# Patient Record
Sex: MALE | Race: BLACK OR AFRICAN AMERICAN | Employment: UNEMPLOYED | URBAN - METROPOLITAN AREA
[De-identification: names, ages, dates, MRNs, and addresses within clinical notes are randomized per-mention and may not be internally consistent; named-entity substitution may affect disease eponyms.]

---

## 2017-01-16 ENCOUNTER — HOSPITAL ENCOUNTER (EMERGENCY)
Age: 52
Discharge: HOME | End: 2017-01-16
Attending: EMERGENCY MEDICINE
Payer: SELF-PAY

## 2017-01-16 VITALS
OXYGEN SATURATION: 100 % | TEMPERATURE: 98.5 F | DIASTOLIC BLOOD PRESSURE: 86 MMHG | BODY MASS INDEX: 37.81 KG/M2 | SYSTOLIC BLOOD PRESSURE: 140 MMHG | HEART RATE: 97 BPM | WEIGHT: 240.9 LBS | RESPIRATION RATE: 17 BRPM | HEIGHT: 67 IN

## 2017-01-16 DIAGNOSIS — I83.891 BLEEDING FROM VARICOSE VEIN, RIGHT: Primary | ICD-10-CM

## 2017-01-16 PROCEDURE — 99283 EMERGENCY DEPT VISIT LOW MDM: CPT

## 2017-01-16 PROCEDURE — 74011250637 HC RX REV CODE- 250/637: Performed by: PHYSICIAN ASSISTANT

## 2017-01-16 RX ORDER — HYDROCODONE BITARTRATE AND ACETAMINOPHEN 5; 325 MG/1; MG/1
2 TABLET ORAL
Status: DISCONTINUED | OUTPATIENT
Start: 2017-01-16 | End: 2017-01-16

## 2017-01-16 RX ORDER — HYDROCODONE BITARTRATE AND ACETAMINOPHEN 5; 325 MG/1; MG/1
1 TABLET ORAL
Qty: 20 TAB | Refills: 0 | Status: SHIPPED | OUTPATIENT
Start: 2017-01-16 | End: 2017-01-16

## 2017-01-16 RX ADMIN — BACITRACIN, NEOMYCIN, POLYMYXIN B 1 PACKET: 400; 3.5; 5 OINTMENT TOPICAL at 19:34

## 2017-01-16 NOTE — ED NOTES
Emergency Department Nursing Plan of Care       The Nursing Plan of Care is developed from the Nursing assessment and Emergency Department Attending provider initial evaluation. The plan of care may be reviewed in the ED Provider note.     The Plan of Care was developed with the following considerations:   Patient / Family readiness to learn indicated by:verbalized understanding  Persons(s) to be included in education: patient  Barriers to Learning/Limitations:No    Signed     Peyton Zarate RN    1/16/2017   6:58 PM

## 2017-01-16 NOTE — ED TRIAGE NOTES
Pt presents via EMS from home with complaints of spontaneous \"squirting\" bleeding from RLE varicose vein.

## 2017-01-17 NOTE — ED NOTES
Patient given copy of dc instructions. Patient verbalized understanding of instructions and script (s). Patient given a current medication reconciliation form and verbalized understanding of their medications. Patient verbalized understanding of the importance of discussing medications with  his or her physician or clinic when they follow up. Patient alert and oriented and in no acute distress. Pt verbalizes pain scale of 0 out of 10. Patient discharged home ambulatory without assistance.

## 2017-01-17 NOTE — DISCHARGE INSTRUCTIONS
Varicose Veins: Care Instructions  Your Care Instructions  Varicose veins are twisted, enlarged veins near the surface of the skin. They develop most often in the legs and ankles. Some people may be more likely than others to get varicose veins because of aging or hormone changes or because a parent has them. Being overweight or pregnant can make varicose veins worse. Jobs that require standing for long periods of time also can make them worse. Follow-up care is a key part of your treatment and safety. Be sure to make and go to all appointments, and call your doctor if you are having problems. It's also a good idea to know your test results and keep a list of the medicines you take. How can you care for yourself at home? · Wear compression stockings during the day to help relieve symptoms. They improve blood flow and are the main treatment for varicose veins. Talk to your doctor about which ones to get and where to get them. · Prop up your legs at or above the level of your heart when possible. This helps keep the blood from pooling in your lower legs and improves blood flow to the rest of your body. · Avoid sitting and standing for long periods. This puts added stress on your veins. · Get regular exercise, and control your weight. Walk, bicycle, or swim to improve blood flow in your legs. · If you bump your leg so hard that you know it is likely to bruise, prop up your leg and put ice or a cold pack on the area for 10 to 20 minutes at a time. Try to do this every 1 to 2 hours for the next 3 days (when you are awake) or until the swelling goes down. Put a thin cloth between the ice and your skin. · If you cut or scratch the skin over a vein, it may bleed a lot. Prop up your leg and apply firm pressure with a clean bandage over the site of the bleeding. Continue to apply pressure for a full 15 minutes. Do not check sooner to see if the bleeding has stopped.  If the bleeding has not stopped after 15 minutes, apply pressure again for another 15 minutes. You can repeat this up to 3 times for a total of 45 minutes. If you have a blood clot in a varicose vein, you may have tenderness and swelling over the vein. The vein may feel firm. Be sure to call your doctor right away if you have these symptoms. If your doctor has told you how to care for the clot, follow his or her instructions. Care may include the following:  · Prop up your leg and apply heat with a warm, damp cloth or a heating pad set on low (put a towel or cloth between your leg and the heating pad to prevent burns). · Ask your doctor if you can take an over-the-counter pain medicine, such as acetaminophen (Tylenol), ibuprofen (Advil, Motrin), or naproxen (Aleve). Be safe with medicines. Read and follow all instructions on the label. When should you call for help? Call 911 anytime you think you may need emergency care. For example, call if:  · You have sudden chest pain and shortness of breath, or you cough up blood. Call your doctor now or seek immediate medical care if:  · You have signs of a blood clot, such as:  ¨ Pain in your calf, back of the knee, thigh, or groin. ¨ Redness and swelling in your leg or groin. · A varicose vein begins to bleed and you cannot stop it. · You have a tender lump in your leg. · You get an open sore. Watch closely for changes in your health, and be sure to contact your doctor if:  · Your varicose vein symptoms do not improve with home treatment. Where can you learn more? Go to http://peggy-austyn.info/. Enter P698 in the search box to learn more about \"Varicose Veins: Care Instructions. \"  Current as of: June 4, 2016  Content Version: 11.1  © 4379-2792 [employer]. Care instructions adapted under license by [employer] (which disclaims liability or warranty for this information).  If you have questions about a medical condition or this instruction, always ask your healthcare professional. [address] any warranty or liability for your use of this information.

## 2017-01-17 NOTE — ED NOTES
Dressing applied to right lower leg. Ace wrap reapplied over dressing for moderate pressure. Pt give additional supplies for dressing change.

## 2017-01-17 NOTE — ED PROVIDER NOTES
Patient is a 46 y.o. male presenting with ecchymosis. The history is provided by the patient. Bleeding/Bruising   This is a new problem. The current episode started less than 1 hour ago (Pt says had a varicose vein rupture R anterior leg bleeding profusely until EMS arrived and applied dressing). Pertinent negatives include no chest pain, no abdominal pain, no headaches and no shortness of breath. The symptoms are aggravated by standing. Past Medical History:   Diagnosis Date    Asthma        History reviewed. No pertinent past surgical history. History reviewed. No pertinent family history. Social History     Social History    Marital status: N/A     Spouse name: N/A    Number of children: N/A    Years of education: N/A     Occupational History    Not on file. Social History Main Topics    Smoking status: Light Tobacco Smoker    Smokeless tobacco: Not on file    Alcohol use Yes      Comment: social    Drug use: No    Sexual activity: Not on file     Other Topics Concern    Not on file     Social History Narrative    No narrative on file         ALLERGIES: Review of patient's allergies indicates no known allergies. Review of Systems   Constitutional: Negative for chills and fever. HENT: Negative for congestion. Eyes: Negative for redness. Respiratory: Negative for shortness of breath. Cardiovascular: Negative for chest pain. Gastrointestinal: Negative for abdominal pain. Genitourinary: Negative for dysuria, enuresis, flank pain, frequency and genital sores. Neurological: Negative for headaches. Vitals:    01/16/17 1847   BP: 140/86   Pulse: 97   Resp: 17   Temp: 98.5 °F (36.9 °C)   SpO2: 100%   Weight: 109.3 kg (240 lb 14.4 oz)   Height: 5' 7.2\" (1.707 m)            Physical Exam   Constitutional: He is oriented to person, place, and time. He appears well-developed and well-nourished. HENT:   Head: Normocephalic and atraumatic.    Eyes: EOM are normal. Pupils are equal, round, and reactive to light. Neck: Normal range of motion. Neck supple. Cardiovascular: Normal rate, regular rhythm and normal heart sounds. Pulmonary/Chest: Effort normal and breath sounds normal.   Abdominal: Soft. Bowel sounds are normal.   Musculoskeletal: Normal range of motion. He exhibits no edema, tenderness or deformity. After unwrapping dressing a 3 cm reba area of confluent varicose veins were revealed on ant. R mid lower leg. Bleeding had stopped. Neurological: He is alert and oriented to person, place, and time. He has normal reflexes. Skin: Skin is warm and dry. Psychiatric: He has a normal mood and affect.         MDM  Number of Diagnoses or Management Options  Diagnosis management comments: Dr Savita Markham saw pt and applied pressure dressing    ED Course       Procedures

## 2021-02-25 PROCEDURE — 99284 EMERGENCY DEPT VISIT MOD MDM: CPT

## 2021-02-26 ENCOUNTER — APPOINTMENT (OUTPATIENT)
Dept: ULTRASOUND IMAGING | Age: 56
DRG: 420 | End: 2021-02-26
Attending: HOSPITALIST
Payer: MEDICAID

## 2021-02-26 ENCOUNTER — APPOINTMENT (OUTPATIENT)
Dept: NON INVASIVE DIAGNOSTICS | Age: 56
DRG: 420 | End: 2021-02-26
Attending: HOSPITALIST
Payer: MEDICAID

## 2021-02-26 ENCOUNTER — APPOINTMENT (OUTPATIENT)
Dept: GENERAL RADIOLOGY | Age: 56
DRG: 420 | End: 2021-02-26
Attending: HOSPITALIST
Payer: MEDICAID

## 2021-02-26 ENCOUNTER — HOSPITAL ENCOUNTER (INPATIENT)
Age: 56
LOS: 5 days | Discharge: HOME HEALTH | DRG: 420 | End: 2021-03-03
Attending: EMERGENCY MEDICINE | Admitting: HOSPITALIST
Payer: MEDICAID

## 2021-02-26 DIAGNOSIS — L03.115 CELLULITIS OF RIGHT LOWER EXTREMITY: Primary | ICD-10-CM

## 2021-02-26 DIAGNOSIS — R60.0 BILATERAL LEG EDEMA: ICD-10-CM

## 2021-02-26 PROBLEM — E88.09 HYPOALBUMINEMIA: Status: ACTIVE | Noted: 2021-02-26

## 2021-02-26 PROBLEM — R73.9 HYPERGLYCEMIA: Status: ACTIVE | Noted: 2021-02-26

## 2021-02-26 LAB
ALBUMIN SERPL-MCNC: 2.6 G/DL (ref 3.5–5)
ALBUMIN/GLOB SERPL: 0.5 {RATIO} (ref 1.1–2.2)
ALP SERPL-CCNC: 69 U/L (ref 45–117)
ALT SERPL-CCNC: 21 U/L (ref 12–78)
ANION GAP SERPL CALC-SCNC: 7 MMOL/L (ref 5–15)
AST SERPL-CCNC: 36 U/L (ref 15–37)
BASOPHILS # BLD: 0 K/UL (ref 0–0.1)
BASOPHILS NFR BLD: 0 % (ref 0–1)
BILIRUB SERPL-MCNC: 0.2 MG/DL (ref 0.2–1)
BNP SERPL-MCNC: 17 PG/ML
BUN SERPL-MCNC: 16 MG/DL (ref 6–20)
BUN/CREAT SERPL: 18 (ref 12–20)
CALCIUM SERPL-MCNC: 8.1 MG/DL (ref 8.5–10.1)
CHLORIDE SERPL-SCNC: 106 MMOL/L (ref 97–108)
CO2 SERPL-SCNC: 25 MMOL/L (ref 21–32)
CREAT SERPL-MCNC: 0.88 MG/DL (ref 0.7–1.3)
DIFFERENTIAL METHOD BLD: ABNORMAL
ECHO AO ASC DIAM: 3.1 CM
ECHO AV AREA PEAK VELOCITY: 1.74 CM2
ECHO AV AREA PEAK VELOCITY: 1.89 CM2
ECHO AV AREA VTI: 1.84 CM2
ECHO AV AREA/BSA VTI: 0.8 CM2/M2
ECHO AV MEAN GRADIENT: 13.32 MMHG
ECHO AV PEAK GRADIENT: 21.78 MMHG
ECHO AV PEAK VELOCITY: 233.31 CM/S
ECHO AV VTI: 41.9 CM
ECHO EST RA PRESSURE: 10 MMHG
ECHO LA AREA 4C: 19.21 CM2
ECHO LA TO AORTIC ROOT RATIO: 1.09
ECHO LA VOL 4C: 53.48 ML (ref 18–58)
ECHO LA VOLUME INDEX A4C: 24.55 ML/M2 (ref 16–28)
ECHO LV INTERNAL DIMENSION DIASTOLIC MMODE: 5.54 CM
ECHO LV INTERNAL DIMENSION SYSTOLIC MMODE: 3.52 CM
ECHO LV IVSD MMODE: 1.04 CM
ECHO LV IVSS MMODE: 1.73 CM
ECHO LV POSTERIOR WALL DIASTOLIC MMODE: 0.92 CM
ECHO LV POSTERIOR WALL SYSTOLIC MMODE: 1.44 CM
ECHO LVOT CARDIAC OUTPUT: 8.27 LITER/MINUTE
ECHO LVOT DIAM: 2.09 CM
ECHO LVOT PEAK GRADIENT: 5.64 MMHG
ECHO LVOT PEAK GRADIENT: 6.65 MMHG
ECHO LVOT PEAK VELOCITY: 118.58 CM/S
ECHO LVOT PEAK VELOCITY: 128.95 CM/S
ECHO LVOT SV: 77 ML
ECHO LVOT VTI: 22.46 CM
ECHO PV PEAK INSTANTANEOUS GRADIENT SYSTOLIC: 5.04 MMHG
ECHO PV REGURGITANT MAX VELOCITY: 112.13 CM/S
ECHO RIGHT VENTRICULAR SYSTOLIC PRESSURE (RVSP): 19.53 MMHG
ECHO RV INTERNAL DIMENSION: 3.99 CM
ECHO TV REGURGITANT MAX VELOCITY: 154.34 CM/S
ECHO TV REGURGITANT MAX VELOCITY: 180.54 CM/S
ECHO TV REGURGITANT PEAK GRADIENT: 9.53 MMHG
EOSINOPHIL # BLD: 0.3 K/UL (ref 0–0.4)
EOSINOPHIL NFR BLD: 5 % (ref 0–7)
ERYTHROCYTE [DISTWIDTH] IN BLOOD BY AUTOMATED COUNT: 12.5 % (ref 11.5–14.5)
EST. AVERAGE GLUCOSE BLD GHB EST-MCNC: 240 MG/DL
GLOBULIN SER CALC-MCNC: 4.9 G/DL (ref 2–4)
GLUCOSE BLD STRIP.AUTO-MCNC: 192 MG/DL (ref 65–100)
GLUCOSE BLD STRIP.AUTO-MCNC: 200 MG/DL (ref 65–100)
GLUCOSE BLD STRIP.AUTO-MCNC: 219 MG/DL (ref 65–100)
GLUCOSE BLD STRIP.AUTO-MCNC: 220 MG/DL (ref 65–100)
GLUCOSE SERPL-MCNC: 303 MG/DL (ref 65–100)
HBA1C MFR BLD: 10 % (ref 4–5.6)
HCT VFR BLD AUTO: 34.5 % (ref 36.6–50.3)
HGB BLD-MCNC: 10.4 G/DL (ref 12.1–17)
IMM GRANULOCYTES # BLD AUTO: 0 K/UL (ref 0–0.04)
IMM GRANULOCYTES NFR BLD AUTO: 0 % (ref 0–0.5)
LACTATE SERPL-SCNC: 1.5 MMOL/L (ref 0.4–2)
LVOT MG: 3.27 MMHG
LYMPHOCYTES # BLD: 1.1 K/UL (ref 0.8–3.5)
LYMPHOCYTES NFR BLD: 20 % (ref 12–49)
MCH RBC QN AUTO: 26.1 PG (ref 26–34)
MCHC RBC AUTO-ENTMCNC: 30.1 G/DL (ref 30–36.5)
MCV RBC AUTO: 86.5 FL (ref 80–99)
MONOCYTES # BLD: 0.5 K/UL (ref 0–1)
MONOCYTES NFR BLD: 8 % (ref 5–13)
NEUTS SEG # BLD: 3.8 K/UL (ref 1.8–8)
NEUTS SEG NFR BLD: 67 % (ref 32–75)
NRBC # BLD: 0 K/UL (ref 0–0.01)
NRBC BLD-RTO: 0 PER 100 WBC
PLATELET # BLD AUTO: 188 K/UL (ref 150–400)
PMV BLD AUTO: 11.9 FL (ref 8.9–12.9)
POTASSIUM SERPL-SCNC: 4.3 MMOL/L (ref 3.5–5.1)
PROT SERPL-MCNC: 7.5 G/DL (ref 6.4–8.2)
RBC # BLD AUTO: 3.99 M/UL (ref 4.1–5.7)
SERVICE CMNT-IMP: ABNORMAL
SODIUM SERPL-SCNC: 138 MMOL/L (ref 136–145)
WBC # BLD AUTO: 5.8 K/UL (ref 4.1–11.1)

## 2021-02-26 PROCEDURE — 87040 BLOOD CULTURE FOR BACTERIA: CPT

## 2021-02-26 PROCEDURE — 65270000015 HC RM PRIVATE ONCOLOGY

## 2021-02-26 PROCEDURE — 97161 PT EVAL LOW COMPLEX 20 MIN: CPT

## 2021-02-26 PROCEDURE — 83880 ASSAY OF NATRIURETIC PEPTIDE: CPT

## 2021-02-26 PROCEDURE — 97165 OT EVAL LOW COMPLEX 30 MIN: CPT

## 2021-02-26 PROCEDURE — 74011250637 HC RX REV CODE- 250/637: Performed by: HOSPITALIST

## 2021-02-26 PROCEDURE — 97530 THERAPEUTIC ACTIVITIES: CPT

## 2021-02-26 PROCEDURE — 77030040718 HC DRSG HYDRGEL SKINTEGRITY MDII -A

## 2021-02-26 PROCEDURE — 74011250637 HC RX REV CODE- 250/637: Performed by: INTERNAL MEDICINE

## 2021-02-26 PROCEDURE — 93970 EXTREMITY STUDY: CPT

## 2021-02-26 PROCEDURE — 74011250636 HC RX REV CODE- 250/636: Performed by: HOSPITALIST

## 2021-02-26 PROCEDURE — 83605 ASSAY OF LACTIC ACID: CPT

## 2021-02-26 PROCEDURE — 80053 COMPREHEN METABOLIC PANEL: CPT

## 2021-02-26 PROCEDURE — 71045 X-RAY EXAM CHEST 1 VIEW: CPT

## 2021-02-26 PROCEDURE — 85025 COMPLETE CBC W/AUTO DIFF WBC: CPT

## 2021-02-26 PROCEDURE — 74011250637 HC RX REV CODE- 250/637: Performed by: EMERGENCY MEDICINE

## 2021-02-26 PROCEDURE — 36415 COLL VENOUS BLD VENIPUNCTURE: CPT

## 2021-02-26 PROCEDURE — 2709999900 HC NON-CHARGEABLE SUPPLY

## 2021-02-26 PROCEDURE — 74011250636 HC RX REV CODE- 250/636: Performed by: EMERGENCY MEDICINE

## 2021-02-26 PROCEDURE — 83036 HEMOGLOBIN GLYCOSYLATED A1C: CPT

## 2021-02-26 PROCEDURE — 74011000258 HC RX REV CODE- 258: Performed by: HOSPITALIST

## 2021-02-26 PROCEDURE — 74011636637 HC RX REV CODE- 636/637: Performed by: HOSPITALIST

## 2021-02-26 PROCEDURE — 82962 GLUCOSE BLOOD TEST: CPT

## 2021-02-26 PROCEDURE — 93306 TTE W/DOPPLER COMPLETE: CPT

## 2021-02-26 RX ORDER — SODIUM CHLORIDE 0.9 % (FLUSH) 0.9 %
5-40 SYRINGE (ML) INJECTION EVERY 8 HOURS
Status: DISCONTINUED | OUTPATIENT
Start: 2021-02-26 | End: 2021-03-03 | Stop reason: HOSPADM

## 2021-02-26 RX ORDER — MAGNESIUM SULFATE 100 %
4 CRYSTALS MISCELLANEOUS AS NEEDED
Status: DISCONTINUED | OUTPATIENT
Start: 2021-02-26 | End: 2021-03-03 | Stop reason: HOSPADM

## 2021-02-26 RX ORDER — PROMETHAZINE HYDROCHLORIDE 25 MG/1
12.5 TABLET ORAL
Status: DISCONTINUED | OUTPATIENT
Start: 2021-02-26 | End: 2021-03-03 | Stop reason: HOSPADM

## 2021-02-26 RX ORDER — ONDANSETRON 2 MG/ML
4 INJECTION INTRAMUSCULAR; INTRAVENOUS
Status: DISCONTINUED | OUTPATIENT
Start: 2021-02-26 | End: 2021-03-03 | Stop reason: HOSPADM

## 2021-02-26 RX ORDER — INSULIN GLARGINE 100 [IU]/ML
25 INJECTION, SOLUTION SUBCUTANEOUS DAILY
COMMUNITY
End: 2022-07-20 | Stop reason: SDUPTHER

## 2021-02-26 RX ORDER — FUROSEMIDE 10 MG/ML
20 INJECTION INTRAMUSCULAR; INTRAVENOUS DAILY
Status: DISCONTINUED | OUTPATIENT
Start: 2021-02-26 | End: 2021-03-02

## 2021-02-26 RX ORDER — ACETAMINOPHEN 325 MG/1
650 TABLET ORAL
Status: DISCONTINUED | OUTPATIENT
Start: 2021-02-26 | End: 2021-03-03 | Stop reason: HOSPADM

## 2021-02-26 RX ORDER — INSULIN LISPRO 100 [IU]/ML
INJECTION, SOLUTION INTRAVENOUS; SUBCUTANEOUS
Status: DISCONTINUED | OUTPATIENT
Start: 2021-02-26 | End: 2021-03-03 | Stop reason: HOSPADM

## 2021-02-26 RX ORDER — DEXTROSE 50 % IN WATER (D50W) INTRAVENOUS SYRINGE
12.5-25 AS NEEDED
Status: DISCONTINUED | OUTPATIENT
Start: 2021-02-26 | End: 2021-03-03 | Stop reason: HOSPADM

## 2021-02-26 RX ORDER — GLIPIZIDE 10 MG/1
10 TABLET ORAL 2 TIMES DAILY
COMMUNITY
End: 2021-08-11 | Stop reason: ALTCHOICE

## 2021-02-26 RX ORDER — ENOXAPARIN SODIUM 100 MG/ML
40 INJECTION SUBCUTANEOUS DAILY
Status: DISCONTINUED | OUTPATIENT
Start: 2021-02-26 | End: 2021-03-03 | Stop reason: HOSPADM

## 2021-02-26 RX ORDER — OXYCODONE AND ACETAMINOPHEN 5; 325 MG/1; MG/1
1 TABLET ORAL
Status: DISCONTINUED | OUTPATIENT
Start: 2021-02-26 | End: 2021-03-02

## 2021-02-26 RX ORDER — ACETAMINOPHEN 650 MG/1
650 SUPPOSITORY RECTAL
Status: DISCONTINUED | OUTPATIENT
Start: 2021-02-26 | End: 2021-03-03 | Stop reason: HOSPADM

## 2021-02-26 RX ORDER — POLYETHYLENE GLYCOL 3350 17 G/17G
17 POWDER, FOR SOLUTION ORAL DAILY PRN
Status: DISCONTINUED | OUTPATIENT
Start: 2021-02-26 | End: 2021-03-03 | Stop reason: HOSPADM

## 2021-02-26 RX ORDER — METFORMIN HYDROCHLORIDE 500 MG/1
2 TABLET, FILM COATED, EXTENDED RELEASE ORAL 2 TIMES DAILY
COMMUNITY

## 2021-02-26 RX ORDER — ATORVASTATIN CALCIUM 40 MG/1
40 TABLET, FILM COATED ORAL DAILY
COMMUNITY
End: 2022-02-24 | Stop reason: SDUPTHER

## 2021-02-26 RX ORDER — FUROSEMIDE 10 MG/ML
40 INJECTION INTRAMUSCULAR; INTRAVENOUS ONCE
Status: COMPLETED | OUTPATIENT
Start: 2021-02-26 | End: 2021-02-26

## 2021-02-26 RX ORDER — SODIUM CHLORIDE 0.9 % (FLUSH) 0.9 %
5-40 SYRINGE (ML) INJECTION AS NEEDED
Status: DISCONTINUED | OUTPATIENT
Start: 2021-02-26 | End: 2021-03-03 | Stop reason: HOSPADM

## 2021-02-26 RX ORDER — FUROSEMIDE 20 MG/1
20 TABLET ORAL DAILY
Status: ON HOLD | COMMUNITY
End: 2021-07-15 | Stop reason: SDUPTHER

## 2021-02-26 RX ORDER — OXYCODONE AND ACETAMINOPHEN 5; 325 MG/1; MG/1
2 TABLET ORAL
Status: COMPLETED | OUTPATIENT
Start: 2021-02-26 | End: 2021-02-26

## 2021-02-26 RX ADMIN — INSULIN LISPRO 2 UNITS: 100 INJECTION, SOLUTION INTRAVENOUS; SUBCUTANEOUS at 17:42

## 2021-02-26 RX ADMIN — AMPICILLIN SODIUM AND SULBACTAM SODIUM 3 G: 2; 1 INJECTION, POWDER, FOR SOLUTION INTRAMUSCULAR; INTRAVENOUS at 12:54

## 2021-02-26 RX ADMIN — FUROSEMIDE 20 MG: 10 INJECTION, SOLUTION INTRAMUSCULAR; INTRAVENOUS at 08:46

## 2021-02-26 RX ADMIN — ACETAMINOPHEN 650 MG: 325 TABLET ORAL at 20:23

## 2021-02-26 RX ADMIN — OXYCODONE HYDROCHLORIDE AND ACETAMINOPHEN 1 TABLET: 5; 325 TABLET ORAL at 13:25

## 2021-02-26 RX ADMIN — OXYCODONE HYDROCHLORIDE AND ACETAMINOPHEN 2 TABLET: 5; 325 TABLET ORAL at 01:06

## 2021-02-26 RX ADMIN — ACETAMINOPHEN 650 MG: 325 TABLET ORAL at 08:46

## 2021-02-26 RX ADMIN — FUROSEMIDE 40 MG: 10 INJECTION, SOLUTION INTRAMUSCULAR; INTRAVENOUS at 04:17

## 2021-02-26 RX ADMIN — Medication 10 ML: at 13:08

## 2021-02-26 RX ADMIN — AMPICILLIN SODIUM AND SULBACTAM SODIUM 3 G: 2; 1 INJECTION, POWDER, FOR SOLUTION INTRAMUSCULAR; INTRAVENOUS at 18:11

## 2021-02-26 RX ADMIN — INSULIN LISPRO 2 UNITS: 100 INJECTION, SOLUTION INTRAVENOUS; SUBCUTANEOUS at 12:56

## 2021-02-26 RX ADMIN — INSULIN LISPRO 2 UNITS: 100 INJECTION, SOLUTION INTRAVENOUS; SUBCUTANEOUS at 08:46

## 2021-02-26 RX ADMIN — AMPICILLIN SODIUM AND SULBACTAM SODIUM 3 G: 2; 1 INJECTION, POWDER, FOR SOLUTION INTRAMUSCULAR; INTRAVENOUS at 06:01

## 2021-02-26 RX ADMIN — ENOXAPARIN SODIUM 40 MG: 40 INJECTION SUBCUTANEOUS at 08:46

## 2021-02-26 NOTE — PROGRESS NOTES
End of Shift Note    Bedside shift change report given to [facility] (oncoming nurse) by Noe Rodriguez (offgoing nurse). Report included the following information SBAR, Kardex and MAR    Shift worked: Day shift. Shift summary and any significant changes:     Patient tolerated care fairly throughout shift. Hourly rounding completed. Medications given and education provided regarding all meds. Patient up x1 to the bedside. Complaints of pain treated with PRN meds. Wound care completed. Concerns for physician to address:       Zone phone for oncoming shift:          Activity:  Activity Level:  Up with Assistance  Number times ambulated in hallways past shift: 0, patient up in their room  Number of times OOB to chair past shift: 1    Cardiac:   Cardiac Monitoring: Yes           Access:   Current line(s): PIV     Genitourinary:   Urinary status: voiding    Respiratory:   O2 Device: Room air  Chronic home O2 use?: NO  Incentive spirometer at bedside: NO     GI:     Current diet:  DIET DIABETIC CONSISTENT CARB Regular  Passing flatus: YES  Tolerating current diet: YES       Pain Management:   Patient states pain is manageable on current regimen: YES    Skin:     Interventions: float heels, increase time out of bed and PT/OT consult    Patient Safety:  Fall Score:    Interventions: bed/chair alarm, gripper socks and pt to call before getting OOB       Length of Stay:  Expected LOS: 3d 4h  Actual LOS: 0      Noe Rodriguez

## 2021-02-26 NOTE — PROGRESS NOTES
Pharmacy Clarification of the Prior to Admission Medication Regimen Retrospective to the Admission Medication Reconciliation    The patient was  interviewed regarding clarification of the prior to admission medication regimen. Patient was questioned regarding use of any other inhalers, topical products, over the counter medications, herbal medications, vitamin products or ophthalmic/nasal/otic medication use. Information Obtained From: Patient    Recommendations/Findings: The following amendments were made to the patient's active medication list on file at [facility]:     1) Additions:   Atorvastatin  Furosemide  Glipizide  Lantus  Metformin ER      2) Removals: 0      3) Changes: 0      4) Pertinent Pharmacy Findings:  Updated patients preferred outpatient pharmacy to:  [facility] on [address] [facility] medication list was corrected to the following:     Prior to Admission Medications   Prescriptions Last Dose Informant Taking?   atorvastatin (LIPITOR) 40 mg tablet  Self Yes   Sig: Take 40 mg by mouth daily. furosemide (LASIX) 20 mg tablet  Self Yes   Sig: Take 20 mg by mouth daily. glipiZIDE (GLUCOTROL) 10 mg tablet  Self Yes   Sig: Take 10 mg by mouth two (2) times a day. insulin glargine (Lantus U-100 Insulin) 100 unit/mL injection  Self Yes   Si Units by SubCUTAneous route daily. metFORMIN (GLUMETZA ER) 500 mg TG24 24 hour tablet  Self Yes   Sig: Take 2 Tabs by mouth two (2) times a day.       Facility-Administered Medications: None        Thank you,  Sofia Silva CPhT  Medication History Pharmacy Technician

## 2021-02-26 NOTE — ED PROVIDER NOTES
EMERGENCY DEPARTMENT HISTORY AND PHYSICAL EXAM    Please note that this dictation was completed with [employer], the computer voice recognition software. Quite often unanticipated grammatical, syntax, homophones, and other interpretive errors are inadvertently transcribed by the computer software. Please disregard these errors. Please excuse any errors that have escaped final proofreading. Date: 2/26/2021  Patient Name: Luis Escalona  Patient Age and Sex: 64 y.o. male    History of Presenting Illness     Chief Complaint   Patient presents with    Leg Pain     pt arrives to the ED via EMS from home with c/o right leg swelling, pain, and exortiation x several weeks. pt states he was satrted on lasix a few weeks ago and has been taking 800mg tyelnol for pain. pain 10/10 at this time       History Provided By: Patient    HPI: Luis Escalona, is a 64 y.o. male whose medical history is noted below and includes prostate cancer, varicose veins, obesity, presents to the ED with acute and severe pain in his right leg. Pain started a week ago, but acutely worsened over the last 24 hours. He has now trouble walking. Patient does not have a primary care doctor and the only doctor he sees is his \"prostate doctor\". This physician started him on 20 mg of Lasix recently. He is taking that daily but has not noticed any increase or change in his urine output. He has bilateral lower extremity edema that he reports has been worsening over few weeks. In addition to the pain in his right leg, he noticed yesterday that his pant leg above the knee has became soaked with fluid which is draining from the new skin excoriations in his right leg, most notably calf. No fevers or chills. No systemic symptoms otherwise. Patient lives alone, states that he does not have any help at home including with wound care.   He is unable to reach the lower extremities sufficiently to provide himself with sufficient wound care.    Pt denies any other alleviating or exacerbating factors. No other associated signs or symptoms. There are no other complaints, changes or physical findings at this time. PCP: None    Past History   All documented elements of the [facility] reviewed and verified by me. -Fartun Gutierrez MD    Past Medical History:  Past Medical History:   Diagnosis Date    Asthma        Past Surgical History:  History reviewed. No pertinent surgical history. Family History:  History reviewed. No pertinent family history. Social History:  Social History     Tobacco Use    Smoking status: Light Tobacco Smoker   Substance Use Topics    Alcohol use: Yes     Comment: social    Drug use: No       Allergies:  No Known Allergies    Review of Systems   All other systems reviewed and negative    Review of Systems   Constitutional: Negative for appetite change and fever. HENT: Negative. Eyes: Negative. Respiratory: Negative for cough and shortness of breath. Cardiovascular: Positive for leg swelling. Negative for chest pain and palpitations. Gastrointestinal: Negative for abdominal pain, nausea and vomiting. Endocrine: Negative. Genitourinary: Negative for dysuria, flank pain and hematuria. Musculoskeletal: Negative for back pain and joint swelling. Skin: Positive for color change and wound. Neurological: Negative for dizziness, weakness, light-headedness, numbness and headaches. Hematological: Negative for adenopathy. All other systems reviewed and are negative. Physical Exam   Reviewed patients vital signs and nursing note    Physical Exam  Vitals signs and nursing note reviewed. Constitutional:       Appearance: He is obese. HENT:      Head: Atraumatic. Mouth/Throat:      Mouth: Mucous membranes are moist.   Eyes:      General: No scleral icterus. Extraocular Movements: Extraocular movements intact.       Conjunctiva/sclera: Conjunctivae normal.      Pupils: Pupils are equal, round, and reactive to light.   Neck:      Musculoskeletal: Normal range of motion and neck supple.   Cardiovascular:      Rate and Rhythm: Normal rate and regular rhythm.      Pulses: Normal pulses.      Heart sounds: Normal heart sounds.   Pulmonary:      Effort: Pulmonary effort is normal.      Breath sounds: Normal breath sounds.   Abdominal:      Palpations: Abdomen is soft.      Tenderness: There is no abdominal tenderness.   Musculoskeletal: Normal range of motion.      Right lower leg: Edema present.      Left lower leg: Edema present.   Skin:     General: Skin is warm.      Capillary Refill: Capillary refill takes less than 2 seconds.      Comments: Right lower extremity below knee has multiple skin excoriations likely from lower extremity edema.     Neurological:      General: No focal deficit present.   Psychiatric:         Mood and Affect: Mood normal.         Behavior: Behavior normal.         Diagnostic Study Results     Labs - I have personally reviewed and interpreted all laboratory results. Meka Kumar MD, MSc  Recent Results (from the past 24 hour(s))   CBC WITH AUTOMATED DIFF    Collection Time: 02/26/21 12:19 AM   Result Value Ref Range    WBC 5.8 4.1 - 11.1 K/uL    RBC 3.99 (L) 4.10 - 5.70 M/uL    HGB 10.4 (L) 12.1 - 17.0 g/dL    HCT 34.5 (L) 36.6 - 50.3 %    MCV 86.5 80.0 - 99.0 FL    MCH 26.1 26.0 - 34.0 PG    MCHC 30.1 30.0 - 36.5 g/dL    RDW 12.5 11.5 - 14.5 %    PLATELET 188 150 - 400 K/uL    MPV 11.9 8.9 - 12.9 FL    NRBC 0.0 0  WBC    ABSOLUTE NRBC 0.00 0.00 - 0.01 K/uL    NEUTROPHILS 67 32 - 75 %    LYMPHOCYTES 20 12 - 49 %    MONOCYTES 8 5 - 13 %    EOSINOPHILS 5 0 - 7 %    BASOPHILS 0 0 - 1 %    IMMATURE GRANULOCYTES 0 0.0 - 0.5 %    ABS. NEUTROPHILS 3.8 1.8 - 8.0 K/UL    ABS. LYMPHOCYTES 1.1 0.8 - 3.5 K/UL    ABS. MONOCYTES 0.5 0.0 - 1.0 K/UL    ABS. EOSINOPHILS 0.3 0.0 - 0.4 K/UL    ABS. BASOPHILS 0.0 0.0 - 0.1 K/UL    ABS. IMM. GRANS. 0.0 0.00 - 0.04 K/UL    DF AUTOMATED    METABOLIC PANEL, COMPREHENSIVE    Collection Time: 02/26/21 12:19 AM   Result Value Ref Range    Sodium 138 136 - 145 mmol/L    Potassium 4.3 3.5 - 5.1 mmol/L    Chloride 106 97 - 108 mmol/L    CO2 25 21 - 32 mmol/L    Anion gap 7 5 - 15 mmol/L    Glucose 303 (H) 65 - 100 mg/dL    BUN 16 6 - 20 MG/DL    Creatinine 0.88 0.70 - 1.30 MG/DL    BUN/Creatinine ratio 18 12 - 20      GFR est AA >60 >60 ml/min/1.73m2    GFR est non-AA >60 >60 ml/min/1.73m2    Calcium 8.1 (L) 8.5 - 10.1 MG/DL    Bilirubin, total 0.2 0.2 - 1.0 MG/DL    ALT (SGPT) 21 12 - 78 U/L    AST (SGOT) 36 15 - 37 U/L    Alk. phosphatase 69 45 - 117 U/L    Protein, total 7.5 6.4 - 8.2 g/dL    Albumin 2.6 (L) 3.5 - 5.0 g/dL    Globulin 4.9 (H) 2.0 - 4.0 g/dL    A-G Ratio 0.5 (L) 1.1 - 2.2     NT-PRO BNP    Collection Time: 02/26/21 12:19 AM   Result Value Ref Range    NT pro-BNP 17 <125 PG/ML       Radiologic Studies - I have personally reviewed and interpreted all imaging studies and agree with radiology interpretation and report. - Stephanie Wilson MD, MSc  No orders to display         Medical Decision Making   I am the first provider for this patient. Records Reviewed: I reviewed our electronic medical record system for any past medical records that were available that may contribute to the patient's current condition, including their PMH, surgical history, social and family history. Reviewed the nursing notes and vital signs from today's visit. Nursing notes will be reviewed as they become available in realtime while the pt has been in the ED. In addition, I read most recent discharge summaries, if available and reviewed prior ECGs or imaging studies for comparison purposes. Stephanie Wilson MD Msc    Vital Signs-Reviewed the patient's vital signs.   Patient Vitals for the past 24 hrs:   Temp Pulse Resp BP SpO2   02/25/21 2327 98.6 °F (37 °C) (!) 109 24 (!) 156/96 100 %         Provider Notes (Medical Decision Making):   Patient has history of varicose veins and venous insufficiency is likely the main reason for his development of edema. It seems that the amount of fluid retention however has recently worsened and now progressed to the point where he is unable to care for himself nor walk. His leg has significant superficial skin excoriations that is likely from the edema, this may be early cellulitis and he is certainly at high risk for development of cellulitis which is why I will start him on antibiotics. No signs or symptoms suggestive of severe sepsis or septic shock. He has no help at home, does not have follow-up, does not have appropriate follow-up. At this point I would recommend admitting him for diuresis, wound care, continuation of antibiotics and ensuring that his blood cultures are negative. Care coordination highly suggested to help him navigate and obtain sufficient outpatient care. ED Course:   Initial assessment performed. The patients presenting problems have been discussed, and they are in agreement with the care plan formulated and outlined with them. I have encouraged them to ask questions as they arise throughout their visit. Consult Note:  Elsy Cancino MD spoke with  Admitting hospitalist,   Discussed pt's hx, physical exam and available diagnostic and imaging results. Reviewed care plans. Agree with management and plan thus far. DISPOSITION: ADMIT  Patient is being admitted to the hospital.  Their test results and reasons for admission have been discussed. The patient and/or available family express agreement with and understanding of the need to be admitted and their admission diagnosis. Thank you for resuming the care of this patient. Please don't hesitate to contact me in the emergency department if you  have any additional questions. Elsy Cancino MD, MSc        Myra Nichole MD, am the attending of record for this patient encounter. Diagnosis     Clinical Impression:   1.  Cellulitis of right lower extremity    2. Bilateral leg edema        Attestation:  I personally performed the services described in this documentation on this date 2/26/2021 for patient Carroll Ponce.   Marta Shin MD

## 2021-02-26 NOTE — ED NOTES
Bedside shift change report given to Timmy Martins (oncoming nurse) by Cindy Castle (offgoing nurse). Report included the following information SBAR, Kardex and ED Summary.

## 2021-02-26 NOTE — PROGRESS NOTES
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 Unsure if he is able to get any assist living there. He states he does not use a device for amb. Per chart, he has been unable to take care of wounds on legs    HOME [address]: The patient lived alone with no local support. Does mention a sister, but unclear of her involvement    Physical Therapy Goals  Initiated 2/26/2021  1. Patient will move from supine to sit and sit to supine , scoot up and down, and roll side to side in bed with independence within 7 day(s). 2.  Patient will transfer from bed to chair and chair to bed with modified independence using the least restrictive device within 7 day(s). 3.  Patient will perform sit to stand with modified independence within 7 day(s). 4.  Patient will ambulate with modified independence for 150 feet with the least restrictive device within 7 day(s). Outcome: Not Met   PHYSICAL THERAPY EVALUATION  Patient: Rod Ny (11 y.o. male)  Date: 2/26/2021  Primary Diagnosis: Cellulitis of right leg [L03.115]  Lower leg edema [R60.0]  Hyperglycemia [R73.9]  Hypoalbuminemia [E88.09]        Precautions: fall  Skin(BLE wounds)    ASSESSMENT  Based on the objective data described below, the patient presents with extreme 10/10 pain LEs especially on RLE. Pt sitting on EOB at arrival. Leg noted to be open, weeping. Pt states the pain makes all tolerance to mobility difficult. He stated he would demonstrate how he ambulates and was able to get up with S and amb 4' forward and back with CGA with gait quality degrading even with the short distance. Pt tearful with the pain. RN called to see pt and pt left in her care at end of session.  Pt may benefit from use of RW or cane next session to relieve pressure on LEs and aid in gait tolerance. Current Level of Function Impacting Discharge (mobility/balance): S to CGA    Functional Outcome Measure: The patient scored 55/100 on the barthel outcome measure which is indicative of 45% functional impairment. Other factors to consider for discharge: poor tolerance to all mobility due to pain     Patient will benefit from skilled therapy intervention to address the above noted impairments. PLAN :  Recommendations and Planned Interventions: bed mobility training, transfer training, gait training, therapeutic exercises, patient and family training/education, and therapeutic activities      Frequency/Duration: Patient will be followed by physical therapy:  4 times a week to address goals. Recommendation for discharge: (in order for the patient to meet his/her long term goals)  Physical therapy at least 2 days/week in the home     This discharge recommendation:  A follow-up discussion with the attending provider and/or case management is planned    IF patient discharges home will need the following DME: walker vs cane - to be evaluated         SUBJECTIVE:   Patient stated It really hurts.     OBJECTIVE DATA SUMMARY:   HISTORY:    Past Medical History:   Diagnosis Date    Asthma     Lower leg edema     Prostate cancer ([facility] [address]    History reviewed. No pertinent surgical history. Personal factors and/or comorbidities impacting plan of care: LE edema    Home Situation  [facility] [address] Name: [facility]  # Steps to Enter: 0  One/Two Story Residence: One story  Living Alone: Yes  Support Systems: Family member(s)(mentioned sister)  Current DME Used/Available at Home: None  Tub or Shower Type: Shower    EXAMINATION/PRESENTATION/DECISION MAKING:   Critical Behavior:   Alert and Ox4, tearful due to pain           Hearing:   Auditory  Auditory Impairment: None    Range Of Motion:  AROM: Generally decreased, functional Strength:    Strength: Generally decreased, functional                    Tone & Sensation:   Tone: Normal              Sensation: (BUEs intact, BLEs NT d/t wounds)               Coordination:  Coordination: Generally decreased, functional  Vision:   Acuity: Within Defined Limits  Corrective Lenses: Reading glasses  Functional Mobility:  Bed Mobility:        Sit to Supine: (NT d/t BLE wounds/p! yet likely independent )  Scooting: Independent  Transfers:  Sit to Stand: Supervision  Stand to Sit: Contact guard assistance        Bed to Chair: Contact guard assistance(d/t decreasing balance when p! increases)              Balance:   Sitting: Intact  Standing: Impaired; Without support(leaned on counter for support)  Standing - Static: Good;Constant support(uses hand on furniture)  Standing - Dynamic : Fair;Constant support  Ambulation/Gait Training:  Distance (ft): 8 Feet (ft)  Assistive Device: (none)  Ambulation - Level of Assistance: Contact guard assistance        Gait Abnormalities: Antalgic;Decreased step clearance;Trunk sway increased(gait stability and tolerance decline as pain increases)        Base of Support: Shift to left  Stance: Left increased;Right decreased  Speed/Jazmyn: Slow;Pace decreased (<100 feet/min)  Step Length: Left shortened          Functional Measure:  Barthel Index:    Bathin  Bladder: 10  Bowels: 10  Groomin  Dressin  Feeding: 10  Mobility: 0  Stairs: 0  Toilet Use: 5  Transfer (Bed to Chair and Back): 10  Total: 55/100       The Barthel ADL Index: Guidelines  1. The index should be used as a record of what a patient does, not as a record of what a patient could do. 2. The main aim is to establish degree of independence from any help, physical or verbal, however minor and for whatever reason. 3. The need for supervision renders the patient not independent. 4. A patient's performance should be established using the best available evidence.  Asking the patient, friends/relatives and nurses are the usual sources, but direct observation and common sense are also important. However direct testing is not needed. 5. Usually the patient's performance over the preceding 24-48 hours is important, but occasionally longer periods will be relevant. 6. Middle categories imply that the patient supplies over 50 per cent of the effort. 7. Use of aids to be independent is allowed. Benjie Ward., Barthel, D.W. ([id number]). Functional evaluation: the Barthel Index. [facility] (14)2. CONY BecerraF, Wes Quiroz., Jagjit Gilliam., Ximena, [address] (1999). Measuring the change indisability after inpatient rehabilitation; comparison of the responsiveness of the Barthel Index and Functional Price Measure. Journal of Neurology, Neurosurgery, and Psychiatry, 66(4), 826-795. Derrell Mello, NPerriJ.A, ELDER Silva, & Whitney Zhang M.A. (2004.) Assessment of post-stroke quality of life in cost-effectiveness studies: The usefulness of the Barthel Index and the EuroQoL-5D. Quality of Life Research, 15, 530-81           Physical Therapy Evaluation Charge Determination   History Examination Presentation Decision-Making   MEDIUM  Complexity : 1-2 comorbidities / personal factors will impact the outcome/ POC  MEDIUM Complexity : 3 Standardized tests and measures addressing body structure, function, activity limitation and / or participation in recreation  MEDIUM Complexity : Evolving with changing characteristics  LOW Complexity : FOTO score of       Based on the above components, the patient evaluation is determined to be of the following complexity level: LOW       Activity Tolerance:   Poor    After treatment patient left in no apparent distress:   Call bell within reach, Caregiver / family present, and sitting at EOB    COMMUNICATION/EDUCATION:   The patients plan of care was discussed with: Occupational therapist and Registered nurse.      Fall prevention education was provided and the patient/caregiver indicated understanding., Patient/family have participated as able in goal setting and plan of care. , and Patient/family agree to work toward stated goals and plan of care.     Thank you for this referral.  Umang Duque, PT   Time Calculation: 15 mins

## 2021-02-26 NOTE — H&P
Hospitalist Admission Note    NAME: Dong Notice   :  1965   MRN:  [id number]     Date/Time:  2021 3:55 AM    Patient PCP: None  _____________________________________________________________________  Given the patient's current clinical presentation, I have a high level of concern for decompensation if discharged from the emergency department. Complex decision making was performed, which includes reviewing the patient's available past medical records, laboratory results, and x-ray films. My assessment of this patient's clinical condition and my plan of care is as follows. Assessment / Plan:    Right LE Cellulitis  B/L leg edema   Patient has history of varicose veins and venous insufficiency is likely the main reason for his development of edema. It seems that the amount of fluid retention however has recently worsened and now progressed to the point where he is unable to care for himself nor walk. His leg has significant superficial skin excoriations that is likely from the edema, this may be early cellulitis and he is certainly at high risk for development of cellulitis which is why  will start him on antibiotics.    -Will  Admit to hospitalist  Service for diuresis prn, wound care, continuation of antibiotics and   Care coordination highly suggested to help him navigate and obtain sufficient outpatient care.  -check venous duplex to r/o dvt  -check 2 d echo  -Wound care consulted       Hyperglycemia 303  Check a1c  Cont diabetic diet ssi    Hypoalbuminemia  2.6  Cont. Nutrition supplement  And nutrtionist consulted  Check UA for proteinuria    Code Status: FULL  NOK:  Vangie Chery Mother [id number]       DVT Prophylaxis: SQ LOVENOX  GI Prophylaxis: not indicated    Baseline: Patient lives alone, states that he does not have any help at home including with wound care.   He is unable to reach the lower extremities sufficiently to provide himself with sufficient wound care. Subjective:   CHIEF COMPLAINT:  c/o right leg swelling, pain, and exortiation x several weeks. pt states he was satrted on lasix a few weeks ago  and has been taking 800mg tyelnol for pain. pain 10/10 at this time    HISTORY OF PRESENT ILLNESS:  : Nehemias Da Silva, is a 64 y.o. male whose medical history is noted below and includes prostate cancer, varicose veins, obesity, presents to the ED with acute and severe pain in his right leg. Pain started a week ago, but acutely worsened over the last 24 hours. He has now trouble walking. Patient does not have a primary care doctor and the only doctor he sees is his \"prostate doctor\". This physician started him on 20 mg of Lasix recently. He is taking that daily but has not noticed any increase or change in his urine output. He has bilateral lower extremity edema that he reports has been worsening over few weeks. In addition to the pain in his right leg, he noticed yesterday that his pant leg above the knee has became soaked with fluid which is draining from the new skin excoriations in his right leg, most notably calf. No fevers or chills. No systemic symptoms otherwise. Pt denies any other alleviating or exacerbating factors. No other associated signs or symptoms. There are no other complaints, changes or physical findings at this time. PCP: None      We were asked to admit for work up and evaluation of the above problems. Vital Signs-Reviewed the patient's vital signs. Patient Vitals for the past 24 hrs:    Temp Pulse Resp BP SpO2   02/25/21 2327 98.6 °F (37 °C) (!) 109 24 (!) 156/96 100 %         Past Medical History:   Diagnosis Date    Asthma     Lower leg edema     Prostate cancer ([facility] [address]         History reviewed. No pertinent surgical history.     Social History     Tobacco Use    Smoking status: Light Tobacco Smoker   Substance Use Topics    Alcohol use: Yes     Comment: social        History reviewed. No pertinent family history. No Known Allergies     Prior to Admission medications    Not on File       REVIEW OF SYSTEMS:     I am not able to complete the review of systems because: The patient is intubated and sedated    The patient has altered mental status due to his acute medical problems    The patient has baseline aphasia from prior stroke(s)    The patient has baseline dementia and is not reliable historian    The patient is in acute medical distress and unable to provide information         Constitutional: Negative for appetite change and fever. HENT: Negative. Eyes: Negative. Respiratory: Negative for cough and shortness of breath. Cardiovascular: Positive for leg swelling. Negative for chest pain and palpitations. Gastrointestinal: Negative for abdominal pain, nausea and vomiting. Endocrine: Negative. Genitourinary: Negative for dysuria, flank pain and hematuria. Musculoskeletal: Negative for back pain and joint swelling. Skin: Positive for color change and wound. Neurological: Negative for dizziness, weakness, light-headedness, numbness and headaches. Hematological: Negative for adenopathy. All other systems reviewed and are negative. Objective:   VITALS:    Visit Vitals  BP (!) 156/96 (BP 1 Location: Left upper arm, BP Patient Position: Sitting)   Pulse (!) 109   Temp 98.6 °F (37 °C)   Resp 24   Ht 5' 6\" (1.676 m)   Wt 110.9 kg (244 lb 7.8 oz)   SpO2 100%   BMI 39.46 kg/m²       PHYSICAL EXAM:    Constitutional:       Appearance: He is obese. HENT:      Head: Atraumatic. Mouth/Throat:      Mouth: Mucous membranes are moist.   Eyes:      General: No scleral icterus. Extraocular Movements: Extraocular movements intact. Conjunctiva/sclera: Conjunctivae normal.      Pupils: Pupils are equal, round, and reactive to light. Neck:      Musculoskeletal: Normal range of motion and neck supple.    Cardiovascular:      Rate and Rhythm: Normal rate and regular rhythm. Pulses: Normal pulses. Heart sounds: Normal heart sounds. Pulmonary:      Effort: Pulmonary effort is normal.      Breath sounds: Normal breath sounds. Abdominal:      Palpations: Abdomen is soft. Tenderness: There is no abdominal tenderness. Musculoskeletal: Normal range of motion. Right lower leg: Edema present. Left lower leg: Edema present. Skin:     General: Skin is warm. Capillary Refill: Capillary refill takes less than 2 seconds. Comments: Right lower extremity below knee has multiple skin excoriations likely from lower extremity edema. Neurological:      General: No focal deficit present. Psychiatric:         Mood and Affect: Mood normal.         Behavior: Behavior normal.       _______________________________________________________________________  Care Plan discussed with:    Comments   Patient y    Family      RN y    Care Manager                    Consultant:  amadeo Ed md   _______________________________________________________________________  Expected  Disposition:   Home with Family y   HH/PT/OT/RN    SNF/LTC    KAMINI    ________________________________________________________________________  TOTAL TIME:  72  Minutes    Critical Care Provided     Minutes non procedure based      Comments    y Reviewed previous records   >50% of visit spent in counseling and coordination of care y Discussion with patient and/or family and questions answered       Given the patient's current clinical presentation, I have a high level of concern for decompensation if discharged from the ED. Complex decision making was performed which includes reviewing the patient's available past medical records, laboratory results, and Xray films.  I have also directly communicated my plan and discussed this case with the involved ED physician.     ____________________________________________________________________  Kip Rob, MD    Procedures: see electronic medical records for all procedures/Xrays and details which were not copied into this note but were reviewed prior to creation of Plan. LAB DATA REVIEWED:    Recent Results (from the past 24 hour(s))   CBC WITH AUTOMATED DIFF    Collection Time: 02/26/21 12:19 AM   Result Value Ref Range    WBC 5.8 4.1 - 11.1 K/uL    RBC 3.99 (L) 4.10 - 5.70 M/uL    HGB 10.4 (L) 12.1 - 17.0 g/dL    HCT 34.5 (L) 36.6 - 50.3 %    MCV 86.5 80.0 - 99.0 FL    MCH 26.1 26.0 - 34.0 PG    MCHC 30.1 30.0 - 36.5 g/dL    RDW 12.5 11.5 - 14.5 %    PLATELET 120 809 - 584 K/uL    MPV 11.9 8.9 - 12.9 FL    NRBC 0.0 0  WBC    ABSOLUTE NRBC 0.00 0.00 - 0.01 K/uL    NEUTROPHILS 67 32 - 75 %    LYMPHOCYTES 20 12 - 49 %    MONOCYTES 8 5 - 13 %    EOSINOPHILS 5 0 - 7 %    BASOPHILS 0 0 - 1 %    IMMATURE GRANULOCYTES 0 0.0 - 0.5 %    ABS. NEUTROPHILS 3.8 1.8 - 8.0 K/UL    ABS. LYMPHOCYTES 1.1 0.8 - 3.5 K/UL    ABS. MONOCYTES 0.5 0.0 - 1.0 K/UL    ABS. EOSINOPHILS 0.3 0.0 - 0.4 K/UL    ABS. BASOPHILS 0.0 0.0 - 0.1 K/UL    ABS. IMM. GRANS. 0.0 0.00 - 0.04 K/UL    DF AUTOMATED     METABOLIC PANEL, COMPREHENSIVE    Collection Time: 02/26/21 12:19 AM   Result Value Ref Range    Sodium 138 136 - 145 mmol/L    Potassium 4.3 3.5 - 5.1 mmol/L    Chloride 106 97 - 108 mmol/L    CO2 25 21 - 32 mmol/L    Anion gap 7 5 - 15 mmol/L    Glucose 303 (H) 65 - 100 mg/dL    BUN 16 6 - 20 MG/DL    Creatinine 0.88 0.70 - 1.30 MG/DL    BUN/Creatinine ratio 18 12 - 20      GFR est AA >60 >60 ml/min/1.73m2    GFR est non-AA >60 >60 ml/min/1.73m2    Calcium 8.1 (L) 8.5 - 10.1 MG/DL    Bilirubin, total 0.2 0.2 - 1.0 MG/DL    ALT (SGPT) 21 12 - 78 U/L    AST (SGOT) 36 15 - 37 U/L    Alk.  phosphatase 69 45 - 117 U/L    Protein, total 7.5 6.4 - 8.2 g/dL    Albumin 2.6 (L) 3.5 - 5.0 g/dL    Globulin 4.9 (H) 2.0 - 4.0 g/dL    A-G Ratio 0.5 (L) 1.1 - 2.2     NT-PRO BNP    Collection Time: 02/26/21 12:19 AM   Result Value Ref Range    NT pro-BNP 17 <125 PG/ML

## 2021-02-26 NOTE — PROGRESS NOTES
Comprehensive Nutrition Assessment    Type and Reason for Visit: Initial, Consult    Nutrition Recommendations/Plan:   Continue CCD  RD added Glucerna BID    Nursing please document % meals and supplements consumed in flowsheet I/O's under intake     Nutrition Assessment:      Consult received for hypoalbuminemia. Please note that albumin in not considered a reliable indicator for malnutrition. Chart reviewed, few notes in at this time. Pt noted for RLE cellulitis, BL edema. Hx of prostate ca and varicose veins. RD visited pt at bedside who repeatedly complained of pain in his legs. He reports trouble moving and sleeping d/t his pain, with decreased intake though he was in too much pain to elaborate much. Pt agreeable to ONS to promote intake. A1c 10 today, pt would benefit from diet education. Not a good time today d/t pain. Will f/u. Limited wt hx. Wt Readings from Last 10 Encounters:   02/25/21 110.9 kg (244 lb 7.8 oz)   01/16/17 109.3 kg (240 lb 14.4 oz)   ]    Estimated Daily Nutrient Needs:  Energy (kcal): 1946 kcal (BMR x 1.3 - 500); Weight Used for Energy Requirements: Current  Protein (g): 78-91g (1.2-1.4g/kg); Weight Used for Protein Requirements: Ideal  Fluid (ml/day): 1900mL; Method Used for Fluid Requirements: 1 ml/kcal      Nutrition Related Findings:  Labs: A1c 10 (2/26), -220mg/dL. Meds: unasyn, lasix, humalog, percocet. Edema 4+weeping/pitting BLE. Wounds:    None       Current Nutrition Therapies:  DIET DIABETIC CONSISTENT CARB Regular  DIET NUTRITIONAL SUPPLEMENTS Breakfast, Dinner; Glucerna Shake    Anthropometric Measures:  · Height:  5' 6\" (167.6 cm)  · Current Body Wt:  110.9 kg (244 lb 7.8 oz)   · Ideal Body Wt:  142 lbs:  172.2 %   · BMI Category:  Obese class 2 (BMI 35.0-39. 9)       Nutrition Diagnosis:   · Altered nutrition-related lab values related to endocrine dysfunction as evidenced by (A1c 10 (2/26), -220mg/dL)      Nutrition Interventions:   Food and/or Nutrient Delivery: Continue current diet, Start oral nutrition supplement  Nutrition Education and Counseling: No recommendations at this time  Coordination of Nutrition Care: Continue to monitor while inpatient    Goals:  PO intake >50% meals and ONS with BG WNL next 3-5 days       Nutrition Monitoring and Evaluation:   Behavioral-Environmental Outcomes: Knowledge or skill  Food/Nutrient Intake Outcomes: Food and nutrient intake, Supplement intake  Physical Signs/Symptoms Outcomes: Biochemical data, Skin, Weight, GI status, Fluid status or edema    Discharge Planning:    Continue current diet, Recommend pursue outpatient nutrition counseling, Continue oral nutrition supplement     Electronically signed by Kannan Mccormack RD on 2/26/2021 at 12:54 PM    Contact: [phone]  Pager [phone]

## 2021-02-26 NOTE — REMOTE MONITORING
Attempted to contact RN - Left message with Charge RN Eleazar Tobin in regards to three hour sepsis bundle    Yordan Cee RN, BSN  Sepsis [facility]  0

## 2021-02-26 NOTE — WOUND CARE
Wound Care consult for leg wounds that were present on admission. This patient has a history of venous insufficiency and edema. Over the past few days he has developed pain and much more edema. Patient does have neuropathy (by his description) and he has problems lifting his legs. He cannot reach down to his feet to do any washing of his legs / feet. He goes to a \"foot doctor\" to get his toenails cut but has not been in a few months. Patient expressed, \"I need help at home with all of this stuff\". Assessment: The Right lower leg is edematous and weepy with clear - serosanguinous fluid. The open areas of the skin appear to be from the edema itself and the fact that his skin is not regularly cleansed. The open wounds are partial thickness and red, inflamed with heat on the mid lower leg that extends from the ankle to 3/4 the way to the knee. WOUND POA CONDITIONS    Wound Pretibial Right cellulitis with soft tissue wounds that are draining. (Active)   Wound Etiology Venous 02/26/21 1506   Dressing Status New dressing applied 02/26/21 1506   Cleansed Soap and water; Other (Comment) 02/26/21 1506   Dressing/Treatment Hydrating gel with Ag;Xeroform;ABD pad;Gauze dressing/dressing sponge; Ace wrap 02/26/21 1506   Dressing Change Due 02/27/21 02/26/21 1506   Wound Assessment Bleeding;Erythema;Pink/red;Subcutaneous 02/26/21 1506   Drainage Amount Small 02/26/21 1506   Drainage Description Serosanguinous 02/26/21 1506   Wound Odor None 02/26/21 1506   Ann-Wound/Incision Assessment Denuded;Edematous;Fragile; Warm 02/26/21 1506   Edges Epibole (rolled edges) 02/26/21 1506   Wound Thickness Description Partial thickness 02/26/21 1506       Treatment: the wounds / skin on the legs was cleansed with CHG Soap and water wet soft cloths to remove the soap. Silvasorb wound gel applied and then a 4 Xeroform gauzes. ABD heavy drainage pack applied and the rest of the 4x4's. Wrapped with Dorothy (NOT kerlix).     Plan: wound care orders written for the same wound care to be done daily. Once the leg starts to feel better, we can change the wound care to every other day when he goes home and use silver dressings with the wound gel. Float the heels at all times. Patient has neuropathy / decreased sensation in the feet. Risk for Pressure injury.   Abbi Portillo RN , BSN, [employer]

## 2021-02-26 NOTE — PROGRESS NOTES
Problem: Self Care Deficits Care Plan (Adult)  Goal: *Acute Goals and Plan of Care (Insert Text)  Description:   FUNCTIONAL STATUS PRIOR TO ADMISSION:  Lives alone at [facility]. 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 Patient was independent and active without use of DME. Admits to increasing difficulty with performing his own BLE wound care. States, \"Its tough but I manage it\" when asked if he has any difficulty getting dressed. Sits/stands to sponge bathe. Denies Hx of falls.     HOME SUPPORT: Resides at [facility]. Takes Uber to grocery store. Mentioned a sister who helped him call an Uber, yet stated \"No\" when asked if she helps him with other IADL tasks or could be available to assist at D/C PRN.     Occupational Therapy Goals  Initiated 2/26/2021  1.  Patient will perform grooming tasks standing at the sink with independence within 7 day(s).  2.  Patient will perform sponge bathing with modified independence using long handled AE PRN within 7 day(s).  3.  Patient will perform lower body dressing with modified independence within 7 day(s).  4.  Patient will perform toilet transfers with independence within 7 day(s).  5.  Patient will perform all aspects of toileting with independence within 7 day(s).     Outcome: Not Met   OCCUPATIONAL THERAPY EVALUATION  Patient: Rick Pappas (56 y.o. male)  Date: 2/26/2021  Primary Diagnosis: Cellulitis of right leg [L03.115]  Lower leg edema [R60.0]  Hyperglycemia [R73.9]  Hypoalbuminemia [E88.09]       Precautions:  Skin(BLE wounds)    ASSESSMENT  Based on the objective data described below, the patient presents with c/o 10/10 BLE pain at wound sites, decreased standing tolerance and decreased functional activity tolerance. Pt was received sitting EOB eating his lunch with c/o 10/10 pain on arrival d/t BLE wounds which were open to air. He stood with Supervision and walked to sink with CGA, as he was observed leaning on counter for  support and calling out in pain with each step. Pt was assisted back to EOB with RN present to evaluate pain level. Anticipate Pt can safely D/C back to [facility] with [facility] OT once pain is controlled and wound care is provided, pending progress. Current Level of Function Impacting Discharge (ADLs/self-care): Up to Mod A    Functional Outcome Measure: The patient scored Total: 55/100 on the Barthel Index outcome measure which is indicative of 45% impaired ability to care for basic self needs/dependency on others; inferred 45% dependency on others for instrumental ADLs. Other factors to consider for discharge: Needs wound care management, Lives alone. Patient will benefit from skilled therapy intervention to address the above noted impairments. PLAN :  Recommendations and Planned Interventions: self care training, therapeutic activities, endurance activities, patient education and home safety training    Frequency/Duration: Patient will be followed by occupational therapy 4 times a week to address goals. Recommendation for discharge: (in order for the patient to meet his/her long term goals)  Occupational therapy at least 2 days/week in the home     This discharge recommendation:  Has been made in collaboration with the attending provider and/or case management    IF patient discharges home will need the following DME: May benefit from long handled AE for distal LB ADLs       SUBJECTIVE:   Patient stated I can barely stand up it hurts so bad! \" (Pt in tears d/t p!)    OBJECTIVE DATA SUMMARY:   HISTORY:   Past Medical History:   Diagnosis Date    Asthma     Lower leg edema     Prostate cancer ([facility] [address]    History reviewed. No pertinent surgical history.     Expanded or extensive additional review of patient history:     Home Situation  Home Environment: [address] Name: Santhosh Rouse [facility]  # Steps to Enter: 0  One/Two Story Residence: One story  Living Alone: Yes  Support Systems: Family member(s)(mentioned sister)  Current DME Used/Available at Home: None  Tub or Shower Type: Shower    Hand dominance: Left    EXAMINATION OF PERFORMANCE DEFICITS:  Cognitive/Behavioral Status:     Skin: BLE wounds on anterior/posterior aspects, open to air. Edema: Moderate edema observed BLEs    Hearing: Auditory  Auditory Impairment: None    Vision/Perceptual:                         Acuity: Within Defined Limits    Corrective Lenses: Reading glasses    Range of Motion:  AROM: Generally decreased, functional                         Strength:  Strength: Generally decreased, functional                Coordination:  Coordination: Generally decreased, functional  Fine Motor Skills-Upper: Left Intact; Right Intact    Gross Motor Skills-Upper: Left Intact; Right Intact    Tone & Sensation:  Tone: Normal  Sensation: (BUEs intact, BLEs NT d/t wounds)                      Balance:  Sitting: Intact  Standing: Impaired; Without support(leaned on counter for support)  Standing - Static: Good;Constant support  Standing - Dynamic : Fair;Constant support    Functional Mobility and Transfers for ADLs:  Bed Mobility:  Sit to Supine: (NT d/t BLE wounds/p! yet likely independent )  Scooting: Independent    Transfers:  Sit to Stand: Supervision  Stand to Sit: Contact guard assistance  Bed to Chair: Contact guard assistance(d/t decreasing balance when p! increases)  Bathroom Mobility: Contact guard assistance  Toilet Transfer : Contact guard assistance    ADL Assessment:  Feeding: Independent    Oral Facial Hygiene/Grooming: Contact guard assistance(standing, when able to tolerate)    Bathing: Minimum assistance    Upper Body Dressing: Setup    Lower Body Dressing:  Moderate assistance(d/t dec distal reach, BLE p! and limited standing tolerance)    Toileting: Minimum assistance                ADL Intervention and task modifications:  Feeding  Feeding Assistance: Independent(eating lunch on arrival)                        Lower Body Dressing Assistance  Socks: Total assistance (dependent)              Therapeutic Exercise:   Functional Measure:  Barthel Index:    Bathin  Bladder: 10  Bowels: 10  Groomin  Dressin  Feeding: 10  Mobility: 0  Stairs: 0  Toilet Use: 5  Transfer (Bed to Chair and Back): 10  Total: 55/100        The Barthel ADL Index: Guidelines  1. The index should be used as a record of what a patient does, not as a record of what a patient could do. 2. The main aim is to establish degree of independence from any help, physical or verbal, however minor and for whatever reason. 3. The need for supervision renders the patient not independent. 4. A patient's performance should be established using the best available evidence. Asking the patient, friends/relatives and nurses are the usual sources, but direct observation and common sense are also important. However direct testing is not needed. 5. Usually the patient's performance over the preceding 24-48 hours is important, but occasionally longer periods will be relevant. 6. Middle categories imply that the patient supplies over 50 per cent of the effort. 7. Use of aids to be independent is allowed. Rosa Chambers., Barthel, D.W. (1670). Functional evaluation: the Barthel Index. [address] [facility] (14)2. Lesley Cortez nicole TREVIN Gill, Sneha Patel., Severa Mouse., [place], [address] [place] [address] (). Measuring the change indisability after inpatient rehabilitation; comparison of the responsiveness of the Barthel Index and Functional [place] Measure. Journal of Neurology, Neurosurgery, and Psychiatry, 66(4), 498-136. Kassandra Nath, N.J.A, ELDER Silva, & Ashwini Harrington MPerriA. (2004.) Assessment of post-stroke quality of life in cost-effectiveness studies: The usefulness of the Barthel Index and the EuroQoL-5D.  Quality of Life Research, 15, 019-97         Occupational Therapy Evaluation Charge Determination   History Examination Decision-Making   LOW Complexity : Brief history review  LOW Complexity : 1-3 performance deficits relating to physical, cognitive , or psychosocial skils that result in activity limitations and / or participation restrictions  LOW Complexity : No comorbidities that affect functional and no verbal or physical assistance needed to complete eval tasks       Based on the above components, the patient evaluation is determined to be of the following complexity level: LOW   Pain Rating:  10/10 BLE pain at wound sites. RN present and aware. Activity Tolerance:   Poor d/t BLE p! After treatment patient left in no apparent distress:    Sitting EOB with RN presdent. COMMUNICATION/EDUCATION:   The patients plan of care was discussed with: Physical therapist, Registered nurse, and Patient . Home safety education was provided and the patient/caregiver indicated understanding., Patient/family have participated as able in goal setting and plan of care. , and Patient/family agree to work toward stated goals and plan of care.       Thank you for this referral.  Robe Lorenzo OTR/L  Time Calculation: 13 mins

## 2021-02-26 NOTE — PROGRESS NOTES
0700- assumed care of pt this am. Pt is alert and oriented with bilateral lower leg wounds and edema. TRANSFER - OUT REPORT: 
 
Verbal report given to Deirdre(name) on Luis Escalona  being transferred to 1120(unit) for routine progression of care Report consisted of patients Situation, Background, Assessment and  
Recommendations(SBAR). Information from the following report(s) SBAR, Kardex, ED Summary and Intake/Output was reviewed with the receiving nurse. Opportunity for questions and clarification was provided. Patient transported with: 
 [facility]

## 2021-02-26 NOTE — PROGRESS NOTES
0700- assumed care of pt this am. Pt is alert and oriented with bilateral lower leg wounds and edema.

## 2021-02-27 LAB
ANION GAP SERPL CALC-SCNC: 6 MMOL/L (ref 5–15)
BASOPHILS # BLD: 0 K/UL (ref 0–0.1)
BASOPHILS NFR BLD: 0 % (ref 0–1)
BUN SERPL-MCNC: 12 MG/DL (ref 6–20)
BUN/CREAT SERPL: 16 (ref 12–20)
CALCIUM SERPL-MCNC: 8.4 MG/DL (ref 8.5–10.1)
CHLORIDE SERPL-SCNC: 104 MMOL/L (ref 97–108)
CO2 SERPL-SCNC: 30 MMOL/L (ref 21–32)
CREAT SERPL-MCNC: 0.76 MG/DL (ref 0.7–1.3)
DIFFERENTIAL METHOD BLD: NORMAL
EOSINOPHIL # BLD: 0.3 K/UL (ref 0–0.4)
EOSINOPHIL NFR BLD: 3 % (ref 0–7)
ERYTHROCYTE [DISTWIDTH] IN BLOOD BY AUTOMATED COUNT: 12.5 % (ref 11.5–14.5)
GLUCOSE BLD STRIP.AUTO-MCNC: 166 MG/DL (ref 65–100)
GLUCOSE BLD STRIP.AUTO-MCNC: 191 MG/DL (ref 65–100)
GLUCOSE BLD STRIP.AUTO-MCNC: 194 MG/DL (ref 65–100)
GLUCOSE BLD STRIP.AUTO-MCNC: 254 MG/DL (ref 65–100)
GLUCOSE SERPL-MCNC: 211 MG/DL (ref 65–100)
HCT VFR BLD AUTO: 34.7 % (ref 36.6–50.3)
HGB BLD-MCNC: 10.3 G/DL (ref 12.1–17)
IMM GRANULOCYTES # BLD AUTO: 0 K/UL (ref 0–0.04)
IMM GRANULOCYTES NFR BLD AUTO: 0 % (ref 0–0.5)
LYMPHOCYTES # BLD: 1.5 K/UL (ref 0.8–3.5)
LYMPHOCYTES NFR BLD: 20 % (ref 12–49)
MAGNESIUM SERPL-MCNC: 2 MG/DL (ref 1.6–2.4)
MCH RBC QN AUTO: 25.9 PG (ref 26–34)
MCHC RBC AUTO-ENTMCNC: 29.7 G/DL (ref 30–36.5)
MCV RBC AUTO: 87.4 FL (ref 80–99)
MONOCYTES # BLD: 0.6 K/UL (ref 0–1)
MONOCYTES NFR BLD: 8 % (ref 5–13)
NEUTS SEG # BLD: 5.1 K/UL (ref 1.8–8)
NEUTS SEG NFR BLD: 69 % (ref 32–75)
NRBC # BLD: 0 K/UL (ref 0–0.01)
NRBC BLD-RTO: 0 PER 100 WBC
PLATELET # BLD AUTO: 295 K/UL (ref 150–400)
PMV BLD AUTO: 11 FL (ref 8.9–12.9)
POTASSIUM SERPL-SCNC: 3.7 MMOL/L (ref 3.5–5.1)
RBC # BLD AUTO: 3.97 M/UL (ref 4.1–5.7)
SERVICE CMNT-IMP: ABNORMAL
SODIUM SERPL-SCNC: 140 MMOL/L (ref 136–145)
WBC # BLD AUTO: 7.6 K/UL (ref 4.1–11.1)

## 2021-02-27 PROCEDURE — 83735 ASSAY OF MAGNESIUM: CPT

## 2021-02-27 PROCEDURE — 65270000015 HC RM PRIVATE ONCOLOGY

## 2021-02-27 PROCEDURE — 74011636637 HC RX REV CODE- 636/637: Performed by: HOSPITALIST

## 2021-02-27 PROCEDURE — 36415 COLL VENOUS BLD VENIPUNCTURE: CPT

## 2021-02-27 PROCEDURE — 74011250636 HC RX REV CODE- 250/636: Performed by: HOSPITALIST

## 2021-02-27 PROCEDURE — 74011250637 HC RX REV CODE- 250/637: Performed by: HOSPITALIST

## 2021-02-27 PROCEDURE — 74011000258 HC RX REV CODE- 258: Performed by: HOSPITALIST

## 2021-02-27 PROCEDURE — 80048 BASIC METABOLIC PNL TOTAL CA: CPT

## 2021-02-27 PROCEDURE — 82962 GLUCOSE BLOOD TEST: CPT

## 2021-02-27 PROCEDURE — 85027 COMPLETE CBC AUTOMATED: CPT

## 2021-02-27 PROCEDURE — 74011250637 HC RX REV CODE- 250/637: Performed by: INTERNAL MEDICINE

## 2021-02-27 RX ADMIN — Medication 10 ML: at 22:00

## 2021-02-27 RX ADMIN — Medication 10 ML: at 00:39

## 2021-02-27 RX ADMIN — ACETAMINOPHEN 650 MG: 325 TABLET ORAL at 16:23

## 2021-02-27 RX ADMIN — ENOXAPARIN SODIUM 40 MG: 40 INJECTION SUBCUTANEOUS at 10:02

## 2021-02-27 RX ADMIN — OXYCODONE HYDROCHLORIDE AND ACETAMINOPHEN 1 TABLET: 5; 325 TABLET ORAL at 00:37

## 2021-02-27 RX ADMIN — INSULIN LISPRO 3 UNITS: 100 INJECTION, SOLUTION INTRAVENOUS; SUBCUTANEOUS at 13:11

## 2021-02-27 RX ADMIN — AMPICILLIN SODIUM AND SULBACTAM SODIUM 3 G: 2; 1 INJECTION, POWDER, FOR SOLUTION INTRAMUSCULAR; INTRAVENOUS at 13:10

## 2021-02-27 RX ADMIN — FUROSEMIDE 20 MG: 10 INJECTION, SOLUTION INTRAMUSCULAR; INTRAVENOUS at 10:03

## 2021-02-27 RX ADMIN — OXYCODONE HYDROCHLORIDE AND ACETAMINOPHEN 1 TABLET: 5; 325 TABLET ORAL at 07:20

## 2021-02-27 RX ADMIN — Medication 10 ML: at 06:28

## 2021-02-27 RX ADMIN — AMPICILLIN SODIUM AND SULBACTAM SODIUM 3 G: 2; 1 INJECTION, POWDER, FOR SOLUTION INTRAMUSCULAR; INTRAVENOUS at 17:10

## 2021-02-27 RX ADMIN — AMPICILLIN SODIUM AND SULBACTAM SODIUM 3 G: 2; 1 INJECTION, POWDER, FOR SOLUTION INTRAMUSCULAR; INTRAVENOUS at 00:38

## 2021-02-27 RX ADMIN — AMPICILLIN SODIUM AND SULBACTAM SODIUM 3 G: 2; 1 INJECTION, POWDER, FOR SOLUTION INTRAMUSCULAR; INTRAVENOUS at 06:28

## 2021-02-27 RX ADMIN — OXYCODONE HYDROCHLORIDE AND ACETAMINOPHEN 1 TABLET: 5; 325 TABLET ORAL at 20:24

## 2021-02-27 RX ADMIN — Medication 10 ML: at 13:12

## 2021-02-27 NOTE — PROGRESS NOTES
Hospitalist Progress Note    NAME: Radha Murillo   :  1965   MRN:  [id number]       Assessment / Plan:  Right LE Cellulitis  B/L leg edema   -Continue IV antibiotic Unasyn  -Continue wound care  -Wound care consult  -Blood culture growing gram-positive cocci  -Follow-up repeat blood culture     Hyperglycemia 303  Check a1c  Cont diabetic diet ssi    Hypoalbuminemia  2.6  Cont. Nutrition supplement  And nutrtionist consulted  Check UA for proteinuria         30.0 - 39.9 Obese / Body mass index is 39.46 kg/m². Code status: Full  Prophylaxis: Lovenox  Recommended Disposition: Home w/Family     Subjective:     Chief Complaint / Reason for Physician Visit  \"\". Discussed with RN events overnight. Still complaining from leg pain, repeat blood culture was ordered    Review of Systems:  Symptom Y/N Comments  Symptom Y/N Comments   Fever/Chills n   Chest Pain n    Poor Appetite    Edema y    Cough n   Abdominal Pain     Sputum    Joint Pain     SOB/HOFF n   Pruritis/Rash     Nausea/vomit n   Tolerating PT/OT     Diarrhea    Tolerating Diet y    Constipation n   Other       Could NOT obtain due to:      Objective:     VITALS:   Last 24hrs VS reviewed since prior progress note. Most recent are:  Patient Vitals for the past 24 hrs:   Temp Pulse Resp BP SpO2   21 0817 98.5 °F (36.9 °C) 97 18 137/78 95 %   21 0738 -- 88 -- -- --   21 2358 98 °F (36.7 °C) (!) 115 18 (!) 151/81 --   21 100 °F (37.8 °C) -- -- -- --   21 1945 (!) 101.9 °F (38.8 °C) (!) 117 20 139/76 95 %   21 1606 98.7 °F (37.1 °C) 95 20 130/76 98 %   21 1525 -- -- -- 132/77 --       Intake/Output Summary (Last 24 hours) at 2021 1427  Last data filed at 2021 6332  Gross per 24 hour   Intake --   Output 450 ml   Net -450 ml        I had a face to face encounter and independently examined this patient on 2021, as outlined below:  PHYSICAL EXAM:  General: WD, WN.  Alert, cooperative, no acute distress    EENT:  EOMI. Anicteric sclerae. MMM  Resp:  CTA bilaterally, no wheezing or rales. No accessory muscle use  CV:  Regular  rhythm,  b/l edema  GI:  Soft, Non distended, Non tender. +Bowel sounds  Neurologic:  Alert and oriented X 3, normal speech,   Psych:   Good insight. Not anxious nor agitated  Skin:  No rashes. No jaundice    Reviewed most current lab test results and cultures  YES  Reviewed most current radiology test results   YES  Review and summation of old records today    NO  Reviewed patient's current orders and MAR    YES  PMH/SH reviewed - no change compared to H&P  ________________________________________________________________________  Care Plan discussed with:    Comments   Patient y    Family      RN y    Care Manager     Consultant                        Multidiciplinary team rounds were held today with , nursing, pharmacist and clinical coordinator. Patient's plan of care was discussed; medications were reviewed and discharge planning was addressed. ________________________________________________________________________  Total NON critical care TIME:  35  Minutes    Total CRITICAL CARE TIME Spent:   Minutes non procedure based      Comments   >50% of visit spent in counseling and coordination of care y    ________________________________________________________________________  Rufino Lawson MD     Procedures: see electronic medical records for all procedures/Xrays and details which were not copied into this note but were reviewed prior to creation of Plan. LABS:  I reviewed today's most current labs and imaging studies.   Pertinent labs include:  Recent Labs     02/27/21  0502 02/26/21  0019   WBC 7.6 5.8   HGB 10.3* 10.4*   HCT 34.7* 34.5*    188     Recent Labs     02/27/21  0502 02/26/21  0019    138   K 3.7 4.3    106   CO2 30 25   * 303*   BUN 12 16   CREA 0.76 0.88   CA 8.4* 8.1*   MG 2.0  --    ALB  --  2.6*   TBILI  -- 0. 2   ALT  --  21       Signed:  Palmira Chavez MD

## 2021-02-27 NOTE — PROGRESS NOTES
End of Shift Note    Bedside shift change report given to Uyen (oncoming nurse) by Aubrey Vazquez (offgoing nurse). Report included the following information SBAR, Kardex, Intake/Output and MAR    Shift worked:  Friday 7p-7a   Shift summary and any significant changes:     slept some, IV ABX, labs drawn, pain med given, wound care has not been done yet, was last done around 3pm yesterday      Concerns for physician to address:  HR was elevated and temp elevated, but came down some      Zone phone for oncoming shift:        Activity:  Activity Level: Up with Assistance  Number times ambulated in hallways past shift:0  Number of times OOB to chair past shift: 0    Cardiac:   Cardiac Monitoring: no           Access:   Current line(s): 20 G LFA, saline locked     Genitourinary:   Urinary status: voiding, urinal     Respiratory:   O2 Device: Room air  Chronic home O2 use?: no  Incentive spirometer at bedside: no     GI:     Current diet:  DIET DIABETIC CONSISTENT CARB Regular  DIET NUTRITIONAL SUPPLEMENTS Breakfast, Dinner; Glucerna Shake  Passing flatus: yes  Tolerating current diet: yes       Pain Management:   Patient states pain is manageable on current regimen: yes    Skin:  Wesley Score: 19  Interventions: float heels, repositioning     Patient Safety:  Fall Score:  Total Score: 3  Interventions: bed low, bed locked, bed alarm   High Fall Risk: Yes    Length of Stay:  Expected LOS: 3d 4h  Actual LOS: [facility]

## 2021-02-28 LAB
GLUCOSE BLD STRIP.AUTO-MCNC: 165 MG/DL (ref 65–100)
GLUCOSE BLD STRIP.AUTO-MCNC: 166 MG/DL (ref 65–100)
GLUCOSE BLD STRIP.AUTO-MCNC: 188 MG/DL (ref 65–100)
GLUCOSE BLD STRIP.AUTO-MCNC: 232 MG/DL (ref 65–100)
SERVICE CMNT-IMP: ABNORMAL

## 2021-02-28 PROCEDURE — 87040 BLOOD CULTURE FOR BACTERIA: CPT

## 2021-02-28 PROCEDURE — 74011000258 HC RX REV CODE- 258: Performed by: HOSPITALIST

## 2021-02-28 PROCEDURE — 65270000015 HC RM PRIVATE ONCOLOGY

## 2021-02-28 PROCEDURE — 74011636637 HC RX REV CODE- 636/637: Performed by: HOSPITALIST

## 2021-02-28 PROCEDURE — 74011000250 HC RX REV CODE- 250

## 2021-02-28 PROCEDURE — 74011250637 HC RX REV CODE- 250/637: Performed by: INTERNAL MEDICINE

## 2021-02-28 PROCEDURE — 82962 GLUCOSE BLOOD TEST: CPT

## 2021-02-28 PROCEDURE — 74011250637 HC RX REV CODE- 250/637: Performed by: HOSPITALIST

## 2021-02-28 PROCEDURE — 36415 COLL VENOUS BLD VENIPUNCTURE: CPT

## 2021-02-28 PROCEDURE — 74011250636 HC RX REV CODE- 250/636: Performed by: HOSPITALIST

## 2021-02-28 RX ORDER — SODIUM CHLORIDE 9 MG/ML
INJECTION INTRAMUSCULAR; INTRAVENOUS; SUBCUTANEOUS
Status: COMPLETED
Start: 2021-02-28 | End: 2021-02-28

## 2021-02-28 RX ADMIN — Medication 10 ML: at 09:21

## 2021-02-28 RX ADMIN — OXYCODONE HYDROCHLORIDE AND ACETAMINOPHEN 1 TABLET: 5; 325 TABLET ORAL at 12:45

## 2021-02-28 RX ADMIN — OXYCODONE HYDROCHLORIDE AND ACETAMINOPHEN 1 TABLET: 5; 325 TABLET ORAL at 02:10

## 2021-02-28 RX ADMIN — AMPICILLIN SODIUM AND SULBACTAM SODIUM 3 G: 2; 1 INJECTION, POWDER, FOR SOLUTION INTRAMUSCULAR; INTRAVENOUS at 07:20

## 2021-02-28 RX ADMIN — SODIUM CHLORIDE 10 ML: 9 INJECTION, SOLUTION INTRAMUSCULAR; INTRAVENOUS; SUBCUTANEOUS at 09:21

## 2021-02-28 RX ADMIN — Medication 10 ML: at 07:25

## 2021-02-28 RX ADMIN — AMPICILLIN SODIUM AND SULBACTAM SODIUM 3 G: 2; 1 INJECTION, POWDER, FOR SOLUTION INTRAMUSCULAR; INTRAVENOUS at 12:45

## 2021-02-28 RX ADMIN — ACETAMINOPHEN 650 MG: 325 TABLET ORAL at 00:21

## 2021-02-28 RX ADMIN — OXYCODONE HYDROCHLORIDE AND ACETAMINOPHEN 1 TABLET: 5; 325 TABLET ORAL at 07:23

## 2021-02-28 RX ADMIN — AMPICILLIN SODIUM AND SULBACTAM SODIUM 3 G: 2; 1 INJECTION, POWDER, FOR SOLUTION INTRAMUSCULAR; INTRAVENOUS at 17:55

## 2021-02-28 RX ADMIN — FUROSEMIDE 20 MG: 10 INJECTION, SOLUTION INTRAMUSCULAR; INTRAVENOUS at 09:20

## 2021-02-28 RX ADMIN — INSULIN LISPRO 1 UNITS: 100 INJECTION, SOLUTION INTRAVENOUS; SUBCUTANEOUS at 22:18

## 2021-02-28 RX ADMIN — OXYCODONE HYDROCHLORIDE AND ACETAMINOPHEN 1 TABLET: 5; 325 TABLET ORAL at 19:33

## 2021-02-28 RX ADMIN — Medication 10 ML: at 14:00

## 2021-02-28 RX ADMIN — AMPICILLIN SODIUM AND SULBACTAM SODIUM 3 G: 2; 1 INJECTION, POWDER, FOR SOLUTION INTRAMUSCULAR; INTRAVENOUS at 00:22

## 2021-02-28 RX ADMIN — ENOXAPARIN SODIUM 40 MG: 40 INJECTION SUBCUTANEOUS at 09:20

## 2021-02-28 NOTE — PROGRESS NOTES
Hospitalist Progress Note    NAME: Luis Escalona   :  1965   MRN:  [id number]       Assessment / Plan:  Right LE Cellulitis  B/L leg edema   -Continue IV antibiotic Unasyn  -Continue wound care  -Wound care consult  -Blood culture growing gram-positive cocci  -Follow-up repeat blood culture  --Wound care consultation     Hyperglycemia 303  Check a1c  Cont diabetic diet ssi    Hypoalbuminemia  2.6  Cont. Nutrition supplement  And nutrtionist consulted             30.0 - 39.9 Obese / Body mass index is 39.46 kg/m².     Code status: Full  Prophylaxis: Lovenox  Recommended Disposition: Home w/Family               Subjective:     Chief Complaint / Reason for Physician Visit  . Discussed with RN events overnight. Patient stated feels better, pain less, continue wound care    Review of Systems:  Symptom Y/N Comments  Symptom Y/N Comments   Fever/Chills n   Chest Pain n    Poor Appetite n   Edema     Cough    Abdominal Pain     Sputum    Joint Pain     SOB/HOFF n   Pruritis/Rash     Nausea/vomit n   Tolerating PT/OT     Diarrhea    Tolerating Diet y    Constipation n   Other       Could NOT obtain due to:      Objective:     VITALS:   Last 24hrs VS reviewed since prior progress note. Most recent are:  Patient Vitals for the past 24 hrs:   Temp Pulse Resp BP SpO2   21 0914 98.4 °F (36.9 °C) (!) 103 17 (!) 141/75 98 %   21 2355 98.8 °F (37.1 °C) 92 18 130/86 100 %   21 2059 98.6 °F (37 °C) 96 18 135/82 100 %   21 98.7 °F (37.1 °C) 91 20 (!) 143/88 --   21 1622 99.7 °F (37.6 °C) 97 18 118/76 93 %       Intake/Output Summary (Last 24 hours) at 2021 1245  Last data filed at 2021 0210  Gross per 24 hour   Intake 400 ml   Output 1500 ml   Net -1100 ml        I had a face to face encounter and independently examined this patient on 2021, as outlined below:  PHYSICAL EXAM:  General: WD, WN. Alert, cooperative, no acute distress    EENT:  EOMI. Anicteric sclerae. MMM  Resp:  CTA bilaterally, no wheezing or rales. No accessory muscle use  CV:  Regular  rhythm,  No edema  GI:  Soft, Non distended, Non tender. +Bowel sounds  Neurologic:  Alert and oriented X 3, normal speech,   Psych:   Good insight. Not anxious nor agitated  Skin:  No rashes. No jaundice    Reviewed most current lab test results and cultures  YES  Reviewed most current radiology test results   YES  Review and summation of old records today    NO  Reviewed patient's current orders and MAR    YES  PMH/SH reviewed - no change compared to H&P  ________________________________________________________________________  Care Plan discussed with:    Comments   Patient y    Family      RN y    Care Manager     Consultant                        Multidiciplinary team rounds were held today with , nursing, pharmacist and clinical coordinator. Patient's plan of care was discussed; medications were reviewed and discharge planning was addressed. ________________________________________________________________________  Total NON critical care TIME:  35   Minutes    Total CRITICAL CARE TIME Spent:   Minutes non procedure based      Comments   >50% of visit spent in counseling and coordination of care y    ________________________________________________________________________  Alma Pitts MD     Procedures: see electronic medical records for all procedures/Xrays and details which were not copied into this note but were reviewed prior to creation of Plan. LABS:  I reviewed today's most current labs and imaging studies. Pertinent labs include:  Recent Labs     02/27/21  0502 02/26/21  0019   WBC 7.6 5.8   HGB 10.3* 10.4*   HCT 34.7* 34.5*    188     Recent Labs     02/27/21  0502 02/26/21  0019    138   K 3.7 4.3    106   CO2 30 25   * 303*   BUN 12 16   CREA 0.76 0.88   CA 8.4* 8.1*   MG 2.0  --    ALB  --  2.6*   TBILI  --  0.2   ALT  --  21       Signed:  Alma Pitts, MD

## 2021-02-28 NOTE — PROGRESS NOTES
End of Shift Note    Bedside shift change report given to      (oncoming nurse) by Trey Shaffer (offgoing nurse). Report included the following information SBAR, Kardex, Intake/Output and MAR    Shift worked:  Sat 7p-7a     Shift summary and any significant changes:     required pain meds a couple times, may need to increase, blood cultures drawn, IV ABX given      Concerns for physician to address:       Zone phone for oncoming shift:          Activity:  Activity Level: Up with Assistance  Number times ambulated in hallways past shift 0  Number of times OOB to chair past shift: 0    Cardiac:   Cardiac Monitoring: no       Access:   Current line(s):     Genitourinary:   Urinary status: voiding, urinal     Respiratory:   O2 Device: Room air  Chronic home O2 use?: no  Incentive spirometer at bedside: no     GI:     Current diet:  DIET DIABETIC CONSISTENT CARB Regular  DIET NUTRITIONAL SUPPLEMENTS Breakfast, Dinner; Glucerna Shake  Passing flatus: yes  Tolerating current diet: yes       Pain Management:   Patient states pain is manageable on current regimen: no    Skin:  Wesley Score: 19  Interventions: repositioning     Patient Safety:  Fall Score:  Total Score: 3  Interventions: call bell, bed locked, bed down   High Fall Risk: Yes    Length of Stay:  Expected LOS: 3d 4h  Actual LOS: 2      Trey Shaffer

## 2021-02-28 NOTE — PROGRESS NOTES
New order received for OT. Patient seen 2/26 and POC will continue with treatment on Monday. Recommendation is for [place]. Thank you.

## 2021-02-28 NOTE — PROGRESS NOTES
End of Shift Note    Bedside shift change report given to John Jhaveri (oncoming nurse) by Amber Schaefer (offgoing nurse). Report included the following information SBAR, Kardex, Intake/Output, MAR, Accordion and Recent Results    Shift worked:  7a-7p     Shift summary and any significant changes:     Resumed care of pt at 1500; IV antibiotics given as ordered; pt sat on side of bed to eat dinner; no acute changes     Concerns for physician to address:       Zone phone for oncoming shift:          Activity:  Activity Level: Up with Assistance  Number times ambulated in hallways past shift: 0  Number of times OOB to chair past shift: 1 - on 7-3 shift    Cardiac:   Cardiac Monitoring: No           Access:   Current line(s): PIV     Genitourinary:   Urinary status: voiding    Respiratory:   O2 Device: Room air  Chronic home O2 use?: NO  Incentive spirometer at bedside: NO     GI:     Current diet:  DIET DIABETIC CONSISTENT CARB Regular  DIET NUTRITIONAL SUPPLEMENTS Breakfast, Dinner; Glucerna Shake  Passing flatus: YES  Tolerating current diet: YES       Pain Management:   Patient states pain is manageable on current regimen: YES    Skin:  Wesley Score: 20  Interventions: increase time out of bed    Patient Safety:  Fall Score:  Total Score: 2  Interventions: pt to call before getting OOB  High Fall Risk: Yes    Length of Stay:  Expected LOS: 3d 4h  Actual LOS: [address]

## 2021-02-28 NOTE — PROGRESS NOTES
End of Shift Note    Bedside shift change report given to Azra Mariano (oncoming nurse) by Devonte Zimmer (offgoing nurse). Report included the following information SBAR, Kardex, Intake/Output, MAR, Accordion and Recent Results    Shift worked:  7a-7p     Shift summary and any significant changes:     wound care completed; pt concerned about wound healing; Antibiotics given as ordered; no acute changes     Concerns for physician to address:       Zone phone for oncoming shift:          Activity:  Activity Level: Up with Assistance  Number times ambulated in hallways past shift: 0  Number of times OOB to chair past shift: 0    Cardiac:   Cardiac Monitoring: No           Access:   Current line(s): PIV     Genitourinary:   Urinary status: voiding    Respiratory:   O2 Device: Room air  Chronic home O2 use?: NO  Incentive spirometer at bedside: NO     GI:     Current diet:  DIET DIABETIC CONSISTENT CARB Regular  DIET NUTRITIONAL SUPPLEMENTS Breakfast, Dinner; Glucerna Shake  Passing flatus: YES  Tolerating current diet: YES       Pain Management:   Patient states pain is manageable on current regimen: YES    Skin:  Wesley Score: 19  Interventions: increase time out of bed    Patient Safety:  Fall Score:  Total Score: 3  Interventions: pt to call before getting OOB  High Fall Risk: Yes    Length of Stay:  Expected LOS: 3d 4h  Actual LOS: [address] [facility]

## 2021-03-01 LAB
ANION GAP SERPL CALC-SCNC: 3 MMOL/L (ref 5–15)
BASOPHILS # BLD: 0 K/UL (ref 0–0.1)
BASOPHILS NFR BLD: 0 % (ref 0–1)
BUN SERPL-MCNC: 11 MG/DL (ref 6–20)
BUN/CREAT SERPL: 16 (ref 12–20)
CALCIUM SERPL-MCNC: 8.7 MG/DL (ref 8.5–10.1)
CHLORIDE SERPL-SCNC: 100 MMOL/L (ref 97–108)
CO2 SERPL-SCNC: 33 MMOL/L (ref 21–32)
CREAT SERPL-MCNC: 0.69 MG/DL (ref 0.7–1.3)
DIFFERENTIAL METHOD BLD: ABNORMAL
EOSINOPHIL # BLD: 0.3 K/UL (ref 0–0.4)
EOSINOPHIL NFR BLD: 4 % (ref 0–7)
ERYTHROCYTE [DISTWIDTH] IN BLOOD BY AUTOMATED COUNT: 12.2 % (ref 11.5–14.5)
GLUCOSE BLD STRIP.AUTO-MCNC: 152 MG/DL (ref 65–100)
GLUCOSE BLD STRIP.AUTO-MCNC: 209 MG/DL (ref 65–100)
GLUCOSE BLD STRIP.AUTO-MCNC: 266 MG/DL (ref 65–100)
GLUCOSE BLD STRIP.AUTO-MCNC: 276 MG/DL (ref 65–100)
GLUCOSE SERPL-MCNC: 194 MG/DL (ref 65–100)
HCT VFR BLD AUTO: 32.8 % (ref 36.6–50.3)
HGB BLD-MCNC: 9.8 G/DL (ref 12.1–17)
IMM GRANULOCYTES # BLD AUTO: 0 K/UL (ref 0–0.04)
IMM GRANULOCYTES NFR BLD AUTO: 0 % (ref 0–0.5)
LYMPHOCYTES # BLD: 1.5 K/UL (ref 0.8–3.5)
LYMPHOCYTES NFR BLD: 20 % (ref 12–49)
MCH RBC QN AUTO: 25.9 PG (ref 26–34)
MCHC RBC AUTO-ENTMCNC: 29.9 G/DL (ref 30–36.5)
MCV RBC AUTO: 86.5 FL (ref 80–99)
MONOCYTES # BLD: 0.6 K/UL (ref 0–1)
MONOCYTES NFR BLD: 8 % (ref 5–13)
NEUTS SEG # BLD: 4.8 K/UL (ref 1.8–8)
NEUTS SEG NFR BLD: 68 % (ref 32–75)
NRBC # BLD: 0 K/UL (ref 0–0.01)
NRBC BLD-RTO: 0 PER 100 WBC
PLATELET # BLD AUTO: 286 K/UL (ref 150–400)
PMV BLD AUTO: 10.7 FL (ref 8.9–12.9)
POTASSIUM SERPL-SCNC: 3.9 MMOL/L (ref 3.5–5.1)
RBC # BLD AUTO: 3.79 M/UL (ref 4.1–5.7)
SERVICE CMNT-IMP: ABNORMAL
SODIUM SERPL-SCNC: 136 MMOL/L (ref 136–145)
WBC # BLD AUTO: 7.2 K/UL (ref 4.1–11.1)

## 2021-03-01 PROCEDURE — 77030041076 HC DRSG AG OPTICELL MDII -A

## 2021-03-01 PROCEDURE — 74011000258 HC RX REV CODE- 258: Performed by: HOSPITALIST

## 2021-03-01 PROCEDURE — 80048 BASIC METABOLIC PNL TOTAL CA: CPT

## 2021-03-01 PROCEDURE — 97116 GAIT TRAINING THERAPY: CPT

## 2021-03-01 PROCEDURE — 77030040718 HC DRSG HYDRGEL SKINTEGRITY MDII -A

## 2021-03-01 PROCEDURE — 36415 COLL VENOUS BLD VENIPUNCTURE: CPT

## 2021-03-01 PROCEDURE — 82962 GLUCOSE BLOOD TEST: CPT

## 2021-03-01 PROCEDURE — 65270000015 HC RM PRIVATE ONCOLOGY

## 2021-03-01 PROCEDURE — 74011250637 HC RX REV CODE- 250/637: Performed by: INTERNAL MEDICINE

## 2021-03-01 PROCEDURE — 2709999900 HC NON-CHARGEABLE SUPPLY

## 2021-03-01 PROCEDURE — 74011636637 HC RX REV CODE- 636/637: Performed by: HOSPITALIST

## 2021-03-01 PROCEDURE — 74011250636 HC RX REV CODE- 250/636: Performed by: HOSPITALIST

## 2021-03-01 PROCEDURE — 85025 COMPLETE CBC W/AUTO DIFF WBC: CPT

## 2021-03-01 PROCEDURE — 74011250637 HC RX REV CODE- 250/637: Performed by: HOSPITALIST

## 2021-03-01 RX ADMIN — ACETAMINOPHEN 650 MG: 325 TABLET ORAL at 05:51

## 2021-03-01 RX ADMIN — AMPICILLIN SODIUM AND SULBACTAM SODIUM 3 G: 2; 1 INJECTION, POWDER, FOR SOLUTION INTRAMUSCULAR; INTRAVENOUS at 01:04

## 2021-03-01 RX ADMIN — AMPICILLIN SODIUM AND SULBACTAM SODIUM 3 G: 2; 1 INJECTION, POWDER, FOR SOLUTION INTRAMUSCULAR; INTRAVENOUS at 12:06

## 2021-03-01 RX ADMIN — Medication 10 ML: at 14:33

## 2021-03-01 RX ADMIN — Medication 10 ML: at 22:47

## 2021-03-01 RX ADMIN — OXYCODONE HYDROCHLORIDE AND ACETAMINOPHEN 1 TABLET: 5; 325 TABLET ORAL at 01:11

## 2021-03-01 RX ADMIN — AMPICILLIN SODIUM AND SULBACTAM SODIUM 3 G: 2; 1 INJECTION, POWDER, FOR SOLUTION INTRAMUSCULAR; INTRAVENOUS at 17:36

## 2021-03-01 RX ADMIN — FUROSEMIDE 20 MG: 10 INJECTION, SOLUTION INTRAMUSCULAR; INTRAVENOUS at 09:30

## 2021-03-01 RX ADMIN — INSULIN LISPRO 2 UNITS: 100 INJECTION, SOLUTION INTRAVENOUS; SUBCUTANEOUS at 09:29

## 2021-03-01 RX ADMIN — OXYCODONE HYDROCHLORIDE AND ACETAMINOPHEN 1 TABLET: 5; 325 TABLET ORAL at 12:10

## 2021-03-01 RX ADMIN — AMPICILLIN SODIUM AND SULBACTAM SODIUM 3 G: 2; 1 INJECTION, POWDER, FOR SOLUTION INTRAMUSCULAR; INTRAVENOUS at 05:41

## 2021-03-01 RX ADMIN — INSULIN LISPRO 3 UNITS: 100 INJECTION, SOLUTION INTRAVENOUS; SUBCUTANEOUS at 12:05

## 2021-03-01 RX ADMIN — INSULIN LISPRO 2 UNITS: 100 INJECTION, SOLUTION INTRAVENOUS; SUBCUTANEOUS at 22:46

## 2021-03-01 RX ADMIN — Medication 10 ML: at 05:42

## 2021-03-01 RX ADMIN — ENOXAPARIN SODIUM 40 MG: 40 INJECTION SUBCUTANEOUS at 09:30

## 2021-03-01 NOTE — PROGRESS NOTES
Attempted to see patient for OT treatment, but patient declined participation 2/2 c/o fatigue and RLE pain. OT deferred per patient request and OT will follow back tomorrow.

## 2021-03-01 NOTE — PROGRESS NOTES
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 Unsure if he is able to get any assist living there. He states he does not use a device for amb. Per chart, he has been unable to take care of wounds on legs    HOME [address]: The patient lived alone with no local support. Does mention a sister, but unclear of her involvement    Physical Therapy Goals  Initiated 2/26/2021  1. Patient will move from supine to sit and sit to supine , scoot up and down, and roll side to side in bed with independence within 7 day(s). 2.  Patient will transfer from bed to chair and chair to bed with modified independence using the least restrictive device within 7 day(s). 3.  Patient will perform sit to stand with modified independence within 7 day(s). 4.  Patient will ambulate with modified independence for 150 feet with the least restrictive device within 7 day(s). Outcome: Progressing Towards Goal    PHYSICAL THERAPY TREATMENT  Patient: Shashi Lopez (41 y.o. male)  Date: 3/1/2021  Diagnosis: Cellulitis of right leg [Y22.681]  Lower leg edema [R60.0]  Hyperglycemia [R73.9]  Hypoalbuminemia [E88.09] <principal problem not specified>       Precautions: Skin(BLE wounds)  Chart, physical therapy assessment, plan of care and goals were reviewed. ASSESSMENT  Patient continues with skilled PT services and is progressing towards goals. Pt was received sitting up in the chair on RA and cleared by nursing to mobilize. VSS during session. Pt continues to be very painful. Pain 0/10 in the bed and then 10/10 during weightbearing. He was able to stand with RW and ambulate around the room but limited by pain. He was left sitting up and LEs elevated in recliner. Could make great progress if pain was more controlled. Current Level of Function Impacting Discharge (mobility/balance): CGA    Other factors to consider for discharge: pain during weightbearing            PLAN :  Patient continues to benefit from skilled intervention to address the above impairments. Continue treatment per established plan of care. to address goals. Recommendation for discharge: (in order for the patient to meet his/her long term goals)  Physical therapy at least 2 days/week in the home     This discharge recommendation:  Has not yet been discussed the attending provider and/or case management    IF patient discharges home will need the following DME: rolling walker       SUBJECTIVE:   Patient stated it is just so painful.     OBJECTIVE DATA SUMMARY:   Critical Behavior:   Alert and oriented x4           Functional Mobility Training:  Bed Mobility:        Sit to Supine: Independent  Scooting: Independent        Transfers:  Sit to Stand: Modified independent  Stand to Sit: Modified independent                             Balance:  Sitting: Intact  Standing: Intact; With support  Ambulation/Gait Training:  Distance (ft): 10 Feet (ft)  Assistive Device: Gait belt;Walker, rolling  Ambulation - Level of Assistance: Contact guard assistance;Stand-by assistance        Gait Abnormalities: Antalgic;Decreased step clearance;Trunk sway increased              Speed/Jazmyn: Pace decreased (<100 feet/min)  Step Length: Left shortened;Right shortened          Pain Rating:  See above    Activity Tolerance:   Fair    After treatment patient left in no apparent distress:   Sitting in chair and Call bell within reach    COMMUNICATION/COLLABORATION:   The patients plan of care was discussed with: Registered nurse.      Naima Dose, PT, DPT   Time Calculation: 12 mins

## 2021-03-01 NOTE — WOUND CARE
Wound care Re-Consulted for this patient who was seen by this wound care nurse last Friday. Orders were written on Friday. Plan: The last DAILY wound care for the right lower leg will be tomorrow and then it can be done every other day. Orders placed to start the EOD wound care on Wednesday with new orders. Patient was examined briefly and he states, \"I feel a little better but it is still raw\".    Rika Francis RN, BSN, [employer]

## 2021-03-01 NOTE — PROGRESS NOTES
Transition of Care Plan:    Disposition: home with [address]  >referral to be sent to [facility]   Follow up appointments: needs new PCP  DME needed: none  Transportation at Discharge: Medicaid transport   Goldy Clear or means to access home:  TBD       IM Medicare letter: N/A  Caregiver Contact: Leti Whatley (mother) [phone]   Discharge Caregiver contacted prior to discharge? To be contacted prior to d/c     Chart reviewed. Consult received for discharge planning. Pt had voiced to physician that he cannot return home and wants to go to rehab. PT/OT recommending HHC at this time and pt does not currently qualify for rehab. CM met with pt at bedside to discuss discharge planning and advise of eligibility for [address] rather than inpatient rehab. Pt agreeable to [address] and verified his discharge address. ([address]) CM will send [address] referral to [facility] and identify if they can accept. Note pt currently does not have an established PCP which is a barrier to identifying [address] services as we must identify following physician. Pt inquired about applying for disability. Advised pt to contact his employer to identify benefits (long term vs short term disability). Pt states he does not have any benefits through his employer at this time. Advised pt to contact [employer] office to apply for disability as he states he does not plan to return to work at all and needs income. CM will continue to follow for d/c planning.  Pt may require transport assistance at d/c.     Radha Galan, [address], [facility]  [phone] No complaints No complaints

## 2021-03-01 NOTE — INTERDISCIPLINARY ROUNDS
Oncology Interdisciplinary rounds were held today to discuss patient plan of care and outcomes. The following members were present: Nursing, Physician, Case Management, Pharmacy, and PT/OT    Actual Length of Stay: 3    DRG GLOS: 3.2    Expected Length of Stay: 3d 4h                       Plan            Discharge    PT/OT  Dietitian consulted.   wound care Date:TBA

## 2021-03-01 NOTE — PROGRESS NOTES
Hospitalist Progress Note    NAME: Tarsha Michael   :  1965   MRN:  [id number]       Assessment / Plan:  Right LE Cellulitis  B/L leg edema   -Continue IV antibiotic Unasyn  -Continue wound care  -Wound care consult  -Blood culture growing gram-positive cocci  -Follow-up repeat blood culture showed no growth so far  --Wound care consultation  --Case management consultation patient may need placement  --PT and OT evaluation     Hyperglycemia 303  Check a1c  Cont diabetic diet ssi    Hypoalbuminemia  2.6  Cont. Nutrition supplement  And nutrtionist consulted              30.0 - 39.9 Obese / Body mass index is 39.46 kg/m².     Code status: Full  Prophylaxis: Lovenox  Recommended Disposition: Home w/Family                     Subjective:     Chief Complaint / Reason for Physician Visit  . Discussed with RN events overnight. Patient stated he is very weak, lives alone nonsupport, ask if he can discharge to rehab, case management consultation was placed    Review of Systems:  Symptom Y/N Comments  Symptom Y/N Comments   Fever/Chills n   Chest Pain n    Poor Appetite n   Edema     Cough    Abdominal Pain     Sputum    Joint Pain     SOB/HOFF n   Pruritis/Rash     Nausea/vomit    Tolerating PT/OT     Diarrhea n   Tolerating Diet y    Constipation n   Other       Could NOT obtain due to:      Objective:     VITALS:   Last 24hrs VS reviewed since prior progress note.  Most recent are:  Patient Vitals for the past 24 hrs:   Temp Pulse Resp BP SpO2   21 0745 99 °F (37.2 °C) (!) 102 18 124/79 96 %   21 0100 98.6 °F (37 °C) 100 17 (!) 150/93 94 %   21 1931 97.2 °F (36.2 °C) (!) 106 18 (!) 148/84 99 %   21 1551 98.5 °F (36.9 °C) 95 18 138/84 97 %       Intake/Output Summary (Last 24 hours) at 3/1/2021 1251  Last data filed at 3/1/2021 0830  Gross per 24 hour   Intake 950 ml   Output 600 ml   Net 350 ml        I had a face to face encounter and independently examined this patient on 3/1/2021, as outlined below:  PHYSICAL EXAM:  General: WD, WN. Alert, cooperative, no acute distress    EENT:  EOMI. Anicteric sclerae. MMM  Resp:  CTA bilaterally, no wheezing or rales. No accessory muscle use  CV:  Regular  rhythm,  No edema  GI:  Soft, Non distended, Non tender. +Bowel sounds  Neurologic:  Alert and oriented X 3, normal speech,   Psych:   Good insight. Not anxious nor agitated  Skin:  No rashes. No jaundice    Reviewed most current lab test results and cultures  YES  Reviewed most current radiology test results   YES  Review and summation of old records today    NO  Reviewed patient's current orders and MAR    YES  PMH/SH reviewed - no change compared to H&P  ________________________________________________________________________  Care Plan discussed with:    Comments   Patient y    Family      RN y    Care Manager     Consultant                        Multidiciplinary team rounds were held today with , nursing, pharmacist and clinical coordinator. Patient's plan of care was discussed; medications were reviewed and discharge planning was addressed. ________________________________________________________________________  Total NON critical care TIME:  35   Minutes    Total CRITICAL CARE TIME Spent:   Minutes non procedure based      Comments   >50% of visit spent in counseling and coordination of care y    ________________________________________________________________________  Meggan Gaffney MD     Procedures: see electronic medical records for all procedures/Xrays and details which were not copied into this note but were reviewed prior to creation of Plan. LABS:  I reviewed today's most current labs and imaging studies.   Pertinent labs include:  Recent Labs     03/01/21  0110 02/27/21  0502   WBC 7.2 7.6   HGB 9.8* 10.3*   HCT 32.8* 34.7*    295     Recent Labs     03/01/21 0110 02/27/21  0502    140   K 3.9 3.7    104   CO2 33* 30   * 211* BUN 11 12   CREA 0.69* 0.76   CA 8.7 8.4*   MG  --  2.0       Signed:  Cristel Ramirez MD

## 2021-03-02 LAB
GLUCOSE BLD STRIP.AUTO-MCNC: 162 MG/DL (ref 65–100)
GLUCOSE BLD STRIP.AUTO-MCNC: 167 MG/DL (ref 65–100)
GLUCOSE BLD STRIP.AUTO-MCNC: 180 MG/DL (ref 65–100)
GLUCOSE BLD STRIP.AUTO-MCNC: 198 MG/DL (ref 65–100)
SERVICE CMNT-IMP: ABNORMAL

## 2021-03-02 PROCEDURE — 65270000015 HC RM PRIVATE ONCOLOGY

## 2021-03-02 PROCEDURE — 97535 SELF CARE MNGMENT TRAINING: CPT

## 2021-03-02 PROCEDURE — 74011250637 HC RX REV CODE- 250/637: Performed by: INTERNAL MEDICINE

## 2021-03-02 PROCEDURE — 94760 N-INVAS EAR/PLS OXIMETRY 1: CPT

## 2021-03-02 PROCEDURE — 74011250636 HC RX REV CODE- 250/636: Performed by: HOSPITALIST

## 2021-03-02 PROCEDURE — 74011250637 HC RX REV CODE- 250/637: Performed by: NURSE PRACTITIONER

## 2021-03-02 PROCEDURE — 97116 GAIT TRAINING THERAPY: CPT | Performed by: PHYSICAL THERAPIST

## 2021-03-02 PROCEDURE — 74011000258 HC RX REV CODE- 258: Performed by: HOSPITALIST

## 2021-03-02 PROCEDURE — 74011636637 HC RX REV CODE- 636/637: Performed by: NURSE PRACTITIONER

## 2021-03-02 PROCEDURE — 82962 GLUCOSE BLOOD TEST: CPT

## 2021-03-02 RX ORDER — OXYCODONE AND ACETAMINOPHEN 5; 325 MG/1; MG/1
1 TABLET ORAL
Status: DISCONTINUED | OUTPATIENT
Start: 2021-03-02 | End: 2021-03-03 | Stop reason: HOSPADM

## 2021-03-02 RX ORDER — ATORVASTATIN CALCIUM 40 MG/1
40 TABLET, FILM COATED ORAL DAILY
Status: DISCONTINUED | OUTPATIENT
Start: 2021-03-03 | End: 2021-03-03 | Stop reason: HOSPADM

## 2021-03-02 RX ORDER — INSULIN GLARGINE 100 [IU]/ML
10 INJECTION, SOLUTION SUBCUTANEOUS
Status: DISCONTINUED | OUTPATIENT
Start: 2021-03-02 | End: 2021-03-03 | Stop reason: HOSPADM

## 2021-03-02 RX ORDER — AMOXICILLIN AND CLAVULANATE POTASSIUM 875; 125 MG/1; MG/1
1 TABLET, FILM COATED ORAL EVERY 12 HOURS
Status: DISCONTINUED | OUTPATIENT
Start: 2021-03-02 | End: 2021-03-03 | Stop reason: HOSPADM

## 2021-03-02 RX ORDER — FUROSEMIDE 20 MG/1
20 TABLET ORAL DAILY
Status: DISCONTINUED | OUTPATIENT
Start: 2021-03-03 | End: 2021-03-03 | Stop reason: HOSPADM

## 2021-03-02 RX ADMIN — Medication 10 ML: at 21:21

## 2021-03-02 RX ADMIN — Medication 10 ML: at 06:36

## 2021-03-02 RX ADMIN — OXYCODONE HYDROCHLORIDE AND ACETAMINOPHEN 1 TABLET: 5; 325 TABLET ORAL at 09:58

## 2021-03-02 RX ADMIN — AMPICILLIN SODIUM AND SULBACTAM SODIUM 3 G: 2; 1 INJECTION, POWDER, FOR SOLUTION INTRAMUSCULAR; INTRAVENOUS at 12:45

## 2021-03-02 RX ADMIN — OXYCODONE HYDROCHLORIDE AND ACETAMINOPHEN 1 TABLET: 5; 325 TABLET ORAL at 00:37

## 2021-03-02 RX ADMIN — ENOXAPARIN SODIUM 40 MG: 40 INJECTION SUBCUTANEOUS at 09:48

## 2021-03-02 RX ADMIN — FUROSEMIDE 20 MG: 10 INJECTION, SOLUTION INTRAMUSCULAR; INTRAVENOUS at 09:48

## 2021-03-02 RX ADMIN — INSULIN GLARGINE 10 UNITS: 100 INJECTION, SOLUTION SUBCUTANEOUS at 23:10

## 2021-03-02 RX ADMIN — OXYCODONE HYDROCHLORIDE AND ACETAMINOPHEN 1 TABLET: 5; 325 TABLET ORAL at 21:18

## 2021-03-02 RX ADMIN — AMPICILLIN SODIUM AND SULBACTAM SODIUM 3 G: 2; 1 INJECTION, POWDER, FOR SOLUTION INTRAMUSCULAR; INTRAVENOUS at 00:29

## 2021-03-02 RX ADMIN — AMOXICILLIN AND CLAVULANATE POTASSIUM 1 TABLET: 875; 125 TABLET, FILM COATED ORAL at 21:18

## 2021-03-02 RX ADMIN — AMPICILLIN SODIUM AND SULBACTAM SODIUM 3 G: 2; 1 INJECTION, POWDER, FOR SOLUTION INTRAMUSCULAR; INTRAVENOUS at 06:32

## 2021-03-02 RX ADMIN — OXYCODONE HYDROCHLORIDE AND ACETAMINOPHEN 1 TABLET: 5; 325 TABLET ORAL at 15:57

## 2021-03-02 RX ADMIN — Medication 10 ML: at 14:10

## 2021-03-02 NOTE — PROGRESS NOTES
Hospitalist Progress Note    NAME: Chinmay Davis   :  1965   MRN:  [id number]     I reviewed pertinent labs and imaging, and discussed /agreed on the plan of care with Dr. Cyndie Galdamez. Assessment / Plan:  Right Lower Extremity Cellulitis POA  Bilateral Lower Extremity Edema  Unasyn changed to oral Augmentin today  Appreciate wound care input  Blood cultures growing Staph possible contaminate  Repeat blood cultures from 21 NTD  Medicate prior to dressing changes   Completed last daily wound care for the right lower leg. Will change to every other day. Cleanse with CHG soap and water wet soft cloths to remove the soap, Silvasorb wound gel applied and then a 4 Xeroform gauzes, ABD heavy drainage pack applied and the rest of the Rx4's. Wrapped with Dorothy (NOT Kerlix)  Float heels at all time  Type 2 Diabetes  Uncontrolled  HgB A1C 10.2  Lantus 10 units QHS  Sliding scale coverage  Diabetic diet  Glucose monitoring  Hypertension   Lasix 20 mg daily  Hypoalbuminemia   Albumin 2.6   dietary Supplements   Nutritionist consult    30.0 - 39.9 Obese / Body mass index is 39.46 kg/m². Code status: Full  Prophylaxis: Lovenox  Recommended Disposition:  PT, OT, RN     Subjective:     Chief Complaint / Reason for Physician Visit  Complains of pain with dressing changes. States he does live alone and feels he will be unable to handle his dressing changes. Discussed with RN events overnight. Review of Systems:  Symptom Y/N Comments  Symptom Y/N Comments   Fever/Chills n   Chest Pain n    Poor Appetite n   Edema y Bilateral lower Extremities   Cough n   Abdominal Pain     Sputum    Joint Pain     SOB/HOFF n   Pruritis/Rash     Nausea/vomit n   Tolerating PT/OT y    Diarrhea n   Tolerating Diet y    Constipation n   Other       Could NOT obtain due to:      Objective:     VITALS:   Last 24hrs VS reviewed since prior progress note.  Most recent are:  Patient Vitals for the past 24 hrs:   Temp Pulse Resp BP SpO2   03/02/21 1531 98.3 °F (36.8 °C) 96 18 119/77 98 %   03/02/21 0752 99.4 °F (37.4 °C) 88 18 139/76 96 %   03/01/21 2332 98.9 °F (37.2 °C) (!) 105 18 (!) 152/82 98 %   03/01/21 1930 99.1 °F (37.3 °C) (!) 110 18 (!) 152/80 99 %       Intake/Output Summary (Last 24 hours) at 3/2/2021 1745  Last data filed at 3/2/2021 0800  Gross per 24 hour   Intake --   Output 1550 ml   Net -1550 ml        I had a face to face encounter and independently examined this patient on 3/2/2021, as outlined below:  PHYSICAL EXAM:  General: . Alert, cooperative, no acute distress, obese  EENT:   Anicteric sclerae. normocephalic  Resp:  CTA bilaterally, no wheezing or rales. No accessory muscle use  CV:  Regular  rhythm,  No edema  GI:  Soft, Non distended, Non tender. +Bowel sounds  Neurologic:  Alert and oriented X 3, normal speech,   Psych:   Good insight. Not anxious nor agitated  Skin:  No rashes. No jaundice, dressing clean/intact right lower leg    Reviewed most current lab test results and cultures  YES  Reviewed most current radiology test results   YES  Review and summation of old records today    NO  Reviewed patient's current orders and MAR    YES  PMH/ reviewed - no change compared to H&P  ________________________________________________________________________  Care Plan discussed with:    Comments   Patient     Family  y    RN y    Care Manager y    Consultant                        Multidiciplinary team rounds were held today with , nursing, pharmacist and clinical coordinator. Patient's plan of care was discussed; medications were reviewed and discharge planning was addressed. ________________________________________________________________________  ______  Evangelista Jhaveri NP     Procedures: see electronic medical records for all procedures/Xrays and details which were not copied into this note but were reviewed prior to creation of Plan.       LABS:  I reviewed today's most current labs and imaging studies.   Pertinent labs include:  Recent Labs     03/01/21  0110   WBC 7.2   HGB 9.8*   HCT 32.8*        Recent Labs     03/01/21  0110      K 3.9      CO2 33*   *   BUN 11   CREA 0.69*   CA 8.7       Signed: Shaneka Small, NP

## 2021-03-02 NOTE — PROGRESS NOTES
Transition of Care Plan:     Disposition: home with [address]  Follow up appointments: New PCP appt - Dr. Geni Conroy at [facility]  DME needed: none  Transportation at Discharge: Medicaid transport   Vicki Cardenas or means to access home:  TBD       IM Medicare letter: N/A  Caregiver Contact: Kevin Lemus (mother) [phone]   Discharge Caregiver contacted prior to discharge? To be contacted prior to d/c     [facility] has accepted pt for [facility] needs. They have made a new PCP appt for pt with Dr. Geni Conroy at [facility]. [address] [place] [address] will contact pt with appt date and time. CM went to pt's bedside to notify him of updated information. Pt confirmed understanding and agreement. Pt is still medically unstable for d/c due to fevers overnight and pt is still on IV ABX. CM will continue to follow for discharge planning while patient is admitted on current unit. Please contact this CM with questions or issues related to discharge.      KRYSTA Mcallister  Care Manager [id number] [facility]  [phone]

## 2021-03-02 NOTE — PROGRESS NOTES
Problem: Self Care Deficits Care Plan (Adult)  Goal: *Acute Goals and Plan of Care (Insert Text)  Description:   FUNCTIONAL STATUS PRIOR TO ADMISSION:  Lives alone at [facility]. 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 Patient was independent and active without use of DME. Admits to increasing difficulty with performing his own BLE wound care. States, \"Its tough but I manage it\" when asked if he has any difficulty getting dressed. Sits/stands to sponge bathe. Denies Hx of falls. HOME SUPPORT: Resides at [facility]. Ebenezer Hughesds to [employer]. Mentioned a sister who helped him call an Jalaine Flavors, yet stated \"No\" when asked if she helps him with other IADL tasks or could be available to assist at D/C PRN. Occupational Therapy Goals  Initiated 2/26/2021  1. Patient will perform grooming tasks standing at the sink with independence within 7 day(s). 2.  Patient will perform sponge bathing with modified independence using long handled AE PRN within 7 day(s). 3.  Patient will perform lower body dressing with modified independence within 7 day(s). 4.  Patient will perform toilet transfers with independence within 7 day(s). 5.  Patient will perform all aspects of toileting with independence within 7 day(s). Outcome: Progressing Towards Goal   OCCUPATIONAL THERAPY TREATMENT  Patient: Hank Antunez (83 y.o. male)  Date: 3/2/2021  Diagnosis: Cellulitis of right leg [M04.109]  Lower leg edema [R60.0]  Hyperglycemia [R73.9]  Hypoalbuminemia [E88.09] <principal problem not specified>       Precautions: Skin(BLE wounds)  Chart, occupational therapy assessment, plan of care, and goals were reviewed. ASSESSMENT  Patient continues with skilled OT services and is progressing towards goals.   Good participation with LE dressing training; training with LE AE with use of video training with review of pro/con of variety of LE tools and LE compression devices/AE with use of compression devices. Patient receptive to LE AE use and reports desire to be mod I for self care with use of tools/AE. Patient A and O x 4 and anxious for max healing with desire to learn how he can aide healing. +++ edema R LE; w/ace wrap in place; will benefit from LE elevation above level of heart in bed w/pillows; Patient requesting \"lotion\" for LEs: recommend MD to make referral as to what he should use on skin on a daily basis. Current Level of Function Impacting Discharge (ADLs): set up UE ADLs, Min/mod A LE ADLs, limited by ++ edema R LE and inablity to reach R distal LE without use of AE; receptive to AE after demo w/video    Other factors to consider for discharge: lives alone but landlord willing to assist PRN         PLAN :  Patient continues to benefit from skilled intervention to address the above impairments. Continue treatment per established plan of care. to address goals. Recommend with staff: elevate LEs in bed with pillows and bed mechanics, above level of heart     Recommend next OT session: LE AE for ADLs with +++ edema R LE in ace wrap; handouts for edema management    Recommendation for discharge: (in order for the patient to meet his/her long term goals)  Occupational therapy at least 2 days/week in the home AND ensure assist and/or supervision for safety with bathing/skin care    This discharge recommendation:  A follow-up discussion with the attending provider and/or case management is planned    IF patient discharges home will need the following DME: AE: long handled bathing, AE: long handled dressing, bedside commode, walker: rolling, and compression stockings for B LE edema management       SUBJECTIVE:   Patient stated I think I need some tight socks-compression socks so I can work and my legs wont swell so bad.     OBJECTIVE DATA SUMMARY:   Cognitive/Behavioral Status:  Neurologic State: Alert; Appropriate for age  Orientation Level: Oriented X4  Cognition: Appropriate decision making; Appropriate for age attention/concentration; Appropriate safety awareness; Follows commands  Perception: Appears intact  Perseveration: No perseveration noted  Safety/Judgement: Fall prevention;Decreased insight into deficits; Awareness of environment;Home safety    Functional Mobility and Transfers for ADLs:  Bed Mobility:  Scooting: Independent    Transfers:  Sit to Stand: Supervision  Functional Transfers  Toilet Transfer : Supervision;Contact guard assistance; Additional time       Balance:  Sitting: Intact  Standing: Impaired; Without support  Standing - Static: Good;Occasional  Standing - Dynamic : Fair;Constant support    ADL Intervention:  Feeding  Feeding Assistance: Independent              Lower Body Bathing  Bathing Assistance: Moderate assistance  Position Performed: Seated in chair(LEs propped on bed)    Upper Body Dressing Assistance  Dressing Assistance: Set-up    Lower Body Dressing Assistance  Socks: Moderate assistance  Position Performed: (seated in chair, propping feet on bed)  Adaptive Equipment Used: (video demo of sock aide and dressing stick, wants to try )    [address]: Stand-by assistance;Supervision    Cognitive Retraining  Attention to Task: Single task  Maintains Attention For (Time): Greater than 10 minutes  Following Commands: Follows one step commands/directions; Follows two step commands/directions  Safety/Judgement: Fall prevention;Decreased insight into deficits; Awareness of environment;Home safety      Pain:  9/10 at worst today, R LE distally, 7/10 with pain meds 2 hours ago, this session. Activity Tolerance:   Fair and requires rest breaks    After treatment patient left in no apparent distress:   Sitting in chair, Heels elevated for pressure relief, and Call bell within reach    COMMUNICATION/COLLABORATION:   The patients plan of care was discussed with: Occupational therapist and Certified nursing assistant/patient care technicianPerri Saldana Michelle Luo OTR/L  Time Calculation: 27 mins

## 2021-03-02 NOTE — PROGRESS NOTES
End of Shift Note    Bedside shift change report given to Amna (oncoming nurse) by Brody Batres (offgoing nurse). Report included the following information SBAR, Kardex and MAR    Shift worked:  Day shift     Shift summary and any significant changes:     Patient tolerated care fairly throughout shift. Hourly rounding completed. Medications given and education provided regarding all meds. Patient up x1 to the chair for most of the shift. PT/OT worked with the patient. Wound care completed. PRN meds given for pain. Concerns for physician to address:       Zone phone for oncoming shift:          Activity:  Activity Level: Up with Assistance  Number times ambulated in hallways past shift: 0, ambulated in their room  Number of times OOB to chair past shift: 1    Cardiac:   Cardiac Monitoring: No           Access:   Current line(s): PIV     Genitourinary:   Urinary status: voiding    Respiratory:   O2 Device: Room air  Chronic home O2 use?: NO  Incentive spirometer at bedside: YES     GI:     Current diet:  DIET DIABETIC CONSISTENT CARB Regular  DIET NUTRITIONAL SUPPLEMENTS Breakfast, Dinner; Glucerna Shake  Passing flatus: YES  Tolerating current diet: YES       Pain Management:   Patient states pain is manageable on current regimen: YES    Skin:  Wesley Score: 20  Interventions: float heels, increase time out of bed and PT/OT consult    Patient Safety:  Fall Score:  Total Score: 2  Interventions: bed/chair alarm, gripper socks and pt to call before getting OOB  High Fall Risk: Yes    Length of Stay:  Expected LOS: 3d 4h  [facility] [address] [facility]

## 2021-03-02 NOTE — PROGRESS NOTES
End of Shift Note    Bedside shift change report given to Jet De Los Santos (oncoming nurse) by Roderick Cheatham (offgoing nurse). Report included the following information SBAR, Kardex, Intake/Output, MAR and Recent Results    Shift worked:  7p-7a     Shift summary and any significant changes:     patient rested well this night sleeping at long intervals, pain medication given when requested     Concerns for physician to address:  none     Zone phone for oncoming shift:   none       Activity:  Activity Level: Up with Assistance  Number times ambulated in hallways past shift: 0  Number of times OOB to chair past shift: 0    Cardiac:   Cardiac Monitoring: No           Access:   Current line(s): PIV     Genitourinary:   Urinary status: voiding    Respiratory:   O2 Device: Room air  Chronic home O2 use?: NO  Incentive spirometer at bedside: NO     GI:     Current diet:  DIET DIABETIC CONSISTENT CARB Regular  DIET NUTRITIONAL SUPPLEMENTS Breakfast, Dinner; Glucerna Shake  Passing flatus: YES  Tolerating current diet: YES       Pain Management:   Patient states pain is manageable on current regimen: YES    Skin:  Wesley Score: 20  Interventions: increase time out of bed and PT/OT consult    Patient Safety:  Fall Score:  Total Score: 2  Interventions: gripper socks and pt to call before getting OOB  High Fall Risk: Yes    Length of Stay:  Expected LOS: 3d 4h  Actual LOS: [facility]

## 2021-03-02 NOTE — PROGRESS NOTES
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 Unsure if he is able to get any assist living there. He states he does not use a device for amb. Per chart, he has been unable to take care of wounds on legs    HOME [address]: The patient lived alone with no local support. Does mention a sister, but unclear of her involvement    Physical Therapy Goals  Initiated 2/26/2021  1. Patient will move from supine to sit and sit to supine , scoot up and down, and roll side to side in bed with independence within 7 day(s). 2.  Patient will transfer from bed to chair and chair to bed with modified independence using the least restrictive device within 7 day(s). 3.  Patient will perform sit to stand with modified independence within 7 day(s). 4.  Patient will ambulate with modified independence for 150 feet with the least restrictive device within 7 day(s). Outcome: Progressing Towards Goal   PHYSICAL THERAPY TREATMENT  Patient: Casey Gamboa (21 y.o. male)  Date: 3/2/2021  Diagnosis: Cellulitis of right leg [J02.760]  Lower leg edema [R60.0]  Hyperglycemia [R73.9]  Hypoalbuminemia [E88.09] <principal problem not specified>       Precautions: Skin(BLE wounds)  Chart, physical therapy assessment, plan of care and goals were reviewed. ASSESSMENT  Patient continues with skilled PT services and is progressing towards goals. Patient demonstrating improvement in overall endurance. He was able to ambulate 125 feet in [place] today using RW for support. Gait is antalgic with increased trunk sway noted but steady overall. Patient ambulated a few feet in room without device demonstrating increased unsteadiness and reaching for external support.  Recommend use of RW for ambulation at this time especially for longer distances. Current Level of Function Impacting Discharge (mobility/balance): SBA to supervision             PLAN :  Patient continues to benefit from skilled intervention to address the above impairments. Continue treatment per established plan of care. to address goals. Recommendation for discharge: (in order for the patient to meet his/her long term goals)  Physical therapy at least 2 days/week in the home     This discharge recommendation:  Has not yet been discussed the attending provider and/or case management    IF patient discharges home will need the following DME: rolling walker       SUBJECTIVE:   Patient stated I'm worried about my leg.     OBJECTIVE DATA SUMMARY:   Critical Behavior:  Neurologic State: Alert, Appropriate for age  Orientation Level: Oriented X4  Cognition: Appropriate decision making, Appropriate for age attention/concentration, Appropriate safety awareness, Follows commands  Safety/Judgement: Fall prevention, Decreased insight into deficits, Awareness of environment, Home safety  Functional Mobility Training:  Bed Mobility:        Sit to Supine: Independent  Scooting: Independent        Transfers:  Sit to Stand: Supervision  Stand to Sit: Supervision                             Balance:  Sitting: Intact  Standing: Impaired  Standing - Static: Good; Unsupported  Standing - Dynamic : Good;Constant support(fair without support)  Ambulation/Gait Training:  Distance (ft): 125 Feet (ft)  Assistive Device: Walker, rolling;Gait belt  Ambulation - Level of Assistance: Stand-by assistance        Gait Abnormalities: Antalgic;Decreased step clearance;Trunk sway increased        Base of Support: Widened     Speed/Jazmyn: Pace decreased (<100 feet/min); Slow  Step Length: Left shortened;Right shortened      Gait is slow with increased trunk sway but steady overall with no overt LOB noted.  Patient ambulated a few feet in room without device- increased trunk sway noted and mildly unsteady; reaching for external support         Activity Tolerance:   Fair    After treatment patient left in no apparent distress:   Supine in bed and Call bell within reach    COMMUNICATION/COLLABORATION:   The patients plan of care was discussed with: Registered nurse.      Faina Gilbert PT   Time Calculation: 10 mins

## 2021-03-03 VITALS
OXYGEN SATURATION: 97 % | TEMPERATURE: 97.9 F | SYSTOLIC BLOOD PRESSURE: 128 MMHG | RESPIRATION RATE: 18 BRPM | WEIGHT: 230.6 LBS | HEIGHT: 66 IN | BODY MASS INDEX: 37.06 KG/M2 | DIASTOLIC BLOOD PRESSURE: 65 MMHG | HEART RATE: 99 BPM

## 2021-03-03 PROBLEM — Z79.4 TYPE 2 DIABETES MELLITUS, WITH LONG-TERM CURRENT USE OF INSULIN (HCC): Status: ACTIVE | Noted: 2021-03-03

## 2021-03-03 PROBLEM — E11.9 TYPE 2 DIABETES MELLITUS, WITH LONG-TERM CURRENT USE OF INSULIN (HCC): Status: ACTIVE | Noted: 2021-03-03

## 2021-03-03 LAB
ALBUMIN SERPL-MCNC: 2.4 G/DL (ref 3.5–5)
ALBUMIN/GLOB SERPL: 0.5 {RATIO} (ref 1.1–2.2)
ALP SERPL-CCNC: 63 U/L (ref 45–117)
ALT SERPL-CCNC: 18 U/L (ref 12–78)
ANION GAP SERPL CALC-SCNC: 7 MMOL/L (ref 5–15)
AST SERPL-CCNC: 12 U/L (ref 15–37)
BILIRUB SERPL-MCNC: 0.3 MG/DL (ref 0.2–1)
BUN SERPL-MCNC: 11 MG/DL (ref 6–20)
BUN/CREAT SERPL: 14 (ref 12–20)
CALCIUM SERPL-MCNC: 8.9 MG/DL (ref 8.5–10.1)
CHLORIDE SERPL-SCNC: 102 MMOL/L (ref 97–108)
CO2 SERPL-SCNC: 28 MMOL/L (ref 21–32)
CREAT SERPL-MCNC: 0.76 MG/DL (ref 0.7–1.3)
ERYTHROCYTE [DISTWIDTH] IN BLOOD BY AUTOMATED COUNT: 12.2 % (ref 11.5–14.5)
GLOBULIN SER CALC-MCNC: 5 G/DL (ref 2–4)
GLUCOSE BLD STRIP.AUTO-MCNC: 154 MG/DL (ref 65–100)
GLUCOSE BLD STRIP.AUTO-MCNC: 198 MG/DL (ref 65–100)
GLUCOSE SERPL-MCNC: 193 MG/DL (ref 65–100)
HCT VFR BLD AUTO: 34.4 % (ref 36.6–50.3)
HGB BLD-MCNC: 10.3 G/DL (ref 12.1–17)
MCH RBC QN AUTO: 26 PG (ref 26–34)
MCHC RBC AUTO-ENTMCNC: 29.9 G/DL (ref 30–36.5)
MCV RBC AUTO: 86.9 FL (ref 80–99)
NRBC # BLD: 0 K/UL (ref 0–0.01)
NRBC BLD-RTO: 0 PER 100 WBC
PLATELET # BLD AUTO: 290 K/UL (ref 150–400)
PMV BLD AUTO: 10.6 FL (ref 8.9–12.9)
POTASSIUM SERPL-SCNC: 4 MMOL/L (ref 3.5–5.1)
PROT SERPL-MCNC: 7.4 G/DL (ref 6.4–8.2)
RBC # BLD AUTO: 3.96 M/UL (ref 4.1–5.7)
SERVICE CMNT-IMP: ABNORMAL
SERVICE CMNT-IMP: ABNORMAL
SODIUM SERPL-SCNC: 137 MMOL/L (ref 136–145)
WBC # BLD AUTO: 5.7 K/UL (ref 4.1–11.1)

## 2021-03-03 PROCEDURE — 80053 COMPREHEN METABOLIC PANEL: CPT

## 2021-03-03 PROCEDURE — 36415 COLL VENOUS BLD VENIPUNCTURE: CPT

## 2021-03-03 PROCEDURE — 85027 COMPLETE CBC AUTOMATED: CPT

## 2021-03-03 PROCEDURE — 74011250636 HC RX REV CODE- 250/636: Performed by: HOSPITALIST

## 2021-03-03 PROCEDURE — 82962 GLUCOSE BLOOD TEST: CPT

## 2021-03-03 PROCEDURE — 97535 SELF CARE MNGMENT TRAINING: CPT

## 2021-03-03 PROCEDURE — 97116 GAIT TRAINING THERAPY: CPT | Performed by: PHYSICAL THERAPIST

## 2021-03-03 PROCEDURE — 74011250637 HC RX REV CODE- 250/637: Performed by: INTERNAL MEDICINE

## 2021-03-03 PROCEDURE — 74011250637 HC RX REV CODE- 250/637: Performed by: NURSE PRACTITIONER

## 2021-03-03 RX ORDER — OXYCODONE AND ACETAMINOPHEN 5; 325 MG/1; MG/1
1 TABLET ORAL
Qty: 20 TAB | Refills: 0 | Status: SHIPPED | OUTPATIENT
Start: 2021-03-03 | End: 2021-03-08

## 2021-03-03 RX ORDER — AMOXICILLIN AND CLAVULANATE POTASSIUM 875; 125 MG/1; MG/1
1 TABLET, FILM COATED ORAL EVERY 12 HOURS
Qty: 14 TAB | Refills: 0 | Status: SHIPPED | OUTPATIENT
Start: 2021-03-03 | End: 2021-03-10

## 2021-03-03 RX ORDER — POLYETHYLENE GLYCOL 3350 17 G/17G
17 POWDER, FOR SOLUTION ORAL DAILY
Qty: 30 EACH | Refills: 0 | Status: SHIPPED | OUTPATIENT
Start: 2021-03-03 | End: 2021-08-11

## 2021-03-03 RX ADMIN — ATORVASTATIN CALCIUM 40 MG: 40 TABLET, FILM COATED ORAL at 10:07

## 2021-03-03 RX ADMIN — Medication 10 ML: at 02:24

## 2021-03-03 RX ADMIN — AMOXICILLIN AND CLAVULANATE POTASSIUM 1 TABLET: 875; 125 TABLET, FILM COATED ORAL at 10:07

## 2021-03-03 RX ADMIN — ENOXAPARIN SODIUM 40 MG: 40 INJECTION SUBCUTANEOUS at 10:07

## 2021-03-03 RX ADMIN — FUROSEMIDE 20 MG: 20 TABLET ORAL at 10:07

## 2021-03-03 RX ADMIN — OXYCODONE HYDROCHLORIDE AND ACETAMINOPHEN 1 TABLET: 5; 325 TABLET ORAL at 10:11

## 2021-03-03 RX ADMIN — OXYCODONE HYDROCHLORIDE AND ACETAMINOPHEN 1 TABLET: 5; 325 TABLET ORAL at 02:30

## 2021-03-03 NOTE — PROGRESS NOTES
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 Unsure if he is able to get any assist living there. He states he does not use a device for amb. Per chart, he has been unable to take care of wounds on legs    HOME [address]: The patient lived alone with no local support. Does mention a sister, but unclear of her involvement    Physical Therapy Goals  Initiated 2/26/2021  1. Patient will move from supine to sit and sit to supine , scoot up and down, and roll side to side in bed with independence within 7 day(s). 2.  Patient will transfer from bed to chair and chair to bed with modified independence using the least restrictive device within 7 day(s). 3.  Patient will perform sit to stand with modified independence within 7 day(s). 4.  Patient will ambulate with modified independence for 150 feet with the least restrictive device within 7 day(s). 3/3/2021 1307 by Nicki Miller, PT, DPT  Outcome: Progressing Towards Goal  PHYSICAL THERAPY TREATMENT  Patient: Mirna Oswald (68 y.o. male)  Date: 3/3/2021  Diagnosis: Cellulitis of right leg [D64.464]  Lower leg edema [R60.0]  Hyperglycemia [R73.9]  Hypoalbuminemia [E88.09] <principal problem not specified>       Precautions: Skin(BLE wounds)  Chart, physical therapy assessment, plan of care and goals were reviewed. ASSESSMENT  Patient continues with skilled PT services and is progressing towards goals. Patient limited by LE pain, gait instability and decreased activity tolerance. Patient currently needing SBA for transfer from chair and able to amb approx 150 feet with RW and CGA in hallway with no overt LOB. Patient with improved pain with increased distance ambulated. Will likely be safe to DC home with [place] PT.        Other factors to consider for discharge: at risk for falls, below functional baseline, limited by pain         PLAN :  Patient continues to benefit from skilled intervention to address the above impairments. Continue treatment per established plan of care. to address goals. Recommendation for discharge: (in order for the patient to meet his/her long term goals)  Physical therapy at least 2 days/week in the home         IF patient discharges home will need the following DME: rolling walker       SUBJECTIVE:   Patient stated I don't know if I'll be able to work.     OBJECTIVE DATA SUMMARY:   Critical Behavior:  Neurologic State: Alert  Orientation Level: Appropriate for age, Oriented X4  Cognition: Follows commands(not formally assessed)  Safety/Judgement: Fall prevention, Awareness of environment, Decreased insight into deficits(A1C over 10; would benefit from dietician education)  Functional Mobility Training:  Bed Mobility:           Scooting: Stand-by assistance        Transfers:  Sit to Stand: Stand-by assistance  Stand to Sit: Stand-by assistance                             Balance:  Sitting: Intact  Standing: Intact  Standing - Static: Good; Unsupported  Standing - Dynamic : Good;Constant support(RW used; ambulated 100 ft, no increased pain w/ambulation)  Ambulation/Gait Training:  Distance (ft): 150 Feet (ft)  Assistive Device: Gait belt;Walker, rolling  Ambulation - Level of Assistance: Stand-by assistance        Gait Abnormalities: Antalgic;Decreased step clearance; Step to gait        Base of Support: Widened  Stance: Left decreased  Speed/Jazmyn: Pace decreased (<100 feet/min); Slow  Step Length: Left shortened;Right shortened       Pain Rating:  Reports pain with activity but does not rate    Activity Tolerance:   Fair and requires rest breaks    After treatment patient left in no apparent distress:   Sitting in chair and Call bell within reach    COMMUNICATION/COLLABORATION:   The patients plan of care was discussed with: Physical therapist, Occupational therapist, and Registered nurse.      Ankush Braswell, PT, DPT   Time Calculation: 11 mins

## 2021-03-03 NOTE — PROGRESS NOTES
Transition of Care Plan:    Disposition: home with [facility]  >[address] [facility]   Follow up appointments: PCP  DME needed: RW, pt declined stating he will borrow one where he resides if needed  Transportation at Discharge: [facility]; ETA by Keegan Roles or means to access home: yes        IM Medicare letter: N/A  Caregiver Contact: Leonardo Avitia (mother) [phone]   Discharge Caregiver contacted prior to discharge? No, pt A&O, will let mother know of d/c     Update 4:29 PM  CM on hold with [facility] for 40+ minutes with no success. CM was told that trip request at 2:30PM was not saved and that pt does not have a scheduled trip for today. CM has obtained permission from leadership and arranged a Round Trip transportation ([address] [place] [address] ID# [id number]) with ETA 5PM. CM has updated unit. CM aware of discharge order. Met with pt at bedside to finalize and review d/c plan. Notified [facility] of d/c. Claudette Gomez has accepted pt for [facility] services at d/c. CM placed call to Dr. Chad Rockwell office to confirm PCP follow up appointment. Pt has been given an appointment of 5/5 @ 10AM with Dr. Kisha Odell. As this is a new patient appointment, pt instructed to call the office as he can see another provider sooner if he prefers. [facility] also to assist pt in getting sooner appointment. Pt requires transport assistance home today. CM has placed call to [facility]; [phone])[phone] and requested d/c transport for today. Representative unable to provide a reference # due to manual report (system was down).  to call nurses' station upon arrival.  Pt has requested CM to fax his d/c prescriptions to the [facility] [phone]. Spoke with pharmacy technician who was able to get these filled within 15 minutes. No co-pay due. CM picked up d/c prescriptions and gave to assigned RN until pt is discharged. Hard copy prescriptions were given to the pharmacy.  Discussed DME recommendation at length with pt (RW). Pt has declined RW and states that if he needs one he will find one to use where he lives. ([place]) Pt reports he has access to the location he is returning to at d/c and will make no additional stops. Pt is ready for d/c from a CM standpoint. Assigned RN informed. Care Management Interventions  PCP Verified by CM: Yes(new PCP appointment scheduled )  Palliative Care Criteria Met (RRAT>21 & CHF Dx)?: No  Mode of Transport at Discharge:  Other (see comment)(Medicaid Access to Care transportation )  Hospital Transport Time of Discharge: 1700  Transition of Care Consult (CM Consult): Discharge Planning, [address]: No  Reason Outside [facility]: (no PCP, [facility] accepted )  Discharge Durable Medical Equipment: No  Physical Therapy Consult: Yes  Occupational Therapy Consult: Yes  Speech Therapy Consult: No  Current Support Network: Lives Alone  Confirm Follow Up Transport: Cab  The Plan for Transition of Care is Related to the Following Treatment Goals : [facility]  The Patient and/or Patient Representative was Provided with a Choice of Provider and Agrees with the Discharge Plan?: Yes  Freedom of Choice List was Provided with Basic Dialogue that Supports the Patient's Individualized Plan of Care/Goals, Treatment Preferences and Shares the Quality Data Associated with the Providers?: No  [place] Resource Information Provided?: No  Discharge Location  Discharge Placement: Home with home health([place] [id number] [facility] )    Shweta Ca, [address], [address]

## 2021-03-03 NOTE — PROGRESS NOTES
Problem: Falls - Risk of  Goal: *Absence of Falls  Description: Document [place] Flow Fall Risk and appropriate interventions in the flowsheet. Outcome: Resolved/Met  Note: Fall Risk Interventions:  Mobility Interventions: Patient to call before getting OOB, PT Consult for mobility concerns, Communicate number of staff needed for ambulation/transfer         Medication Interventions: Patient to call before getting OOB, Teach patient to arise slowly    Elimination Interventions: Call light in reach              Problem: Patient Education: Go to Patient Education Activity  Goal: Patient/Family Education  Outcome: Resolved/Met     Problem: Diabetes Self-Management  Goal: *Disease process and treatment process  Description: Define diabetes and identify own type of diabetes; list 3 options for treating diabetes. Outcome: Resolved/Met  Goal: *Incorporating nutritional management into lifestyle  Description: Describe effect of type, amount and timing of food on blood glucose; list 3 methods for planning meals. Outcome: Resolved/Met  Goal: *Incorporating physical activity into lifestyle  Description: State effect of exercise on blood glucose levels. Outcome: Resolved/Met  Goal: *Developing strategies to promote health/change behavior  Description: Define the ABC's of diabetes; identify appropriate screenings, schedule and personal plan for screenings. Outcome: Resolved/Met  Goal: *Using medications safely  Description: State effect of diabetes medications on diabetes; name diabetes medication taking, action and side effects. Outcome: Resolved/Met  Goal: *Monitoring blood glucose, interpreting and using results  Description: Identify recommended blood glucose targets  and personal targets.   Outcome: Resolved/Met  Goal: *Prevention, detection, treatment of acute complications  Description: List symptoms of hyper- and hypoglycemia; describe how to treat low blood sugar and actions for lowering  high blood glucose level.  Outcome: Resolved/Met  Goal: *Prevention, detection and treatment of chronic complications  Description: Define the natural course of diabetes and describe the relationship of blood glucose levels to long term complications of diabetes.   Outcome: Resolved/Met  Goal: *Developing strategies to address psychosocial issues  Description: Describe feelings about living with diabetes; identify support needed and support network  Outcome: Resolved/Met  Goal: *Insulin pump training  Outcome: Resolved/Met  Goal: *Sick day guidelines  Outcome: Resolved/Met  Goal: *Patient Specific Goal (EDIT GOAL, INSERT TEXT)  Outcome: Resolved/Met     Problem: Patient Education: Go to Patient Education Activity  Goal: Patient/Family Education  Outcome: Resolved/Met     Problem: Patient Education: Go to Patient Education Activity  Goal: Patient/Family Education  Outcome: Resolved/Met     Problem: Patient Education: Go to Patient Education Activity  Goal: Patient/Family Education  Outcome: Resolved/Met     Problem: Cellulitis Care Plan (Adult)  Goal: *Control of acute pain  Outcome: Resolved/Met  Goal: *Skin integrity maintained  Outcome: Resolved/Met  Goal: *Absence of infection signs and symptoms  Outcome: Resolved/Met     Problem: Patient Education: Go to Patient Education Activity  Goal: Patient/Family Education  Outcome: Resolved/Met

## 2021-03-03 NOTE — DISCHARGE SUMMARY
Hospitalist Discharge Summary     Patient ID:  Taylor Dhaliwal  [id number]  64 y.o.  1965 2/26/2021    PCP on record: None    Admit date: 2/26/2021  Discharge date and time: 3/3/2021    DISCHARGE DIAGNOSIS:    Hypoalbuminemia  Hyperglycemia  Cellulitis of Right lower leg  Type 2 Diabetes with long term current insulin use    CONSULTATIONS:  Wound Care Nurse    Excerpted HPI from H&P of Edwin Dent MD:    Taylor Dhaliwal, is G 1 y. o. male whose medical history is noted below and includes prostate cancer, varicose veins, obesity, presents to the ED with acute and severe pain in his right leg. Pain started a week ago, but acutely worsened over the last 24 hours. [facility] has now trouble walking.  Patient does not have a primary care doctor and the only doctor he sees is his \"prostate doctor\".  This physician started him on 20 mg of Lasix recently. [facility] is taking that daily but has not noticed any increase or change in his urine output.  He has bilateral lower extremity edema that he reports has been worsening over few weeks.  In addition to the pain in his right leg, he noticed yesterday that his pant leg above the knee has became soaked with fluid which is draining from the new skin excoriations in his right leg, most notably calf.   No fevers or chills.  No systemic symptoms otherwise.    ______________________________________________________________________  DISCHARGE SUMMARY/HOSPITAL COURSE:  for full details see H&P, daily progress notes, labs, consult notes. The patient is a pleasant 64year old male patient who presented to the ED with acute and severe pain in his right leg. He stated it started about a week prior to his admission with worsening symptoms. He complained of difficulty walking. He denied fever or chills. He was started on IV Unasyn. Duplex negative for DVT.  He has a past medical history significant for varicose veins, venous insufficiency, type 2 diabetes, and hypertension. He was noted to have low albumin. He was started on dietary supplements and dietician was consulted for further evaluation and treatment. His blood cultures growing staph in one bottle which is probable contaminate. Repeat blood cultures from 2/28/21 are Negative to date. He is to continue Augment twice daily for a total of seven days. Home Health to assist with dressing changes and home PT. Discussed discharge with patient. All questions answered at this time. · Bilateral duplex: No evidence of deep vein thrombosis in the right lower extremity. No evidence of deep vein thrombosis in the left lower extremity. Hypoalbuminemia: continue dietary supplements with meals    Hyperglycemia: continue home hypoglycemic medications    Cellulitis of Right lower leg: continue Augmentin Bid x 7 days, Follow up with Pcp for monitoring    Type 2 Diabetes with long term current insulin use: continue home hypoglycemic medications, follow up with pcp for monitoring    _______________________________________________________________________  Patient seen and examined by me on discharge day. Pertinent Findings:  Gen:    Not in distress, obese  Chest: Clear lungs, no wheeze, no rales noted  CVS:   Regular rhythm. Chronic bilateral lower extremity edema  Abd:  Soft, not distended, not tender  Neuro:  Alert, not anxious  _______________________________________________________________________  DISCHARGE MEDICATIONS:   Current Discharge Medication List      START taking these medications    Details   amoxicillin-clavulanate (AUGMENTIN) 875-125 mg per tablet Take 1 Tab by mouth every twelve (12) hours for 7 days. Qty: 14 Tab, Refills: 0      oxyCODONE-acetaminophen (PERCOCET) 5-325 mg per tablet Take 1 Tab by mouth every six (6) hours as needed for Pain for up to 5 days. Max Daily Amount: 4 Tabs.   Qty: 20 Tab, Refills: 0    Associated Diagnoses: Cellulitis of right lower extremity      polyethylene glycol (MIRALAX) 17 gram packet Take 1 Packet by mouth daily. Qty: 30 Each, Refills: 0         CONTINUE these medications which have NOT CHANGED    Details   atorvastatin (LIPITOR) 40 mg tablet Take 40 mg by mouth daily. furosemide (LASIX) 20 mg tablet Take 20 mg by mouth daily. glipiZIDE (GLUCOTROL) 10 mg tablet Take 10 mg by mouth two (2) times a day. insulin glargine (Lantus U-100 Insulin) 100 unit/mL injection 25 Units by SubCUTAneous route daily. metFORMIN (GLUMETZA ER) 500 mg TG24 24 hour tablet Take 2 Tabs by mouth two (2) times a day. Patient Follow Up Instructions: Activity: Activity as tolerated  Diet: Diabetic Diet  Wound Care:  Cleanse Right lower leg  with CHG soap and water wet soft cloths to remove the soap, Silvasorb wound gel applied and then a 4 Xeroform gauzes, ABD heavy drainage pack applied and the rest of the 4x4's. Wrapped with Dorothy (NOT Kerlix). Dressings changes every other day. Follow-up with PCP in 1 week.   Follow-up tests/labs BMP    Follow-up Information     Follow up With Specialties Details Why [address] Call in 1 day this is your home health provider [address] [place] [address], [address] [facility] [address]    Brock Painter MD Family Medicine Schedule an appointment as soon as possible for a visit in 1 week Hospital follow up [address].  [phone]      None    None (395) Patient stated that they have no PCP          ________________________________________________________________    Risk of deterioration: Low    Condition at Discharge:  Stable  __________________________________________________________________    Disposition  Home with family and home health services    ____________________________________________________________________    Code Status: Full Code  ___________________________________________________________________      Total time in minutes spent coordinating this discharge (includes going over instructions, follow-up, prescriptions, and preparing report for sign off to her PCP) :  >30 minutes    Signed:  Georgina John NP

## 2021-03-03 NOTE — DISCHARGE INSTRUCTIONS
Patient Education        Cellulitis: Care Instructions  Your Care Instructions     Cellulitis is a skin infection caused by bacteria, most often strep or staph. It often occurs after a break in the skin from a scrape, cut, bite, or puncture, or after a rash. Cellulitis may be treated without doing tests to find out what caused it. But your doctor may do tests, if needed, to look for a specific bacteria, like methicillin-resistant Staphylococcus aureus (MRSA). The doctor has checked you carefully, but problems can develop later. If you notice any problems or new symptoms, get medical treatment right away. Follow-up care is a key part of your treatment and safety. Be sure to make and go to all appointments, and call your doctor if you are having problems. It's also a good idea to know your test results and keep a list of the medicines you take. How can you care for yourself at home? · Take your antibiotics as directed. Do not stop taking them just because you feel better. You need to take the full course of antibiotics. · Prop up the infected area on pillows to reduce pain and swelling. Try to keep the area above the level of your heart as often as you can. · If your doctor told you how to care for your wound, follow your doctor's instructions. If you did not get instructions, follow this general advice:  ? Wash the wound with clean water 2 times a day. Don't use hydrogen peroxide or alcohol, which can slow healing. ? You may cover the wound with a thin layer of petroleum jelly, such as Vaseline, and a nonstick bandage. ? Apply more petroleum jelly and replace the bandage as needed. · Be safe with medicines. Take pain medicines exactly as directed. ? If the doctor gave you a prescription medicine for pain, take it as prescribed. ? If you are not taking a prescription pain medicine, ask your doctor if you can take an over-the-counter medicine.   To prevent cellulitis in the future  · Try to prevent cuts, scrapes, or other injuries to your skin. Cellulitis most often occurs where there is a break in the skin. · If you get a scrape, cut, mild burn, or bite, wash the wound with clean water as soon as you can to help avoid infection. Don't use hydrogen peroxide or alcohol, which can slow healing. · If you have swelling in your legs (edema), support stockings and good skin care may help prevent leg sores and cellulitis. · Take care of your feet, especially if you have diabetes or other conditions that increase the risk of infection. Wear shoes and socks. Do not go barefoot. If you have athlete's foot or other skin problems on your feet, talk to your doctor about how to treat them. When should you call for help? Call your doctor now or seek immediate medical care if:    · You have signs that your infection is getting worse, such as:  ? Increased pain, swelling, warmth, or redness. ? Red streaks leading from the area. ? Pus draining from the area. ? A fever.     · You get a rash. Watch closely for changes in your health, and be sure to contact your doctor if:    · You do not get better as expected. Where can you learn more? Go to http://www.gray.com/  Enter X309 in the search box to learn more about \"Cellulitis: Care Instructions. \"  Current as of: July 2, 2020               Content Version: 12.6  © 6606-2209 Healthwise, Incorporated. Care instructions adapted under license by [employer] (which disclaims liability or warranty for this information). If you have questions about a medical condition or this instruction, always ask your healthcare professional. [place] [address] any warranty or liability for your use of this information. Patient Education       Patient Discharge Instructions     Pt Name  Jamee Avitia   Date of Birth 1965   Age  64 y.o.    Medical Record Number  [id number]   PCP None    Admit date:  2/26/2021 @    [facility] [address]    Room Number  1120/01   Date of Discharge 3/3/2021     Admission Diagnoses:     <principal problem not specified>        No Known Allergies     You were admitted to [address] for  <principal problem not specified>    YOUR OTHER MEDICAL DIAGNOSES INCLUDE (BUT NOT LIMITED TO ):  Present on Admission:   Hypoalbuminemia   Hyperglycemia   Cellulitis of right leg   Lower leg edema   Type 2 diabetes mellitus, with long-term current use of insulin (HCC)      DIET:  Diabetic Diet   Oral Nutritional Supplements: Glucerna  Supplement Frequency: Three times daily    Recommended activity: Activity as tolerated  Follow up : Follow-up Information     Follow up With Specialties Details Why [address] Call in 1 day this is your home health provider [address] [facility], [address]    Juanita Hartmann MD Family Medicine Schedule an appointment as soon as possible for a visit in 1 week Hospital follow up [address].  [phone]      None    None (395) Patient stated that they have no PCP           Cleanse Right lower leg  with CHG soap and water wet soft cloths to remove the soap, Silvasorb wound gel applied and then a 4 Xeroform gauzes, ABD heavy drainage pack applied and the rest of the 4x4's. Wrapped with Dorothy (NOT Kerlix). Dressings changes every other day. · It is important that you take the medication exactly as they are prescribed. · Keep your medication in the bottles provided by the pharmacist and keep a list of the medication names, dosages, and times to be taken in your wallet. · Do not take other medications without consulting your doctor.        ADDITIONAL INFORMATION: If you experience any of the following symptoms or have any health problem not listed below, then please call your primary care physician or return to the emergency room if you cannot get hold of your doctor: Fever, chills, nausea, vomiting, diarrhea, change in mentation, falling, bleeding, shortness of breath. I understand that if any problems occur once I am discharged, I am supposed to call my Primary care physician for further care or seek help in the Emergency Department at the nearest Healthcare facility. I have had an opportunity to discuss my clinical issues with my doctor and nursing staff. I understand and acknowledge receipt of the above instructions. Physician's or R.N.'s Signature                                                            Date/Time                                                                                                                                              Patient or Representative Signature                                                 Date/Time                     Constipation: Care Instructions  Your Care Instructions     Constipation means that you have a hard time passing stools (bowel movements). People pass stools from 3 times a day to once every 3 days. What is normal for you may be different. Constipation may occur with pain in the rectum and cramping. The pain may get worse when you try to pass stools. Sometimes there are small amounts of bright red blood on toilet paper or the surface of stools. This is because of enlarged veins near the rectum (hemorrhoids). A few changes in your diet and lifestyle may help you avoid ongoing constipation. Your doctor may also prescribe medicine to help loosen your stool. Some medicines can cause constipation. These include pain medicines and antidepressants. Tell your doctor about all the medicines you take. Your doctor may want to make a medicine change to ease your symptoms.   Follow-up care is a key part of your treatment and safety. Be sure to make and go to all appointments, and call your doctor if you are having problems. It's also a good idea to know your test results and keep a list of the medicines you take. How can you care for yourself at home? · Drink plenty of fluids, enough so that your urine is light yellow or clear like water. If you have kidney, heart, or liver disease and have to limit fluids, talk with your doctor before you increase the amount of fluids you drink. · Include high-fiber foods in your diet each day. These include fruits, vegetables, beans, and whole grains. · Get at least 30 minutes of exercise on most days of the week. Walking is a good choice. You also may want to do other activities, such as running, swimming, cycling, or playing tennis or team sports. · Take a fiber supplement, such as Citrucel or Metamucil, every day. Read and follow all instructions on the label. · Schedule time each day for a bowel movement. A daily routine may help. Take your time having your bowel movement. · Support your feet with a small step stool when you sit on the toilet. This helps flex your hips and places your pelvis in a squatting position. · Your doctor may recommend an over-the-counter laxative to relieve your constipation. Examples are Milk of Magnesia and MiraLax. Read and follow all instructions on the label. Do not use laxatives on a long-term basis. When should you call for help? Call your doctor now or seek immediate medical care if:    · You have new or worse belly pain.     · You have new or worse nausea or vomiting.     · You have blood in your stools. Watch closely for changes in your health, and be sure to contact your doctor if:    · Your constipation is getting worse.     · You do not get better as expected. Where can you learn more? Go to http://www.ChangeMob.com/  Enter P343 in the search box to learn more about \"Constipation: Care Instructions. \"  Current as of: June 26, 2019               Content Version: 12.6  © 7251-9714 [employer], [employer]orporated. Care instructions adapted under license by [employer] (which disclaims liability or warranty for this information). If you have questions about a medical condition or this instruction, always ask your healthcare professional. [address] any warranty or liability for your use of this information.

## 2021-03-03 NOTE — PROGRESS NOTES
Problem: Self Care Deficits Care Plan (Adult)  Goal: *Acute Goals and Plan of Care (Insert Text)  Description:     FUNCTIONAL STATUS PRIOR TO ADMISSION:  Lives alone at [facility]. 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 Patient was independent and active without use of DME. Admits to increasing difficulty with performing his own BLE wound care. States, \"Its tough but I manage it\" when asked if he has any difficulty getting dressed. Props foot on bed, B LE. Sits/stands to sponge bathe. Denies Hx of falls. HOME SUPPORT: Resides at [facility]. [facility] to [employer]. Mentioned a sister who helped him call an Richie Ion, yet stated \"No\" when asked if she helps him with other IADL tasks or could be available to assist at D/C PRN. Reports that he may be able to request assist from Cha  of [facility]. \" but this is not yet verified. \"She said she would help me. \"    Occupational Therapy Goals  Initiated 2/26/2021  1. Patient will perform grooming tasks standing at the sink with independence within 7 day(s). 2.  Patient will perform sponge bathing with modified independence using long handled AE PRN within 7 day(s). 3.  Patient will perform lower body dressing with modified independence within 7 day(s). 4.  Patient will perform toilet transfers with independence within 7 day(s). 5.  Patient will perform all aspects of toileting with independence within 7 day(s). Outcome: Progressing Towards Goal   OCCUPATIONAL THERAPY TREATMENT  Patient: Carroll Ponce (32 y.o. male)  Date: 3/3/2021  Diagnosis: Cellulitis of right leg [K42.454]  Lower leg edema [R60.0]  Hyperglycemia [R73.9]  Hypoalbuminemia [E88.09] <principal problem not specified>       Precautions: Skin(BLE wounds)  Chart, occupational therapy assessment, plan of care, and goals were reviewed. ASSESSMENT  Patient continues with skilled OT services and is progressing towards goals.   Good participation with therapy; receptive to recommendations for safety, effective nutrition for DMII and pain management; reports he may have assist at his living place from S at his job/building where he lives for ADLs and IADLs. Ambulated 100 ft with RW without increased pain or fatigue. 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 PLAN :  Patient continues to benefit from skilled intervention to address the above impairments. Continue treatment per established plan of care. to address goals. Recommend with staff: S to bathroom with RW    Recommend next OT session: UE HEP with handout, red theraband    Recommendation for discharge: (in order for the patient to meet his/her long term goals)  Occupational therapy at least 2 days/week in the home AND ensure assist and/or supervision for safety with wound care    This discharge recommendation:  A follow-up discussion with the attending provider and/or case management is planned    IF patient discharges home will need the following DME: AE: long handled dressing       SUBJECTIVE:   Patient stated I can ask for some training to help my diabetes to get better.     OBJECTIVE DATA SUMMARY:   Cognitive/Behavioral Status:  Neurologic State: Alert  Orientation Level: Appropriate for age;Oriented X4  Cognition: Follows commands(not formally assessed)  Perception: Appears intact  Perseveration: No perseveration noted  Safety/Judgement: Fall prevention; Awareness of environment;Decreased insight into deficits(A1C over 10; would benefit from dietician education)    Functional Mobility and Transfers for ADLs:  Bed Mobility:  Scooting: Stand-by assistance    Transfers:  Sit to Stand: Supervision          Balance:  Sitting: Intact  Standing: Intact; With support(RW used)  Standing - Static: Good; Unsupported  Standing - Dynamic : Good;Constant support(RW used; ambulated 100 ft, no increased pain w/ambulation)    ADL Intervention:       Grooming  Grooming Assistance: Modified independent    Upper Body Bathing  Bathing Assistance: Stand-by assistance    Lower Body Bathing  Bathing Assistance: Minimum assistance; Moderate assistance(limited by 9/10 R LE pain, wounds covered; standing RW used)    Upper Body Dressing Assistance  Dressing Assistance: Set-up    Lower Body Dressing Assistance  Dressing Assistance: Minimum assistance  Socks: Supervision;Contact guard assistance(LE may be helpful; not sure if friend can assist \"thinks so\")  Leg Crossed Method Used: No  Position Performed: (LE AE may be helpful for [facility])         Cognitive Retraining  Attention to Task: Single task  Maintains Attention For (Time): Greater than 10 minutes  Following Commands: Follows one step commands/directions; Follows two step commands/directions  Safety/Judgement: Fall prevention; Awareness of environment;Decreased insight into deficits(A1C over 10; would benefit from dietician education)    Therapeutic Exercises:   Trained benefit of AROM to maximize circulation and healing    Pain:  9/10, recently medicated; pain management reviewed w/patient , including elevation, exercise and distraction    Activity Tolerance:   Fair and SpO2 stable on RA    After treatment patient left in no apparent distress:   Sitting in chair and Call bell within reach    COMMUNICATION/COLLABORATION:   The patients plan of care was discussed with: Registered nurse and Certified nursing assistant/patient care technician.      Burnice Mealy OTR/L  Time Calculation: 32 mins

## 2021-03-04 ENCOUNTER — PATIENT OUTREACH (OUTPATIENT)
Dept: CASE MANAGEMENT | Age: 56
End: 2021-03-04

## 2021-03-04 NOTE — PROGRESS NOTES
Physician Progress Note      PATIENT:               DUY PETERSON  [facility] #:                  [id number]  :                       1965  ADMIT DATE:       2021 2:05 AM  DISCH DATE:        3/3/2021 5:30 PM  RESPONDING  PROVIDER #:        NEERAJ CALDWELL NP          QUERY TEXT:    Dear Hospitalist Team,  Pt admitted with RLE cellulitis. Pt noted to have DM 2 uncontrolled with an A1C of 10.2 per progress note on 3/2.    If possible, please document in progress notes and discharge summary the relationship, if any, between cellulitis and DM.    The medical record reflects the following:    Risk Factors: 56 Yr M admitted with RLE; new found hyperglycemia with DM2 dx    Clinical Indicators: Patient arrived to the ED with c/o RLE pain, swelling and erythema. On admission patient was noted to have hyperglycemia with a BG of 303 which prompted labs to check the patient's A1C which came back as 10.5. Patient's cellulitis was treated with wound care consultation with daily wound care, Augmentin 875-125 mg PO Q 12 hrs and Unasyn 3 G IV Q 6 hrs. Patient also received Lantus 10 units SC at bedtime and was on ACHS insulin lispro schedule with insulin PRN. Patient was not discharged on diabetic medication.    Treatment: A1C, ACHS BG checks, Wound care, Augmentin 875-125 mg PO Q 12 hrs, Unasyn 3 G IV Q 6 hrs, Lantus 10 units SC at bedtime and ACHS insulin lispro schedule with insulin PRN. Patient was not discharged on diabetic medication.    Thank you,  Michelle Fontenot RN, [facility]  [phone]  Options provided:  -- RLE cellulitis associated with Diabetes  -- RLE cellulitis unrelated to Diabetes  -- Other - I will add my own diagnosis  -- Disagree - Not applicable / Not valid  -- Disagree - Clinically unable to determine / Unknown  -- Refer to Clinical Documentation Reviewer    PROVIDER RESPONSE TEXT:    RLE cellulitis associated with Diabetes.    Query created by: Michelle Fontenot on 3/4/2021 9:24  AM      Electronically signed by:  Gasper Kim NP 3/4/2021 12:15 PM

## 2021-03-04 NOTE — PROGRESS NOTES
Patient contacted regarding recent discharge and COVID-19 risk. Discussed COVID-19 related testing which was not done at this time. Test results were not done. Patient informed of results, if available? NA    Outreach made within 2 business days of discharge: Yes    Care Transition Nurse/ Ambulatory Care Manager/ LPN Care Coordinator contacted the patient by telephone to perform post discharge assessment. Verified name and  with patient as identifiers. Patient has following risk factors of: diabetes and hospital visit to [facility], discharged on 3/3/2021. CTN/ACM/LPN reviewed discharge instructions, medical action plan and red flags related to discharge diagnosis. Reviewed and educated them on any new and changed medications related to discharge diagnosis. Advised obtaining a 90-day supply of all daily and as-needed medications. Advance Care Planning:   Does patient have an Advance Directive: not reviewed    Education provided regarding infection prevention, and signs and symptoms of COVID-19 and when to seek medical attention with patient who verbalized understanding. Discussed exposure protocols and [facility]ine from [facility] you at higher risk for severe illness  and given an opportunity for questions and concerns. The patient agrees to contact the COVID-19 hotline [phone] or PCP office for questions related to their healthcare. CTN/ACM/LPN provided contact information for future reference. From CDC: Are you at higher risk for severe illness?  Wash your hands often.  Avoid close contact (6 feet, which is about two arm lengths) with people who are sick.  Put distance between yourself and other people if COVID-19 is spreading in your community.  Clean and disinfect frequently touched surfaces.  Avoid all cruise travel and non-essential air travel.  Call your healthcare professional if you have concerns about COVID-19 and your underlying condition or if you are sick.     For more information on steps you can take to protect yourself, see CDC's How to Protect Yourself      Patient/family/caregiver given information for [employer] and agrees to enroll no      Plan for follow-up call in 5-7 days based on severity of symptoms and risk factors. Pt verified that he has hospital prescribed medications and has all maintenance medications except glipizide as [facility] has not filled it and pharmacy is to contact his DM physician who is at [facility]. Pt reports that he needs a blood glucose meter and advised to call DM physician. Pt reports that he has follow up appt with DM physician next week. Pt verified that he has PCP appt on 5/5/2021. Pt reports that DM physician manages medications. Pt states that [facility] there currently. Pt was [facility] telephone numbers and my contact information. Will follow up in a week.

## 2021-03-04 NOTE — PROGRESS NOTES
DISCHARGE SUMMARY FROM [facility] NURSE    The patient is stable for discharge. I have reviewed the discharge instructions with the patient. The patient verbalized understanding. All questions were fully answered. The patient verbalized no complaints. Hard scripts and medication handouts were given and reviewed with the patient. Appropriate educational materials and medication side effects teaching were provided also provided. Cardiac monitor and IV line(s) were removed. There were no personal belongings, valuables or home medications left at patient's bedside,  or safe.

## 2021-03-05 LAB
BACTERIA SPEC CULT: ABNORMAL
BACTERIA SPEC CULT: NORMAL
SERVICE CMNT-IMP: ABNORMAL
SERVICE CMNT-IMP: NORMAL

## 2021-03-11 ENCOUNTER — PATIENT OUTREACH (OUTPATIENT)
Dept: CASE MANAGEMENT | Age: 56
End: 2021-03-11

## 2021-03-11 NOTE — PROGRESS NOTES
Called pt to follow up on hospital visit. Pt verified name and  and reports that [facility] is still seeing him and pt reports that he followed up with physician who has taken him off glipizide. Pt states no concerns presently and is agreeable to a call next week.

## 2021-03-19 ENCOUNTER — PATIENT OUTREACH (OUTPATIENT)
Dept: CASE MANAGEMENT | Age: 56
End: 2021-03-19

## 2021-03-19 NOTE — PROGRESS NOTES
Patient resolved from [address] Transitions episode on 3/19/2021. Discussed COVID-19 related testing which was not done at this time. Test results were not done. Patient informed of results, if available? NA     Patient/family has been provided the following resources and education related to COVID-19:                         Signs, symptoms and red flags related to COVID-19            [facility] exposure and quarantine guidelines            [employer] exposure contact - [phone]            Contact for their local Department of Health                 Patient currently reports that the following symptoms have improved:  no new symptoms and no worsening symptoms. Pt reports that he has stopped [place] but does have a follow up appt with PCP on 5/5/2021. No further outreach scheduled with this CTN/ACM/LPN/HC/ MA. Episode of Care resolved. Patient has this CTN/ACM/LPN/HC/MA contact information if future needs arise.

## 2021-07-09 ENCOUNTER — APPOINTMENT (OUTPATIENT)
Dept: GENERAL RADIOLOGY | Age: 56
DRG: 383 | End: 2021-07-09
Attending: EMERGENCY MEDICINE
Payer: MEDICAID

## 2021-07-09 ENCOUNTER — APPOINTMENT (OUTPATIENT)
Dept: ULTRASOUND IMAGING | Age: 56
DRG: 383 | End: 2021-07-09
Attending: EMERGENCY MEDICINE
Payer: MEDICAID

## 2021-07-09 ENCOUNTER — HOSPITAL ENCOUNTER (INPATIENT)
Age: 56
LOS: 5 days | Discharge: HOME HEALTH | DRG: 383 | End: 2021-07-15
Attending: EMERGENCY MEDICINE | Admitting: HOSPITALIST
Payer: MEDICAID

## 2021-07-09 DIAGNOSIS — L03.116 BILATERAL LOWER LEG CELLULITIS: ICD-10-CM

## 2021-07-09 DIAGNOSIS — Z78.9 FAILURE OF OUTPATIENT TREATMENT: ICD-10-CM

## 2021-07-09 DIAGNOSIS — L03.115 BILATERAL CELLULITIS OF LOWER LEG: Primary | ICD-10-CM

## 2021-07-09 DIAGNOSIS — M79.605 BILATERAL LEG PAIN: ICD-10-CM

## 2021-07-09 DIAGNOSIS — I10 ACCELERATED HYPERTENSION: ICD-10-CM

## 2021-07-09 DIAGNOSIS — L03.115 BILATERAL LOWER LEG CELLULITIS: ICD-10-CM

## 2021-07-09 DIAGNOSIS — M79.604 BILATERAL LEG PAIN: ICD-10-CM

## 2021-07-09 DIAGNOSIS — L03.116 BILATERAL CELLULITIS OF LOWER LEG: Primary | ICD-10-CM

## 2021-07-09 DIAGNOSIS — S81.809A MULTIPLE OPEN WOUNDS OF LOWER LEG, UNSPECIFIED LATERALITY, INITIAL ENCOUNTER: ICD-10-CM

## 2021-07-09 LAB
ALBUMIN SERPL-MCNC: 3 G/DL (ref 3.5–5)
ALBUMIN/GLOB SERPL: 0.6 {RATIO} (ref 1.1–2.2)
ALP SERPL-CCNC: 89 U/L (ref 45–117)
ALT SERPL-CCNC: 26 U/L (ref 12–78)
ANION GAP SERPL CALC-SCNC: 3 MMOL/L (ref 5–15)
AST SERPL-CCNC: 19 U/L (ref 15–37)
BASOPHILS # BLD: 0 K/UL (ref 0–0.1)
BASOPHILS NFR BLD: 0 % (ref 0–1)
BILIRUB SERPL-MCNC: 0.5 MG/DL (ref 0.2–1)
BUN SERPL-MCNC: 12 MG/DL (ref 6–20)
BUN/CREAT SERPL: 17 (ref 12–20)
CALCIUM SERPL-MCNC: 8.5 MG/DL (ref 8.5–10.1)
CHLORIDE SERPL-SCNC: 104 MMOL/L (ref 97–108)
CO2 SERPL-SCNC: 31 MMOL/L (ref 21–32)
CREAT SERPL-MCNC: 0.7 MG/DL (ref 0.7–1.3)
DIFFERENTIAL METHOD BLD: ABNORMAL
EOSINOPHIL # BLD: 0.2 K/UL (ref 0–0.4)
EOSINOPHIL NFR BLD: 3 % (ref 0–7)
ERYTHROCYTE [DISTWIDTH] IN BLOOD BY AUTOMATED COUNT: 13.7 % (ref 11.5–14.5)
GLOBULIN SER CALC-MCNC: 5.3 G/DL (ref 2–4)
GLUCOSE SERPL-MCNC: 110 MG/DL (ref 65–100)
HCT VFR BLD AUTO: 34.4 % (ref 36.6–50.3)
HGB BLD-MCNC: 10.5 G/DL (ref 12.1–17)
IMM GRANULOCYTES # BLD AUTO: 0 K/UL (ref 0–0.04)
IMM GRANULOCYTES NFR BLD AUTO: 0 % (ref 0–0.5)
LACTATE BLD-SCNC: 1.44 MMOL/L (ref 0.4–2)
LYMPHOCYTES # BLD: 1.3 K/UL (ref 0.8–3.5)
LYMPHOCYTES NFR BLD: 19 % (ref 12–49)
MCH RBC QN AUTO: 25.7 PG (ref 26–34)
MCHC RBC AUTO-ENTMCNC: 30.5 G/DL (ref 30–36.5)
MCV RBC AUTO: 84.3 FL (ref 80–99)
MONOCYTES # BLD: 0.6 K/UL (ref 0–1)
MONOCYTES NFR BLD: 9 % (ref 5–13)
NEUTS SEG # BLD: 4.7 K/UL (ref 1.8–8)
NEUTS SEG NFR BLD: 69 % (ref 32–75)
NRBC # BLD: 0 K/UL (ref 0–0.01)
NRBC BLD-RTO: 0 PER 100 WBC
PLATELET # BLD AUTO: 252 K/UL (ref 150–400)
PMV BLD AUTO: 11 FL (ref 8.9–12.9)
POTASSIUM SERPL-SCNC: 3.8 MMOL/L (ref 3.5–5.1)
PROT SERPL-MCNC: 8.3 G/DL (ref 6.4–8.2)
RBC # BLD AUTO: 4.08 M/UL (ref 4.1–5.7)
SODIUM SERPL-SCNC: 138 MMOL/L (ref 136–145)
WBC # BLD AUTO: 6.8 K/UL (ref 4.1–11.1)

## 2021-07-09 PROCEDURE — 96375 TX/PRO/DX INJ NEW DRUG ADDON: CPT

## 2021-07-09 PROCEDURE — 74011250636 HC RX REV CODE- 250/636: Performed by: EMERGENCY MEDICINE

## 2021-07-09 PROCEDURE — 83605 ASSAY OF LACTIC ACID: CPT

## 2021-07-09 PROCEDURE — 87077 CULTURE AEROBIC IDENTIFY: CPT

## 2021-07-09 PROCEDURE — 96365 THER/PROPH/DIAG IV INF INIT: CPT

## 2021-07-09 PROCEDURE — 85025 COMPLETE CBC W/AUTO DIFF WBC: CPT

## 2021-07-09 PROCEDURE — 80053 COMPREHEN METABOLIC PANEL: CPT

## 2021-07-09 PROCEDURE — 71045 X-RAY EXAM CHEST 1 VIEW: CPT

## 2021-07-09 PROCEDURE — 87186 SC STD MICRODIL/AGAR DIL: CPT

## 2021-07-09 PROCEDURE — 93970 EXTREMITY STUDY: CPT

## 2021-07-09 PROCEDURE — 87040 BLOOD CULTURE FOR BACTERIA: CPT

## 2021-07-09 PROCEDURE — 99283 EMERGENCY DEPT VISIT LOW MDM: CPT

## 2021-07-09 PROCEDURE — 36415 COLL VENOUS BLD VENIPUNCTURE: CPT

## 2021-07-09 PROCEDURE — 83880 ASSAY OF NATRIURETIC PEPTIDE: CPT

## 2021-07-09 PROCEDURE — 74011000258 HC RX REV CODE- 258: Performed by: EMERGENCY MEDICINE

## 2021-07-09 PROCEDURE — 96376 TX/PRO/DX INJ SAME DRUG ADON: CPT

## 2021-07-09 RX ORDER — ONDANSETRON 2 MG/ML
4 INJECTION INTRAMUSCULAR; INTRAVENOUS
Status: DISCONTINUED | OUTPATIENT
Start: 2021-07-09 | End: 2021-07-15 | Stop reason: HOSPADM

## 2021-07-09 RX ORDER — MORPHINE SULFATE 2 MG/ML
4 INJECTION, SOLUTION INTRAMUSCULAR; INTRAVENOUS
Status: COMPLETED | OUTPATIENT
Start: 2021-07-09 | End: 2021-07-10

## 2021-07-09 RX ORDER — KETOROLAC TROMETHAMINE 30 MG/ML
30 INJECTION, SOLUTION INTRAMUSCULAR; INTRAVENOUS
Status: COMPLETED | OUTPATIENT
Start: 2021-07-09 | End: 2021-07-09

## 2021-07-09 RX ORDER — FENTANYL CITRATE 50 UG/ML
50 INJECTION, SOLUTION INTRAMUSCULAR; INTRAVENOUS
Status: COMPLETED | OUTPATIENT
Start: 2021-07-09 | End: 2021-07-09

## 2021-07-09 RX ADMIN — AMPICILLIN SODIUM AND SULBACTAM SODIUM 3 G: 2; 1 INJECTION, POWDER, FOR SOLUTION INTRAMUSCULAR; INTRAVENOUS at 23:14

## 2021-07-09 RX ADMIN — FENTANYL CITRATE 50 MCG: 50 INJECTION, SOLUTION INTRAMUSCULAR; INTRAVENOUS at 23:11

## 2021-07-09 RX ADMIN — KETOROLAC TROMETHAMINE 30 MG: 30 INJECTION, SOLUTION INTRAMUSCULAR; INTRAVENOUS at 23:12

## 2021-07-09 NOTE — LETTER
NOTIFICATION OF RETURN TO WORK / SCHOOL    7/19/2021    Mr. Tejal Chandler  [address] [address]      To Whom It May Concern:    Tejal Chandler was under the care of a physician at [facility]. He will return to work with regular duties and/or activities, do not lift more than 10-15 lbs or get midline IV access dirty/sweaty. His IV access will be removed on 7/25 and he can resume full duty at that time. If there are questions or concerns please have the patient contact our office.         Sincerely,      Roberto Carlos Sullivan NP

## 2021-07-10 PROBLEM — L03.115 BILATERAL LOWER LEG CELLULITIS: Status: ACTIVE | Noted: 2021-07-10

## 2021-07-10 PROBLEM — L03.116 BILATERAL LOWER LEG CELLULITIS: Status: ACTIVE | Noted: 2021-07-10

## 2021-07-10 LAB
ANION GAP SERPL CALC-SCNC: 7 MMOL/L (ref 5–15)
BNP SERPL-MCNC: 31 PG/ML
BUN SERPL-MCNC: 12 MG/DL (ref 6–20)
BUN/CREAT SERPL: 17 (ref 12–20)
CALCIUM SERPL-MCNC: 8.4 MG/DL (ref 8.5–10.1)
CHLORIDE SERPL-SCNC: 103 MMOL/L (ref 97–108)
CO2 SERPL-SCNC: 27 MMOL/L (ref 21–32)
CREAT SERPL-MCNC: 0.72 MG/DL (ref 0.7–1.3)
ERYTHROCYTE [DISTWIDTH] IN BLOOD BY AUTOMATED COUNT: 13.7 % (ref 11.5–14.5)
EST. AVERAGE GLUCOSE BLD GHB EST-MCNC: 223 MG/DL
GLUCOSE BLD STRIP.AUTO-MCNC: 155 MG/DL (ref 65–117)
GLUCOSE BLD STRIP.AUTO-MCNC: 191 MG/DL (ref 65–117)
GLUCOSE BLD STRIP.AUTO-MCNC: 200 MG/DL (ref 65–117)
GLUCOSE BLD STRIP.AUTO-MCNC: 233 MG/DL (ref 65–117)
GLUCOSE SERPL-MCNC: 165 MG/DL (ref 65–100)
HBA1C MFR BLD: 9.4 % (ref 4–5.6)
HCT VFR BLD AUTO: 33.9 % (ref 36.6–50.3)
HGB BLD-MCNC: 9.9 G/DL (ref 12.1–17)
MCH RBC QN AUTO: 24.9 PG (ref 26–34)
MCHC RBC AUTO-ENTMCNC: 29.2 G/DL (ref 30–36.5)
MCV RBC AUTO: 85.2 FL (ref 80–99)
NRBC # BLD: 0 K/UL (ref 0–0.01)
NRBC BLD-RTO: 0 PER 100 WBC
PLATELET # BLD AUTO: 270 K/UL (ref 150–400)
PMV BLD AUTO: 11.2 FL (ref 8.9–12.9)
POTASSIUM SERPL-SCNC: 3.6 MMOL/L (ref 3.5–5.1)
RBC # BLD AUTO: 3.98 M/UL (ref 4.1–5.7)
SERVICE CMNT-IMP: ABNORMAL
SODIUM SERPL-SCNC: 137 MMOL/L (ref 136–145)
WBC # BLD AUTO: 6.2 K/UL (ref 4.1–11.1)

## 2021-07-10 PROCEDURE — 2709999900 HC NON-CHARGEABLE SUPPLY

## 2021-07-10 PROCEDURE — 74011000258 HC RX REV CODE- 258: Performed by: NURSE PRACTITIONER

## 2021-07-10 PROCEDURE — 74011250636 HC RX REV CODE- 250/636: Performed by: NURSE PRACTITIONER

## 2021-07-10 PROCEDURE — 74011250636 HC RX REV CODE- 250/636: Performed by: EMERGENCY MEDICINE

## 2021-07-10 PROCEDURE — 65270000029 HC RM PRIVATE

## 2021-07-10 PROCEDURE — 74011250637 HC RX REV CODE- 250/637: Performed by: NURSE PRACTITIONER

## 2021-07-10 PROCEDURE — 87077 CULTURE AEROBIC IDENTIFY: CPT

## 2021-07-10 PROCEDURE — 87147 CULTURE TYPE IMMUNOLOGIC: CPT

## 2021-07-10 PROCEDURE — 87186 SC STD MICRODIL/AGAR DIL: CPT

## 2021-07-10 PROCEDURE — 83036 HEMOGLOBIN GLYCOSYLATED A1C: CPT

## 2021-07-10 PROCEDURE — 74011636637 HC RX REV CODE- 636/637: Performed by: NURSE PRACTITIONER

## 2021-07-10 PROCEDURE — 87070 CULTURE OTHR SPECIMN AEROBIC: CPT

## 2021-07-10 PROCEDURE — 85027 COMPLETE CBC AUTOMATED: CPT

## 2021-07-10 PROCEDURE — 80048 BASIC METABOLIC PNL TOTAL CA: CPT

## 2021-07-10 PROCEDURE — 82962 GLUCOSE BLOOD TEST: CPT

## 2021-07-10 PROCEDURE — 36415 COLL VENOUS BLD VENIPUNCTURE: CPT

## 2021-07-10 RX ORDER — VANCOMYCIN HYDROCHLORIDE
1250 EVERY 12 HOURS
Status: DISCONTINUED | OUTPATIENT
Start: 2021-07-10 | End: 2021-07-10

## 2021-07-10 RX ORDER — SODIUM CHLORIDE 0.9 % (FLUSH) 0.9 %
5-40 SYRINGE (ML) INJECTION AS NEEDED
Status: DISCONTINUED | OUTPATIENT
Start: 2021-07-10 | End: 2021-07-15 | Stop reason: HOSPADM

## 2021-07-10 RX ORDER — ATORVASTATIN CALCIUM 40 MG/1
40 TABLET, FILM COATED ORAL DAILY
Status: DISCONTINUED | OUTPATIENT
Start: 2021-07-10 | End: 2021-07-15 | Stop reason: HOSPADM

## 2021-07-10 RX ORDER — AMOXICILLIN 250 MG
1 CAPSULE ORAL DAILY PRN
Status: DISCONTINUED | OUTPATIENT
Start: 2021-07-10 | End: 2021-07-15 | Stop reason: HOSPADM

## 2021-07-10 RX ORDER — MAGNESIUM SULFATE 100 %
4 CRYSTALS MISCELLANEOUS AS NEEDED
Status: DISCONTINUED | OUTPATIENT
Start: 2021-07-10 | End: 2021-07-15 | Stop reason: HOSPADM

## 2021-07-10 RX ORDER — ALBUTEROL SULFATE 0.83 MG/ML
2.5 SOLUTION RESPIRATORY (INHALATION)
Status: DISCONTINUED | OUTPATIENT
Start: 2021-07-10 | End: 2021-07-15 | Stop reason: HOSPADM

## 2021-07-10 RX ORDER — OXYCODONE AND ACETAMINOPHEN 5; 325 MG/1; MG/1
1 TABLET ORAL
Status: DISCONTINUED | OUTPATIENT
Start: 2021-07-10 | End: 2021-07-10

## 2021-07-10 RX ORDER — ENOXAPARIN SODIUM 100 MG/ML
40 INJECTION SUBCUTANEOUS EVERY 24 HOURS
Status: DISCONTINUED | OUTPATIENT
Start: 2021-07-10 | End: 2021-07-15 | Stop reason: HOSPADM

## 2021-07-10 RX ORDER — INSULIN LISPRO 100 [IU]/ML
INJECTION, SOLUTION INTRAVENOUS; SUBCUTANEOUS
Status: DISCONTINUED | OUTPATIENT
Start: 2021-07-10 | End: 2021-07-15 | Stop reason: HOSPADM

## 2021-07-10 RX ORDER — POLYETHYLENE GLYCOL 3350 17 G/17G
17 POWDER, FOR SOLUTION ORAL DAILY PRN
Status: DISCONTINUED | OUTPATIENT
Start: 2021-07-10 | End: 2021-07-15 | Stop reason: HOSPADM

## 2021-07-10 RX ORDER — VANCOMYCIN 2 GRAM/500 ML IN 0.9 % SODIUM CHLORIDE INTRAVENOUS
2000 ONCE
Status: COMPLETED | OUTPATIENT
Start: 2021-07-10 | End: 2021-07-10

## 2021-07-10 RX ORDER — DEXTROSE 50 % IN WATER (D50W) INTRAVENOUS SYRINGE
12.5-25 AS NEEDED
Status: DISCONTINUED | OUTPATIENT
Start: 2021-07-10 | End: 2021-07-15 | Stop reason: HOSPADM

## 2021-07-10 RX ORDER — ACETAMINOPHEN 325 MG/1
650 TABLET ORAL
Status: DISCONTINUED | OUTPATIENT
Start: 2021-07-10 | End: 2021-07-15 | Stop reason: HOSPADM

## 2021-07-10 RX ORDER — SODIUM CHLORIDE 0.9 % (FLUSH) 0.9 %
5-40 SYRINGE (ML) INJECTION EVERY 8 HOURS
Status: DISCONTINUED | OUTPATIENT
Start: 2021-07-10 | End: 2021-07-15 | Stop reason: HOSPADM

## 2021-07-10 RX ORDER — OXYCODONE HYDROCHLORIDE 5 MG/1
10 TABLET ORAL
Status: DISCONTINUED | OUTPATIENT
Start: 2021-07-10 | End: 2021-07-15 | Stop reason: HOSPADM

## 2021-07-10 RX ORDER — FUROSEMIDE 10 MG/ML
40 INJECTION INTRAMUSCULAR; INTRAVENOUS DAILY
Status: DISCONTINUED | OUTPATIENT
Start: 2021-07-10 | End: 2021-07-15 | Stop reason: HOSPADM

## 2021-07-10 RX ADMIN — PIPERACILLIN AND TAZOBACTAM 3.38 G: 3; .375 INJECTION, POWDER, LYOPHILIZED, FOR SOLUTION INTRAVENOUS at 05:50

## 2021-07-10 RX ADMIN — INSULIN LISPRO 2 UNITS: 100 INJECTION, SOLUTION INTRAVENOUS; SUBCUTANEOUS at 12:04

## 2021-07-10 RX ADMIN — Medication 10 ML: at 16:04

## 2021-07-10 RX ADMIN — VANCOMYCIN HYDROCHLORIDE 2000 MG: 10 INJECTION, POWDER, LYOPHILIZED, FOR SOLUTION INTRAVENOUS at 04:11

## 2021-07-10 RX ADMIN — ATORVASTATIN CALCIUM 40 MG: 40 TABLET, FILM COATED ORAL at 09:37

## 2021-07-10 RX ADMIN — OXYCODONE 10 MG: 5 TABLET ORAL at 22:08

## 2021-07-10 RX ADMIN — ENOXAPARIN SODIUM 40 MG: 40 INJECTION SUBCUTANEOUS at 05:48

## 2021-07-10 RX ADMIN — INSULIN LISPRO 2 UNITS: 100 INJECTION, SOLUTION INTRAVENOUS; SUBCUTANEOUS at 16:30

## 2021-07-10 RX ADMIN — MORPHINE SULFATE 4 MG: 2 INJECTION, SOLUTION INTRAMUSCULAR; INTRAVENOUS at 12:14

## 2021-07-10 RX ADMIN — DOXYCYCLINE 100 MG: 100 INJECTION, POWDER, LYOPHILIZED, FOR SOLUTION INTRAVENOUS at 22:11

## 2021-07-10 RX ADMIN — FUROSEMIDE 40 MG: 10 INJECTION, SOLUTION INTRAMUSCULAR; INTRAVENOUS at 12:04

## 2021-07-10 RX ADMIN — DOXYCYCLINE 100 MG: 100 INJECTION, POWDER, LYOPHILIZED, FOR SOLUTION INTRAVENOUS at 16:04

## 2021-07-10 RX ADMIN — MORPHINE SULFATE 4 MG: 2 INJECTION, SOLUTION INTRAMUSCULAR; INTRAVENOUS at 01:17

## 2021-07-10 RX ADMIN — Medication 10 ML: at 05:49

## 2021-07-10 RX ADMIN — Medication 10 ML: at 22:10

## 2021-07-10 RX ADMIN — INSULIN LISPRO 2 UNITS: 100 INJECTION, SOLUTION INTRAVENOUS; SUBCUTANEOUS at 22:09

## 2021-07-10 RX ADMIN — INSULIN LISPRO 3 UNITS: 100 INJECTION, SOLUTION INTRAVENOUS; SUBCUTANEOUS at 09:37

## 2021-07-10 NOTE — ED NOTES
Report given to Maya Morrow RN. They were informed of patient chief complaint, current status, orders completed (to include IV access/medications/radiology testing), outstanding orders that still need to be completed, and the treatment plan. Ensured no questions or concerns regarding the patient prior to departure.

## 2021-07-10 NOTE — H&P
Hospitalist Admission Note    NAME: Costa Robles   :  1965   MRN:  [id number]     Date/Time:  7/10/2021 12:13 AM    Patient PCP: None  ______________________________________________________________________  Given the patient's current clinical presentation, I have a high level of concern for decompensation if discharged from the emergency department. Complex decision making was performed, which includes reviewing the patient's available past medical records, laboratory results, and x-ray films. Discussed assessment of this patient's clinical condition and plan of care with Dr. Iraj Ballard which is as follows. Assessment / Plan:    B/L leg cellulitis, POA  B/L leg pain, POA  Failed outpatient treatment  B/L LE Duplex ultrasound: No evidence of DVT  - empiric antibiotic coverage with Piperacillin and Vancomycin (was treated with Unasyn prior)  - wound culture pending  - wound care consult  - pain management    Asthma, POA  CXR: No acute findings. - O2 and neb treatment PRN  - monitor    DM2, POA  Hyperlipidemia, POA  - SSI with Lispro coverage, Hold Metformin for now  - Resume Lipitor   - check A1C    Code Status: FULL  Surrogate Decision Maker: Jose Guadalupe Eugene, mother: [phone]    DVT Prophylaxis: Lovenox  GI Prophylaxis: not indicated    Baseline: independent, does not use any assistive device      Subjective:   CHIEF COMPLAINT: bilateral leg swelling, pain, wound    HISTORY OF PRESENT ILLNESS:     Costa Robles is a 64 y.o.  male who presents with worsening bilateral leg wound, swelling and pain over the past 2 weeks. Patient noticed increasing drainage to bilateral leg wound. 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 He is not currently on any antibiotic and dresses his wound with gauze wrap. He described the pain as 10/10 on assessment.  Denies any fever, chills, CP, SOB, productive cough, focal weakness, bowel and bladder habit changes. Past medical history is significant for asthma, prostate cancer, hyperlipidemia and diabetes. Was recently hospitalized here for RLE cellulitis 02/26-03/03/2021 was started on Unasyn and sent home on Augmentin BID for 7 days. We were asked to admit for work up and evaluation of the above problems. Past Medical History:   Diagnosis Date    Asthma     Lower leg edema     Prostate cancer ([facility] [address]         No past surgical history on file. Smoking history: former, quit more than 8 years ago  Alcohol history: 1-2 beer/day    Family History:  Father: hypertension, stroke, MI  Mother: Brain bleed  Sister: Asthma, hypertension, obesity    No Known Allergies     Prior to Admission medications    Medication Sig Start Date End Date Taking? Authorizing Provider   polyethylene glycol (MIRALAX) 17 gram packet Take 1 Packet by mouth daily. 3/3/21   Michael Ashby, ELISA   atorvastatin (LIPITOR) 40 mg tablet Take 40 mg by mouth daily. Provider, Historical   furosemide (LASIX) 20 mg tablet Take 20 mg by mouth daily. Provider, Historical   glipiZIDE (GLUCOTROL) 10 mg tablet Take 10 mg by mouth two (2) times a day. Provider, Historical   insulin glargine (Lantus U-100 Insulin) 100 unit/mL injection 25 Units by SubCUTAneous route daily. Provider, Historical   metFORMIN (GLUMETZA ER) 500 mg TG24 24 hour tablet Take 2 Tabs by mouth two (2) times a day. Provider, Historical       REVIEW OF SYSTEMS:     I am not able to complete the review of systems because:    The patient is intubated and sedated    The patient has altered mental status due to his acute medical problems    The patient has baseline aphasia from prior stroke(s)    The patient has baseline dementia and is not reliable historian    The patient is in acute medical distress and unable to provide information           Total of 12 systems reviewed as follows:       POSITIVE= bolded text  Negative = text not bolded  General:  fever, chills, sweats, generalized weakness, weight loss/gain,      loss of appetite   Eyes:    blurred vision, eye pain, loss of vision, double vision  ENT:    rhinorrhea, pharyngitis   Respiratory:   cough, sputum production, SOB, HOFF, wheezing, pleuritic pain   Cardiology:   chest pain, palpitations, orthopnea, PND, edema, syncope   Gastrointestinal:  abdominal pain , N/V, diarrhea, dysphagia, constipation, bleeding   Genitourinary:  frequency, urgency, dysuria, hematuria, incontinence   Muskuloskeletal :  arthralgia, myalgia, back pain, bilateral leg pain  Hematology:  easy bruising, nose or gum bleeding, lymphadenopathy   Dermatological: rash, ulceration, pruritis, color change / jaundice, bilateral leg cellulitis  Endocrine:   hot flashes or polydipsia   Neurological:  headache, dizziness, confusion, focal weakness, paresthesia,     Speech difficulties, memory loss, gait difficulty  Psychological: Feelings of anxiety, depression, agitation    Objective:   VITALS:    Visit Vitals  BP (!) 143/85 (BP 1 Location: Right upper arm, BP Patient Position: At rest;Lying)   Pulse (!) 104   Temp 98.7 °F (37.1 °C)   Resp 18   Ht 5' 9\" (1.753 m)   Wt 117.7 kg (259 lb 7.7 oz)   SpO2 100%   BMI 38.32 kg/m²       PHYSICAL EXAM:    General:    Alert, cooperative, no distress, appears stated age. HEENT: Atraumatic, anicteric sclerae, pink conjunctivae     No oral ulcers, mucosa moist, throat clear, dentition fair  Neck:  Supple, symmetrical,  thyroid: non tender  Lungs:   Clear to auscultation bilaterally. No Wheezing or Rhonchi. No rales. Chest wall:  No tenderness  No Accessory muscle use. Heart:   Regular  rhythm,  No  Murmur. Chronic leg edema  Abdomen:   Soft, non-tender. Not distended. Bowel sounds normal  Extremities: No cyanosis. No clubbing,      Skin turgor normal, Capillary refill normal, Radial dial pulse 2+  Skin:     Not pale. Not Jaundiced  No rashes .  Open leg wound with drainage, bilateral  Psych:  Good insight. Not depressed. Not anxious or agitated. Neurologic: EOMs intact. No facial asymmetry. No aphasia or slurred speech. Symmetrical strength, Sensation grossly intact. Alert and oriented X 4.     _______________________________________________________________________  Care Plan discussed with:    Comments   Patient y    Family      RN y    Care Manager                    Consultant:      _______________________________________________________________________  Expected  Disposition:   Home with Family y   HH/PT/OT/RN    SNF/LTC    KAMINI    ________________________________________________________________________        Comments    y Reviewed previous records   >50% of visit spent in counseling and coordination of care y Discussion with patient and/or family and questions answered       ________________________________________________________________________  Signed: Bhumi Kaufman NP    Procedures: see electronic medical records for all procedures/Xrays and details which were not copied into this note but were reviewed prior to creation of Plan. LAB DATA REVIEWED:    Recent Results (from the past 24 hour(s))   CBC WITH AUTOMATED DIFF    Collection Time: 07/09/21  8:20 PM   Result Value Ref Range    WBC 6.8 4.1 - 11.1 K/uL    RBC 4.08 (L) 4.10 - 5.70 M/uL    HGB 10.5 (L) 12.1 - 17.0 g/dL    HCT 34.4 (L) 36.6 - 50.3 %    MCV 84.3 80.0 - 99.0 FL    MCH 25.7 (L) 26.0 - 34.0 PG    MCHC 30.5 30.0 - 36.5 g/dL    RDW 13.7 11.5 - 14.5 %    PLATELET 507 289 - 659 K/uL    MPV 11.0 8.9 - 12.9 FL    NRBC 0.0 0  WBC    ABSOLUTE NRBC 0.00 0.00 - 0.01 K/uL    NEUTROPHILS 69 32 - 75 %    LYMPHOCYTES 19 12 - 49 %    MONOCYTES 9 5 - 13 %    EOSINOPHILS 3 0 - 7 %    BASOPHILS 0 0 - 1 %    IMMATURE GRANULOCYTES 0 0.0 - 0.5 %    ABS. NEUTROPHILS 4.7 1.8 - 8.0 K/UL    ABS. LYMPHOCYTES 1.3 0.8 - 3.5 K/UL    ABS. MONOCYTES 0.6 0.0 - 1.0 K/UL    ABS.  EOSINOPHILS 0.2 0.0 - 0.4 K/UL ABS. BASOPHILS 0.0 0.0 - 0.1 K/UL    ABS. IMM. GRANS. 0.0 0.00 - 0.04 K/UL    DF AUTOMATED     METABOLIC PANEL, COMPREHENSIVE    Collection Time: 07/09/21  8:20 PM   Result Value Ref Range    Sodium 138 136 - 145 mmol/L    Potassium 3.8 3.5 - 5.1 mmol/L    Chloride 104 97 - 108 mmol/L    CO2 31 21 - 32 mmol/L    Anion gap 3 (L) 5 - 15 mmol/L    Glucose 110 (H) 65 - 100 mg/dL    BUN 12 6 - 20 MG/DL    Creatinine 0.70 0.70 - 1.30 MG/DL    BUN/Creatinine ratio 17 12 - 20      GFR est AA >60 >60 ml/min/1.73m2    GFR est non-AA >60 >60 ml/min/1.73m2    Calcium 8.5 8.5 - 10.1 MG/DL    Bilirubin, total 0.5 0.2 - 1.0 MG/DL    ALT (SGPT) 26 12 - 78 U/L    AST (SGOT) 19 15 - 37 U/L    Alk.  phosphatase 89 45 - 117 U/L    Protein, total 8.3 (H) 6.4 - 8.2 g/dL    Albumin 3.0 (L) 3.5 - 5.0 g/dL    Globulin 5.3 (H) 2.0 - 4.0 g/dL    A-G Ratio 0.6 (L) 1.1 - 2.2     POC LACTIC ACID    Collection Time: 07/09/21 10:43 PM   Result Value Ref Range    Lactic Acid (POC) 1.44 0.40 - 2.00 mmol/L

## 2021-07-10 NOTE — ACP (ADVANCE CARE PLANNING)
Advance Care Planning Note      NAME: Pino Haddad   :  1965   MRN:  [id number]     Date/Time:  7/10/2021 3:59 AM    Active Diagnoses:  Hospital Problems  Date Reviewed: 3/3/2021        Codes Class Noted POA    Bilateral lower leg cellulitis ICD-10-CM: L03.116, L03.115  ICD-9-CM: 682.6  7/10/2021 Unknown              These active diagnoses are of sufficient risk that focused discussion on advance care planning is indicated in order to allow the patient to thoughtfully consider personal goals of care, and if situations arise that prevent the ability to personally give input, to ensure appropriate representation of their personal desires for different levels and aggressiveness of care. Discussion:   Code status addressed and wants to be a Full Code. Patient wants central line and vasopressors if needed. Patient would also want a feeding tube, if needed, for nutritional support. Patient  would like to assign Sloan Hughes, mother ([phone])  as the surrogate decision maker. Persons present and participating in discussion: Lucretia Headley NP      Time Spent:   Total time spent face-to-face in education and discussion:  20  minutes.          Gilbert Fletcher NP   Hospitalist

## 2021-07-10 NOTE — PROGRESS NOTES
I reviewed pertinent labs and imaging, and discussed /agreed on the plan of care with Dr. Ton Douglas      Hospitalist Progress Note    NAME: Costa Robles   :  1965   MRN:  [id number]       Assessment / Plan:  B/L leg cellulitis, POA  B/L leg pain, POA  B/L LE Duplex ultrasound: No evidence of DVT  Hx Venous insuffiencey  -  Chronic venous stasis ulcers  - does not follow with wound care center   - states on hours on feet  For work   - noted increased edema for few weeks   - Lasix home med change to IV and increase to 40 mg   - DC Zosyn / Vanc start Doxycyline x 5 days- pain management   - wound care with Xeroform dressing and Dorothy wrap   - wound care consult     Asthma POA   - CXR shows no acute findings   - PRN neb tx and Oxygen     Diabetes type 2 w/ neuropathy   Hyperlipidemia   - hgbA1c pending   - hold Metformin for now   - cont with SSI     Advanced Prostate Cancer w/ PSA of 11.0   Hx RCC   _ followed at [facility] cancer center no procedures yet     / Body mass index is 38.32 kg/m². Estimated discharge date:   Barriers:    Code status: Full  Prophylaxis: Lovenox  Recommended Disposition: Home w/Family and TBD     Subjective:     Chief Complaint / Reason for Physician Visit  \"I need help with my legs \". Discussed with RN events overnight. Patient very tearful this morning states issues with his legs have gotten worse with increased hours at work and longstanding on legs. He has not been able to manage the wounds.   Also talked about his prostate cancer diagnosis emotional support given to patient    Review of Systems:  Symptom Y/N Comments  Symptom Y/N Comments   Fever/Chills    Chest Pain     Poor Appetite    Edema     Cough    Abdominal Pain     Sputum    Joint Pain     SOB/HOFF    Pruritis/Rash     Nausea/vomit    Tolerating PT/OT     Diarrhea    Tolerating Diet     Constipation    Other       Could NOT obtain due to:      Objective:     VITALS:   Last 24hrs VS reviewed since prior progress note. Most recent are:  Patient Vitals for the past 24 hrs:   Temp Pulse Resp BP SpO2   07/10/21 0830 98.9 °F (37.2 °C) 96 18 126/81 96 %   07/10/21 0216 98.3 °F (36.8 °C) 97 18 (!) 148/87 100 %   07/09/21 2248 -- (!) 104 18 (!) 143/85 100 %   07/09/21 2014 98.7 °F (37.1 °C) (!) 102 18 (!) 157/93 100 %       Intake/Output Summary (Last 24 hours) at 7/10/2021 1057  Last data filed at 7/10/2021 2331  Gross per 24 hour   Intake 800 ml   Output --   Net 800 ml        I had a face to face encounter and independently examined this patient on 7/10/2021, as outlined below:  PHYSICAL EXAM:  General: Obese. Alert, cooperative, no acute distress    EENT:  EOMI. Anicteric sclerae. MMM  Resp:  CTA bilaterally, no wheezing or rales. No accessory muscle   use  CV:  Regular  rhythm,  No edema  GI:  Soft, Non distended, Non tender. +Bowel sounds  Neurologic:  Alert and oriented X 3, normal speech,   Psych:   . Not anxious nor agitated  Skin:  No rashes. No jaundice, open carol on Kermit legs     Reviewed most current lab test results and cultures  YES  Reviewed most current radiology test results   YES  Review and summation of old records today    NO  Reviewed patient's current orders and MAR    YES  PMH/SH reviewed - no change compared to H&P  ________________________________________________________________________  Care Plan discussed with:    Comments   Patient     Family      RN     Care Manager     Consultant                        Multidiciplinary team rounds were held today with , nursing, pharmacist and clinical coordinator. Patient's plan of care was discussed; medications were reviewed and discharge planning was addressed.      ________________________________________________________________________  Total NON critical care TIME:   Minutes  Non billable rounding  25 minues    Total CRITICAL CARE TIME Spent:   Minutes non procedure based      Comments   >50% of visit spent in counseling and coordination of care     ________________________________________________________________________  Kianaal Flip. Merced Vicente NP     Procedures: see electronic medical records for all procedures/Xrays and details which were not copied into this note but were reviewed prior to creation of Plan. LABS:  I reviewed today's most current labs and imaging studies. Pertinent labs include:  Recent Labs     07/10/21  0304 07/09/21  2020   WBC 6.2 6.8   HGB 9.9* 10.5*   HCT 33.9* 34.4*    252     Recent Labs     07/10/21  0304 07/09/21  2020    138   K 3.6 3.8    104   CO2 27 31   * 110*   BUN 12 12   CREA 0.72 0.70   CA 8.4* 8.5   ALB  --  3.0*   TBILI  --  0.5   ALT  --  26       Signed: Marleni Vicente NP

## 2021-07-10 NOTE — ED PROVIDER NOTES
EMERGENCY DEPARTMENT HISTORY AND PHYSICAL EXAM      Please note that this dictation was completed with the assistance of \"Dragon\", the computer voice recognition software. Quite often unanticipated grammatical, syntax, homophones, and other interpretive errors are inadvertently transcribed by the computer software. Please disregard these errors and any errors that have escaped final proofreading. Thank you. Patient Name: Ben Charlton  : 1965  MRN: [id number]  History of Presenting Illness     Chief Complaint   Patient presents with    Wound Check     ED visit d/t bilat leg wound - onset of sxs, \" for a couple of days \" - increased swelling to bilat LE, (L) > (R) LE;;      History Provided By: Patient    HPI: Ben Charlton, 64 y.o. male with past medical history as documented below presents to the ED with c/o of two weeks of progressively worsening bilateral leg pain, swelling and open wounds. 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 States sx's worsened at end of day. Pt states pain is moderate to severe. Denies falls or trauma. Pt was recently hospitalized here for RLE cellulitis -2021 was started on Unasyn and sent home on Augmentin BID for 7 days. Pt denies any other alleviating or exacerbating factors. Additionally, pt specifically denies any recent fever, chills, headache, nausea, vomiting, abdominal pain, CP, SOB, lightheadedness, dizziness, numbness, weakness, heart palpitations, urinary sxs, diarrhea, constipation, melena, hematochezia, cough, or congestion. There are no other complaints, changes or physical findings pertinent to the HPI at this time.     PCP: None    Past History   Past Medical History:  Past Medical History:   Diagnosis Date    Asthma     Lower leg edema     Prostate cancer ([facility] [address]        Past Surgical History:  Denies    Family History:  Family History   Problem Relation Age of Onset    Stroke Mother         brain bleed    Stroke Father     Hypertension Father     Heart Attack Father     Hypertension Sister     Obesity Sister     Asthma Sister     Hypertension Sister     Hypertension Sister        Social History:  Social History     Tobacco Use    Smoking status: Former Smoker    Tobacco comment: quit > 8 years ago   Substance Use Topics    Alcohol use: Not Currently     Comment: 1-2 per day (wine or beer)    Drug use: No       Allergies:  No Known Allergies    Current Medications:  No current facility-administered medications on file prior to encounter. Current Outpatient Medications on File Prior to Encounter   Medication Sig Dispense Refill    polyethylene glycol (MIRALAX) 17 gram packet Take 1 Packet by mouth daily. 30 Each 0    atorvastatin (LIPITOR) 40 mg tablet Take 40 mg by mouth daily.  furosemide (LASIX) 20 mg tablet Take 20 mg by mouth daily.  glipiZIDE (GLUCOTROL) 10 mg tablet Take 10 mg by mouth two (2) times a day.  insulin glargine (Lantus U-100 Insulin) 100 unit/mL injection 25 Units by SubCUTAneous route daily.  metFORMIN (GLUMETZA ER) 500 mg TG24 24 hour tablet Take 2 Tabs by mouth two (2) times a day. Review of Systems   Review of Systems   Constitutional: Negative. Negative for chills and fever. HENT: Negative. Negative for congestion and sore throat. Eyes: Negative. Respiratory: Negative. Negative for cough, chest tightness, shortness of breath and wheezing. Cardiovascular: Positive for leg swelling. Negative for chest pain and palpitations. Gastrointestinal: Negative. Negative for abdominal distention, abdominal pain, blood in stool, constipation, diarrhea, nausea and vomiting. Endocrine: Negative. Genitourinary: Negative. Negative for dysuria, flank pain, frequency, hematuria and urgency. Musculoskeletal: Positive for arthralgias and myalgias. Negative for back pain. Skin: Positive for rash and wound. Negative for color change.    Neurological: Negative. Negative for dizziness, syncope, speech difficulty, weakness, light-headedness, numbness and headaches. Hematological: Negative. Psychiatric/Behavioral: Negative. Negative for confusion and self-injury. The patient is not nervous/anxious. All other systems reviewed and are negative. Physical Exam   Physical Exam  Vitals and nursing note reviewed. Constitutional:       General: He is in acute distress. Appearance: He is well-developed. He is ill-appearing, toxic-appearing and diaphoretic. HENT:      Head: Normocephalic and atraumatic. Mouth/Throat:      Pharynx: No posterior oropharyngeal erythema. Eyes:      Conjunctiva/sclera: Conjunctivae normal.      Pupils: Pupils are equal, round, and reactive to light. Cardiovascular:      Rate and Rhythm: Normal rate and regular rhythm. Heart sounds: Normal heart sounds. No murmur heard. No friction rub. No gallop. Pulmonary:      Effort: Pulmonary effort is normal. No respiratory distress. Breath sounds: Normal breath sounds. No wheezing or rales. Chest:      Chest wall: No tenderness. Abdominal:      General: Bowel sounds are normal. There is no distension. Palpations: Abdomen is soft. There is no mass. Tenderness: There is no abdominal tenderness. There is no guarding or rebound. Musculoskeletal:         General: Swelling and tenderness present. No deformity. Normal range of motion. Cervical back: Normal range of motion. Right lower leg: Edema present. Left lower leg: Edema present. Skin:     General: Skin is warm. Findings: Erythema (TTP bilateral shins, open wounds noted with drainage, no calf TTP, NVI distally, comparments soft) present. No rash. Neurological:      Mental Status: He is alert and oriented to person, place, and time. Cranial Nerves: No cranial nerve deficit. Motor: No abnormal muscle tone.       Coordination: Coordination normal.   Psychiatric: Behavior: Behavior is cooperative. Clinical pictured obtained with verbal consent form patient:        Diagnostic Study Results     Labs -   I have personally reviewed and interpreted all available laboratory results. Recent Results (from the past 24 hour(s))   CBC WITH AUTOMATED DIFF    Collection Time: 07/09/21  8:20 PM   Result Value Ref Range    WBC 6.8 4.1 - 11.1 K/uL    RBC 4.08 (L) 4.10 - 5.70 M/uL    HGB 10.5 (L) 12.1 - 17.0 g/dL    HCT 34.4 (L) 36.6 - 50.3 %    MCV 84.3 80.0 - 99.0 FL    MCH 25.7 (L) 26.0 - 34.0 PG    MCHC 30.5 30.0 - 36.5 g/dL    RDW 13.7 11.5 - 14.5 %    PLATELET 379 698 - 736 K/uL    MPV 11.0 8.9 - 12.9 FL    NRBC 0.0 0  WBC    ABSOLUTE NRBC 0.00 0.00 - 0.01 K/uL    NEUTROPHILS 69 32 - 75 %    LYMPHOCYTES 19 12 - 49 %    MONOCYTES 9 5 - 13 %    EOSINOPHILS 3 0 - 7 %    BASOPHILS 0 0 - 1 %    IMMATURE GRANULOCYTES 0 0.0 - 0.5 %    ABS. NEUTROPHILS 4.7 1.8 - 8.0 K/UL    ABS. LYMPHOCYTES 1.3 0.8 - 3.5 K/UL    ABS. MONOCYTES 0.6 0.0 - 1.0 K/UL    ABS. EOSINOPHILS 0.2 0.0 - 0.4 K/UL    ABS. BASOPHILS 0.0 0.0 - 0.1 K/UL    ABS. IMM. GRANS. 0.0 0.00 - 0.04 K/UL    DF AUTOMATED     METABOLIC PANEL, COMPREHENSIVE    Collection Time: 07/09/21  8:20 PM   Result Value Ref Range    Sodium 138 136 - 145 mmol/L    Potassium 3.8 3.5 - 5.1 mmol/L    Chloride 104 97 - 108 mmol/L    CO2 31 21 - 32 mmol/L    Anion gap 3 (L) 5 - 15 mmol/L    Glucose 110 (H) 65 - 100 mg/dL    BUN 12 6 - 20 MG/DL    Creatinine 0.70 0.70 - 1.30 MG/DL    BUN/Creatinine ratio 17 12 - 20      GFR est AA >60 >60 ml/min/1.73m2    GFR est non-AA >60 >60 ml/min/1.73m2    Calcium 8.5 8.5 - 10.1 MG/DL    Bilirubin, total 0.5 0.2 - 1.0 MG/DL    ALT (SGPT) 26 12 - 78 U/L    AST (SGOT) 19 15 - 37 U/L    Alk.  phosphatase 89 45 - 117 U/L    Protein, total 8.3 (H) 6.4 - 8.2 g/dL    Albumin 3.0 (L) 3.5 - 5.0 g/dL    Globulin 5.3 (H) 2.0 - 4.0 g/dL    A-G Ratio 0.6 (L) 1.1 - 2.2     NT-PRO BNP    Collection Time: 07/09/21  8:20 PM   Result Value Ref Range    NT pro-BNP 31 <125 PG/ML   POC LACTIC ACID    Collection Time: 07/09/21 10:43 PM   Result Value Ref Range    Lactic Acid (POC) 1.44 0.40 - 2.00 mmol/L   CULTURE, BLOOD, PAIRED    Collection Time: 07/09/21 10:45 PM    Specimen: Blood   Result Value Ref Range    Special Requests: NO SPECIAL REQUESTS      Culture result: NO GROWTH AFTER 8 HOURS     METABOLIC PANEL, BASIC    Collection Time: 07/10/21  3:04 AM   Result Value Ref Range    Sodium 137 136 - 145 mmol/L    Potassium 3.6 3.5 - 5.1 mmol/L    Chloride 103 97 - 108 mmol/L    CO2 27 21 - 32 mmol/L    Anion gap 7 5 - 15 mmol/L    Glucose 165 (H) 65 - 100 mg/dL    BUN 12 6 - 20 MG/DL    Creatinine 0.72 0.70 - 1.30 MG/DL    BUN/Creatinine ratio 17 12 - 20      GFR est AA >60 >60 ml/min/1.73m2    GFR est non-AA >60 >60 ml/min/1.73m2    Calcium 8.4 (L) 8.5 - 10.1 MG/DL   CBC W/O DIFF    Collection Time: 07/10/21  3:04 AM   Result Value Ref Range    WBC 6.2 4.1 - 11.1 K/uL    RBC 3.98 (L) 4.10 - 5.70 M/uL    HGB 9.9 (L) 12.1 - 17.0 g/dL    HCT 33.9 (L) 36.6 - 50.3 %    MCV 85.2 80.0 - 99.0 FL    MCH 24.9 (L) 26.0 - 34.0 PG    MCHC 29.2 (L) 30.0 - 36.5 g/dL    RDW 13.7 11.5 - 14.5 %    PLATELET 983 049 - 829 K/uL    MPV 11.2 8.9 - 12.9 FL    NRBC 0.0 0  WBC    ABSOLUTE NRBC 0.00 0.00 - 0.01 K/uL   GLUCOSE, POC    Collection Time: 07/10/21  6:49 AM   Result Value Ref Range    Glucose (POC) 233 (H) 65 - 117 mg/dL    Performed by Damari Sanchez RN        Radiologic Studies -   I have personally reviewed and interpreted all available imaging studies and agree with radiology interpretation and report. DUPLEX LOWER EXT VENOUS BILAT   Final Result      XR CHEST PORT   Final Result   No acute findings. CT Results  (Last 48 hours)    None        CXR Results  (Last 48 hours)               07/09/21 2240  XR CHEST PORT Final result    Impression:  No acute findings.        Narrative:  EXAM: XR CHEST PORT       INDICATION: sob COMPARISON: Chest x-ray 2/28/2021. FINDINGS: A portable AP radiograph of the chest was obtained at 22:18 hours. The   lungs are clear. The cardiac and mediastinal contours and pulmonary vascularity   are normal.  The bones and soft tissues are grossly within normal limits. Interpretation Summary    No evidence of acute deep vein thrombosis in the right common femoral, superficial femoral, popliteal, posterior tibial, and peroneal veins. The veins were imaged in the transverse and longitudinal planes. The vessels showed normal color filling and compressibility. Doppler interrogation showed phasic and spontaneous flow. No evidence of deep vein thrombosis in the right lower extremity. No evidence of acute deep vein thrombosis in the left common femoral, superficial femoral, popliteal, posterior tibial, and peroneal veins. The veins were imaged in the transverse and longitudinal planes. The vessels showed normal color filling and compressibility. Doppler interrogation showed phasic and spontaneous flow. No evidence of deep vein thrombosis in the left lower extremity. Lower Extremity Venous Findings    Right Lower Venous    No evidence of deep vein thrombosis in the right lower extremity. No evidence of deep vein thrombosis in the common femoral, profunda femoral, femoral, popliteal, posterior tibial, and peroneal veins. The veins were imaged in the transverse and longitudinal planes. The vessels showed normal color filling and compressibility. Doppler interrogation showed phasic and spontaneous flow. The posterior tibial vein(s) were not well visualized. Left Lower Venous    No evidence of deep vein thrombosis in the left lower extremity. No evidence of deep vein thrombosis in the common femoral, profunda femoral, femoral, popliteal, posterior tibial, and peroneal veins. The veins were imaged in the transverse and longitudinal planes.  The vessels showed normal color filling and compressibility. Doppler interrogation showed phasic and spontaneous flow. The posterior tibial vein(s) were not well visualized. Medical Decision Making   I reviewed the vital signs, available nursing notes, past medical history, past surgical history, family history and social history. Vital Signs-Reviewed the patient's vital signs. Patient Vitals for the past 24 hrs:   Temp Pulse Resp BP SpO2   07/10/21 0830 98.9 °F (37.2 °C) 96 18 126/81 96 %   07/10/21 0216 98.3 °F (36.8 °C) 97 18 (!) 148/87 100 %   07/09/21 2248 -- (!) 104 18 (!) 143/85 100 %   07/09/21 2014 98.7 °F (37.1 °C) (!) 102 18 (!) 157/93 100 %     Pulse Oximetry Analysis - 100% on RA    Cardiac Monitor:   Rate: 96 bpm  The cardiac monitor revealed the following rhythm as interpreted by me: Normal Sinus Rhythm    The cardiac monitor was ordered secondary to the patient's history of infection and to monitor the patient for dysrhythmia    Records Reviewed: Nursing Notes, Old Medical Records, Previous electrocardiograms, Previous Radiology Studies and Previous Laboratory Studies    Provider Notes (Medical Decision Making):   Pt presents with increased warmth, erythema and tenderness of his BLE concerning for cellulitis vs chronic lymphedema with secondary cellulitis. Pt is afebrile with stable vitals and nontoxic appearing. No systemic symptoms. Will check labs, duplex, and start IV abx. ED Course:   I am the first provider for this patient's ED visit today. Initial assessment performed. I discussed presenting problems, concerns and my formulated plan for today's visit with the patient and any available family members at bedside. I encouraged them to ask questions as they arise throughout the visit.      Social History     Tobacco Use    Smoking status: Former Smoker    Tobacco comment: quit > 8 years ago   Substance Use Topics    Alcohol use: Not Currently     Comment: 1-2 per day (wine or beer)    Drug use: No       I reviewed our electronic medical record system for any past medical records that were available that may contribute to the patient's current condition, the nursing notes and vital signs from today's visit. ED Orders Placed :  Orders Placed This Encounter    SEVERE SEPSIS AND SEPTIC SHOCK BUNDLE INITIATED    CULTURE, BLOOD, PAIRED    CULTURE, WOUND W GRAM STAIN    XR CHEST PORT    CBC WITH AUTOMATED DIFF    METABOLIC PANEL, COMPREHENSIVE    LACTIC ACID, PLASMA    LACTIC ACID, PLASMA    NT-PRO BNP    METABOLIC PANEL, BASIC    CBC W/O DIFF    HEMOGLOBIN A1C WITH EAG    ADULT DIET Regular; 3 carb choices (45 gm/meal);  No Concentrated Sweets    CARDIAC/RESPIRATORY MONITORING    NOTIFY PROVIDER: SPECIFY Notify provider within one hour to start vasopressors if patient is unable to maintain a MAP of greater than or equal to 65 mmHg despite fluid resuscitation CONTINUOUS STAT    NOTIFY PROVIDER: SPECIFY Notify provider on pt's arrival to floor ONE TIME STAT    INTAKE AND OUTPUT    VITAL SIGNS PER UNIT ROUTINE    OUT OF BED [address] [facility]    NURSING-MISCELLANEOUS: wound care Wash legs with NS apply Xeroform dressing , wrap with ABD and Dorothy wrap  BID    FULL CODE    OXYGEN CANNULA Liters per minute: 2; Indications for O2 therapy: HYPOXIA PRN Routine    POC LACTIC ACID    GLUCOSE, POC    INSERT PERIPHERAL IV ONE TIME STAT    ampicillin-sulbactam (UNASYN) 3 g in 0.9% sodium chloride (MBP/ADV) 100 mL MBP    ketorolac (TORADOL) injection 30 mg    fentaNYL citrate (PF) injection 50 mcg    morphine injection 4 mg    ondansetron (ZOFRAN) injection 4 mg    oxyCODONE-acetaminophen (PERCOCET) 5-325 mg per tablet 1 Tablet    sodium chloride (NS) flush 5-40 mL    sodium chloride (NS) flush 5-40 mL    DISCONTD: ampicillin-sulbactam (UNASYN) 3 g in 0.9% sodium chloride (MBP/ADV) 100 mL MBP    enoxaparin (LOVENOX) injection 40 mg    insulin lispro (HUMALOG) injection    glucose chewable tablet 16 g    dextrose (D50W) injection syrg 12.5-25 g    glucagon (GLUCAGEN) injection 1 mg    albuterol (PROVENTIL VENTOLIN) nebulizer solution 2.5 mg    acetaminophen (TYLENOL) tablet 650 mg    polyethylene glycol (MIRALAX) packet 17 g    senna-docusate (PERICOLACE) 8.6-50 mg per tablet 1 Tablet    atorvastatin (LIPITOR) tablet 40 mg    vancomycin (VANCOCIN) 2000 mg in  ml infusion    vancomycin (VANCOCIN) 1250 mg in  ml infusion    DISCONTD: cefepime (MAXIPIME) 2 g in 0.9% sodium chloride (MBP/ADV) 100 mL MBP    DISCONTD: piperacillin-tazobactam (ZOSYN) 3.375 g in 0.9% sodium chloride (MBP/ADV) 100 mL MBP    piperacillin-tazobactam (ZOSYN) 3.375 g in 0.9% sodium chloride (MBP/ADV) 100 mL MBP    IP CONSULT TO HOSPITALIST    INITIAL PHYSICIAN ORDER: INPATIENT Medical; Yes; 3.  Patient receiving treatment that can only be provided in an inpatient setting (further clarification in H&P documentation)    IP CONSULT TO WOUND CARE    IP CONSULT TO [address] [place] [address]       ED Medications Administered:  Medications   morphine injection 4 mg (4 mg IntraVENous Given 7/10/21 0117)   ondansetron (ZOFRAN) injection 4 mg (has no administration in time range)   oxyCODONE-acetaminophen (PERCOCET) 5-325 mg per tablet 1 Tablet (has no administration in time range)   sodium chloride (NS) flush 5-40 mL (10 mL IntraVENous Given 7/10/21 6731)   sodium chloride (NS) flush 5-40 mL (has no administration in time range)   enoxaparin (LOVENOX) injection 40 mg (40 mg SubCUTAneous Given 7/10/21 3277)   insulin lispro (HUMALOG) injection (has no administration in time range)   glucose chewable tablet 16 g (has no administration in time range)   dextrose (D50W) injection syrg 12.5-25 g (has no administration in time range)   glucagon (GLUCAGEN) injection 1 mg (has no administration in time range)   albuterol (PROVENTIL VENTOLIN) nebulizer solution 2.5 mg (has no administration in time range) acetaminophen (TYLENOL) tablet 650 mg (has no administration in time range)   polyethylene glycol (MIRALAX) packet 17 g (has no administration in time range)   senna-docusate (PERICOLACE) 8.6-50 mg per tablet 1 Tablet (has no administration in time range)   atorvastatin (LIPITOR) tablet 40 mg (has no administration in time range)   vancomycin (VANCOCIN) 1250 mg in  ml infusion (has no administration in time range)   piperacillin-tazobactam (ZOSYN) 3.375 g in 0.9% sodium chloride (MBP/ADV) 100 mL MBP (3.375 g IntraVENous New Bag 7/10/21 0550)   ampicillin-sulbactam (UNASYN) 3 g in 0.9% sodium chloride (MBP/ADV) 100 mL MBP (0 g IntraVENous IV Completed 7/9/21 2344)   ketorolac (TORADOL) injection 30 mg (30 mg IntraVENous Given 7/9/21 2312)   fentaNYL citrate (PF) injection 50 mcg (50 mcg IntraVENous Given 7/9/21 2311)   vancomycin (VANCOCIN) 2000 mg in  ml infusion (2,000 mg IntraVENous New Bag 7/10/21 0411)         Progress Note:  Duplex without DVT, empiric IV abx initiated, will need pain control, wound care consult and frequent neurovascular exams. IV Lasix given, no evidence CHF, last ECHO EF 55-60%    Consult Note:  Kera Irvin MD spoke with Dr. Veronique Pino  Specialty: Hospitalist  Discussed pt's hx, disposition, and available diagnostic and imaging results. Reviewed care plans. Agree with management and plan thus far. Consultant will evaluate pt. Disposition:   ADMIT  Given the patient's current clinical presentation, I have a high level of concern for decompensation if discharged from the emergency department. Patient is being admitted to the hospital.  The results of their tests and reasons for their admission have been discussed with them and/or available family. They convey agreement and understanding for the need to be admitted and for their admission diagnosis. Consultation has been made with the inpatient physician specialist for hospitalization. Diagnosis   Clinical Impression:  1. Bilateral cellulitis of lower leg    2. Bilateral lower leg cellulitis    3. Bilateral leg pain    4. Failure of outpatient treatment    5. Multiple open wounds of lower leg, unspecified laterality, initial encounter    6. Accelerated hypertension        Attestation:  Kenney Palafox MD, am the attending of record for this patient. I personally performed the services described in this documentation on this date, 7/9/2021 for patient, Lucy Webber. I have reviewed the chart and verified that the record is accurate and complete.

## 2021-07-11 ENCOUNTER — APPOINTMENT (OUTPATIENT)
Dept: NON INVASIVE DIAGNOSTICS | Age: 56
DRG: 383 | End: 2021-07-11
Attending: NURSE PRACTITIONER
Payer: MEDICAID

## 2021-07-11 LAB
ANION GAP SERPL CALC-SCNC: 5 MMOL/L (ref 5–15)
BUN SERPL-MCNC: 12 MG/DL (ref 6–20)
BUN/CREAT SERPL: 18 (ref 12–20)
CALCIUM SERPL-MCNC: 8.2 MG/DL (ref 8.5–10.1)
CHLORIDE SERPL-SCNC: 105 MMOL/L (ref 97–108)
CO2 SERPL-SCNC: 28 MMOL/L (ref 21–32)
CREAT SERPL-MCNC: 0.68 MG/DL (ref 0.7–1.3)
ERYTHROCYTE [DISTWIDTH] IN BLOOD BY AUTOMATED COUNT: 13.6 % (ref 11.5–14.5)
GLUCOSE BLD STRIP.AUTO-MCNC: 115 MG/DL (ref 65–117)
GLUCOSE BLD STRIP.AUTO-MCNC: 146 MG/DL (ref 65–117)
GLUCOSE BLD STRIP.AUTO-MCNC: 179 MG/DL (ref 65–117)
GLUCOSE BLD STRIP.AUTO-MCNC: 187 MG/DL (ref 65–117)
GLUCOSE SERPL-MCNC: 128 MG/DL (ref 65–100)
HCT VFR BLD AUTO: 32 % (ref 36.6–50.3)
HGB BLD-MCNC: 9.4 G/DL (ref 12.1–17)
LACTATE SERPL-SCNC: 1.1 MMOL/L (ref 0.4–2)
MCH RBC QN AUTO: 25.1 PG (ref 26–34)
MCHC RBC AUTO-ENTMCNC: 29.4 G/DL (ref 30–36.5)
MCV RBC AUTO: 85.6 FL (ref 80–99)
NRBC # BLD: 0 K/UL (ref 0–0.01)
NRBC BLD-RTO: 0 PER 100 WBC
PLATELET # BLD AUTO: 249 K/UL (ref 150–400)
PMV BLD AUTO: 10.6 FL (ref 8.9–12.9)
POTASSIUM SERPL-SCNC: 4 MMOL/L (ref 3.5–5.1)
RBC # BLD AUTO: 3.74 M/UL (ref 4.1–5.7)
SERVICE CMNT-IMP: ABNORMAL
SERVICE CMNT-IMP: NORMAL
SODIUM SERPL-SCNC: 138 MMOL/L (ref 136–145)
WBC # BLD AUTO: 5.2 K/UL (ref 4.1–11.1)

## 2021-07-11 PROCEDURE — 83605 ASSAY OF LACTIC ACID: CPT

## 2021-07-11 PROCEDURE — 87040 BLOOD CULTURE FOR BACTERIA: CPT

## 2021-07-11 PROCEDURE — 36415 COLL VENOUS BLD VENIPUNCTURE: CPT

## 2021-07-11 PROCEDURE — 74011250637 HC RX REV CODE- 250/637: Performed by: NURSE PRACTITIONER

## 2021-07-11 PROCEDURE — 82962 GLUCOSE BLOOD TEST: CPT

## 2021-07-11 PROCEDURE — 65270000029 HC RM PRIVATE

## 2021-07-11 PROCEDURE — 74011636637 HC RX REV CODE- 636/637: Performed by: NURSE PRACTITIONER

## 2021-07-11 PROCEDURE — 85027 COMPLETE CBC AUTOMATED: CPT

## 2021-07-11 PROCEDURE — 94760 N-INVAS EAR/PLS OXIMETRY 1: CPT

## 2021-07-11 PROCEDURE — 80048 BASIC METABOLIC PNL TOTAL CA: CPT

## 2021-07-11 PROCEDURE — 74011000258 HC RX REV CODE- 258: Performed by: NURSE PRACTITIONER

## 2021-07-11 PROCEDURE — 74011250636 HC RX REV CODE- 250/636: Performed by: NURSE PRACTITIONER

## 2021-07-11 RX ORDER — VANCOMYCIN 2 GRAM/500 ML IN 0.9 % SODIUM CHLORIDE INTRAVENOUS
2000 ONCE
Status: COMPLETED | OUTPATIENT
Start: 2021-07-11 | End: 2021-07-11

## 2021-07-11 RX ORDER — VANCOMYCIN/0.9 % SOD CHLORIDE 1.5G/250ML
1500 PLASTIC BAG, INJECTION (ML) INTRAVENOUS EVERY 12 HOURS
Status: DISCONTINUED | OUTPATIENT
Start: 2021-07-12 | End: 2021-07-12 | Stop reason: DRUGHIGH

## 2021-07-11 RX ADMIN — CEFEPIME HYDROCHLORIDE 2 G: 2 INJECTION, POWDER, FOR SOLUTION INTRAVENOUS at 18:26

## 2021-07-11 RX ADMIN — OXYCODONE 10 MG: 5 TABLET ORAL at 04:13

## 2021-07-11 RX ADMIN — ENOXAPARIN SODIUM 40 MG: 40 INJECTION SUBCUTANEOUS at 05:56

## 2021-07-11 RX ADMIN — Medication 10 ML: at 14:00

## 2021-07-11 RX ADMIN — VANCOMYCIN HYDROCHLORIDE 2000 MG: 10 INJECTION, POWDER, LYOPHILIZED, FOR SOLUTION INTRAVENOUS at 12:20

## 2021-07-11 RX ADMIN — CEFEPIME HYDROCHLORIDE 2 G: 2 INJECTION, POWDER, FOR SOLUTION INTRAVENOUS at 11:02

## 2021-07-11 RX ADMIN — FUROSEMIDE 40 MG: 10 INJECTION, SOLUTION INTRAMUSCULAR; INTRAVENOUS at 11:02

## 2021-07-11 RX ADMIN — Medication 10 ML: at 05:58

## 2021-07-11 RX ADMIN — DOCUSATE SODIUM 50MG AND SENNOSIDES 8.6MG 1 TABLET: 8.6; 5 TABLET, FILM COATED ORAL at 12:20

## 2021-07-11 RX ADMIN — INSULIN LISPRO 2 UNITS: 100 INJECTION, SOLUTION INTRAVENOUS; SUBCUTANEOUS at 11:30

## 2021-07-11 RX ADMIN — OXYCODONE 10 MG: 5 TABLET ORAL at 20:26

## 2021-07-11 RX ADMIN — ATORVASTATIN CALCIUM 40 MG: 40 TABLET, FILM COATED ORAL at 11:03

## 2021-07-11 RX ADMIN — INSULIN LISPRO 2 UNITS: 100 INJECTION, SOLUTION INTRAVENOUS; SUBCUTANEOUS at 07:30

## 2021-07-11 NOTE — PROGRESS NOTES
I reviewed pertinent labs and imaging, and discussed /agreed on the plan of care with Dr. Marin Puentes      Hospitalist Progress Note    NAME: Xenia Young   :  1965   MRN:  [id number]       Assessment / Plan:  B/L leg cellulitis, POA  B/L leg pain, POA  B/L LE Duplex ultrasound: No evidence of DVT  Hx Venous insuffiencey  -  Chronic venous stasis ulcers  - does not follow with wound care center   - states on feet for hours at  work   - noted increased edema for few weeks states Lasix not helping   - Lasix home med change to IV and increase to 40 mg   - BC results  botttles Gram Positive Cocci   - Wound Cx Gram Positive / negative   - will resart Vanc / Cefepime   - repeat cultures , get ECHO   - wound care with Xeroform dressing and Dorothy wrap   - wound care consult     Asthma POA   - CXR shows no acute findings   - PRN neb tx and Oxygen     Diabetes type 2 w/ neuropathy   Hyperlipidemia   - hgbA1c 9..4  - hold Metformin for now   - BS <200  - cont with SSI     Advanced Prostate Cancer w/ PSA of 11.0   _ followed at [facility] no procedures yet     / Body mass index is 38.32 kg/m². Estimated discharge date:   Barriers:    Code status: Full  Prophylaxis: Lovenox  Recommended Disposition: Home w/Family and TBD     Subjective:     Chief Complaint / Reason for Physician Visit   \". Discussed with RN events overnight. Pt seen this morning states not feeling well , cannot be specific . No fever noted   Review of Systems:  Symptom Y/N Comments  Symptom Y/N Comments   Fever/Chills n   Chest Pain n    Poor Appetite n   Edema y    Cough    Abdominal Pain     Sputum    Joint Pain     SOB/HOFF y   Pruritis/Rash     Nausea/vomit n   Tolerating PT/OT     Diarrhea    Tolerating Diet y    Constipation    Other       Could NOT obtain due to:      Objective:     VITALS:   Last 24hrs VS reviewed since prior progress note.  Most recent are:  Patient Vitals for the past 24 hrs:   Temp Pulse Resp BP SpO2   07/11/21 0807 98.9 °F (37.2 °C) 98 18 (!) 152/93 95 %   07/11/21 0352 99.6 °F (37.6 °C) 99 18 131/88 96 %   07/10/21 2330 99 °F (37.2 °C) 99 16 120/81 92 %   07/10/21 2003 99.4 °F (37.4 °C) (!) 102 16 (!) 143/86 94 %   07/10/21 1534 98.6 °F (37 °C) 93 16 139/86 98 %   07/10/21 1200 -- 93 -- -- --   07/10/21 1156 97.7 °F (36.5 °C) 93 16 133/78 100 %   07/10/21 0830 98.9 °F (37.2 °C) 96 18 126/81 96 %       Intake/Output Summary (Last 24 hours) at 7/11/2021 0811  Last data filed at 7/11/2021 0403  Gross per 24 hour   Intake 350 ml   Output --   Net 350 ml        I had a face to face encounter and independently examined this patient on 7/11/2021, as outlined below:  PHYSICAL EXAM:  General: Obese. Alert, cooperative, no acute distress    EENT:  EOMI. Anicteric sclerae. MMM  Resp:  CTA bilaterally, no wheezing or rales. No accessory muscle   use  CV:  Regular  rhythm,  No edema  GI:  Soft, Non distended, Non tender. +Bowel sounds  Neurologic:  Alert and oriented X 3, normal speech,   Psych:   . Not anxious nor agitated  Skin:  No rashes. No jaundice, open areas on Kermit legs , venous stasis    Reviewed most current lab test results and cultures  YES  Reviewed most current radiology test results   YES  Review and summation of old records today    NO  Reviewed patient's current orders and MAR    YES  PMH/SH reviewed - no change compared to H&P  ________________________________________________________________________  Care Plan discussed with:    Comments   Patient x    Family      RN x    Care Manager     Consultant                        Multidiciplinary team rounds were held today with , nursing, pharmacist and clinical coordinator. Patient's plan of care was discussed; medications were reviewed and discharge planning was addressed.      ________________________________________________________________________  Total NON critical care TIME:  30    Minutes      Total CRITICAL CARE TIME Spent:   Minutes non procedure based      Comments   >50% of visit spent in counseling and coordination of care     ________________________________________________________________________  Jenna Schmidt. Joann Diaz NP     Procedures: see electronic medical records for all procedures/Xrays and details which were not copied into this note but were reviewed prior to creation of Plan. LABS:  I reviewed today's most current labs and imaging studies. Pertinent labs include:  Recent Labs     07/11/21  0406 07/10/21  0304 07/09/21  2020   WBC 5.2 6.2 6.8   HGB 9.4* 9.9* 10.5*   HCT 32.0* 33.9* 34.4*    270 252     Recent Labs     07/11/21  0406 07/10/21  0304 07/09/21  2020    137 138   K 4.0 3.6 3.8    103 104   CO2 28 27 31   * 165* 110*   BUN 12 12 12   CREA 0.68* 0.72 0.70   CA 8.2* 8.4* 8.5   ALB  --   --  3.0*   TBILI  --   --  0.5   ALT  --   --  26       Signed: Marleni Diaz NP

## 2021-07-11 NOTE — PROGRESS NOTES
End of Shift Note    Bedside shift change report given to Violeta Rojas (oncoming nurse) by Stephanie Quinonez RN (offgoing nurse). Report included the following information SBAR, Kardex, Intake/Output, MAR and Recent Results    Shift worked:  7a-7p     Shift summary and any significant changes:     wound care completed after orders received. Received insulin coverage with each meal. Pt rested quietly during shift     Concerns for physician to address:  none     Zone phone for oncoming shift:   none       Activity:  Activity Level: Up ad amanda  Number times ambulated in hallways past shift: 0  Number of times OOB to chair past shift: 0    Cardiac:   Cardiac Monitoring: Yes      Cardiac Rhythm: Sinus Rhythm    Access:   Current line(s): PIV     Genitourinary:   Urinary status: voiding    Respiratory:   O2 Device: None (Room air)  Chronic home O2 use?: NO  Incentive spirometer at bedside: NO     GI:     Current diet:  ADULT DIET Regular; 3 carb choices (45 gm/meal); Low Sodium (2 gm); No Concentrated Sweets  DIET ONE TIME MESSAGE  Passing flatus: YES  Tolerating current diet: YES       Pain Management:   Patient states pain is manageable on current regimen: YES    Skin:  Wesley Score: 22  Interventions: float heels, increase time out of bed and nutritional support     Patient Safety:  Fall Score:  Total Score: 2  Interventions: gripper socks and stay with me (per policy)       Length of Stay:  Expected LOS: - - -  Actual LOS: 0      Stephanie Quinonez RN

## 2021-07-11 NOTE — PROGRESS NOTES
Problem: Falls - Risk of  Goal: *Absence of Falls  Description: Document Leonardo Sites Fall Risk and appropriate interventions in the flowsheet.   Outcome: Progressing Towards Goal  Note: Fall Risk Interventions:            Medication Interventions: Teach patient to arise slowly

## 2021-07-11 NOTE — PROGRESS NOTES
End of Shift Note    Bedside shift change report given to [facility] (oncoming nurse) by Keyshawn Zaragoza RN (offgoing nurse). Report included the following information SBAR, Kardex, Intake/Output, MAR and Recent Results    Shift worked:  7p-7a     Shift summary and any significant changes:     Pt received prn medication for pain. BLE leg dressing change performed. Pt ambulated to bathroom once. Concerns for physician to address:       Zone phone for oncoming shift:          Activity:  Activity Level: Up ad amanda  Number times ambulated in hallways past shift: 0  Number of times OOB to chair past shift: Bathroom    Cardiac:   Cardiac Monitoring: Yes      Cardiac Rhythm: Sinus Rhythm, Sinus Tach    Access:   Current line(s): PIV     Genitourinary:   Urinary status: voiding    Respiratory:   O2 Device: None (Room air)  Chronic home O2 use?: N/A  Incentive spirometer at bedside: N/A     GI:     Current diet:  ADULT DIET Regular; 3 carb choices (45 gm/meal); Low Sodium (2 gm); No Concentrated Sweets  DIET ONE TIME MESSAGE  Passing flatus: YES  Tolerating current diet: YES       Pain Management:   Patient states pain is manageable on current regimen: YES    Skin:  Wesley Score: 21  Interventions: increase time out of bed    Patient Safety:  Fall Score:  Total Score: 2  Interventions: gripper socks       Length of Stay:  Expected LOS: - - -  Actual LOS: 1      Keyshawn Zaragoza RN

## 2021-07-11 NOTE — PROGRESS NOTES
Pharmacy Antimicrobial Kinetic Dosing    Indication for Antimicrobials: SSTI /bacteremia    Current Regimen of Each Antimicrobial:  Vancomycin, pharmacy to dose (start ; day 1)  Cefepime 2g IV q8h (start ; day 1)    Previous Antimicrobial Therapy:  Unasyn 3 gm q6h (Start Date ; Day # 2)  Completed unasyn/augmentin ~ 2021  Zosyn 3.375 gm every 8 hours (7/10, day 1)  Vancomycin - pharmacy to dose (7/10, day 1)  Doxycycline 100mg IV q12h (start 7/10; day 2/5    Goal Level: AUC: 400-600 mg/hr/Liter/day    Date Dose & Interval Measured (mcg/mL) Extrapolated (mcg/mL)                       Date & time of next level:     Significant Positive Cultures:   7/10 bcx - gpc 2/2 (pending)  7/10 wound - gpc & gnr (pending)    Conditions for Dosing Consideration: None    Labs:  Recent Labs     21  0406 07/10/21  0304 21   CREA 0.68* 0.72 0.70   BUN 12 12 12     Recent Labs     21  0406 07/10/21  0304 21   WBC 5.2 6.2 6.8     Temp (24hrs), Av.9 °F (37.2 °C), Min:97.7 °F (36.5 °C), Max:99.6 °F (37.6 °C)        Creatinine Clearance (mL/min):   CrCl (Ideal Body Weight): 117.8   If actual weight < IBW: CrCl (Actual Body Weight) 196.2    Impression/Plan:   Only received vancomycin 2g IV x 1 7/10 at 0400. Will give vancomycin 2g IV x 1 again as previous dose has cleared and continue with vancomycin 1500mg IV 12h to give an estimated AUC/tr of 552/17.8  Continue cefepime as ordered  BMP daily  Antimicrobial stop date tbd      Pharmacy will follow daily and adjust medications as appropriate for renal function and/or serum levels.     Thank you,  Thuan Winston, PHARMD    Vancomycin Dosing Document    Documents located on pharmacy Teams site: Clinical Practice -> Antimicrobial Stewardship -> Antibiotics_Vancomycin     Aminoglycoside Dosing Document    Documents located on pharmacy Teams site: Clinical Practice -> Antimicrobial Stewardship -> Antibiotics_Aminoglycosides

## 2021-07-11 NOTE — PROGRESS NOTES
End of Shift Note    Bedside shift change report given to [address] [place], [place] [address] (oncoming nurse) by Jenniffer Mendoza RN (offgoing nurse). Report included the following information SBAR, Kardex, Intake/Output, MAR and Recent Results    Shift worked:  7a-7p     Shift summary and any significant changes:     paired blood cultures and lactic sent to lab. Antibiotic therapy. Daily weight ordered     Concerns for physician to address:  none     Zone phone for oncoming shift:   [phone]       Activity:  Activity Level: Up ad amanda  Number times ambulated in hallways past shift: 0  Number of times OOB to chair past shift: 0    Cardiac:   Cardiac Monitoring: Yes      Cardiac Rhythm: Sinus Rhythm    Access:   Current line(s): PIV     Genitourinary:   Urinary status: voiding    Respiratory:   O2 Device: None (Room air)  Chronic home O2 use?: NO  Incentive spirometer at bedside: NO     GI:     Current diet:  ADULT DIET Regular; 3 carb choices (45 gm/meal); Low Sodium (2 gm); No Concentrated Sweets  DIET ONE TIME MESSAGE  DIET ONE TIME MESSAGE  Passing flatus: YES  Tolerating current diet: YES       Pain Management:   Patient states pain is manageable on current regimen: YES    Skin:  Wesley Score: 21  Interventions: float heels and increase time out of bed    Patient Safety:  Fall Score:  Total Score: 2  Interventions: gripper socks       Length of Stay:  Expected LOS: - - -  Actual LOS: 1      Jenniffer Mendoza RN

## 2021-07-12 ENCOUNTER — APPOINTMENT (OUTPATIENT)
Dept: NON INVASIVE DIAGNOSTICS | Age: 56
DRG: 383 | End: 2021-07-12
Attending: NURSE PRACTITIONER
Payer: MEDICAID

## 2021-07-12 LAB
ANION GAP SERPL CALC-SCNC: 5 MMOL/L (ref 5–15)
BUN SERPL-MCNC: 11 MG/DL (ref 6–20)
BUN/CREAT SERPL: 15 (ref 12–20)
CALCIUM SERPL-MCNC: 8.6 MG/DL (ref 8.5–10.1)
CHLORIDE SERPL-SCNC: 104 MMOL/L (ref 97–108)
CO2 SERPL-SCNC: 28 MMOL/L (ref 21–32)
CREAT SERPL-MCNC: 0.74 MG/DL (ref 0.7–1.3)
DATE LAST DOSE: NORMAL
ECHO AO ROOT DIAM: 3.28 CM
ECHO AV AREA PEAK VELOCITY: 2.34 CM2
ECHO AV AREA PEAK VELOCITY: 2.37 CM2
ECHO AV AREA PEAK VELOCITY: 2.38 CM2
ECHO AV AREA PEAK VELOCITY: 2.41 CM2
ECHO AV AREA VTI: 2.26 CM2
ECHO AV AREA/BSA VTI: 1 CM2/M2
ECHO AV CUSP MM: 1.85 CM
ECHO AV MEAN GRADIENT: 7.67 MMHG
ECHO AV PEAK GRADIENT: 11.63 MMHG
ECHO AV PEAK GRADIENT: 12.02 MMHG
ECHO AV PEAK VELOCITY: 170.51 CM/S
ECHO AV PEAK VELOCITY: 173.38 CM/S
ECHO AV VTI: 30.42 CM
ECHO LA AREA 4C: 19.12 CM2
ECHO LA MAJOR AXIS: 4.01 CM
ECHO LA MINOR AXIS: 1.74 CM
ECHO LA TO AORTIC ROOT RATIO: 1.23
ECHO LA TO AORTIC ROOT RATIO: 1.23
ECHO LA VOL 2C: 77.58 ML (ref 18–58)
ECHO LA VOL 4C: 53.96 ML (ref 18–58)
ECHO LA VOL BP: 76.78 ML (ref 18–58)
ECHO LA VOL/BSA BIPLANE: 33.38 ML/M2 (ref 16–28)
ECHO LA VOLUME INDEX A2C: 33.73 ML/M2 (ref 16–28)
ECHO LA VOLUME INDEX A4C: 23.46 ML/M2 (ref 16–28)
ECHO LV E' LATERAL VELOCITY: 10.02 CM/S
ECHO LV E' SEPTAL VELOCITY: 10.45 CM/S
ECHO LV INTERNAL DIMENSION DIASTOLIC MMODE: 5.15 CM
ECHO LV INTERNAL DIMENSION DIASTOLIC: 5.03 CM (ref 4.2–5.9)
ECHO LV INTERNAL DIMENSION SYSTOLIC MMODE: 3.74 CM
ECHO LV INTERNAL DIMENSION SYSTOLIC: 3.17 CM
ECHO LV IVSD MMODE: 1.53 CM
ECHO LV IVSD: 1.63 CM (ref 0.6–1)
ECHO LV IVSS MMODE: 1.76 CM
ECHO LV IVSS: 1.76 CM
ECHO LV MASS 2D: 319.1 G (ref 88–224)
ECHO LV MASS INDEX 2D: 138.7 G/M2 (ref 49–115)
ECHO LV POSTERIOR WALL DIASTOLIC MMODE: 1.45 CM
ECHO LV POSTERIOR WALL DIASTOLIC: 1.33 CM (ref 0.6–1)
ECHO LV POSTERIOR WALL SYSTOLIC: 2.24 CM
ECHO LVOT DIAM: 2.11 CM
ECHO LVOT PEAK GRADIENT: 5.39 MMHG
ECHO LVOT PEAK GRADIENT: 5.54 MMHG
ECHO LVOT PEAK VELOCITY: 116.09 CM/S
ECHO LVOT PEAK VELOCITY: 117.7 CM/S
ECHO LVOT SV: 68.9 ML
ECHO LVOT VTI: 19.73 CM
ECHO MV A VELOCITY: 104.01 CM/S
ECHO MV E DECELERATION TIME (DT): 159.11 MS
ECHO MV E VELOCITY: 111.45 CM/S
ECHO MV E/A RATIO: 1.07
ECHO MV E/E' LATERAL: 11.12
ECHO MV E/E' RATIO (AVERAGED): 10.89
ECHO MV E/E' SEPTAL: 10.67
ECHO PV MAX VELOCITY: 92.53 CM/S
ECHO PV PEAK INSTANTANEOUS GRADIENT SYSTOLIC: 3.42 MMHG
ERYTHROCYTE [DISTWIDTH] IN BLOOD BY AUTOMATED COUNT: 13.6 % (ref 11.5–14.5)
GLUCOSE BLD STRIP.AUTO-MCNC: 132 MG/DL (ref 65–117)
GLUCOSE BLD STRIP.AUTO-MCNC: 134 MG/DL (ref 65–117)
GLUCOSE BLD STRIP.AUTO-MCNC: 139 MG/DL (ref 65–117)
GLUCOSE BLD STRIP.AUTO-MCNC: 185 MG/DL (ref 65–117)
GLUCOSE BLD STRIP.AUTO-MCNC: 429 MG/DL (ref 65–117)
GLUCOSE SERPL-MCNC: 165 MG/DL (ref 65–100)
HCT VFR BLD AUTO: 33.6 % (ref 36.6–50.3)
HGB BLD-MCNC: 10.1 G/DL (ref 12.1–17)
LVOT MG: 2.56 MMHG
MCH RBC QN AUTO: 25.6 PG (ref 26–34)
MCHC RBC AUTO-ENTMCNC: 30.1 G/DL (ref 30–36.5)
MCV RBC AUTO: 85.3 FL (ref 80–99)
NRBC # BLD: 0 K/UL (ref 0–0.01)
NRBC BLD-RTO: 0 PER 100 WBC
PLATELET # BLD AUTO: 278 K/UL (ref 150–400)
PMV BLD AUTO: 10.8 FL (ref 8.9–12.9)
POTASSIUM SERPL-SCNC: 4 MMOL/L (ref 3.5–5.1)
RBC # BLD AUTO: 3.94 M/UL (ref 4.1–5.7)
REPORTED DOSE,DOSE: NORMAL UNITS
REPORTED DOSE/TIME,TMG: NORMAL
SERVICE CMNT-IMP: ABNORMAL
SODIUM SERPL-SCNC: 137 MMOL/L (ref 136–145)
VANCOMYCIN TROUGH SERPL-MCNC: 8.1 UG/ML (ref 5–10)
WBC # BLD AUTO: 5.6 K/UL (ref 4.1–11.1)

## 2021-07-12 PROCEDURE — 74011250636 HC RX REV CODE- 250/636: Performed by: NURSE PRACTITIONER

## 2021-07-12 PROCEDURE — 36415 COLL VENOUS BLD VENIPUNCTURE: CPT

## 2021-07-12 PROCEDURE — 74011636637 HC RX REV CODE- 636/637: Performed by: NURSE PRACTITIONER

## 2021-07-12 PROCEDURE — 65270000029 HC RM PRIVATE

## 2021-07-12 PROCEDURE — 80048 BASIC METABOLIC PNL TOTAL CA: CPT

## 2021-07-12 PROCEDURE — 74011250637 HC RX REV CODE- 250/637: Performed by: NURSE PRACTITIONER

## 2021-07-12 PROCEDURE — 80202 ASSAY OF VANCOMYCIN: CPT

## 2021-07-12 PROCEDURE — 74011000258 HC RX REV CODE- 258: Performed by: NURSE PRACTITIONER

## 2021-07-12 PROCEDURE — 85027 COMPLETE CBC AUTOMATED: CPT

## 2021-07-12 PROCEDURE — 93306 TTE W/DOPPLER COMPLETE: CPT

## 2021-07-12 PROCEDURE — 82962 GLUCOSE BLOOD TEST: CPT

## 2021-07-12 PROCEDURE — 94760 N-INVAS EAR/PLS OXIMETRY 1: CPT

## 2021-07-12 RX ORDER — INSULIN GLARGINE 100 [IU]/ML
25 INJECTION, SOLUTION SUBCUTANEOUS
Status: DISCONTINUED | OUTPATIENT
Start: 2021-07-12 | End: 2021-07-15 | Stop reason: HOSPADM

## 2021-07-12 RX ADMIN — VANCOMYCIN HYDROCHLORIDE 1500 MG: 10 INJECTION, POWDER, LYOPHILIZED, FOR SOLUTION INTRAVENOUS at 01:00

## 2021-07-12 RX ADMIN — INSULIN LISPRO 2 UNITS: 100 INJECTION, SOLUTION INTRAVENOUS; SUBCUTANEOUS at 11:53

## 2021-07-12 RX ADMIN — FUROSEMIDE 40 MG: 10 INJECTION, SOLUTION INTRAMUSCULAR; INTRAVENOUS at 10:47

## 2021-07-12 RX ADMIN — Medication 10 ML: at 14:56

## 2021-07-12 RX ADMIN — AMPICILLIN SODIUM AND SULBACTAM SODIUM 3 G: 2; 1 INJECTION, POWDER, FOR SOLUTION INTRAMUSCULAR; INTRAVENOUS at 21:07

## 2021-07-12 RX ADMIN — Medication 10 ML: at 21:08

## 2021-07-12 RX ADMIN — VANCOMYCIN HYDROCHLORIDE 1500 MG: 10 INJECTION, POWDER, LYOPHILIZED, FOR SOLUTION INTRAVENOUS at 15:35

## 2021-07-12 RX ADMIN — AMPICILLIN SODIUM AND SULBACTAM SODIUM 3 G: 2; 1 INJECTION, POWDER, FOR SOLUTION INTRAMUSCULAR; INTRAVENOUS at 16:54

## 2021-07-12 RX ADMIN — CEFEPIME HYDROCHLORIDE 2 G: 2 INJECTION, POWDER, FOR SOLUTION INTRAVENOUS at 00:42

## 2021-07-12 RX ADMIN — OXYCODONE 10 MG: 5 TABLET ORAL at 21:07

## 2021-07-12 RX ADMIN — INSULIN GLARGINE 25 UNITS: 100 INJECTION, SOLUTION SUBCUTANEOUS at 21:34

## 2021-07-12 RX ADMIN — ENOXAPARIN SODIUM 40 MG: 40 INJECTION SUBCUTANEOUS at 08:00

## 2021-07-12 RX ADMIN — Medication 10 ML: at 11:12

## 2021-07-12 RX ADMIN — VANCOMYCIN HYDROCHLORIDE 1000 MG: 1 INJECTION, POWDER, LYOPHILIZED, FOR SOLUTION INTRAVENOUS at 22:08

## 2021-07-12 RX ADMIN — ATORVASTATIN CALCIUM 40 MG: 40 TABLET, FILM COATED ORAL at 10:47

## 2021-07-12 RX ADMIN — CEFEPIME HYDROCHLORIDE 2 G: 2 INJECTION, POWDER, FOR SOLUTION INTRAVENOUS at 10:47

## 2021-07-12 NOTE — PROGRESS NOTES
Spiritual Care Partner Volunteer visited patient at [address] in [facility] on 7/12/2021   Documented by:     JL Hamm, [facility], Staff [address]    [address] Paging Service  [phone])

## 2021-07-12 NOTE — PROGRESS NOTES
I reviewed pertinent labs and imaging, and discussed /agreed on the plan of care with Dr. Floyd Chase      Hospitalist Progress Note    NAME: Victor Hugo Alicia   :  1965   MRN:  [id number]       Assessment / Plan:  B/L leg cellulitis, POA  B/L leg pain, POA  B/L LE Duplex ultrasound: No evidence of DVT  Hx Venous insuffiencey  -  Chronic venous stasis ulcers treated in Feb - March   - does not follow with wound care center   - noted increased edema for few weeks states home Lasix not working   - Lasix home med change to IV and increase to 40 mg  Daily weight   - BC showed poss staph aureus , and Group B strep  currently on Vanc and Cefepime    cultures have not finalized , added Unasyn for Group B coverage and DC cefepime  - wound culture also Heavy group B strep  And Staph aureus   - wound care consult pending   -continue current wound care orders       Asymptomatic Bacteremia  - afebrile , no Leukocytosis   - ECHO EF 55-60% no mention of vegetation on valves   - BC positive 2/2 for Group B and Staph aures preliminary   - sensitivity not finalized   -  repeat BC NGTD  - cont Vanc /Uasyn    Asthma POA stable  - CXR shows no acute findings   - PRN neb tx and Oxygen     Diabetes type 2 w/ neuropathy   Hyperlipidemia   - hgbA1c 9.4  - hold Metformin for now   - cont with SSI   - cont  Atorvastatin     Advanced Prostate Cancer w/ PSA of 11.0 - stable   _ followed at [facility] cancer center no procedures yet         / Body mass index is 38.19 kg/m². Estimated discharge date:   Barriers:    Code status: Full  Prophylaxis: Lovenox  Recommended Disposition: Home w/Family and TBD     Subjective:     Chief Complaint / Reason for Physician Visit  \"I am feeling better  \". Discussed with RN events overnight.   Discussed POC with pt  deepa land with progression of health     Review of Systems:  Symptom Y/N Comments  Symptom Y/N Comments   Fever/Chills n   Chest Pain n    Poor Appetite n   Edema y    Cough n Abdominal Pain n    Sputum n   Joint Pain     SOB/HOFF    Pruritis/Rash     Nausea/vomit n   Tolerating PT/OT n    Diarrhea n   Tolerating Diet y    Constipation    Other       Could NOT obtain due to:      Objective:     VITALS:   Last 24hrs VS reviewed since prior progress note. Most recent are:  Patient Vitals for the past 24 hrs:   Temp Pulse Resp BP SpO2   07/12/21 1523 98.3 °F (36.8 °C) 82 18 135/88 96 %   07/12/21 1055 98.5 °F (36.9 °C) 95 17 (!) 138/91 95 %   07/12/21 1006 -- -- -- (!) 138/97 --   07/12/21 0728 98.2 °F (36.8 °C) 100 18 (!) 138/97 93 %   07/12/21 0346 98.1 °F (36.7 °C) (!) 107 18 (!) 102/93 90 %   07/11/21 2348 98.1 °F (36.7 °C) 99 18 138/78 100 %   07/11/21 2018 98.7 °F (37.1 °C) 92 18 (!) 140/79 99 %   07/11/21 1626 97.8 °F (36.6 °C) 94 18 (!) 148/88 94 %       Intake/Output Summary (Last 24 hours) at 7/12/2021 1547  Last data filed at 7/12/2021 1308  Gross per 24 hour   Intake 600 ml   Output --   Net 600 ml        I had a face to face encounter and independently examined this patient on 7/12/2021, as outlined below:  PHYSICAL EXAM:  General: Obese. Alert, cooperative, no acute distress     EENT:  EOMI. Anicteric sclerae. MMM large lips ( baseline )   Resp:  CTA bilaterally, no wheezing or rales. No accessory muscle use  CV:  Regular  rhythm,  No edema  GI:  Soft, Non distended, Non tender. +Bowel sounds  Neurologic:  Alert and oriented X 3, normal speech but difficult to understand at  Times,   Psych:   . Not anxious nor agitated  Skin:  No rashes.   No jaundice, open carol on Kermit legs     Reviewed most current lab test results and cultures  YES  Reviewed most current radiology test results   YES  Review and summation of old records today    NO  Reviewed patient's current orders and MAR    YES  PMH/SH reviewed - no change compared to H&P  ________________________________________________________________________  Care Plan discussed with:    Comments   Patient x    Family  x    RN x    Care Manager     Consultant                        Multidiciplinary team rounds were held today with , nursing, pharmacist and clinical coordinator. Patient's plan of care was discussed; medications were reviewed and discharge planning was addressed. ________________________________________________________________________  Total NON critical care TIME:   30 Minutes      Total CRITICAL CARE TIME Spent:   Minutes non procedure based      Comments   >50% of visit spent in counseling and coordination of care     ________________________________________________________________________  Carmen Allen. Jeanne Redmond NP     Procedures: see electronic medical records for all procedures/Xrays and details which were not copied into this note but were reviewed prior to creation of Plan. LABS:  I reviewed today's most current labs and imaging studies. Pertinent labs include:  Recent Labs     07/12/21  0356 07/11/21  0406 07/10/21  0304   WBC 5.6 5.2 6.2   HGB 10.1* 9.4* 9.9*   HCT 33.6* 32.0* 33.9*    249 270     Recent Labs     07/12/21  0356 07/11/21 0406 07/10/21  0304 07/09/21 2020 07/09/21 2020    138 137   < > 138   K 4.0 4.0 3.6   < > 3.8    105 103   < > 104   CO2 28 28 27   < > 31   * 128* 165*   < > 110*   BUN 11 12 12   < > 12   CREA 0.74 0.68* 0.72   < > 0.70   CA 8.6 8.2* 8.4*   < > 8.5   ALB  --   --   --   --  3.0*   TBILI  --   --   --   --  0.5   ALT  --   --   --   --  26    < > = values in this interval not displayed. Signed: Marleni Redmond NP

## 2021-07-12 NOTE — PROGRESS NOTES
End of Shift Note    Bedside shift change report given to Paolo Zhou RN (oncoming nurse) by Mike Cleveland RN (offgoing nurse). Report included the following information SBAR, Kardex and Recent Results    Shift worked:  7p-7a     Shift summary and any significant changes:     Uneventful shift. Pt requested pain meds once during the shift. Concerns for physician to address:  none     Zone phone for oncoming shift:          Activity:  Activity Level: Up ad amanda  Number times ambulated in hallways past shift: 0  Number of times OOB to chair past shift: 2    Cardiac:   Cardiac Monitoring: Yes      Cardiac Rhythm: Sinus Tach    Access:   Current line(s): PIV     Genitourinary:   Urinary status: voiding    Respiratory:   O2 Device: None (Room air)  Chronic home O2 use?: NO  Incentive spirometer at bedside: NO     GI:     Current diet:  ADULT DIET Regular; 3 carb choices (45 gm/meal); Low Sodium (2 gm); No Concentrated Sweets  DIET ONE TIME MESSAGE  DIET ONE TIME MESSAGE  Passing flatus: YES  Tolerating current diet: YES       Pain Management:   Patient states pain is manageable on current regimen: YES    Skin:  Wesley Score: 22  Interventions: increase time out of bed    Patient Safety:  Fall Score:  Total Score: 2  Interventions: gripper socks       Length of Stay:  Expected LOS: - - -  Actual LOS: 2      Mike Cleveland RN

## 2021-07-12 NOTE — PROGRESS NOTES
Physician Progress Note      PATIENT:               Swathi Vincent  CSN #:                  [id number]  :                       1965  ADMIT DATE:       2021 10:07 PM  DISCH DATE:  RESPONDING  PROVIDER #:        Joon Hampton NP          QUERY TEXT:    Dear Provider,  Pt admitted with bilateral lower leg cellulitis. Pt noted to have DM type 2. If possible, please document in progress notes and discharge summary the relationship, if any, between cellulitis and DM. The medical record reflects the following:  Risk Factors: 64 yr. old male admitted with bilateral lower leg cellulitis. Past medical history is significant for asthma, prostate cancer, hyperlipidemia and diabetes mellitus, type 2 with neuropathy. Clinical Indicators: Bilateral lower leg cellulitis and hx of DM2, HBG A1c on 7/10 9.4. Treatment: Medication management; Sliding scale insulin, Metformin on hold, lab work, monitoring and evaluation. Options provided:  -- Bilateral lower leg cellulitis associated with Diabetes  -- Bilateral lower leg cellulitis unrelated to Diabetes  -- Other - I will add my own diagnosis  -- Disagree - Not applicable / Not valid  -- Disagree - Clinically unable to determine / Unknown  -- Refer to Clinical Documentation Reviewer    PROVIDER RESPONSE TEXT:    Bilateral lower leg cellulitis unrelated to Diabetes.     Query created by: Bobbi Garcia on 2021 2:35 PM      Electronically signed by:  Joon Hampton NP 2021 3:06 PM

## 2021-07-12 NOTE — PROGRESS NOTES
End of Shift Note    Bedside shift change report given to Toshia Jason (oncoming nurse) by Samara Schaefer (offgoing nurse). Report included the following information SBAR, Kardex, Intake/Output and MAR    Shift worked: 07a-7p     Shift summary and any significant changes:    ekg this a.m  Up ab amanda   Room air   Moved iv site  Tolerated diet   Tolerated wound care   Tolerated pain management    Concerns for physician to address:  none     Zone phone for oncoming shift:  none       Activity:  Activity Level: Up ad amanda  Number times ambulated in hallways past shift: 0  Number of times OOB to chair past shift: 4    Cardiac:   Cardiac Monitoring: Yes      Cardiac Rhythm: Sinus Tach    Access:   Current line(s): PIV     Genitourinary:   Urinary status: voiding    Respiratory:   O2 Device: None (Room air)  Chronic home O2 use?: NO  Incentive spirometer at bedside: YES     GI:     Current diet:  ADULT DIET Regular; 3 carb choices (45 gm/meal); Low Sodium (2 gm); No Concentrated Sweets  DIET ONE TIME MESSAGE  DIET ONE TIME MESSAGE  Passing flatus: YES  Tolerating current diet: YES       Pain Management:   Patient states pain is manageable on current regimen: YES    Skin:  Wesley Score: 22  Interventions: turn team and increase time out of bed    Patient Safety:  Fall Score:  Total Score: 2  Interventions: gripper socks and pt to call before getting OOB       Length of Stay:  Expected LOS: - - -  Actual LOS: Jam Casillas LPN  60/98/68  [id number]

## 2021-07-12 NOTE — PROGRESS NOTES
Pharmacy Antimicrobial Kinetic Dosing    Indication for Antimicrobials: SSTI /bacteremia    Current Regimen of Each Antimicrobial:  Vancomycin, pharmacy to dose (start ; day 1)  Cefepime 2g IV q8h (start ; day 1)    Previous Antimicrobial Therapy:  Unasyn 3 gm q6h (Start Date ; Day # 2)  Completed unasyn/augmentin ~ 2021  Zosyn 3.375 gm every 8 hours (7/10, day 1)  Vancomycin - pharmacy to dose (7/10, day 1)  Doxycycline 100mg IV q12h (start 7/10; day 2/5    Goal Level: AUC: 400-600 mg/hr/Liter/day    Date Dose & Interval Measured (mcg/mL) Extrapolated (mcg/mL)   [email or web address] 1500 mg q12h  8.1 10.3                 Date & time of next level:     Significant Positive Cultures:   7/10 bcx - gpc 2/2 (pending)  7/10 wound - gpc & gnr (pending)    Conditions for Dosing Consideration: None    Labs:  Recent Labs     21  0356 21  0406 07/10/21  030   CREA 0.74 0.68* 0.72   BUN 11 12 12     Recent Labs     21  0356 21  0406 07/10/21  0304 21   WBC 5.6 5.2 6.2 6.8     Temp (24hrs), Av.3 °F (36.8 °C), Min:98.1 °F (36.7 °C), Max:98.7 °F (37.1 °C)        Creatinine Clearance (mL/min):   CrCl (Ideal Body Weight): 111.5   If actual weight < IBW: CrCl (Actual Body Weight) 184.9    Impression/Plan:   Vanc trough 8.1 corrected to 10.2 (was not at steady state)  Vanc dose adjusted to 1000 mg q8h Predicted Tr 12.7    Continue cefepime as ordered  BMP daily  Antimicrobial stop date tbd      Pharmacy will follow daily and adjust medications as appropriate for renal function and/or serum levels.     Thank you,  Joshua Craven [facility]    Vancomycin Dosing Document    Documents located on pharmacy Teams site: Clinical Practice -> Antimicrobial Stewardship -> Antibiotics_Vancomycin     Aminoglycoside Dosing Document    Documents located on pharmacy Teams site: Clinical Practice -> Antimicrobial Stewardship -> Antibiotics_Aminoglycosides

## 2021-07-12 NOTE — PROGRESS NOTES
Problem: Falls - Risk of  Goal: *Absence of Falls  Description: Document Mi Marcum Fall Risk and appropriate interventions in the flowsheet. Outcome: Progressing Towards Goal  Note: Fall Risk Interventions:            Medication Interventions: Teach patient to arise slowly         History of Falls Interventions: Room close to nurse's station         Problem: Diabetes Self-Management  Goal: *Disease process and treatment process  Description: Define diabetes and identify own type of diabetes; list 3 options for treating diabetes. Outcome: Progressing Towards Goal  Goal: *Incorporating nutritional management into lifestyle  Description: Describe effect of type, amount and timing of food on blood glucose; list 3 methods for planning meals. Outcome: Progressing Towards Goal  Goal: *Incorporating physical activity into lifestyle  Description: State effect of exercise on blood glucose levels. Outcome: Progressing Towards Goal  Goal: *Developing strategies to promote health/change behavior  Description: Define the ABC's of diabetes; identify appropriate screenings, schedule and personal plan for screenings. Outcome: Progressing Towards Goal  Goal: *Using medications safely  Description: State effect of diabetes medications on diabetes; name diabetes medication taking, action and side effects. Outcome: Progressing Towards Goal  Goal: *Monitoring blood glucose, interpreting and using results  Description: Identify recommended blood glucose targets  and personal targets. Outcome: Progressing Towards Goal  Goal: *Prevention, detection, treatment of acute complications  Description: List symptoms of hyper- and hypoglycemia; describe how to treat low blood sugar and actions for lowering  high blood glucose level.   Outcome: Progressing Towards Goal  Goal: *Prevention, detection and treatment of chronic complications  Description: Define the natural course of diabetes and describe the relationship of blood glucose levels to long term complications of diabetes.   Outcome: Progressing Towards Goal  Goal: *Developing strategies to address psychosocial issues  Description: Describe feelings about living with diabetes; identify support needed and support network  Outcome: Progressing Towards Goal  Goal: *Insulin pump training  Outcome: Progressing Towards Goal  Goal: *Sick day guidelines  Outcome: Progressing Towards Goal  Goal: *Patient Specific Goal (EDIT GOAL, INSERT TEXT)  Outcome: Progressing Towards Goal     Problem: Patient Education: Go to Patient Education Activity  Goal: Patient/Family Education  Outcome: Progressing Towards Goal

## 2021-07-13 LAB
ANION GAP SERPL CALC-SCNC: 3 MMOL/L (ref 5–15)
BACTERIA SPEC CULT: ABNORMAL
BUN SERPL-MCNC: 10 MG/DL (ref 6–20)
BUN/CREAT SERPL: 15 (ref 12–20)
CALCIUM SERPL-MCNC: 8.6 MG/DL (ref 8.5–10.1)
CHLORIDE SERPL-SCNC: 104 MMOL/L (ref 97–108)
CO2 SERPL-SCNC: 31 MMOL/L (ref 21–32)
CREAT SERPL-MCNC: 0.66 MG/DL (ref 0.7–1.3)
ERYTHROCYTE [DISTWIDTH] IN BLOOD BY AUTOMATED COUNT: 13.3 % (ref 11.5–14.5)
GLUCOSE BLD STRIP.AUTO-MCNC: 115 MG/DL (ref 65–117)
GLUCOSE BLD STRIP.AUTO-MCNC: 134 MG/DL (ref 65–117)
GLUCOSE BLD STRIP.AUTO-MCNC: 136 MG/DL (ref 65–117)
GLUCOSE BLD STRIP.AUTO-MCNC: 140 MG/DL (ref 65–117)
GLUCOSE BLD STRIP.AUTO-MCNC: 169 MG/DL (ref 65–117)
GLUCOSE SERPL-MCNC: 126 MG/DL (ref 65–100)
GRAM STN SPEC: ABNORMAL
GRAM STN SPEC: ABNORMAL
HCT VFR BLD AUTO: 34.3 % (ref 36.6–50.3)
HGB BLD-MCNC: 10.3 G/DL (ref 12.1–17)
MCH RBC QN AUTO: 25.4 PG (ref 26–34)
MCHC RBC AUTO-ENTMCNC: 30 G/DL (ref 30–36.5)
MCV RBC AUTO: 84.7 FL (ref 80–99)
NRBC # BLD: 0 K/UL (ref 0–0.01)
NRBC BLD-RTO: 0 PER 100 WBC
PLATELET # BLD AUTO: 283 K/UL (ref 150–400)
PMV BLD AUTO: 10.5 FL (ref 8.9–12.9)
POTASSIUM SERPL-SCNC: 3.9 MMOL/L (ref 3.5–5.1)
RBC # BLD AUTO: 4.05 M/UL (ref 4.1–5.7)
SERVICE CMNT-IMP: ABNORMAL
SERVICE CMNT-IMP: NORMAL
SODIUM SERPL-SCNC: 138 MMOL/L (ref 136–145)
WBC # BLD AUTO: 5.3 K/UL (ref 4.1–11.1)

## 2021-07-13 PROCEDURE — 74011000250 HC RX REV CODE- 250: Performed by: STUDENT IN AN ORGANIZED HEALTH CARE EDUCATION/TRAINING PROGRAM

## 2021-07-13 PROCEDURE — 74011636637 HC RX REV CODE- 636/637: Performed by: NURSE PRACTITIONER

## 2021-07-13 PROCEDURE — 82962 GLUCOSE BLOOD TEST: CPT

## 2021-07-13 PROCEDURE — 74011000258 HC RX REV CODE- 258: Performed by: NURSE PRACTITIONER

## 2021-07-13 PROCEDURE — 74011250637 HC RX REV CODE- 250/637: Performed by: NURSE PRACTITIONER

## 2021-07-13 PROCEDURE — 36415 COLL VENOUS BLD VENIPUNCTURE: CPT

## 2021-07-13 PROCEDURE — 2709999900 HC NON-CHARGEABLE SUPPLY

## 2021-07-13 PROCEDURE — 80048 BASIC METABOLIC PNL TOTAL CA: CPT

## 2021-07-13 PROCEDURE — 65270000029 HC RM PRIVATE

## 2021-07-13 PROCEDURE — 85027 COMPLETE CBC AUTOMATED: CPT

## 2021-07-13 PROCEDURE — 74011250636 HC RX REV CODE- 250/636: Performed by: NURSE PRACTITIONER

## 2021-07-13 PROCEDURE — 74011250636 HC RX REV CODE- 250/636: Performed by: STUDENT IN AN ORGANIZED HEALTH CARE EDUCATION/TRAINING PROGRAM

## 2021-07-13 RX ADMIN — OXYCODONE 10 MG: 5 TABLET ORAL at 05:32

## 2021-07-13 RX ADMIN — WATER 2 G: 1 INJECTION INTRAMUSCULAR; INTRAVENOUS; SUBCUTANEOUS at 23:30

## 2021-07-13 RX ADMIN — ENOXAPARIN SODIUM 40 MG: 40 INJECTION SUBCUTANEOUS at 05:10

## 2021-07-13 RX ADMIN — FUROSEMIDE 40 MG: 10 INJECTION, SOLUTION INTRAMUSCULAR; INTRAVENOUS at 09:47

## 2021-07-13 RX ADMIN — INSULIN GLARGINE 25 UNITS: 100 INJECTION, SOLUTION SUBCUTANEOUS at 22:21

## 2021-07-13 RX ADMIN — INSULIN LISPRO 2 UNITS: 100 INJECTION, SOLUTION INTRAVENOUS; SUBCUTANEOUS at 12:22

## 2021-07-13 RX ADMIN — Medication 10 ML: at 22:24

## 2021-07-13 RX ADMIN — ATORVASTATIN CALCIUM 40 MG: 40 TABLET, FILM COATED ORAL at 09:47

## 2021-07-13 RX ADMIN — VANCOMYCIN HYDROCHLORIDE 1000 MG: 1 INJECTION, POWDER, LYOPHILIZED, FOR SOLUTION INTRAVENOUS at 14:43

## 2021-07-13 RX ADMIN — Medication 10 ML: at 14:00

## 2021-07-13 RX ADMIN — AMPICILLIN SODIUM AND SULBACTAM SODIUM 3 G: 2; 1 INJECTION, POWDER, FOR SOLUTION INTRAMUSCULAR; INTRAVENOUS at 05:09

## 2021-07-13 RX ADMIN — OXYCODONE 10 MG: 5 TABLET ORAL at 22:27

## 2021-07-13 RX ADMIN — Medication 10 ML: at 05:10

## 2021-07-13 RX ADMIN — Medication 10 ML: at 07:48

## 2021-07-13 RX ADMIN — VANCOMYCIN HYDROCHLORIDE 1000 MG: 1 INJECTION, POWDER, LYOPHILIZED, FOR SOLUTION INTRAVENOUS at 07:46

## 2021-07-13 RX ADMIN — AMPICILLIN SODIUM AND SULBACTAM SODIUM 3 G: 2; 1 INJECTION, POWDER, FOR SOLUTION INTRAMUSCULAR; INTRAVENOUS at 14:37

## 2021-07-13 NOTE — PROGRESS NOTES
Transition of Care Plan:    RUR:10%  Disposition:[facility]  Follow up appointments:PCP-CM provided him a list of [facility] doctors  DME needed:TBD  Transportation at Discharge:Pt will need assistance with transportation  [address] or means to access home:  The staff at [facility], [facility] letter:N/A due to pt having Medicaid  [address] [facility] [address], [phone]-[phone] and [id number]-[phone]  Discharge Caregiver contacted prior to discharge?                 Reason for Admission:  Bilateral leg swelling, pain, wound                     RUR Score:          10%           Plan for utilizing home health:      TBD    PCP: First and Last name:  None     Name of Practice:  CM provided him a list of [facility] doctors   Are you a current patient: Yes/No:    Approximate date of last visit:    Can you participate in a virtual visit with your PCP:                     Current Advanced Directive/Advance Care Plan: Full Code      Healthcare Decision Maker:   Click here to complete [address] including selection of the Healthcare Decision Maker Relationship (ie \"Primary\")             Primary Decision MakerCherfacundo Uriel - Mother - [address] (ACP) Conversation      Date of Conversation: 7/13/2021  Conducted with: Patient with [address]:     Primary Decision Maker: CheryVangie - Mother - [phone]  Click here to [phone]  including selection of the Healthcare Decision Maker Relationship (ie \"Primary\")      Content/Action Overview:   DECLINED ACP conversation - will revisit periodically   Reviewed DNR/DNI and patient elects Full Code (Attempt Resuscitation)         Length of Voluntary ACP Conversation in minutes:  <16 minutes (Non-Billable)    Ignacio Guzman           Transition of Care Plan:                        Pt is a 63 yo male who was admitted to [facility] with a dx of bilateral leg swelling, pain and wound. CM confirmed demographics with pt. Pt lives and works at [facility] with 6 steps to enter in the front and a ramp to enter in the back. No DME reported. Pt has a hx of using home health, [facility]. No hx of using outpatient rehab or being admitted to a SNF or inpatient rehab. Pt is independent in his ADLs and IADLs. Pt uses [facility] on [address] [place] [address]. Pt either has his family or public transportation to take him to his medical appointments. At the time of d/c pt will need assistance with transport. CM will continue to follow and assist with d/c planning. Care Management Interventions  PCP Verified by CM: Yes (No PCP)  Mode of Transport at Discharge: Other (see comment) (Pt will need transportation arrange)  Transition of Care Consult (CM Consult): Discharge Planning, Assisted Living (Pt works and lives at [facility])  Discharge Durable Medical Equipment: No (No DME reported)  Physical Therapy Consult: No  Occupational Therapy Consult: No  Speech Therapy Consult: No  Current Support Network: Family Lives Nearby, Assisted Living (Family lives close by and has the support from the staff at [employer])  Confirm Follow Up Transport: Other (see comment) (Family vs public transportation)  Discharge Location  Discharge Placement: Assisted Living    [place], [place].   Care Manager [facility]  [phone]

## 2021-07-13 NOTE — PROGRESS NOTES
Patient seen and examined. MSSA, GBS and Staph hemolyticus bacteremia. Source is leg wounds. The bacteremia is likely all contaminant based on the clinical presentation, the number of bottles grown in. However will need to be treated. Chronic leg wound with possible bacterial infection on admission. Leg wounds are growing multiple organisms and this is likely reflecting colonization rather than true infection. Hence, abx therapy will target the bacteremia only. - Cefazolin 2gm q8h for 2 weeks. If blood cultures from 7/11 remain negative, plan for 2 weeks, 7/11/21 to 7/25/21.  - Needs consistent wound care for the leg wounds as outpatient. Wound care evaluation is recommended if not already placed. Final abx recommendations 7/14/21.        Kailyn Howell MD  Infectious Diseases

## 2021-07-13 NOTE — PROGRESS NOTES
End of Shift Note    Bedside shift change report given to Dwayne Rocha (oncoming nurse) by Swapna Hutson (offgoing nurse). Report included the following information SBAR, Kardex, Intake/Output and MAR    Shift worked:  07a-7p     Shift summary and any significant changes:     tolerated diet   Wound care completed  Up ab amanda   Infectious disease MD visited   [facility] privileges   Tolerated pain management   Wound care nurse visited    Concerns for physician to address:  none     Zone phone for oncoming shift:   none       Activity:  Activity Level: Up ad amanda  Number times ambulated in hallways past shift: 0  Number of times OOB to chair past shift: 3    Cardiac:   Cardiac Monitoring: Yes    ST 20  Cardiac Rhythm: Sinus Rhythm    Access:   Current line(s): PIV     Genitourinary:   Urinary status: voiding    Respiratory:   O2 Device: None (Room air)  Chronic home O2 use?: NO  Incentive spirometer at bedside: YES     GI:  Last Bowel Movement Date: 07/12/21  Current diet:  ADULT DIET Regular; 3 carb choices (45 gm/meal); Low Sodium (2 gm); No Concentrated Sweets  DIET ONE TIME MESSAGE  DIET ONE TIME MESSAGE  Passing flatus: YES  Tolerating current diet: YES       Pain Management:   Patient states pain is manageable on current regimen: YES    Skin:  Wesley Score: 22  Interventions: increase time out of bed    Patient Safety:  Fall Score:  Total Score: 2  Interventions: gripper socks and pt to call before getting OOB       Length of Stay:  Expected LOS: 3d 4h  Actual LOS: [facility]  81/07/03  3802

## 2021-07-13 NOTE — PROGRESS NOTES
Problem: Falls - Risk of  Goal: *Absence of Falls  Description: Document Lilia Ryan Fall Risk and appropriate interventions in the flowsheet. Outcome: Progressing Towards Goal  Note: Fall Risk Interventions:            Medication Interventions: Evaluate medications/consider consulting pharmacy         History of Falls Interventions: Room close to nurse's station         Problem: Diabetes Self-Management  Goal: *Disease process and treatment process  Description: Define diabetes and identify own type of diabetes; list 3 options for treating diabetes. Outcome: Progressing Towards Goal  Goal: *Incorporating nutritional management into lifestyle  Description: Describe effect of type, amount and timing of food on blood glucose; list 3 methods for planning meals. Outcome: Progressing Towards Goal  Goal: *Incorporating physical activity into lifestyle  Description: State effect of exercise on blood glucose levels. Outcome: Progressing Towards Goal  Goal: *Developing strategies to promote health/change behavior  Description: Define the ABC's of diabetes; identify appropriate screenings, schedule and personal plan for screenings. Outcome: Progressing Towards Goal  Goal: *Using medications safely  Description: State effect of diabetes medications on diabetes; name diabetes medication taking, action and side effects. Outcome: Progressing Towards Goal  Goal: *Monitoring blood glucose, interpreting and using results  Description: Identify recommended blood glucose targets  and personal targets. Outcome: Progressing Towards Goal  Goal: *Prevention, detection, treatment of acute complications  Description: List symptoms of hyper- and hypoglycemia; describe how to treat low blood sugar and actions for lowering  high blood glucose level.   Outcome: Progressing Towards Goal  Goal: *Prevention, detection and treatment of chronic complications  Description: Define the natural course of diabetes and describe the relationship of blood glucose levels to long term complications of diabetes.   Outcome: Progressing Towards Goal  Goal: *Developing strategies to address psychosocial issues  Description: Describe feelings about living with diabetes; identify support needed and support network  Outcome: Progressing Towards Goal  Goal: *Insulin pump training  Outcome: Progressing Towards Goal  Goal: *Sick day guidelines  Outcome: Progressing Towards Goal  Goal: *Patient Specific Goal (EDIT GOAL, INSERT TEXT)  Outcome: Progressing Towards Goal     Problem: Patient Education: Go to Patient Education Activity  Goal: Patient/Family Education  Outcome: Progressing Towards Goal

## 2021-07-13 NOTE — PROGRESS NOTES
Hospitalist Progress Note    NAME: Pino Haddad   :  1965   MRN:  [id number]       Assessment / Plan:    B/L leg cellulitis, POA  B/L leg pain, POA  B/L LE Duplex ultrasound: No evidence of DVT  Hx Venous insuffiencey  -  Chronic venous stasis ulcers treated in Feb - March    - noted increased edema for few weeks states home Lasix not working   - Lasix home med change to IV and increase to 40 mg  Daily weight   - BC showed poss staph aureus , and Group B strep  currently on Vanc and Cefepime    cultures have not finalized , added Unasyn for Group B coverage and DC cefepime  - wound culture also Heavy group B strep  And Staph aureus   - wound care consult   -continue current wound care orders   ID consultation         Bacteremia  - afebrile , no Leukocytosis   - ECHO EF 55-60% no mention of vegetation on valves   - BC positive  for Group B and Staph aures preliminary   - sensitivity not finalized   -  repeat BC NGTD  - cont Vanc /Uasyn     Asthma POA stable  - CXR shows no acute findings   - PRN neb tx and Oxygen      Diabetes type 2 w/ neuropathy   Hyperlipidemia   - hgbA1c 9.4  - hold Metformin for now   - cont with SSI   - cont  Atorvastatin      Advanced Prostate Cancer w/ PSA of 11.0 - stable   _ followed at [facility] cancer center no procedures yet            / Body mass index is 38.19 kg/m².     Estimated discharge date:   Barriers:     Code status: Full  Prophylaxis: Lovenox  Recommended Disposition: Home w/Family and TBD         Subjective:     Chief Complaint / Reason for Physician Visit    Discussed with RN events overnight.   Patient stated feels better, ID consulted for cellulitis and bacteremia    Review of Systems:  Symptom Y/N Comments  Symptom Y/N Comments   Fever/Chills n   Chest Pain n    Poor Appetite    Edema     Cough    Abdominal Pain     Sputum    Joint Pain     SOB/HOFF    Pruritis/Rash     Nausea/vomit n   Tolerating PT/OT     Diarrhea    Tolerating Diet y Constipation n   Other       Could NOT obtain due to:      Objective:     VITALS:   Last 24hrs VS reviewed since prior progress note. Most recent are:  Patient Vitals for the past 24 hrs:   Temp Pulse Resp BP SpO2   07/13/21 1128 98 °F (36.7 °C) 82 18 (!) 142/78 95 %   07/13/21 0730 97.4 °F (36.3 °C) 92 18 (!) 149/80 96 %   07/13/21 0420 97.7 °F (36.5 °C) 90 18 135/87 100 %   07/12/21 2308 98 °F (36.7 °C) 93 18 (!) 142/89 91 %   07/12/21 2020 98.4 °F (36.9 °C) 96 18 (!) 143/93 91 %   07/12/21 1523 98.3 °F (36.8 °C) 82 18 135/88 96 %       Intake/Output Summary (Last 24 hours) at 7/13/2021 1414  Last data filed at 7/13/2021 9033  Gross per 24 hour   Intake 1490 ml   Output --   Net 1490 ml        I had a face to face encounter and independently examined this patient on 7/13/2021, as outlined below:  PHYSICAL EXAM:  General: WD, WN. Alert, cooperative, no acute distress    EENT:  EOMI. Anicteric sclerae. MMM  Resp:  CTA bilaterally, no wheezing or rales. No accessory muscle use  CV:  Regular  rhythm,  No edema  GI:  Soft, Non distended, Non tender. +Bowel sounds  Neurologic:  Alert and oriented X 3, normal speech,   Psych:   Good insight. Not anxious nor agitated  Skin:  No rashes. No jaundice    Reviewed most current lab test results and cultures  YES  Reviewed most current radiology test results   YES  Review and summation of old records today    NO  Reviewed patient's current orders and MAR    YES  PMH/SH reviewed - no change compared to H&P  ________________________________________________________________________  Care Plan discussed with:    Comments   Patient y    Family      RN y    Care Manager     Consultant                        Multidiciplinary team rounds were held today with , nursing, pharmacist and clinical coordinator. Patient's plan of care was discussed; medications were reviewed and discharge planning was addressed. ________________________________________________________________________  Total NON critical care TIME:  35    Minutes    Total CRITICAL CARE TIME Spent:   Minutes non procedure based      Comments   >50% of visit spent iny counseling and coordination of care y    ________________________________________________________________________  Latrice Sandoval MD     Procedures: see electronic medical records for all procedures/Xrays and details which were not copied into this note but were reviewed prior to creation of Plan. LABS:  I reviewed today's most current labs and imaging studies. Pertinent labs include:  Recent Labs     07/13/21 0330 07/12/21 0356 07/11/21 0406   WBC 5.3 5.6 5.2   HGB 10.3* 10.1* 9.4*   HCT 34.3* 33.6* 32.0*    278 249     Recent Labs     07/13/21 0330 07/12/21 0356 07/11/21  0406    137 138   K 3.9 4.0 4.0    104 105   CO2 31 28 28   * 165* 128*   BUN 10 11 12   CREA 0.66* 0.74 0.68*   CA 8.6 8.6 8.2*       Signed:  Latrice Sandoval MD

## 2021-07-13 NOTE — WOUND CARE
Wound Care Consult for bilateral lower leg cellulitis that was present on admission. Pt. Was admitted for failed outpatient treatment of the cellulitis and wounds got worse. Pt. Has Diabetes type 2, Hx of asthma. He had a duplex ultrasound to rule out DVT. Apparently a wound culture was done and the results are a mixture of bacteria types, some of which is most likely contaminant. He is on antibiotics. His culture grew out mod. Staph aureus, heavy gram negative rods, heavy enterobacter cloacae and 2+ gram positive cocci in chains. Pt. Is septic with a + blood culture growing staph aureus and heavy strep hemolyticus in both bottles. Assessment: Both legs are edematous with a royce appearance to the skin and macerated outer edges. The left leg wounds are worse than the right. The skin was open, bleeding and painful on admission but now it seems to be \"settling down\" per the patient. The wound beds on both legs are pink with granulation and a large amount of \"new\" epithelial pink skin. This will re-pigment later when healing is complete. The drainage from the wounds is serosanguinous without odor. Patient lifts his leg well and has good range of motion and a faint pulse in DP. Over-all the wounds look clean and pink. Left lower leg (the worst one):              Treatment:  Prior to wound care consult the wounds are being dressed with Petrolatum dressings daily by nursing. This has kept the wound beds moist and clean and the wounds are starting to show some great healing progress per Nurse,  Hunter Montenegro. She was in the middle of the wound care and continued with the current dressing. Plan:  Keep the legs elevated when possible above the heart level. Float the heels - the legs are very heavy with fluid. Wound care orders written for new wound care. Patient will need wound care at home too with some changes. Would like to obtain an Ankle BrachialI test to evaluate the arterial status of the legs.     New dressings to be applied tomorrow. New orders:  Cleanse the bilateral lower legs and feet with CHG soap and water using soft wash cloths, wiping the old petrolatum away. Apply some white zinc oxide cream in a thin layer along the intact but macerated skin. Apply Opticel ag dressings only to the pink open wound beds. Cover with ABD dressings and wrap with Dorothy. Float the heels. Encourage mobility as tolerated in the halls.    Elevate the legs when at rest.   Alyson Burdick, RN, BSN, [facility]

## 2021-07-14 LAB
ANION GAP SERPL CALC-SCNC: 2 MMOL/L (ref 5–15)
BUN SERPL-MCNC: 11 MG/DL (ref 6–20)
BUN/CREAT SERPL: 17 (ref 12–20)
CALCIUM SERPL-MCNC: 8.5 MG/DL (ref 8.5–10.1)
CHLORIDE SERPL-SCNC: 104 MMOL/L (ref 97–108)
CO2 SERPL-SCNC: 30 MMOL/L (ref 21–32)
CREAT SERPL-MCNC: 0.63 MG/DL (ref 0.7–1.3)
ERYTHROCYTE [DISTWIDTH] IN BLOOD BY AUTOMATED COUNT: 13.4 % (ref 11.5–14.5)
GLUCOSE BLD STRIP.AUTO-MCNC: 111 MG/DL (ref 65–117)
GLUCOSE BLD STRIP.AUTO-MCNC: 116 MG/DL (ref 65–117)
GLUCOSE BLD STRIP.AUTO-MCNC: 131 MG/DL (ref 65–117)
GLUCOSE BLD STRIP.AUTO-MCNC: 151 MG/DL (ref 65–117)
GLUCOSE SERPL-MCNC: 109 MG/DL (ref 65–100)
HCT VFR BLD AUTO: 34.4 % (ref 36.6–50.3)
HGB BLD-MCNC: 10.3 G/DL (ref 12.1–17)
MCH RBC QN AUTO: 25.5 PG (ref 26–34)
MCHC RBC AUTO-ENTMCNC: 29.9 G/DL (ref 30–36.5)
MCV RBC AUTO: 85.1 FL (ref 80–99)
NRBC # BLD: 0 K/UL (ref 0–0.01)
NRBC BLD-RTO: 0 PER 100 WBC
PLATELET # BLD AUTO: 262 K/UL (ref 150–400)
PMV BLD AUTO: 10.4 FL (ref 8.9–12.9)
POTASSIUM SERPL-SCNC: 3.9 MMOL/L (ref 3.5–5.1)
RBC # BLD AUTO: 4.04 M/UL (ref 4.1–5.7)
SERVICE CMNT-IMP: ABNORMAL
SERVICE CMNT-IMP: ABNORMAL
SERVICE CMNT-IMP: NORMAL
SERVICE CMNT-IMP: NORMAL
SODIUM SERPL-SCNC: 136 MMOL/L (ref 136–145)
WBC # BLD AUTO: 5.4 K/UL (ref 4.1–11.1)

## 2021-07-14 PROCEDURE — 36415 COLL VENOUS BLD VENIPUNCTURE: CPT

## 2021-07-14 PROCEDURE — 97161 PT EVAL LOW COMPLEX 20 MIN: CPT

## 2021-07-14 PROCEDURE — 74011250636 HC RX REV CODE- 250/636: Performed by: NURSE PRACTITIONER

## 2021-07-14 PROCEDURE — 74011636637 HC RX REV CODE- 636/637: Performed by: NURSE PRACTITIONER

## 2021-07-14 PROCEDURE — 65270000029 HC RM PRIVATE

## 2021-07-14 PROCEDURE — 97116 GAIT TRAINING THERAPY: CPT

## 2021-07-14 PROCEDURE — 82962 GLUCOSE BLOOD TEST: CPT

## 2021-07-14 PROCEDURE — 77030041076 HC DRSG AG OPTICELL MDII -A

## 2021-07-14 PROCEDURE — 80048 BASIC METABOLIC PNL TOTAL CA: CPT

## 2021-07-14 PROCEDURE — 74011000250 HC RX REV CODE- 250: Performed by: STUDENT IN AN ORGANIZED HEALTH CARE EDUCATION/TRAINING PROGRAM

## 2021-07-14 PROCEDURE — 85027 COMPLETE CBC AUTOMATED: CPT

## 2021-07-14 PROCEDURE — 74011250637 HC RX REV CODE- 250/637: Performed by: NURSE PRACTITIONER

## 2021-07-14 PROCEDURE — 74011250636 HC RX REV CODE- 250/636: Performed by: STUDENT IN AN ORGANIZED HEALTH CARE EDUCATION/TRAINING PROGRAM

## 2021-07-14 PROCEDURE — 94760 N-INVAS EAR/PLS OXIMETRY 1: CPT

## 2021-07-14 PROCEDURE — 2709999900 HC NON-CHARGEABLE SUPPLY

## 2021-07-14 RX ADMIN — WATER 2 G: 1 INJECTION INTRAMUSCULAR; INTRAVENOUS; SUBCUTANEOUS at 08:17

## 2021-07-14 RX ADMIN — Medication 10 ML: at 15:45

## 2021-07-14 RX ADMIN — ATORVASTATIN CALCIUM 40 MG: 40 TABLET, FILM COATED ORAL at 08:17

## 2021-07-14 RX ADMIN — Medication 10 ML: at 05:09

## 2021-07-14 RX ADMIN — ENOXAPARIN SODIUM 40 MG: 40 INJECTION SUBCUTANEOUS at 05:08

## 2021-07-14 RX ADMIN — OXYCODONE 10 MG: 5 TABLET ORAL at 10:25

## 2021-07-14 RX ADMIN — FUROSEMIDE 40 MG: 10 INJECTION, SOLUTION INTRAMUSCULAR; INTRAVENOUS at 08:17

## 2021-07-14 RX ADMIN — INSULIN LISPRO 2 UNITS: 100 INJECTION, SOLUTION INTRAVENOUS; SUBCUTANEOUS at 12:05

## 2021-07-14 RX ADMIN — OXYCODONE 10 MG: 5 TABLET ORAL at 21:10

## 2021-07-14 RX ADMIN — WATER 2 G: 1 INJECTION INTRAMUSCULAR; INTRAVENOUS; SUBCUTANEOUS at 15:45

## 2021-07-14 NOTE — INTERDISCIPLINARY ROUNDS
Interdisciplinary Rounds were completed on 07/14/21 for this patient. Rounds included nursing, clinical care leader, pharmacy, and case management. Plan of care discussed. See clinical pathway and/or care plan for interventions and desired outcomes.

## 2021-07-14 NOTE — PROGRESS NOTES
Hospitalist Progress Note    NAME: Naomi Valdez   :  1965   MRN:  [id number]       Assessment / Plan:    B/L leg cellulitis, POA  B/L leg pain, POA  B/L LE Duplex ultrasound: No evidence of DVT  Hx Venous insuffiencey  -  Chronic venous stasis ulcers treated in Feb - March    - noted increased edema for few weeks states home Lasix not working   - Lasix home med change to IV and increase to 40 mg  Daily weight   - BC showed poss staph aureus , and Group B strep  currently on Vanc and Cefepime    cultures have not finalized , added Unasyn for Group B coverage and DC cefepime  - wound culture also Heavy group B strep  And Staph aureus   - wound care consult   -continue current wound care orders   ID consultation         Bacteremia  - afebrile , no Leukocytosis   - ECHO EF 55-60% no mention of vegetation on valves   - BC positive  for Group B and Staph aures preliminary   - sensitivity not finalized   -  repeat BC NGTD  - cont Vanc /Uasyn     Asthma POA stable  - CXR shows no acute findings   - PRN neb tx and Oxygen      Diabetes type 2 w/ neuropathy   Hyperlipidemia   - hgbA1c 9.4  - hold Metformin for now   - cont with SSI   - cont  Atorvastatin      Advanced Prostate Cancer w/ PSA of 11.0 - stable   _ followed at [facility] no procedures yet            / Body mass index is 38.19 kg/m².     Estimated discharge date: July 15  Barriers:     Code status: Full  Prophylaxis: Lovenox  Recommended Disposition: Home w/Family and TBD          Subjective:     Chief Complaint / Reason for Physician Visit    Discussed with RN events overnight.   PT OT evaluation, waiting for ID for final antibiotic duration    Review of Systems:  Symptom Y/N Comments  Symptom Y/N Comments   Fever/Chills n   Chest Pain n    Poor Appetite    Edema     Cough    Abdominal Pain     Sputum    Joint Pain     SOB/HOFF n   Pruritis/Rash     Nausea/vomit    Tolerating PT/OT     Diarrhea    Tolerating Diet y Constipation n   Other       Could NOT obtain due to:      Objective:     VITALS:   Last 24hrs VS reviewed since prior progress note. Most recent are:  Patient Vitals for the past 24 hrs:   Temp Pulse Resp BP SpO2   07/14/21 1158 98.4 °F (36.9 °C) 84 18 130/82 92 %   07/14/21 0741 98.4 °F (36.9 °C) 95 17 136/85 96 %   07/14/21 0336 98.6 °F (37 °C) 90 18 97/64 93 %   07/13/21 2322 98.1 °F (36.7 °C) 90 18 131/79 96 %   07/13/21 2051 98.9 °F (37.2 °C) 88 18 (!) 144/91 93 %   07/13/21 1438 98.5 °F (36.9 °C) 92 18 (!) 151/87 99 %       Intake/Output Summary (Last 24 hours) at 7/14/2021 1347  Last data filed at 7/14/2021 0541  Gross per 24 hour   Intake 1390 ml   Output --   Net 1390 ml        I had a face to face encounter and independently examined this patient on 7/14/2021, as outlined below:  PHYSICAL EXAM:  General: WD, WN. Alert, cooperative, no acute distress    EENT:  EOMI. Anicteric sclerae. MMM  Resp:  CTA bilaterally, no wheezing or rales. No accessory muscle use  CV:  Regular  rhythm,  No edema  GI:  Soft, Non distended, Non tender. +Bowel sounds  Neurologic:  Alert and oriented X 3, normal speech,   Psych:   Good insight. Not anxious nor agitated  Skin:  No rashes. No jaundice    Reviewed most current lab test results and cultures  YES  Reviewed most current radiology test results   YES  Review and summation of old records today    NO  Reviewed patient's current orders and MAR    YES  PMH/SH reviewed - no change compared to H&P  ________________________________________________________________________  Care Plan discussed with:    Comments   Patient y    Family      RN y    Care Manager     Consultant                        Multidiciplinary team rounds were held today with , nursing, pharmacist and clinical coordinator. Patient's plan of care was discussed; medications were reviewed and discharge planning was addressed. ________________________________________________________________________  Total NON critical care TIME:  35    Minutes    Total CRITICAL CARE TIME Spent:   Minutes non procedure based      Comments   >50% of visit spent in counseling and coordination of care y    ________________________________________________________________________  Uriah Lopez MD     Procedures: see electronic medical records for all procedures/Xrays and details which were not copied into this note but were reviewed prior to creation of Plan. LABS:  I reviewed today's most current labs and imaging studies. Pertinent labs include:  Recent Labs     07/14/21 0321 07/13/21 0330 07/12/21 0356   WBC 5.4 5.3 5.6   HGB 10.3* 10.3* 10.1*   HCT 34.4* 34.3* 33.6*    283 278     Recent Labs     07/14/21 0321 07/13/21 0330 07/12/21  0356    138 137   K 3.9 3.9 4.0    104 104   CO2 30 31 28   * 126* 165*   BUN 11 10 11   CREA 0.63* 0.66* 0.74   CA 8.5 8.6 8.6       Signed:  Uriah Lopez MD

## 2021-07-14 NOTE — PROGRESS NOTES
PHYSICAL THERAPY EVALUATION/DISCHARGE  Patient: Rosetta Hutson (40 y.o. male)  Date: 7/14/2021  Primary Diagnosis: Bilateral lower leg cellulitis [L03.116, L03.115]       Precautions:          ASSESSMENT  Based on the objective data described below, the patient presents with good strength, intact balance, and overall baseline independent functional mobility. Gait speed decreased however steady overall as pt independently ambulated 250ft w/o device. Balance intact throughout ambulation trial as well as during dynamic tasks, including standing marches and item retrieval. Pt functioning at his baseline independent level and is safe to be up ad amanda within room and hallways as well as return home w/ no further needs. Functional Outcome Measure: The patient scored 100/100 on the Barthel Index outcome measure which is indicative of no impairment in ADLs and functional mobility. Other factors to consider for discharge: BLE wounds/edema     Further skilled acute physical therapy is not indicated at this time. PLAN :  Recommendation for discharge: (in order for the patient to meet his/her long term goals)  No skilled physical therapy/ follow up rehabilitation needs identified at this time. 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     OBJECTIVE DATA SUMMARY:   HISTORY:    Past Medical History:   Diagnosis Date    Asthma     Lower leg edema     Prostate cancer ([facility] [address]    No past surgical history on file. Prior level of function: independent w/ ambulation and ADLs. Denies history of falls. 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 States he is currently homeless and has been staying at [facility] and working extra hours assisting patients with bathing, dressing, and transfers. Personal factors and/or comorbidities impacting plan of care:     Home Situation  Home Environment: Apartment  One/Two Story Residence: One story  Living Alone: No  Support Systems: Family member(s)  Patient Expects to be Discharged to[de-identified] House  Current DME Used/Available at Home: None    EXAMINATION/PRESENTATION/DECISION MAKING:   Critical Behavior:              Hearing: Auditory  Auditory Impairment: None  Skin:  Dressings to BLEs clean, dry, intact  Edema: none noted  Range Of Motion:  AROM: Within functional limits                       Strength:    Strength: Within functional limits                    Tone & Sensation:   Tone: Normal                              Coordination:  Coordination: Within functional limits  Vision:      Functional Mobility:  Bed Mobility:  Rolling: Independent  Supine to Sit: Independent     Scooting: Independent  Transfers:  Sit to Stand: Modified independent  Stand to Sit: Modified independent        Bed to Chair: Independent              Balance:   Sitting: Intact  Standing: Intact  Ambulation/Gait Training:  Distance (ft): 250 Feet (ft)  Assistive Device: Gait belt  Ambulation - Level of Assistance: Independent        Gait Abnormalities: Shuffling gait              Speed/Jazmyn: Slow  Step Length: Left shortened;Right shortened                      Functional Measure:  Barthel Index:    Bathin  Bladder: 10  Bowels: 10  Groomin  Dressing: 10  Feeding: 10  Mobility: 15  Stairs: 10  Toilet Use: 10  Transfer (Bed to Chair and Back): 15  Total: 100/100       The Barthel ADL Index: Guidelines  1. The index should be used as a record of what a patient does, not as a record of what a patient could do. 2. The main aim is to establish degree of independence from any help, physical or verbal, however minor and for whatever reason. 3. The need for supervision renders the patient not independent. 4. A patient's performance should be established using the best available evidence. Asking the patient, friends/relatives and nurses are the usual sources, but direct observation and common sense are also important. However direct testing is not needed. 5. Usually the patient's performance over the preceding 24-48 hours is important, but occasionally longer periods will be relevant. 6. Middle categories imply that the patient supplies over 50 per cent of the effort. 7. Use of aids to be independent is allowed. Parris Lawton., Barthel, D.W. ([id number]). Functional evaluation: the Barthel Index. [address] [facility] (14)2. Ashanti Basilio nicole TREVIN Gill, Milagros Chavez., Remigio Boas., Ximena, [address] (1999). Measuring the change indisability after inpatient rehabilitation; comparison of the responsiveness of the Barthel Index and Functional [place] Measure. Journal of Neurology, Neurosurgery, and Psychiatry, 66(4), 926-626. Nicole Damian, N.J.A, ELDER Silva, & Radha Luna, M.A. (2004.) Assessment of post-stroke quality of life in cost-effectiveness studies: The usefulness of the Barthel Index and the EuroQoL-5D. Quality of Life Research, 15, 901-54          Physical Therapy Evaluation Charge Determination   History Examination Presentation Decision-Making   MEDIUM  Complexity : 1-2 comorbidities / personal factors will impact the outcome/ POC  MEDIUM Complexity : 3 Standardized tests and measures addressing body structure, function, activity limitation and / or participation in recreation  MEDIUM Complexity : Evolving with changing characteristics  MEDIUM Complexity : FOTO score of 26-74      Based on the above components, the patient evaluation is determined to be of the following complexity level: MEDIUM    Pain Rating:  Reports pain in BLEs however did not rate    Activity Tolerance:   Good      After treatment patient left in no apparent distress:   Sitting in chair and Call bell within reach    COMMUNICATION/EDUCATION:   The patients plan of care was discussed with: Registered nursePerri Macias prevention education was provided and the patient/caregiver indicated understanding., Patient/family have participated as able in goal setting and plan of care. and Patient/family agree to work toward stated goals and plan of care.     Thank you for this referral.  Agustina Etienne, PT, DPT   Time Calculation: 15 mins

## 2021-07-14 NOTE — PROGRESS NOTES
Spiritual Care Assessment/Progress Note  [facility]      NAME: Cinthia Morales      MRN: [id number]  AGE: 64 y.o.  SEX: male  Zoroastrianism Affiliation: No preference   Language: English     7/14/2021     Total Time (in minutes): 15     Spiritual Assessment begun in [facility] through conversation with:         [x]Patient        [] Family    [] Friend(s)        Reason for Consult: Initial/Spiritual assessment, patient floor     Spiritual beliefs: (Please include comment if needed)     [x] Identifies with a jory tradition: Buddhist        [] Supported by a jory community:            [] Claims no spiritual orientation:           [] Seeking spiritual identity:                [] Adheres to an individual form of spirituality:           [] Not able to assess:                           Identified resources for coping:      [] Prayer                               [] Music                  [] Guided Imagery     [x] Family/friends                 [] Pet visits     [] Devotional reading                         [] Unknown     [] Other:                                               Interventions offered during this visit: (See comments for more details)    Patient Interventions: Initial visit, Initial/Spiritual assessment, patient floor, Affirmation of emotions/emotional suffering, Coping skills reviewed/reinforced, Catharsis/review of pertinent events in supportive environment           Plan of Care:     [] Support spiritual and/or cultural needs    [] Support AMD and/or advance care planning process      [] Support grieving process   [] Coordinate Rites and/or Rituals    [] Coordination with community clergy   [] No spiritual needs identified at this time   [] Detailed Plan of Care below (See Comments)  [] Make referral to Music Therapy  [] Make referral to Pet Therapy     [] Make referral to Addiction services  [] Make referral to [facility]  [] Make referral to Spiritual Care Partner  [] No future visits requested        [x] Follow up upon further referrals     Comments:     Initial Spiritual Care Assessment visit for Mr. Pappas in 2118. Patient was alert, no family was present during the visit. Pt shared about his mood that he is doing okay, and his sister is major family support per him. Pt is Taoist however, he had not been able to attend Jainism because he works at weekend. Pt shared his one of his concerns that he did not get the food yet. Pt is waiting for his food. Advised of  availability.       [id number] [facility], MMary Alice., M.S., Th.M.  Spiritual Care Provider   Paging Service [phone])

## 2021-07-15 VITALS
TEMPERATURE: 96.2 F | HEART RATE: 87 BPM | WEIGHT: 258.6 LBS | HEIGHT: 69 IN | BODY MASS INDEX: 38.3 KG/M2 | DIASTOLIC BLOOD PRESSURE: 93 MMHG | SYSTOLIC BLOOD PRESSURE: 148 MMHG | OXYGEN SATURATION: 98 % | RESPIRATION RATE: 20 BRPM

## 2021-07-15 LAB
GLUCOSE BLD STRIP.AUTO-MCNC: 131 MG/DL (ref 65–117)
GLUCOSE BLD STRIP.AUTO-MCNC: 169 MG/DL (ref 65–117)
SERVICE CMNT-IMP: ABNORMAL
SERVICE CMNT-IMP: ABNORMAL

## 2021-07-15 PROCEDURE — 97165 OT EVAL LOW COMPLEX 30 MIN: CPT | Performed by: OCCUPATIONAL THERAPIST

## 2021-07-15 PROCEDURE — 97535 SELF CARE MNGMENT TRAINING: CPT | Performed by: OCCUPATIONAL THERAPIST

## 2021-07-15 PROCEDURE — 99232 SBSQ HOSP IP/OBS MODERATE 35: CPT | Performed by: STUDENT IN AN ORGANIZED HEALTH CARE EDUCATION/TRAINING PROGRAM

## 2021-07-15 PROCEDURE — 82962 GLUCOSE BLOOD TEST: CPT

## 2021-07-15 PROCEDURE — 74011250637 HC RX REV CODE- 250/637: Performed by: NURSE PRACTITIONER

## 2021-07-15 PROCEDURE — 77030041076 HC DRSG AG OPTICELL MDII -A

## 2021-07-15 PROCEDURE — 77030018786 HC NDL GD F/USND BARD -B

## 2021-07-15 PROCEDURE — 74011250636 HC RX REV CODE- 250/636: Performed by: NURSE PRACTITIONER

## 2021-07-15 PROCEDURE — 76937 US GUIDE VASCULAR ACCESS: CPT

## 2021-07-15 PROCEDURE — 74011000250 HC RX REV CODE- 250: Performed by: STUDENT IN AN ORGANIZED HEALTH CARE EDUCATION/TRAINING PROGRAM

## 2021-07-15 PROCEDURE — 2709999900 HC NON-CHARGEABLE SUPPLY

## 2021-07-15 PROCEDURE — 74011636637 HC RX REV CODE- 636/637: Performed by: NURSE PRACTITIONER

## 2021-07-15 PROCEDURE — 74011250636 HC RX REV CODE- 250/636: Performed by: STUDENT IN AN ORGANIZED HEALTH CARE EDUCATION/TRAINING PROGRAM

## 2021-07-15 PROCEDURE — C1751 CATH, INF, PER/CENT/MIDLINE: HCPCS

## 2021-07-15 RX ORDER — OXYCODONE HYDROCHLORIDE 10 MG/1
5 TABLET ORAL
Qty: 12 TABLET | Refills: 0 | Status: SHIPPED | OUTPATIENT
Start: 2021-07-15 | End: 2021-07-19

## 2021-07-15 RX ORDER — FUROSEMIDE 20 MG/1
40 TABLET ORAL DAILY
Qty: 60 TABLET | Refills: 0 | Status: SHIPPED | OUTPATIENT
Start: 2021-07-15 | End: 2021-07-28 | Stop reason: SDUPTHER

## 2021-07-15 RX ADMIN — FUROSEMIDE 40 MG: 10 INJECTION, SOLUTION INTRAMUSCULAR; INTRAVENOUS at 11:03

## 2021-07-15 RX ADMIN — Medication 10 ML: at 14:03

## 2021-07-15 RX ADMIN — ENOXAPARIN SODIUM 40 MG: 40 INJECTION SUBCUTANEOUS at 07:54

## 2021-07-15 RX ADMIN — OXYCODONE 10 MG: 5 TABLET ORAL at 11:03

## 2021-07-15 RX ADMIN — ATORVASTATIN CALCIUM 40 MG: 40 TABLET, FILM COATED ORAL at 11:03

## 2021-07-15 RX ADMIN — Medication 5 ML: at 06:00

## 2021-07-15 RX ADMIN — Medication 10 ML: at 07:54

## 2021-07-15 RX ADMIN — WATER 2 G: 1 INJECTION INTRAMUSCULAR; INTRAVENOUS; SUBCUTANEOUS at 07:54

## 2021-07-15 RX ADMIN — DOCUSATE SODIUM 50MG AND SENNOSIDES 8.6MG 1 TABLET: 8.6; 5 TABLET, FILM COATED ORAL at 11:03

## 2021-07-15 RX ADMIN — OXYCODONE 10 MG: 5 TABLET ORAL at 15:11

## 2021-07-15 RX ADMIN — WATER 2 G: 1 INJECTION INTRAMUSCULAR; INTRAVENOUS; SUBCUTANEOUS at 01:23

## 2021-07-15 RX ADMIN — INSULIN LISPRO 2 UNITS: 100 INJECTION, SOLUTION INTRAVENOUS; SUBCUTANEOUS at 07:54

## 2021-07-15 NOTE — PROGRESS NOTES
DISCHARGE NOTE FROM [facility] NURSE    Patient determined to be stable for discharge by attending provider. I have reviewed the discharge instructions and follow-up appointments with the patient and caregiver. They verbalized understanding and all questions were answered to their satisfaction. No complaints or further questions were expressed. Medications sent to pharmacy. Appropriate educational materials and medication side effect teaching were provided. PIV were removed prior to discharge. PICC line care provided to patient by picc team and antibiotic team.      All personal items collected during admission were returned to the patient prior to discharge. Post-op patient: Yes- Patient given post-op discharge kit and instructed on use.        Kiera Briones RN

## 2021-07-15 NOTE — PROGRESS NOTES
OCCUPATIONAL THERAPY EVALUATION/DISCHARGE  Patient: Stephanie Vazquez (49 y.o. male)  Date: 7/15/2021  Primary Diagnosis: Bilateral lower leg cellulitis [L03.116, F77.510]       Precautions: BLE wounds       ASSESSMENT  Based on the objective data described below, the patient presents with near baseline level of functioning. Pt is independent with adls and demonstrated good balance and tolerance for simulated IADL tasks. 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 [employer] He is wondering what restrictions that he will have at discharge and when he will be able to go back to work. Pt does not need acute OT services at this time. .    Current Level of Function (ADLs/self-care): independent    Functional Outcome Measure: The patient scored 100/100 on the Barthel Index outcome measure which is indicative of no impairment in adls. .      Other factors to consider for discharge: was working PTA which required strength and endurance      PLAN :  Recommend with staff: encourage mobility to help maintain strength and endurance    Recommendation for discharge: (in order for the patient to meet his/her long term goals)  No skilled occupational therapy/ follow up rehabilitation needs identified at this time. This discharge recommendation:  Has not yet been discussed the attending provider and/or case management    IF patient discharges home will need the following DME: may benefit from a reacher       SUBJECTIVE:   Patient stated I do the cooking there too. 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,  Expanded or extensive additional review of patient history:   Home Situation  Home Environment:  (pt lives at facility where he works)  One/Two Story Residence: One story  Living Alone: No  Support Systems: Family member(s)  Patient Expects to be Discharged toVF Cor[de-identified]ration  Current DME Used/Available at Home: None    Hand dominance: Right    EXAMINATION OF PERFORMANCE DEFICITS:  Cognitive/Behavioral Status:  Neurologic State: Alert; Appropriate for age  Orientation Level: Appropriate for age  Cognition: Appropriate decision making; Appropriate for age attention/concentration; Appropriate safety awareness; Follows commands  Perception: Appears intact  Perseveration: No perseveration noted  Safety/Judgement: Awareness of environment; Fall prevention; Insight into deficits    Skin: impaired, BLE cellulitis/bandaged    Edema: none observed    Hearing: Auditory  Auditory Impairment: None    Vision/Perceptual:                           Acuity:  (not formally assessed)    Corrective Lenses:  (none present)    Range of Motion:  BUEs:    AROM: Within functional limits                         Strength:  BUEs:    Strength: Within functional limits                Coordination:  Coordination: Within functional limits  Fine Motor Skills-Upper: Left Intact; Right Intact    Gross Motor Skills-Upper: Left Intact; Right Intact    Tone & Sensation:  Sensation: reports no numbness or tingling  Tone: Normal                         Balance:  Sitting: Intact  Standing: Intact    Functional Mobility and Transfers for ADLs:  Bed Mobility:  Rolling:  (pt seated upon arrival)  Scooting: Independent    Transfers:  Sit to Stand: Independent  Stand to Sit: Independent  Bathroom Mobility: Independent  Toilet Transfer : Independent    ADL Assessment:  Feeding: Independent    Oral Facial Hygiene/Grooming: Independent    Bathing: Independent    Upper Body Dressing: Independent    Lower Body Dressing: Independent    Toileting: Independent                ADL Intervention and task modifications:     Pt has been up ad amanda in his room. Independent in dressing and toileting. Pt was able to ambulate in room and perform standing balance challenges. Able to reach to floor and low dresser drawers without LOB. Cognitive Retraining  Safety/Judgement: Awareness of environment; Fall prevention; Insight into deficits    Therapeutic Exercise:  Encouraged pt to be up and moving to keep up his strength and endurance while hospitalized   Functional Measure:  Barthel Index:    Bathin  Bladder: 10  Bowels: 10  Groomin  Dressing: 10  Feeding: 10  Mobility: 15  Stairs: 10  Toilet Use: 10  Transfer (Bed to Chair and Back): 15  Total: 100/100        The Barthel ADL Index: Guidelines  1. The index should be used as a record of what a patient does, not as a record of what a patient could do. 2. The main aim is to establish degree of independence from any help, physical or verbal, however minor and for whatever reason. 3. The need for supervision renders the patient not independent. 4. A patient's performance should be established using the best available evidence. Asking the patient, friends/relatives and nurses are the usual sources, but direct observation and common sense are also important. However direct testing is not needed. 5. Usually the patient's performance over the preceding 24-48 hours is important, but occasionally longer periods will be relevant. 6. Middle categories imply that the patient supplies over 50 per cent of the effort. 7. Use of aids to be independent is allowed. Tremayne Beaver, Barthel, D.W. (9001). Functional evaluation: the Barthel Index. [address] [facility] (14)2. TREVIN Bolivar, Noelle Griffin., Walter Mccullough., Kirk Gibbs, [address] [place] [address] (). Measuring the change indisability after inpatient rehabilitation; comparison of the responsiveness of the Barthel Index and Functional [place] Measure. Journal of Neurology, Neurosurgery, and Psychiatry, 66(4), 241-937.   Gregory Hong, MARISOL, ELDER Silva, & Brooke Maldonado M.A. (2004.) Assessment of post-stroke quality of life in cost-effectiveness studies: The usefulness of the Barthel Index and the EuroQoL-5D. Quality of Life Research, 15, 687-27       Occupational Therapy Evaluation Charge Determination   History Examination Decision-Making   MEDIUM Complexity : Expanded review of history including physical, cognitive and psychosocial  history  MEDIUM Complexity : 3-5 performance deficits relating to physical, cognitive , or psychosocial skils that result in activity limitations and / or participation restrictions MEDIUM Complexity : Patient may present with comorbidities that affect occupational performnce. Miniml to moderate modification of tasks or assistance (eg, physical or verbal ) with assesment(s) is necessary to enable patient to complete evaluation       Based on the above components, the patient evaluation is determined to be of the following complexity level: MEDIUM  Pain Rating:  RUE where line is is uncomfortable  BLEs are sensitive due to wounds      After treatment patient left in no apparent distress:    standing in room    COMMUNICATION/EDUCATION:   The patients plan of care was discussed with: Registered nurse.      Thank you for this referral.  Barrett Purcell OTR/L   10 minutes

## 2021-07-15 NOTE — PROGRESS NOTES
Infectious Disease Progress Note         Interval:  NAEO    Subjective:   Reports feeling alright             Objective:    Vitals:   Reviewed in chart. Physical Exam:  Gen: No apparent distress  HEENT:  Normocephalic, atraumatic, no scleral icteruslymphadenopathy  CV: HDS  Lungs: room air  Abdomen: soft, non tender, non distended  Genitourinary:  no knott catheter   Skin: no rash   Psych: good affect, good eye contact, non tearful  Neuro: alert, oriented to time,  place, and situation, moves all extremities to commands, verbal  Musculoskeletal:  Legs in dressings  Lines: midline        Labs:  Recent Results (from the past 24 hour(s))   GLUCOSE, POC    Collection Time: 07/14/21 11:50 AM   Result Value Ref Range    Glucose (POC) 151 (H) 65 - 117 mg/dL    Performed by Steven Hernández RN    GLUCOSE, POC    Collection Time: 07/14/21  4:30 PM   Result Value Ref Range    Glucose (POC) 131 (H) 65 - 117 mg/dL    Performed by Maribel Brady RN    GLUCOSE, POC    Collection Time: 07/14/21  9:05 PM   Result Value Ref Range    Glucose (POC) 111 65 - 117 mg/dL    Performed by [facility] PCT    GLUCOSE, POC    Collection Time: 07/15/21  7:13 AM   Result Value Ref Range    Glucose (POC) 169 (H) 65 - 117 mg/dL    Performed by [facility] PCT                Assessment:  65 yo M admitted for MSSA, GBS and Staph hemolyticus bacteremia. Source is leg wounds. The bacteremia is likely all contaminant based on the clinical presentation, the number of bottles grown in. However will need to be treated. Chronic leg wound with possible bacterial infection on admission. Leg wounds are growing multiple organisms and this is likely reflecting colonization rather than true infection. Hence, abx therapy will target the bacteremia only. TTE negative for vegetations. Repeat blood cultures from 7/11 negative. Patient did NOT have bilateral leg cellulitis. It is exceedingly rare to get cellulitis at the same time in both legs.  This patient has chronic leg wounds which were superimposed with bacterial inflammation. Recommendations:  - Cefazolin 2gm q8h for 2 weeks, 7/11/21 to 7/25/21.  - There is no need for ID clinic follow up after discharge. - Strongly recommend consistent wound care for legs. · Please have labs done on weekly basis for CBC with diff, CMP  and have results sent to me by faxing to  [phone]. · Call with critical labs at [phone]. · Please ensure that patient has midline care arranged per protocol   · Once IV antibiotics have been discontinued, please ensure that PICC/IV access is promptly removed. · Smoking cessation encouraged if patient is current smoker to aid in infection and wound healing              Thank you for the opportunity to participate in the care of this patient. Please contact with questions or concerns.       Serenity Coello MD  Infectious Diseases

## 2021-07-15 NOTE — DISCHARGE SUMMARY
Hospitalist Discharge Summary     Patient ID:  Victor Hugo Alicia  [id number]  64 y.o.  1965 7/9/2021    PCP on record: None    Admit date: 7/9/2021  Discharge date and time: 7/15/2021    DISCHARGE DIAGNOSIS:     B/L leg cellulitis, POA  B/L leg pain, POA      CONSULTATIONS:  IP CONSULT TO HOSPITALIST  IP CONSULT TO INFECTIOUS DISEASES    Excerpted HPI from H&P of Rina Cancino MD:    Victor Hugo Alicia is a 64 y.o.  male who presents with worsening bilateral leg wound, swelling and pain over the past 2 weeks. Patient noticed increasing drainage to bilateral leg wound. 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 He is not currently on any antibiotic and dresses his wound with gauze wrap. He described the pain as 10/10 on assessment. Denies any fever, chills, CP, SOB, productive cough, focal weakness, bowel and bladder habit changes. Past medical history is significant for asthma, prostate cancer, hyperlipidemia and diabetes. Was recently hospitalized here for RLE cellulitis 02/26-03/03/2021 was started on Unasyn and sent home on Augmentin BID for 7 days.     ______________________________________________________________________  DISCHARGE SUMMARY/HOSPITAL COURSE:  for full details see H&P, daily progress notes, labs, consult notes.      B/L leg cellulitis, POA  B/L leg pain, POA  B/L LE Duplex ultrasound: No evidence of DVT  Hx Venous insuffiencey  -  Chronic venous stasis ulcers treated in Feb - March    - noted increased edema for few weeks states home Lasix not working   - Lasix home med change to IV and increase to 40 mg  Daily weight   - BC showed poss staph aureus , and Group B strep  currently on Vanc and Cefepime    cultures have not finalized , added Unasyn for Group B coverage and DC cefepime  - wound culture also Heavy group B strep  And Staph aureus   - wound care consult   -continue current wound care orders   ID consultation         Bacteremia  - afebrile , no Leukocytosis   - ECHO EF 55-60% no mention of vegetation on valves   - BC positive 2/2 for Group B and Staph aures preliminary   - sensitivity not finalized   - 7/11 repeat BC NGTD  - Cefazolin 2gm q8h for 2 weeks, 7/11/21 to 7/25/21.     Asthma POA stable  - CXR shows no acute findings   - PRN neb tx and Oxygen      Diabetes type 2 w/ neuropathy   Hyperlipidemia   - hgbA1c 9.4  - hold Metformin for now   - cont with SSI   - cont  Atorvastatin      Advanced Prostate Cancer w/ PSA of 11.0 - stable   _ followed at [facility] cancer center no procedures yet            / Body mass index is 38.19 kg/m².     Estimated discharge date: July 15  Barriers:     Code status: Full  Prophylaxis: Lovenox  Recommended Disposition: Home w/Family and TBD        _______________________________________________________________________  Patient seen and examined by me on discharge day. Pertinent Findings:  Gen:    Not in distress  Chest: Clear lungs  CVS:   Regular rhythm. No edema  Abd:  Soft, not distended, not tender  Neuro:  Alert,   _______________________________________________________________________  DISCHARGE MEDICATIONS:   Current Discharge Medication List      START taking these medications    Details   ceFAZolin 2 g IV syringe 2 g by IntraVENous route every eight (8) hours for 10 days. Qty: 30 Dose, Refills: 0  Start date: 7/15/2021, End date: 7/25/2021         CONTINUE these medications which have CHANGED    Details   furosemide (LASIX) 20 mg tablet Take 2 Tablets by mouth daily for 30 days. Qty: 60 Tablet, Refills: 0  Start date: 7/15/2021, End date: 8/14/2021         CONTINUE these medications which have NOT CHANGED    Details   polyethylene glycol (MIRALAX) 17 gram packet Take 1 Packet by mouth daily. Qty: 30 Each, Refills: 0      atorvastatin (LIPITOR) 40 mg tablet Take 40 mg by mouth daily.       glipiZIDE (GLUCOTROL) 10 mg tablet Take 10 mg by mouth two (2) times a day. insulin glargine (Lantus U-100 Insulin) 100 unit/mL injection 25 Units by SubCUTAneous route daily. metFORMIN (GLUMETZA ER) 500 mg TG24 24 hour tablet Take 2 Tabs by mouth two (2) times a day. Patient Follow Up Instructions: Activity: PT/OT Eval and Treat and PT/OT per Home Health  Diet: Diabetic Diet  Wound Care: As directed    Follow-up with PCP  in 4 days. Follow-up tests/labs  weekly basis for CBC with diff, CMP      Follow-up Information     Follow up With Specialties Details Why Contact Weston Meredith NP Internal Medicine On 7/21/2021 For new patient appointment at [facility]  [phone]      None    None (253) Patient stated that they have no PCP          follow up with PCP within 4 days         ________________________________________________________________    Risk of deterioration: Moderate    Condition at Discharge:  Stable  __________________________________________________________________    Disposition  Home with family and home health services    ____________________________________________________________________    Code Status: Full Code  ___________________________________________________________________      Total time in minutes spent coordinating this discharge (includes going over instructions, follow-up, prescriptions, and preparing report for sign off to her PCP) :  >30 minutes    Signed:   Juancho Tello MD

## 2021-07-15 NOTE — PROGRESS NOTES
Problem: Falls - Risk of  Goal: *Absence of Falls  Description: Document Weston Huerta Fall Risk and appropriate interventions in the flowsheet. Outcome: Resolved/Met     Problem: Diabetes Self-Management  Goal: *Disease process and treatment process  Description: Define diabetes and identify own type of diabetes; list 3 options for treating diabetes. Outcome: Resolved/Met  Goal: *Incorporating nutritional management into lifestyle  Description: Describe effect of type, amount and timing of food on blood glucose; list 3 methods for planning meals. Outcome: Resolved/Met  Goal: *Incorporating physical activity into lifestyle  Description: State effect of exercise on blood glucose levels. Outcome: Resolved/Met  Goal: *Developing strategies to promote health/change behavior  Description: Define the ABC's of diabetes; identify appropriate screenings, schedule and personal plan for screenings. Outcome: Resolved/Met  Goal: *Using medications safely  Description: State effect of diabetes medications on diabetes; name diabetes medication taking, action and side effects. Outcome: Resolved/Met  Goal: *Monitoring blood glucose, interpreting and using results  Description: Identify recommended blood glucose targets  and personal targets. Outcome: Resolved/Met  Goal: *Prevention, detection, treatment of acute complications  Description: List symptoms of hyper- and hypoglycemia; describe how to treat low blood sugar and actions for lowering  high blood glucose level. Outcome: Resolved/Met  Goal: *Prevention, detection and treatment of chronic complications  Description: Define the natural course of diabetes and describe the relationship of blood glucose levels to long term complications of diabetes.   Outcome: Resolved/Met  Goal: *Developing strategies to address psychosocial issues  Description: Describe feelings about living with diabetes; identify support needed and support network  Outcome: Resolved/Met  Goal: *Insulin pump training  Outcome: Resolved/Met  Goal: *Sick day guidelines  Outcome: Resolved/Met  Goal: *Patient Specific Goal (EDIT GOAL, INSERT TEXT)  Outcome: Resolved/Met

## 2021-07-15 NOTE — DISCHARGE INSTRUCTIONS

## 2021-07-15 NOTE — PROGRESS NOTES
Midline insertion procedure note:  Pt has very limited vascular access. Procedure explained to patient along with risks and benefits. Procedure teaching completed. Pre procedure assessment done. Patient denies questions or concerns at this time. Midline sign over the [facility]. Maximum sterile barrier precautions observed throughout procedure. Lidocaine 1% 3ml sc injected to site prior to access the vein. Cannulated Cephalic vein using ultrasound guidance. Inserted 4 Fr. single lumen midline to RIGHT arm. Blood return verified and flushed with 20ml normal saline. Sterile dressing applied with Biopatch, StatLock and occlusive dressing as per protocol. Curos caps applied to port. Patient tolerated procedure well with minimal blood loss. Reason for access : 2 weeks IV antibiotics  Complications related to insertion : intermittent vein spasms     See nursing message on [employer] for midline reminders. Midline is CT and MRI compatible. Inserted by : Panfilo Patel RN [facility] / Vascular Access Nurse  Assisted by : Bharathi Gama RN, / Vascular Access Nurse  Catheter Length : 11 cm   External Length : 0 cm   RIGHT arm circumference : 38 cm  Catheter occupies 31% of vein. Type of Midline: BARD  Ref#: [id number]  Lot#: [id number]  Expiration Date: 07/31/2022  Informed primary nurse Aracelis Desir RN midline is ready for use and to hang new infusion tubing prior to use.     Panfilo Patel RN, BSN, [facility]  Vascular Access Nurse

## 2021-07-15 NOTE — PROGRESS NOTES
Transition of Care Plan:     RUR:10%  Disposition:[facility]-lives & works at facility w/[facility] [address] & IV antibiotics  Follow up appointments:new PCP appointment is on the AVS  DME needed:none  Transportation at [address] or means to access home:  The staff at [place] [address] letter:N/A due to pt having Medicaid  [address], [phone] and [id number]-[phone]  Discharge Caregiver contacted prior to discharge? sister in room    Patient is discharging home today with [facility] & IV antibiotics. Follow-up appointment is on the AVS.  [facility] to arrive at 3pm for teaching w/pt & sister. RN to provide pt with wound care supplies. No other needs identified. Patient is ready for d/c from CM standpoint. Care Management Interventions  PCP Verified by CM: Yes (No PCP)  Mode of Transport at Discharge:  Other (see comment) (sister)  Transition of Care Consult (CM Consult): Home Health, Other ([facility]-IV antibiotics)  [address]: No  Reason Outside [facility]: Patient already serviced by other home care/hospice agency  Discharge Durable Medical Equipment: No (No DME reported)  Physical Therapy Consult: No  Occupational Therapy Consult: No  Speech Therapy Consult: No  Current Support Network: Family Lives [place], [facility] (Family lives close by and has the support from the staff at [employer])  Confirm Follow Up Transport: Other (see comment) (Family vs public transportation)  The Patient and/or Patient Representative was Provided with a Choice of Provider and Agrees with the Discharge Plan?: Yes  Name of the Patient Representative Who was Provided with a Choice of Provider and Agrees with the Discharge Plan: verbal-patient  Freedom of Choice List was Provided with Basic Dialogue that Supports the Patient's Individualized Plan of Care/Goals, Treatment Preferences and Shares the Quality Data Associated with the Providers?: Yes  Discharge Location  Discharge Placement: Home with home health [facility]  Ext [phone]

## 2021-07-16 LAB
BACTERIA SPEC CULT: NORMAL
SERVICE CMNT-IMP: NORMAL

## 2021-07-17 ENCOUNTER — HOSPITAL ENCOUNTER (OUTPATIENT)
Age: 56
Setting detail: OBSERVATION
Discharge: HOME HEALTH | End: 2021-07-19
Attending: EMERGENCY MEDICINE | Admitting: GENERAL ACUTE CARE HOSPITAL
Payer: MEDICAID

## 2021-07-17 DIAGNOSIS — R78.81 BACTEREMIA: Primary | ICD-10-CM

## 2021-07-17 PROCEDURE — 96365 THER/PROPH/DIAG IV INF INIT: CPT

## 2021-07-17 PROCEDURE — 99283 EMERGENCY DEPT VISIT LOW MDM: CPT

## 2021-07-17 PROCEDURE — 74011000250 HC RX REV CODE- 250: Performed by: EMERGENCY MEDICINE

## 2021-07-17 PROCEDURE — 74011250637 HC RX REV CODE- 250/637: Performed by: GENERAL ACUTE CARE HOSPITAL

## 2021-07-17 PROCEDURE — 74011250636 HC RX REV CODE- 250/636: Performed by: EMERGENCY MEDICINE

## 2021-07-17 PROCEDURE — 99218 HC RM OBSERVATION: CPT

## 2021-07-17 RX ORDER — GLIPIZIDE 5 MG/1
10 TABLET ORAL 2 TIMES DAILY
Status: DISCONTINUED | OUTPATIENT
Start: 2021-07-18 | End: 2021-07-19 | Stop reason: HOSPADM

## 2021-07-17 RX ORDER — INSULIN GLARGINE 100 [IU]/ML
25 INJECTION, SOLUTION SUBCUTANEOUS DAILY
Status: DISCONTINUED | OUTPATIENT
Start: 2021-07-18 | End: 2021-07-19 | Stop reason: HOSPADM

## 2021-07-17 RX ORDER — SODIUM CHLORIDE 0.9 % (FLUSH) 0.9 %
5-40 SYRINGE (ML) INJECTION EVERY 8 HOURS
Status: DISCONTINUED | OUTPATIENT
Start: 2021-07-17 | End: 2021-07-19 | Stop reason: HOSPADM

## 2021-07-17 RX ORDER — METFORMIN HYDROCHLORIDE EXTENDED-RELEASE TABLETS 500 MG/1
500 TABLET, FILM COATED, EXTENDED RELEASE ORAL DAILY
Status: DISCONTINUED | OUTPATIENT
Start: 2021-07-18 | End: 2021-07-19 | Stop reason: HOSPADM

## 2021-07-17 RX ORDER — ENOXAPARIN SODIUM 100 MG/ML
40 INJECTION SUBCUTANEOUS EVERY 12 HOURS
Status: DISCONTINUED | OUTPATIENT
Start: 2021-07-18 | End: 2021-07-19 | Stop reason: HOSPADM

## 2021-07-17 RX ORDER — OXYCODONE HYDROCHLORIDE 5 MG/1
5 TABLET ORAL
Status: DISCONTINUED | OUTPATIENT
Start: 2021-07-17 | End: 2021-07-19 | Stop reason: HOSPADM

## 2021-07-17 RX ORDER — MAGNESIUM SULFATE 100 %
4 CRYSTALS MISCELLANEOUS AS NEEDED
Status: DISCONTINUED | OUTPATIENT
Start: 2021-07-17 | End: 2021-07-19 | Stop reason: HOSPADM

## 2021-07-17 RX ORDER — FUROSEMIDE 40 MG/1
40 TABLET ORAL DAILY
Status: DISCONTINUED | OUTPATIENT
Start: 2021-07-18 | End: 2021-07-19 | Stop reason: HOSPADM

## 2021-07-17 RX ORDER — DEXTROSE 50 % IN WATER (D50W) INTRAVENOUS SYRINGE
12.5-25 AS NEEDED
Status: DISCONTINUED | OUTPATIENT
Start: 2021-07-17 | End: 2021-07-19 | Stop reason: HOSPADM

## 2021-07-17 RX ORDER — POLYETHYLENE GLYCOL 3350 17 G/17G
17 POWDER, FOR SOLUTION ORAL DAILY PRN
Status: DISCONTINUED | OUTPATIENT
Start: 2021-07-17 | End: 2021-07-19 | Stop reason: HOSPADM

## 2021-07-17 RX ORDER — ACETAMINOPHEN 325 MG/1
650 TABLET ORAL
Status: DISCONTINUED | OUTPATIENT
Start: 2021-07-17 | End: 2021-07-19 | Stop reason: HOSPADM

## 2021-07-17 RX ORDER — ATORVASTATIN CALCIUM 40 MG/1
40 TABLET, FILM COATED ORAL DAILY
Status: DISCONTINUED | OUTPATIENT
Start: 2021-07-18 | End: 2021-07-19 | Stop reason: HOSPADM

## 2021-07-17 RX ORDER — ONDANSETRON 4 MG/1
4 TABLET, ORALLY DISINTEGRATING ORAL
Status: DISCONTINUED | OUTPATIENT
Start: 2021-07-17 | End: 2021-07-19 | Stop reason: HOSPADM

## 2021-07-17 RX ORDER — ACETAMINOPHEN 650 MG/1
650 SUPPOSITORY RECTAL
Status: DISCONTINUED | OUTPATIENT
Start: 2021-07-17 | End: 2021-07-19 | Stop reason: HOSPADM

## 2021-07-17 RX ORDER — INSULIN LISPRO 100 [IU]/ML
INJECTION, SOLUTION INTRAVENOUS; SUBCUTANEOUS
Status: DISCONTINUED | OUTPATIENT
Start: 2021-07-18 | End: 2021-07-19 | Stop reason: HOSPADM

## 2021-07-17 RX ORDER — SODIUM CHLORIDE 0.9 % (FLUSH) 0.9 %
5-40 SYRINGE (ML) INJECTION AS NEEDED
Status: DISCONTINUED | OUTPATIENT
Start: 2021-07-17 | End: 2021-07-19 | Stop reason: HOSPADM

## 2021-07-17 RX ORDER — ONDANSETRON 2 MG/ML
4 INJECTION INTRAMUSCULAR; INTRAVENOUS
Status: DISCONTINUED | OUTPATIENT
Start: 2021-07-17 | End: 2021-07-19 | Stop reason: HOSPADM

## 2021-07-17 RX ADMIN — CEFAZOLIN 2 G: 1 INJECTION, POWDER, FOR SOLUTION INTRAMUSCULAR; INTRAVENOUS at 21:40

## 2021-07-17 RX ADMIN — Medication 10 ML: at 23:25

## 2021-07-17 RX ADMIN — OXYCODONE HYDROCHLORIDE 5 MG: 5 TABLET ORAL at 23:23

## 2021-07-18 LAB
ANION GAP SERPL CALC-SCNC: 3 MMOL/L (ref 5–15)
BASOPHILS # BLD: 0 K/UL (ref 0–0.1)
BASOPHILS NFR BLD: 0 % (ref 0–1)
BUN SERPL-MCNC: 17 MG/DL (ref 6–20)
BUN/CREAT SERPL: 24 (ref 12–20)
CALCIUM SERPL-MCNC: 8.5 MG/DL (ref 8.5–10.1)
CHLORIDE SERPL-SCNC: 107 MMOL/L (ref 97–108)
CO2 SERPL-SCNC: 28 MMOL/L (ref 21–32)
CREAT SERPL-MCNC: 0.71 MG/DL (ref 0.7–1.3)
DIFFERENTIAL METHOD BLD: ABNORMAL
EOSINOPHIL # BLD: 0.3 K/UL (ref 0–0.4)
EOSINOPHIL NFR BLD: 5 % (ref 0–7)
ERYTHROCYTE [DISTWIDTH] IN BLOOD BY AUTOMATED COUNT: 14 % (ref 11.5–14.5)
GLUCOSE BLD STRIP.AUTO-MCNC: 100 MG/DL (ref 65–117)
GLUCOSE BLD STRIP.AUTO-MCNC: 110 MG/DL (ref 65–117)
GLUCOSE BLD STRIP.AUTO-MCNC: 128 MG/DL (ref 65–117)
GLUCOSE BLD STRIP.AUTO-MCNC: 91 MG/DL (ref 65–117)
GLUCOSE SERPL-MCNC: 137 MG/DL (ref 65–100)
HCT VFR BLD AUTO: 34.9 % (ref 36.6–50.3)
HGB BLD-MCNC: 10.4 G/DL (ref 12.1–17)
IMM GRANULOCYTES # BLD AUTO: 0 K/UL (ref 0–0.04)
IMM GRANULOCYTES NFR BLD AUTO: 0 % (ref 0–0.5)
LYMPHOCYTES # BLD: 1.6 K/UL (ref 0.8–3.5)
LYMPHOCYTES NFR BLD: 28 % (ref 12–49)
MCH RBC QN AUTO: 25.1 PG (ref 26–34)
MCHC RBC AUTO-ENTMCNC: 29.8 G/DL (ref 30–36.5)
MCV RBC AUTO: 84.1 FL (ref 80–99)
MONOCYTES # BLD: 0.5 K/UL (ref 0–1)
MONOCYTES NFR BLD: 9 % (ref 5–13)
NEUTS SEG # BLD: 3.4 K/UL (ref 1.8–8)
NEUTS SEG NFR BLD: 58 % (ref 32–75)
NRBC # BLD: 0 K/UL (ref 0–0.01)
NRBC BLD-RTO: 0 PER 100 WBC
PLATELET # BLD AUTO: 251 K/UL (ref 150–400)
PMV BLD AUTO: 11 FL (ref 8.9–12.9)
POTASSIUM SERPL-SCNC: 4.3 MMOL/L (ref 3.5–5.1)
RBC # BLD AUTO: 4.15 M/UL (ref 4.1–5.7)
SERVICE CMNT-IMP: ABNORMAL
SERVICE CMNT-IMP: NORMAL
SODIUM SERPL-SCNC: 138 MMOL/L (ref 136–145)
WBC # BLD AUTO: 5.8 K/UL (ref 4.1–11.1)

## 2021-07-18 PROCEDURE — 74011636637 HC RX REV CODE- 636/637: Performed by: GENERAL ACUTE CARE HOSPITAL

## 2021-07-18 PROCEDURE — 96372 THER/PROPH/DIAG INJ SC/IM: CPT

## 2021-07-18 PROCEDURE — 77030041076 HC DRSG AG OPTICELL MDII -A

## 2021-07-18 PROCEDURE — 74011250637 HC RX REV CODE- 250/637: Performed by: GENERAL ACUTE CARE HOSPITAL

## 2021-07-18 PROCEDURE — 85025 COMPLETE CBC W/AUTO DIFF WBC: CPT

## 2021-07-18 PROCEDURE — 99218 HC RM OBSERVATION: CPT

## 2021-07-18 PROCEDURE — 80048 BASIC METABOLIC PNL TOTAL CA: CPT

## 2021-07-18 PROCEDURE — 82962 GLUCOSE BLOOD TEST: CPT

## 2021-07-18 PROCEDURE — 74011250636 HC RX REV CODE- 250/636: Performed by: GENERAL ACUTE CARE HOSPITAL

## 2021-07-18 PROCEDURE — 36415 COLL VENOUS BLD VENIPUNCTURE: CPT

## 2021-07-18 PROCEDURE — 2709999900 HC NON-CHARGEABLE SUPPLY

## 2021-07-18 PROCEDURE — 96376 TX/PRO/DX INJ SAME DRUG ADON: CPT

## 2021-07-18 PROCEDURE — 74011000250 HC RX REV CODE- 250: Performed by: GENERAL ACUTE CARE HOSPITAL

## 2021-07-18 RX ADMIN — CEFAZOLIN SODIUM 2 G: 1 INJECTION, POWDER, FOR SOLUTION INTRAMUSCULAR; INTRAVENOUS at 05:47

## 2021-07-18 RX ADMIN — METFORMIN 500 MG: 500 TABLET, EXTENDED RELEASE ORAL at 14:19

## 2021-07-18 RX ADMIN — CEFAZOLIN SODIUM 2 G: 1 INJECTION, POWDER, FOR SOLUTION INTRAMUSCULAR; INTRAVENOUS at 14:20

## 2021-07-18 RX ADMIN — GLIPIZIDE 10 MG: 5 TABLET ORAL at 19:27

## 2021-07-18 RX ADMIN — ACETAMINOPHEN 650 MG: 325 TABLET ORAL at 05:46

## 2021-07-18 RX ADMIN — OXYCODONE HYDROCHLORIDE 5 MG: 5 TABLET ORAL at 09:08

## 2021-07-18 RX ADMIN — ATORVASTATIN CALCIUM 40 MG: 40 TABLET, FILM COATED ORAL at 09:08

## 2021-07-18 RX ADMIN — OXYCODONE HYDROCHLORIDE 5 MG: 5 TABLET ORAL at 19:27

## 2021-07-18 RX ADMIN — INSULIN GLARGINE 25 UNITS: 100 INJECTION, SOLUTION SUBCUTANEOUS at 09:08

## 2021-07-18 RX ADMIN — GLIPIZIDE 10 MG: 5 TABLET ORAL at 09:08

## 2021-07-18 RX ADMIN — ENOXAPARIN SODIUM 40 MG: 40 INJECTION SUBCUTANEOUS at 09:08

## 2021-07-18 RX ADMIN — Medication 10 ML: at 05:48

## 2021-07-18 RX ADMIN — FUROSEMIDE 40 MG: 40 TABLET ORAL at 09:08

## 2021-07-18 NOTE — PROGRESS NOTES
Problem: Falls - Risk of  Goal: *Absence of Falls  Description: Document Gera Damon Fall Risk and appropriate interventions in the flowsheet.   Outcome: Progressing Towards Goal  Note: Fall Risk Interventions:  Mobility Interventions: Communicate number of staff needed for ambulation/transfer, Patient to call before getting OOB         Medication Interventions: Patient to call before getting OOB, Teach patient to arise slowly                   Problem: Patient Education: Go to Patient Education Activity  Goal: Patient/Family Education  Outcome: Progressing Towards Goal     Problem: Cellulitis Care Plan (Adult)  Goal: *Control of acute pain  Outcome: Progressing Towards Goal  Goal: *Skin integrity maintained  Outcome: Progressing Towards Goal  Goal: *Absence of infection signs and symptoms  Outcome: Progressing Towards Goal

## 2021-07-18 NOTE — PROGRESS NOTES
Transition of Care Plan:    RUR: observation   Disposition: Back to [facility]  and IV ABX ( [address])  Follow up appointments: PCP- appt scheduled for 21 at 8am  DME needed: none   Transportation at Discharge: Medicaid  [facility] or means to access home:   Yes     IM Medicare letter: N/A  Caregiver Contact: Eduardo Atkinson [phone]; Juventino Dudley [phone]  Discharge Caregiver contacted prior to discharge? Unit CM to follow up before discharge    Oral and Written notification given to patient and/or caregiver informing them that they are currently an Outpatient receiving care in our facility. Outpatient services include Observation Services. CM met with patient at the bedside to discuss discharge planning. Patient name, , and demographics all verified in chart. Patient was recently discharged 7/15/21 with IV ABX, but his PICC Line fell out last night. Patient was brought in to have PICC replaced. PICC team not here on w/e so plans to have IR place PICC. FAM orders sent via [facility] to have [address] resume SN at discharge. Patient also inquired if CM can set up transport for his follow up PCP appt on 21. CM placed call to [facility] [facility] to set up ride. Ride set up confirmation # [id number]. Patient provided information.        Readmission Assessment  Number of days since last admission?: 1-7 days  Previous disposition: Home with Home Health  Who is being interviewed?: Patient  What was the patient's/caregiver's perception as to why they think they needed to return back to the hospital?: Other (Comment) (Patient PICC line fell out)  Did you visit your Primary Care Physician after you left the hospital, before you returned this time?: No  Why weren't you able to visit your PCP?: Other (Comment) (Appointment not until )  Did you see a specialist, such as Cardiac, Pulmonary, Orthopedic Physician, etc. after you left the hospital?: No  Who advised the patient to return to the hospital?: Self-referral, Home Health Staff  Does the patient report anything that got in the way of taking their medications?: No  In our efforts to provide the best possible care to you and others like you, can you think of anything that we could have done to help you after you left the hospital the first time, so that you might not have needed to return so soon?: Other (Comment) (PICC line fell out)    Care Management Interventions  PCP Verified by CM: Yes  Mode of Transport at Discharge: Other (see comment) (Medicaid transport )  Transition of Care Consult (CM Consult): Discharge Planning  Physical Therapy Consult: No  Occupational Therapy Consult: No  Current Support Network: Assisted Living (Pt lives and works at [place] [address])  Confirm Follow Up Transport: Cab (Medicaid Cab )  Discharge Location  Discharge Placement: Assisted Living    Unit CM to continue to follow for discharge needs and planning.     Carlyon Severin, BSN, RN, BSW   RN Care Manager

## 2021-07-18 NOTE — ED PROVIDER NOTES
EMERGENCY DEPARTMENT HISTORY AND PHYSICAL EXAM      Date: 7/17/2021  Patient Name: Kalyani Gilmore    History of Presenting Illness     Chief Complaint   Patient presents with    Vascular Access Problem     pt states home health told him he needs his picc line replaced. History Provided By: Patient    HPI: Kalyani Gilmore, 64 y.o. male with PMHx significant for prostate cancer, asthma, recent admission with diagnosis of bacteremia discharged with a PICC line in place for IV antibiotics 2 days ago who presents because his PICC line became dislodged. Patient is unsure how this happened but states that home health referred him back to the ED for PICC line replacement. Patient states he is otherwise been doing well and has no complaints. No fever, chest pain, shortness of breath. PCP: None    There are no other complaints, changes, or physical findings at this time.     Current Facility-Administered Medications   Medication Dose Route Frequency Provider Last Rate Last Admin    atorvastatin (LIPITOR) tablet 40 mg  40 mg Oral DAILY Tristan Gu MD        ceFAZolin (ANCEF) 2 g in sterile water (preservative free) 20 mL IV syringe  2 g IntraVENous Q8H Tristan Gu MD        furosemide (LASIX) tablet 40 mg  40 mg Oral DAILY Tristan Gu MD        insulin glargine (LANTUS) injection 25 Units  25 Units SubCUTAneous DAILY Tristan Gu MD        glipiZIDE (GLUCOTROL) tablet 10 mg  10 mg Oral BID Tristan Gu MD        metFORMIN SR (FORTAMET) 500 mg tablet 500 mg  500 mg Oral DAILY Tristan Gu MD        oxyCODONE IR (ROXICODONE) tablet 5 mg  5 mg Oral Q8H PRN Tristan Gu MD   5 mg at 07/17/21 3999    insulin lispro (HUMALOG) injection   SubCUTAneous AC&HS Tristan Gu MD        glucose chewable tablet 16 g  4 Tablet Oral PRN Tristan Gu MD        dextrose (D50W) injection syrg 12.5-25 g  12.5-25 g IntraVENous PRN Tristan Gu MD        glucagon [facility]) injection 1 mg  1 mg IntraMUSCular PRN Dana Lopez MD        sodium chloride (NS) flush 5-40 mL  5-40 mL IntraVENous Q8H Dana Lopez MD   10 mL at 07/17/21 2325    sodium chloride (NS) flush 5-40 mL  5-40 mL IntraVENous PRN Dana Lopez MD        acetaminophen (TYLENOL) tablet 650 mg  650 mg Oral Q6H PRN Dana Lopez MD        Or   [facility] acetaminophen (TYLENOL) suppository 650 mg  650 mg Rectal Q6H PRN Dana Lopez MD        polyethylene glycol (MIRALAX) packet 17 g  17 g Oral DAILY PRN Dana Lopez MD        ondansetron (ZOFRAN ODT) tablet 4 mg  4 mg Oral Q8H PRN Dana Lopez MD        Or    ondansetron [facility] injection 4 mg  4 mg IntraVENous Q6H PRN Dana Lopez MD        enoxaparin (LOVENOX) injection 40 mg  40 mg SubCUTAneous Q12H Dana Lpoez MD         Past History     Past Medical History:  Past Medical History:   Diagnosis Date    Asthma     Lower leg edema     Prostate cancer [facility])      Past Surgical History:  No past surgical history on file. Family History:  Family History   Problem Relation Age of Onset    Stroke Mother         brain bleed    Stroke Father     Hypertension Father     Heart Attack Father     Hypertension Sister     Obesity Sister     Asthma Sister     Hypertension Sister     Hypertension Sister      Social History:  Social History     Tobacco Use    Smoking status: Former Smoker    Tobacco comment: quit > 8 years ago   Substance Use Topics    Alcohol use: Not Currently     Comment: 1-2 per day (wine or beer)    Drug use: No     Allergies:  No Known Allergies  Review of Systems   Review of Systems   Constitutional: Negative for chills and fever. HENT: Negative for congestion, rhinorrhea and sore throat. Respiratory: Negative for cough and shortness of breath. Cardiovascular: Negative for chest pain. Gastrointestinal: Negative for abdominal pain, nausea and vomiting. Genitourinary: Negative for dysuria and urgency. Skin: Negative for rash. Neurological: Negative for dizziness, light-headedness and headaches. All other systems reviewed and are negative. Physical Exam   Physical Exam  Vitals and nursing note reviewed. Constitutional:       General: He is not in acute distress. Appearance: He is well-developed. HENT:      Head: Normocephalic and atraumatic. Eyes:      Conjunctiva/sclera: Conjunctivae normal.      Pupils: Pupils are equal, round, and reactive to light. Cardiovascular:      Rate and Rhythm: Normal rate and regular rhythm. Pulmonary:      Effort: Pulmonary effort is normal. No respiratory distress. Breath sounds: Normal breath sounds. No stridor. Abdominal:      General: There is no distension. Palpations: Abdomen is soft. Tenderness: There is no abdominal tenderness. Musculoskeletal:         General: Normal range of motion. Cervical back: Normal range of motion. Skin:     General: Skin is warm and dry. Comments: Prior PICC site and right upper extremity with surrounding ecchymosis, PICC line is not present   Neurological:      Mental Status: He is alert and oriented to person, place, and time. Diagnostic Study Results   Labs -   No results found for this or any previous visit (from the past 12 hour(s)). Radiologic Studies -   No orders to display     No results found. Medical Decision Making   I am the first provider for this patient. I reviewed the vital signs, available nursing notes, past medical history, past surgical history, family history and social history. Vital Signs-Reviewed the patient's vital signs.   Patient Vitals for the past 12 hrs:   Temp Pulse Resp BP SpO2   07/17/21 2320 98.5 °F (36.9 °C) 95 22 (!) 151/61 98 %   07/17/21 1943 99 °F (37.2 °C) (!) 106 16 137/79 97 %       Pulse Oximetry Analysis - 96% on ra      Records Reviewed: Nursing Notes and Old Medical Records    Provider Notes (Medical Decision Making):   Patient presents after his PICC line became dislodged. Has no other complaints. Hemodynamically stable. Will place an IV and give him 2 g of Ancef which he is supposed to be getting every 8h. I discussed with Dr. Ginger Porras, hospitalist, regarding timing of PICC line replacement. He will admit the patient observation for PICC line placement. ED Course:   Initial assessment performed. The patients presenting problems have been discussed, and they are in agreement with the care plan formulated and outlined with them. I have encouraged them to ask questions as they arise throughout their visit. Procedures:  Procedures    Critical Care:  none    Disposition:    Admission Note:  Patient is being admitted to the hospital by Dr. Ginger Porras, Service: Hospitalist.  The results of their tests and reasons for their admission have been discussed with them and available family. They convey agreement and understanding for the need to be admitted and for their admission diagnosis. Diagnosis     Clinical Impression:   1. Bacteremia            Please note that this dictation was completed with goBramble, the computer voice recognition software. Quite often unanticipated grammatical, syntax, homophones, and other interpretive errors are inadvertently transcribed by the computer software. Please disregard these errors.   Please excuse any errors that have escaped final proofreading

## 2021-07-18 NOTE — PROGRESS NOTES
End of Shift Note    Bedside shift change report given to Arpan (oncoming nurse) by Meehan Metro (offgoing nurse). Report included the following information SBAR, Kardex, Intake/Output and MAR    Shift worked:  7p-7a     Shift summary and any significant changes:     Pt admitted from ED at beginning of shift. PRN Oxycodone given on arrival to unit. IV abx given. Labs drawn. Wound care completed to legs based on wound care note from previous admission in 7/15. Concerns for physician to address:  Pt to IR for PICC line placement. Zone phone for oncoming shift:   0640       Activity:  Activity Level: Up with Assistance  Number times ambulated in hallways past shift: 0  Number of times OOB to chair past shift: 0    Cardiac:   Cardiac Monitoring: No           Access:   Current line(s): PIV     Genitourinary:   Urinary status: voiding    Respiratory:   O2 Device: None (Room air)  Chronic home O2 use?: NO  Incentive spirometer at bedside: NO     GI:  Last Bowel Movement Date: 07/17/21  Current diet:  ADULT DIET Regular; 4 carb choices (60 gm/meal)  Passing flatus: YES  Tolerating current diet: YES       Pain Management:   Patient states pain is manageable on current regimen: YES    Skin:  Wesley Score: 18  Interventions: increase time out of bed    Patient Safety:  Fall Score:  Total Score: 2  Interventions: gripper socks, pt to call before getting OOB and stay with me (per policy)       Length of Stay:  Expected LOS: - - -  Actual LOS: 0      Meehan Metro

## 2021-07-18 NOTE — PROGRESS NOTES
Hospitalist Progress Note    NAME: Victor Hugo Alicia   :  1965   MRN:  [id number]     I reviewed pertinent labs and imaging, and discussed /agreed on the plan of care with Dr. Michael Cavanaugh. Assessment / Plan:  MSSA, GBS and staph hemolyticus bacteremia  Chronic lower extremity leg wounds with overlying infection  -Patient was discharged on 7/15 with IV abx and PICC line. ID recommendations to complete Cefazolin 2gm q8hrs, with ending date of 21.  -Patient's PICC line fell out at home. He is unsure how it happened, stated it may have come loose when he was changing his shirt. -PICC team is not here today and IR is available only for emergencies, will need stay overnight to have PICC line replaced. -Copied from previous admission below are the ID recs from 7/15:   Recommendations:      - Cefazolin 2gm q8h for 2 weeks, 21 to 21.      - There is no need for ID clinic follow up after discharge. - Strongly recommend consistent wound care for legs. - Please have labs done on weekly basis for CBC with diff, CMP  and have results sent to me by faxing to  [phone].     - Call with critical labs at [phone]. ? Please ensure that patient has midline care arranged per protocol   ? Once IV antibiotics have been discontinued, please ensure that PICC/IV access is promptly removed. - Smoking cessation encouraged if patient is current smoker to aid in infection and wound healing  -Patient is medically stable and ready for discharge once PICC line is placed. Will continue IV abx through peripheral IV for now. Type II with neuropathy  -Continue SSI, metformin, glipizide and lantus     Hyperlipidemia Continue atorvastatin   History of Prostate Cancer    40 or above Morbid obesity / Body mass index is 40.71 kg/m².     Estimated discharge date:   Barriers: PICC line placement     Code status: Full  Prophylaxis: Lovenox  Recommended Disposition: Home w/Family Subjective:     Chief Complaint / Reason for Physician Visit  Patient seen at bedside. He has no complaints. Discussed plan of care, he is in agreement and verbalized understanding. Discussed with RN events overnight. Review of Systems:  Symptom Y/N Comments  Symptom Y/N Comments   Fever/Chills n   Chest Pain n    Poor Appetite n   Edema n    Cough n   Abdominal Pain n    Sputum n   Joint Pain n    SOB/HOFF n   Pruritis/Rash n    Nausea/vomit n   Tolerating PT/OT n    Diarrhea n   Tolerating Diet     Constipation n   Other       Could NOT obtain due to:      Objective:     VITALS:   Last 24hrs VS reviewed since prior progress note. Most recent are:  Patient Vitals for the past 24 hrs:   Temp Pulse Resp BP SpO2   07/18/21 0833 98 °F (36.7 °C) 94 18 119/76 --   07/17/21 2320 98.5 °F (36.9 °C) 95 22 (!) 151/61 98 %   07/17/21 1943 99 °F (37.2 °C) (!) 106 16 137/79 97 %       Intake/Output Summary (Last 24 hours) at 7/18/2021 1222  Last data filed at 7/17/2021 2320  Gross per 24 hour   Intake 20 ml   Output --   Net 20 ml        I had a face to face encounter and independently examined this patient on 7/18/2021, as outlined below:  PHYSICAL EXAM:  General: WD, WN. Alert, cooperative, no acute distress    EENT:  EOMI. Anicteric sclerae. MMM  Resp:  CTA bilaterally, no wheezing or rales. No accessory muscle use  CV:  Regular  rhythm,  No edema  GI:  Soft, obese, Non tender. +Bowel sounds  Neurologic:  Alert and oriented X 3, normal speech,   Psych:   Good insight. Not anxious nor agitated  Skin:  No rashes.   No jaundice    Reviewed most current lab test results and cultures  YES  Reviewed most current radiology test results   YES  Review and summation of old records today    NO  Reviewed patient's current orders and MAR    YES  PMH/SH reviewed - no change compared to H&P  ________________________________________________________________________  Care Plan discussed with:    Comments   Patient x    Family      RN x Care Manager     Consultant                        Multidiciplinary team rounds were held today with , nursing, pharmacist and clinical coordinator. Patient's plan of care was discussed; medications were reviewed and discharge planning was addressed. ________________________________________________________________________  Total NON critical care TIME:  25   Minutes    Total CRITICAL CARE TIME Spent:   Minutes non procedure based      Comments   >50% of visit spent in counseling and coordination of care x    ________________________________________________________________________  Yady Wyatt NP     Procedures: see electronic medical records for all procedures/Xrays and details which were not copied into this note but were reviewed prior to creation of Plan. LABS:  I reviewed today's most current labs and imaging studies.   Pertinent labs include:  Recent Labs     07/18/21  0509   WBC 5.8   HGB 10.4*   HCT 34.9*        Recent Labs     07/18/21  0509      K 4.3      CO2 28   *   BUN 17   CREA 0.71   CA 8.5       Signed: Yady Wyatt NP

## 2021-07-18 NOTE — H&P
Hospitalist Admission Note    NAME: Virginie Almanza   :  1965   MRN:  [id number]     Date/Time:  2021 9:04 PM    Patient PCP: None  ______________________________________________________________________  Given the patient's current clinical presentation, I have a high level of concern for decompensation if discharged from the emergency department. Complex decision making was performed, which includes reviewing the patient's available past medical records, laboratory results, and x-ray films. My assessment of this patient's clinical condition and my plan of care is as follows. Assessment / Plan:  MSSA, GBS and staph hemolyticus bacteremia  Chronic lower extremity leg wounds with overlying infection  Admit for Observation  Pt was discharged on 7/15 with IV abx and PICC line. Pt was to complete Cefazolin 2gm q8hrs, with ending date of 21. Given that his [facility] has fallen out and [facility] he will be admitted for PICC placement in the AM. I believe PICC team is not in-house on Sundays, so will place consult for IR to place. Pasted below are the ID recs from 7/15: Recommendations:  - Cefazolin 2gm q8h for 2 weeks, 21 to 21.  - There is no need for ID clinic follow up after discharge. - Strongly recommend consistent wound care for legs.     ? Please have labs done on weekly basis for CBC with diff, CMP  and have results sent to me by faxing to  [phone].   ? Call with critical labs at [phone]. ? Please ensure that patient has midline care arranged per protocol   ? Once IV antibiotics have been discontinued, please ensure that PICC/IV access is promptly removed. Smoking cessation encouraged if patient is current smoker to aid in infection and wound healing    DMII with neuropathy  HLD  Prostate CA  Continue home meds  FS monitoring, SS    Code Status: Full Code  Surrogate Decision Maker:  Mother  DVT Prophylaxis: Lovenox  GI Prophylaxis: not indicated  Baseline: Independent      Subjective:   CHIEF COMPLAINT: picc line fell out    HISTORY OF PRESENT ILLNESS:     Tam Schuster is a 64 y.o.  male who presents with CC listed above. Pt was discharged from [facility] on 7/15 after being admitted for bacteremia. The plan was for IV abx till 7/25 but since his PICC line out, he was unable to continue with his IV Cefazolin. He denies any new complaints. Admits to being compliant with his abx prior to his PICC having fallen out. We were asked to admit for work up and evaluation of the above problems. Past Medical History:   Diagnosis Date    Asthma     Lower leg edema     Prostate cancer ([address]         No past surgical history on file. Social History     Tobacco Use    Smoking status: Former Smoker    Tobacco comment: quit > 8 years ago   Substance Use Topics    Alcohol use: Not Currently     Comment: 1-2 per day (wine or beer)        Family History   Problem Relation Age of Onset    Stroke Mother         brain bleed    Stroke Father     Hypertension Father     Heart Attack Father     Hypertension Sister     Obesity Sister     Asthma Sister     Hypertension Sister     Hypertension Sister      No Known Allergies     Prior to Admission medications    Medication Sig Start Date End Date Taking? Authorizing Provider   ceFAZolin 2 g IV syringe 2 g by IntraVENous route every eight (8) hours for 10 days. 7/15/21 7/25/21  Vicenta Beal MD   furosemide (LASIX) 20 mg tablet Take 2 Tablets by mouth daily for 30 days. 7/15/21 8/14/21  Vicenta Beal MD   oxyCODONE IR (ROXICODONE) 10 mg tab immediate release tablet Take 0.5 Tablets by mouth every eight (8) hours as needed for Pain for up to 4 days. Max Daily Amount: 15 mg. 7/15/21 7/19/21  Vicenta Beal MD   polyethylene glycol [facility]) 17 gram packet Take 1 Packet by mouth daily.  3/3/21   Taina Gupta NP   atorvastatin (LIPITOR) 40 mg tablet Take 40 mg by mouth daily. Provider, Historical   glipiZIDE (GLUCOTROL) 10 mg tablet Take 10 mg by mouth two (2) times a day. Provider, Historical   insulin glargine (Lantus U-100 Insulin) 100 unit/mL injection 25 Units by SubCUTAneous route daily. Provider, Historical   metFORMIN (GLUMETZA ER) 500 mg TG24 24 hour tablet Take 2 Tabs by mouth two (2) times a day. Provider, Historical       REVIEW OF SYSTEMS:     I am not able to complete the review of systems because: The patient is intubated and sedated    The patient has altered mental status due to his acute medical problems    The patient has baseline aphasia from prior stroke(s)    The patient has baseline dementia and is not reliable historian    The patient is in acute medical distress and unable to provide information           Total of 12 systems reviewed as follows:       POSITIVE= underlined text  Negative = text not underlined  General:  fever, chills, sweats, generalized weakness, weight loss/gain,      loss of appetite   Eyes:    blurred vision, eye pain, loss of vision, double vision  ENT:    rhinorrhea, pharyngitis   Respiratory:   cough, sputum production, SOB, HOFF, wheezing, pleuritic pain   Cardiology:   chest pain, palpitations, orthopnea, PND, edema, syncope   Gastrointestinal:  abdominal pain , N/V, diarrhea, dysphagia, constipation, bleeding   Genitourinary:  frequency, urgency, dysuria, hematuria, incontinence   Muskuloskeletal :  arthralgia, myalgia, back pain  Hematology:  easy bruising, nose or gum bleeding, lymphadenopathy   Dermatological: rash, ulceration, pruritis, color change / jaundice  Endocrine:   hot flashes or polydipsia   Neurological:  headache, dizziness, confusion, focal weakness, paresthesia,     Speech difficulties, memory loss, gait difficulty  Psychological: Feelings of anxiety, depression, agitation    Objective:   VITALS:    Visit Vitals  /79 (BP 1 Location: Left arm, BP Patient Position:  At rest)   Pulse (!) 106   Temp 99 °F (37.2 °C)   Resp 16   Ht 5' 6\" (1.676 m)   Wt 114.4 kg (252 lb 3.3 oz)   SpO2 97%   BMI 40.71 kg/m²       PHYSICAL EXAM:    General:    Alert, agitated, no distress, appears stated age. HEENT: Atraumatic, anicteric sclerae, pink conjunctivae  Neck:  Supple, symmetrical  Lungs:   Clear to auscultation bilaterally. No Wheezing or Rhonchi. No rales. Chest wall:  No tenderness  No Accessory muscle use. Heart:   Regular  rhythm,  No  murmur   No edema  Abdomen:   Soft, non-tender. Not distended. Bowel sounds normal  Extremities: No cyanosis. No clubbing,      Skin turgor normal, Capillary refill normal, Radial dial pulse 2+  Skin:     Chronic LE wounds consistent with venous insufficiency and poor healing  Psych:  Good insight. Not depressed. Not anxious or agitated. Neurologic: EOMs intact. No facial asymmetry. No aphasia or slurred speech. Symmetrical strength, Sensation grossly intact. Alert and oriented X 4.     _______________________________________________________________________  Care Plan discussed with:    Comments   Patient x    Family      RN x    Care Manager                    Consultant:      _______________________________________________________________________  Expected  Disposition:   Home with Family    HH/PT/OT/RN x   SNF/LTC    KAMINI    ________________________________________________________________________  TOTAL TIME:  54 Minutes    Critical Care Provided     Minutes non procedure based      Comments    x Reviewed previous records   >50% of visit spent in counseling and coordination of care  Discussion with patient and/or family and questions answered       ________________________________________________________________________  Signed: Rafy Wheeler MD    Procedures: see electronic medical records for all procedures/Xrays and details which were not copied into this note but were reviewed prior to creation of Plan.     LAB DATA REVIEWED:    No results found for this or any previous visit (from the past 24 hour(s)).

## 2021-07-18 NOTE — PROGRESS NOTES
TRANSFER - IN REPORT:    Verbal report received from Rick(name) on [employer]  being received from Rick(unit) for routine progression of care      Report consisted of patients Situation, Background, Assessment and   Recommendations(SBAR). Information from the following report(s) SBAR, Kardex, ED Summary, Intake/Output, MAR and Recent Results was reviewed with the receiving nurse. Opportunity for questions and clarification was provided. Assessment completed upon patients arrival to unit and care assumed. 2316: pt arrived to unit. Bedside report received upon arrival to unit.

## 2021-07-19 VITALS
HEART RATE: 83 BPM | RESPIRATION RATE: 18 BRPM | TEMPERATURE: 98.9 F | BODY MASS INDEX: 40.53 KG/M2 | OXYGEN SATURATION: 96 % | HEIGHT: 66 IN | SYSTOLIC BLOOD PRESSURE: 119 MMHG | WEIGHT: 252.21 LBS | DIASTOLIC BLOOD PRESSURE: 79 MMHG

## 2021-07-19 LAB
GLUCOSE BLD STRIP.AUTO-MCNC: 93 MG/DL (ref 65–117)
SERVICE CMNT-IMP: NORMAL

## 2021-07-19 PROCEDURE — 76937 US GUIDE VASCULAR ACCESS: CPT

## 2021-07-19 PROCEDURE — 74011000250 HC RX REV CODE- 250: Performed by: GENERAL ACUTE CARE HOSPITAL

## 2021-07-19 PROCEDURE — 74011636637 HC RX REV CODE- 636/637: Performed by: GENERAL ACUTE CARE HOSPITAL

## 2021-07-19 PROCEDURE — 82962 GLUCOSE BLOOD TEST: CPT

## 2021-07-19 PROCEDURE — 99218 HC RM OBSERVATION: CPT

## 2021-07-19 PROCEDURE — C1751 CATH, INF, PER/CENT/MIDLINE: HCPCS

## 2021-07-19 PROCEDURE — 96376 TX/PRO/DX INJ SAME DRUG ADON: CPT

## 2021-07-19 PROCEDURE — 74011250637 HC RX REV CODE- 250/637: Performed by: GENERAL ACUTE CARE HOSPITAL

## 2021-07-19 PROCEDURE — 77030018786 HC NDL GD F/USND BARD -B

## 2021-07-19 PROCEDURE — 96372 THER/PROPH/DIAG INJ SC/IM: CPT

## 2021-07-19 PROCEDURE — 74011250636 HC RX REV CODE- 250/636: Performed by: GENERAL ACUTE CARE HOSPITAL

## 2021-07-19 RX ADMIN — FUROSEMIDE 40 MG: 40 TABLET ORAL at 10:52

## 2021-07-19 RX ADMIN — METFORMIN 500 MG: 500 TABLET, EXTENDED RELEASE ORAL at 10:52

## 2021-07-19 RX ADMIN — ENOXAPARIN SODIUM 40 MG: 40 INJECTION SUBCUTANEOUS at 10:52

## 2021-07-19 RX ADMIN — ENOXAPARIN SODIUM 40 MG: 40 INJECTION SUBCUTANEOUS at 00:24

## 2021-07-19 RX ADMIN — CEFAZOLIN SODIUM 2 G: 1 INJECTION, POWDER, FOR SOLUTION INTRAMUSCULAR; INTRAVENOUS at 09:01

## 2021-07-19 RX ADMIN — Medication 10 ML: at 00:25

## 2021-07-19 RX ADMIN — CEFAZOLIN SODIUM 2 G: 1 INJECTION, POWDER, FOR SOLUTION INTRAMUSCULAR; INTRAVENOUS at 00:24

## 2021-07-19 RX ADMIN — INSULIN GLARGINE 25 UNITS: 100 INJECTION, SOLUTION SUBCUTANEOUS at 10:51

## 2021-07-19 RX ADMIN — Medication 10 ML: at 09:01

## 2021-07-19 RX ADMIN — GLIPIZIDE 10 MG: 5 TABLET ORAL at 10:51

## 2021-07-19 RX ADMIN — ATORVASTATIN CALCIUM 40 MG: 40 TABLET, FILM COATED ORAL at 10:51

## 2021-07-19 NOTE — DISCHARGE SUMMARY
Hospitalist Discharge Summary     Patient ID:  Tam Schuster  [id number]  64 y.o.  1965 7/17/2021    PCP on record: None    Admit date: 7/17/2021  Discharge date and time: 7/19/2021    DISCHARGE DIAGNOSIS:    MSSA, GBS and staph hemolyticus bacteremia  Chronic lower extremity leg wounds with overlying infection  Type II Diabetes with neuropathy  Hyperlipidemia   History of Prostate Cancer    CONSULTATIONS:  None    Excerpted HPI from H&P of Sherl Lesch, MD:  Tam Schuster is a 64 y.o.  male who presents with CC listed above. Pt was discharged from [address] [place] [address] on 7/15 after being admitted for bacteremia. The plan was for IV abx till 7/25 but since his PICC line out, he was unable to continue with his IV Cefazolin. He denies any new complaints. Admits to being compliant with his abx prior to his PICC having fallen out.     We were asked to admit for work up and evaluation of the above problems. ______________________________________________________________________  DISCHARGE SUMMARY/HOSPITAL COURSE:  for full details see H&P, daily progress notes, labs, consult notes. MSSA, GBS and staph hemolyticus bacteremia  Chronic lower extremity leg wounds with overlying infection  -Patient was discharged on 7/15 with IV abx and PICC line. ID recommendations to complete Cefazolin 2gm q8hrs, with ending date of 7/25/21.  -Patient's PICC line fell out at home. He is unsure how it happened, stated it may have come loose when he was changing his shirt. -PICC team is not here today and IR is available only for emergencies, will need stay overnight to have PICC line replaced. -Copied from previous admission below are the ID recs from 7/15:   Recommendations:      - Cefazolin 2gm q8h for 2 weeks, 7/11/21 to 7/25/21.      - There is no need for ID clinic follow up after discharge. - Strongly recommend consistent wound care for legs.      - Please have labs done on weekly basis for CBC with diff, CMP  and have results sent to me by faxing to  [phone].     - Call with critical labs at [phone]. ? Please ensure that patient has midline care arranged per protocol   ? Once IV antibiotics have been discontinued, please ensure that PICC/IV access is promptly removed. - Smoking cessation encouraged if patient is current smoker to aid in infection and wound healing  -Patient is medically stable and ready for discharge once PICC line is placed. Will continue IV abx through peripheral IV for now. Type II Diabetes with neuropathy  -Continuemetformin, glipizide and lantus   Hyperlipidemia Continue atorvastatin   History of Prostate Cancer    Patient seen at bedside. Patient's plan of care has been reviewed with them. Patient and/or family have verbally conveyed their understanding and agreement of the patient's signs, symptoms, diagnosis, treatment and prognosis and additionally agree to follow up as recommended or return to [facility] should their condition change prior to follow-up. Discharge instructions have also been provided to the patient with some educational information regarding their diagnosis as well a list of reasons why they would want to return to the office prior to their follow-up appointment should their condition change.  _______________________________________________________________________  Patient seen and examined by me on discharge day. Pertinent Findings:  Gen:    Not in distress  Chest: Clear lungs  CVS:   Regular rhythm. No edema  Abd:  Soft, obese, not tender  Neuro:  Alert and oriented x3  _______________________________________________________________________  DISCHARGE MEDICATIONS:   Current Discharge Medication List      CONTINUE these medications which have NOT CHANGED    Details   ceFAZolin 2 g IV syringe 2 g by IntraVENous route every eight (8) hours for 10 days.   Qty: 30 Dose, Refills: 0      oxyCODONE IR (ROXICODONE) 10 mg tab immediate release tablet Take 0.5 Tablets by mouth every eight (8) hours as needed for Pain for up to 4 days. Max Daily Amount: 15 mg.  Qty: 12 Tablet, Refills: 0    Associated Diagnoses: Bilateral lower leg cellulitis      polyethylene glycol (MIRALAX) 17 gram packet Take 1 Packet by mouth daily. Qty: 30 Each, Refills: 0      atorvastatin (LIPITOR) 40 mg tablet Take 40 mg by mouth daily. glipiZIDE (GLUCOTROL) 10 mg tablet Take 10 mg by mouth two (2) times a day. insulin glargine (Lantus U-100 Insulin) 100 unit/mL injection 25 Units by SubCUTAneous route daily. metFORMIN (GLUMETZA ER) 500 mg TG24 24 hour tablet Take 2 Tabs by mouth two (2) times a day. Patient Follow Up Instructions:    Activity: Activity as tolerated  Diet: Diabetic Diet  Wound Care: None needed    Follow-up Information     Follow up With Specialties Details Why Contact Weston Franklin NP Internal Medicine On 7/21/2021 at 8 am [facility]  [phone]          ________________________________________________________________    Risk of deterioration: Low    Condition at Discharge:  Stable  __________________________________________________________________    Disposition  Home with family and home health services    ____________________________________________________________________    Code Status: Full Code  ___________________________________________________________________      Total time in minutes spent coordinating this discharge (includes going over instructions, follow-up, prescriptions, and preparing report for sign off to her PCP) :  >30 minutes    Signed:  Travis Kohler NP

## 2021-07-19 NOTE — PROGRESS NOTES
Midline insertion procedure note:  Pt has very limited vascular access. Procedure explained to patient along with risks and benefits. Procedure teaching completed. Pre procedure assessment done. patient denies questions or concerns at this time. Midline sign over the [facility]. Maximum sterile barrier precautions observed throughout procedure. Lidocaine 1% 3ml sc injected to site prior to access the vein. Cannulated left cephalic vein using ultrasound guidance. Inserted 4.5 Fr. single lumen midline to left arm. Blood return verified and flushed with 20ml normal saline. Sterile dressing applied with Biopatch, StatLock and occlusive dressing as per protocol. Curos caps applied to port. Patient tolerated procedure well with minimal blood loss. Reason for access : long-term IV antibiotics-replace midline (patient d/c with midline and midline accidentally \"came out\")  Complications related to insertion : none  See nursing message on [employer] for midline reminders. Midline is CT and MRI compatible. Inserted by : Vernelle Primrose, RN [facility] / Vascular Access Nurse  Assisted by : Eliceo Chang RN, [facility] / Vascular Access Nurse  Total Catheter Length:  15 cm  Catheter Length : 12 cm   External Length : 3 cm   left arm circumference : 36 cm  Catheter occupies 21% of vein. Type of Midline: ARROW BLUE ADVANCE  Ref#: [id number]  Lot#: [id number]  Expiration Date: 2022-03-31  Informed primary nurse Gabrielle Genta, RN midline is ready for use and to hang new infusion tubing prior to use.     Vernelle Primrose, RN [facility]   Vascular Access Nurse

## 2021-07-19 NOTE — DISCHARGE INSTRUCTIONS
Patient Education        Sepsis: Care Instructions  Overview     Sepsis is an intense reaction to an infection. It can cause damage to the body and lead to dangerously low blood pressure. You may have inflammation across large areas of your body. It can damage tissue and even go deep into your organs. Infections that can lead to sepsis include:  · A skin infection such as from a cut. · A lung infection like pneumonia. · A kidney infection. · A gut infection such as E. coli. Sepsis is treated with antibiotics. Your doctor will try to find the infection that led to sepsis. [place] Angles also get fluids through a vein (IV). Machines will track your vital signs, including temperature, blood pressure, breathing rate, and pulse rate. The physical and mental effects of sepsis may not be seen for several weeks after treatment. And they may last long after the infection is gone. Physical problems may include:  · Feeling weak and tired. · Feeling out of breath. · Aches and pains. · Problems with getting around. · Trouble falling asleep or staying asleep. · Dry and itchy skin, brittle nails, and hair loss. Some of these effects can lead to problems with your organs or your feet, legs, hands, or arms. Sepsis can also affect your mind and emotions. Problems may include:  · Self-doubt. · Anxiety. · Nightmares. · Depression and mood problems. · Wanting to avoid other people. · Confusion. · Flashbacks and bad memories of your illness. It's important to care for yourself and try to avoid infections. This may lower your risk of getting sepsis again. Follow-up care is a key part of your treatment and safety. Be sure to make and go to all appointments, and call your doctor if you are having problems. It's also a good idea to know your test results and keep a list of the medicines you take. How can you care for yourself at home? · Be safe with medicines. Take your medicines exactly as prescribed.  Call your doctor if you think you are having a problem with your medicine. · If your doctor prescribed antibiotics, take them as directed. Do not stop taking them just because you feel better. You need to take the full course of antibiotics. · Help prevent infections that could again lead to sepsis. ? Try to avoid colds and flu. If you must be around people who have a cold or the flu, wash your hands often. And get a flu vaccine every year. ? Ask your doctor if you need a pneumococcal vaccine (to prevent pneumonia, meningitis, and other infections). If you have had one before, ask your doctor if you need another dose. ? Clean any wounds or scrapes. · Do not smoke or use other tobacco products. When you quit smoking, you are less likely to get a cold, the flu, bronchitis, and pneumonia. If you need help quitting, talk to your doctor about stop-smoking programs and medicines. These can increase your chances of quitting for good. · Drink plenty of fluids to prevent dehydration. Choose water and other caffeine-free clear liquids until you feel better. If you have kidney, heart, or liver disease and have to limit fluids, talk with your doctor before you increase the amount of fluids you drink. · Eat a healthy diet. Include fruits, vegetables, and whole grains in your diet every day. · If your doctor recommends it, try doing some physical activity. Walking is a good choice. Bit by bit, increase the amount you walk every day. · Talk with your family and friends about your challenges. Ask for help if you need it. · Keep a journal. Writing down your thoughts and feelings can help reduce your stress. · Ask family members to fill in gaps in your memory. · Set small goals for yourself that you can reach. Reward yourself for success. When should you call for help? Call  911 anytime you think you may need emergency care. For example, call if:    · You passed out (lost consciousness).    Call your doctor now or seek immediate medical care if:    · You have symptoms such as:  ? Shortness of breath. ? Feeling very sick. ? Severe pain. ? A fast heart rate. ? Cool, pale, or clammy skin. ? Feeling confused. ? Feeling very sleepy, or you are hard to wake up.     · You are dizzy or lightheaded, or you feel like you may faint.     · You have a fever or chills. Watch closely for changes in your health, and be sure to contact your doctor if:    · You do not get better as expected. Where can you learn more? Go to http://www.gray.com/  Enter T383 in the search box to learn more about \"Sepsis: Care Instructions. \"  Current as of: 2020               Content Version: 12.8  © 0689-9800 [employer]. Care instructions adapted under license by [employer] (which disclaims liability or warranty for this information). If you have questions about a medical condition or this instruction, always ask your healthcare professional. [place] [address] any warranty or liability for your use of this information. HOSPITALIST DISCHARGE INSTRUCTIONS    NAME: Tam Schuster   :  1965   MRN:  [id number]     Date/Time:  2021 10:24 AM    ADMIT DATE: 2021   DISCHARGE DATE: 2021     Attending Physician: Dahiana Whalen NP    DISCHARGE DIAGNOSIS:    MSSA, GBS and staph hemolyticus bacteremia  Chronic lower extremity leg wounds with overlying infection  Type II Diabetes with neuropathy  Hyperlipidemia   History of Prostate Cancer      Medications: Per above medication reconciliation. Pain Management: per above medications    Recommended diet: Diabetic Diet    Recommended activity: Activity as tolerated    Wound care: None    Indwelling devices:  Midline IV access, please discontinue once IV antibiotics are completed on 2021. Supplemental Oxygen: None    Required Lab work: Weekly labs faxed to [phone]. Call with critical labs [phone]. Glucose management:  per routine    Code status: Full        Outside physician follow up: Follow-up Information     Follow up With Specialties Details Why Contact Weston Giles NP Internal Medicine On 7/21/2021 at 8 am for new patient appt [address] [address] to avoid frequent ED visits. [facility] can treat; pains, sprains, cuts, wounds, high fevers, upper respiratory infections and much more. There medical team is equipped with all the tools necessary to provide advanced medical care in the comfort of you home, workplace, or location of need. The medical team consists of doctors, nurse practitioners, and EMTs. [facility] is available 7 days per week 9 am to 9 pm.   Request care by calling [phone] or by going online at [employer]    Information obtained by :  I understand that if any problems occur once I am at home I am to contact my physician. I understand and acknowledge receipt of the instructions indicated above.                                                                                                                                            Physician's or R.N.'s Signature                                                                  Date/Time                                                                                                                                              Patient or Repres\

## 2021-07-19 NOTE — PROGRESS NOTES
Phone call received from patient requesting information about what he is allowed to do and not do at work. Questions referred to discharging NP who will plan to return phone call to patient at [phone].     Sarai Thibodeaux RN, BSN, [address] [place] [address]    Coordinator  [phone]

## 2021-07-19 NOTE — PROGRESS NOTES
Transition of Care Plan:     RUR: observation   Disposition: Back to [facility] with resumption of SN [facility])  and IV ABX ( [facility] [phone])  Follow up appointments: PCP- appt scheduled for 7/21/21 at 8am  DME needed: none   Transportation at Discharge: Medicaid Cab, ETA 12:30PM   Keys or means to access home:   Yes     IM Medicare letter: N/A  Caregiver Contact: Tonya Cornea [phone]; Javier Card [phone]  Discharge Caregiver contacted prior to discharge? No    CM aware of discharge order. New midline placed this AM. Met with pt at bedside to finalize and review discharge plan. Pt confirmed that he does not have a ride home today and requires transportation assistance. CM placed call to [employer] to secure transportation ([phone]; confirmation#[id number]) CM was informed that a Medicaid cab would arrive at 11:30AM. Pt discharged to front lobby with volunteer. CM located pt in cafeteria ordering lunch at 11:45AM. CM placed another call to Medicaid transport to check on status of trip. Spoke with dispatch who provided the following details: Lizbeth Bullsinan;  is [place] and # is [phone]. ETA 13 minutes. CM relayed trip information to pt who discharged at front entrance. CM placed call to [facility] to notify of discharge today and to resume [facility] services. Confirmed that they can accept pt back into care. CM then placed call to [facility] [employer] to notify of discharge. Han Mehdi Hector [employer] representative arrived prior to pt discharge to provide him with extension kit as new midline is in left arm. New PCP appointment made at previous hospital discharge for 7/21 at [facility] [address]. See AVS for details. No further CM needs identified at this time. Pt is ready for d/c from a CM standpoint. Assigned RN informed. Hospitalist NP has addressed return to work instructions and recommendations via phone.        Care Management Interventions  PCP Verified by CM: Yes  Palliative Care Criteria Met (RRAT>21 & CHF Dx)?: No  Mode of Transport at Discharge:  Other (see comment) (Medicaid transport )  Transition of Care Consult (CM Consult): Discharge Planning  Discharge Durable Medical Equipment: No  Physical Therapy Consult: No  Occupational Therapy Consult: No  Speech Therapy Consult: No  Current Support Network: Assisted Living, Lives Alone (lives at [facility] where he works)  Confirm Follow Up Transport: [facility]  The Plan for Transition of Care is Related to the Following Treatment Goals : [address]   The Patient and/or Patient Representative was Provided with a Choice of Provider and Agrees with the Discharge Plan?: Yes  Freedom of Choice List was Provided with Basic Dialogue that Supports the Patient's Individualized Plan of Care/Goals, Treatment Preferences and Shares the Quality Data Associated with the Providers?: No  [employer] Information Provided?: No  Discharge Location  Discharge Placement: Home with home health ([facility])    Maranda Hawkins, [address] [place] [address], [address]

## 2021-07-28 ENCOUNTER — OFFICE VISIT (OUTPATIENT)
Dept: FAMILY MEDICINE CLINIC | Age: 56
End: 2021-07-28
Payer: MEDICAID

## 2021-07-28 VITALS
SYSTOLIC BLOOD PRESSURE: 130 MMHG | WEIGHT: 256 LBS | HEART RATE: 87 BPM | DIASTOLIC BLOOD PRESSURE: 74 MMHG | TEMPERATURE: 97.2 F | RESPIRATION RATE: 16 BRPM | OXYGEN SATURATION: 95 % | HEIGHT: 66 IN | BODY MASS INDEX: 41.14 KG/M2

## 2021-07-28 DIAGNOSIS — E11.9 DIABETES MELLITUS WITHOUT COMPLICATION (HCC): ICD-10-CM

## 2021-07-28 DIAGNOSIS — I10 ESSENTIAL HYPERTENSION: ICD-10-CM

## 2021-07-28 DIAGNOSIS — I87.2 CHRONIC VENOUS INSUFFICIENCY: Primary | ICD-10-CM

## 2021-07-28 PROCEDURE — 99213 OFFICE O/P EST LOW 20 MIN: CPT | Performed by: FAMILY MEDICINE

## 2021-07-28 RX ORDER — SYRINGE AND NEEDLE,INSULIN,1ML 25GX1"
SYRINGE, EMPTY DISPOSABLE MISCELLANEOUS
COMMUNITY
Start: 2021-02-24 | End: 2022-07-20 | Stop reason: SDUPTHER

## 2021-07-28 RX ORDER — ISOPROPYL ALCOHOL 70 ML/100ML
SWAB TOPICAL
COMMUNITY
Start: 2021-03-09 | End: 2021-08-11

## 2021-07-28 RX ORDER — LANCETS
EACH MISCELLANEOUS
COMMUNITY
Start: 2021-02-24

## 2021-07-28 RX ORDER — MELOXICAM 15 MG/1
15 TABLET ORAL DAILY
Qty: 30 TABLET | Refills: 5 | Status: SHIPPED | OUTPATIENT
Start: 2021-07-28 | End: 2022-05-16

## 2021-07-28 RX ORDER — FUROSEMIDE 40 MG/1
TABLET ORAL
Qty: 30 TABLET | Refills: 12 | Status: ON HOLD | OUTPATIENT
Start: 2021-07-28 | End: 2022-05-16 | Stop reason: SDUPTHER

## 2021-07-28 RX ORDER — IBUPROFEN 600 MG/1
TABLET ORAL
COMMUNITY
Start: 2021-06-29 | End: 2022-05-16

## 2021-07-28 RX ORDER — TRAMADOL HYDROCHLORIDE 50 MG/1
TABLET ORAL
COMMUNITY
Start: 2020-10-12 | End: 2021-08-11

## 2021-07-28 NOTE — PROGRESS NOTES
HISTORY OF PRESENT ILLNESS  Cricket Marks is a 64 y.o. male. HPI In to establish care. Was just in ED [facility] for cellulitis. Was in the hospital twice. Did home antibiotics. Had pic line removed on Monday. Still having some pain in R leg, taking ibuprofen- not helping that much. Is also followed for prostate cancer. 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 Has been told that prostate cancer has spread, currently taking pills for this. Declines any blood  Tests today. ROS    Physical Exam  Vitals and nursing note reviewed. Constitutional:       Appearance: He is well-developed. HENT:      Right Ear: External ear normal.      Left Ear: External ear normal.   Neck:      Thyroid: No thyromegaly. Cardiovascular:      Rate and Rhythm: Normal rate and regular rhythm. Heart sounds: Normal heart sounds. Pulmonary:      Effort: Pulmonary effort is normal. No respiratory distress. Breath sounds: Normal breath sounds. No wheezing. Abdominal:      General: Bowel sounds are normal. There is no distension. Palpations: Abdomen is soft. There is no mass. Tenderness: There is no abdominal tenderness. There is no guarding. Musculoskeletal:         General: Normal range of motion. Comments: Legs- moderate  Swelling  I lower extremities bilaterally. Moderate scaling. No oozing, redness, ortenderness   Lymphadenopathy:      Cervical: No cervical adenopathy. ASSESSMENT and PLAN  Orders Placed This Encounter    furosemide (LASIX) 40 mg tablet    meloxicam (MOBIC) 15 mg tablet     Diagnoses and all orders for this visit:    1. Chronic venous insufficiency    2. Diabetes mellitus without complication ([address]    3. Essential hypertension    Other orders  -     furosemide (LASIX) 40 mg tablet; One po daily for fluid  -     meloxicam (MOBIC) 15 mg tablet; Take 1 Tablet by mouth daily.  For leg pain      Follow-up and Dispositions    · Return in about 3 months (around 10/28/2021). Pt was also  Given prescription for compression stockings .

## 2021-07-28 NOTE — PROGRESS NOTES
Chief Complaint   Patient presents with   [facility]     1. Have you been to the ER, urgent care clinic since your last visit? Hospitalized since your last visit? Yes 7/17/21     2. Have you seen or consulted any other health care providers outside of the [address] [place] [address] since your last visit? Include any pap smears or colon screening.  Yes Aubrey 10     Would like to discuss Domenica Tello

## 2021-08-11 ENCOUNTER — OFFICE VISIT (OUTPATIENT)
Dept: INTERNAL MEDICINE CLINIC | Age: 56
End: 2021-08-11
Payer: MEDICAID

## 2021-08-11 VITALS
SYSTOLIC BLOOD PRESSURE: 133 MMHG | HEART RATE: 94 BPM | TEMPERATURE: 98.6 F | BODY MASS INDEX: 40.4 KG/M2 | WEIGHT: 257.4 LBS | RESPIRATION RATE: 16 BRPM | OXYGEN SATURATION: 96 % | DIASTOLIC BLOOD PRESSURE: 88 MMHG | HEIGHT: 67 IN

## 2021-08-11 DIAGNOSIS — Z79.4 TYPE 2 DIABETES MELLITUS WITH OTHER CIRCULATORY COMPLICATION, WITH LONG-TERM CURRENT USE OF INSULIN (HCC): Primary | ICD-10-CM

## 2021-08-11 DIAGNOSIS — E55.9 VITAMIN D DEFICIENCY: ICD-10-CM

## 2021-08-11 DIAGNOSIS — I10 ESSENTIAL HYPERTENSION: ICD-10-CM

## 2021-08-11 DIAGNOSIS — E03.9 HYPOTHYROIDISM, UNSPECIFIED TYPE: ICD-10-CM

## 2021-08-11 DIAGNOSIS — R60.9 PERIPHERAL EDEMA: ICD-10-CM

## 2021-08-11 DIAGNOSIS — C61 PROSTATE CANCER (HCC): ICD-10-CM

## 2021-08-11 DIAGNOSIS — E11.59 TYPE 2 DIABETES MELLITUS WITH OTHER CIRCULATORY COMPLICATION, WITH LONG-TERM CURRENT USE OF INSULIN (HCC): Primary | ICD-10-CM

## 2021-08-11 DIAGNOSIS — E78.2 MIXED HYPERLIPIDEMIA: ICD-10-CM

## 2021-08-11 DIAGNOSIS — D50.9 IRON DEFICIENCY ANEMIA, UNSPECIFIED IRON DEFICIENCY ANEMIA TYPE: ICD-10-CM

## 2021-08-11 DIAGNOSIS — E11.42 DIABETIC POLYNEUROPATHY ASSOCIATED WITH TYPE 2 DIABETES MELLITUS (HCC): ICD-10-CM

## 2021-08-11 DIAGNOSIS — N18.9 CHRONIC KIDNEY DISEASE, UNSPECIFIED CKD STAGE: ICD-10-CM

## 2021-08-11 DIAGNOSIS — Z87.828 H/O OPEN LEG WOUND: ICD-10-CM

## 2021-08-11 PROBLEM — E11.9 TYPE 2 DIABETES MELLITUS (HCC): Status: ACTIVE | Noted: 2017-01-01

## 2021-08-11 PROBLEM — E66.9 OBESITY: Status: ACTIVE | Noted: 2021-08-11

## 2021-08-11 PROBLEM — J45.909 ASTHMA: Status: ACTIVE | Noted: 2021-08-11

## 2021-08-11 PROBLEM — Z86.711 PERSONAL HISTORY OF PULMONARY EMBOLISM: Status: ACTIVE | Noted: 2021-08-11

## 2021-08-11 PROBLEM — K20.90 ESOPHAGITIS: Status: ACTIVE | Noted: 2021-08-11

## 2021-08-11 LAB — MICROALBUMIN UR TEST STR-MCNC: 150 MG/L (ref 0–20)

## 2021-08-11 PROCEDURE — 82044 UR ALBUMIN SEMIQUANTITATIVE: CPT | Performed by: NURSE PRACTITIONER

## 2021-08-11 PROCEDURE — 99214 OFFICE O/P EST MOD 30 MIN: CPT | Performed by: NURSE PRACTITIONER

## 2021-08-11 RX ORDER — ENZALUTAMIDE 80 MG/1
TABLET ORAL 2 TIMES DAILY
COMMUNITY
Start: 2021-07-28

## 2021-08-11 RX ORDER — FLASH GLUCOSE SENSOR
1 KIT MISCELLANEOUS
Qty: 2 KIT | Refills: 5 | Status: SHIPPED | OUTPATIENT
Start: 2021-08-11

## 2021-08-11 RX ORDER — FLASH GLUCOSE SCANNING READER
1 EACH MISCELLANEOUS 2 TIMES DAILY
Qty: 1 EACH | Refills: 0 | Status: SHIPPED | OUTPATIENT
Start: 2021-08-11

## 2021-08-11 RX ORDER — GABAPENTIN 100 MG/1
100 CAPSULE ORAL 3 TIMES DAILY
Qty: 90 CAPSULE | Refills: 1 | Status: ON HOLD | OUTPATIENT
Start: 2021-08-11 | End: 2022-05-16 | Stop reason: SDUPTHER

## 2021-08-11 RX ORDER — GLIMEPIRIDE 4 MG/1
4 TABLET ORAL
Qty: 90 TABLET | Refills: 0 | Status: SHIPPED | OUTPATIENT
Start: 2021-08-11 | End: 2021-11-05 | Stop reason: SDUPTHER

## 2021-08-11 NOTE — PATIENT INSTRUCTIONS
Please start Glimepiride in addition to your Metformin for management of your diabetes. Continue your Lantus 25 units each evening. Do not take previously prescribed glipizide.

## 2021-08-11 NOTE — PROGRESS NOTES
Chief Complaint   Patient presents with    [facility]     new patient       SUBJECTIVE:    Cinthia Morales is a 64 y.o. male who is new to me, and here today to discuss his current medical concerns and medications. The patient was previously hospitalized from 07/17/2021 to 07/19/2021 for treatment of bacteremia and cellulitis to his bilateral lower extremities. Upon discharge, the patient went back, and has been receiving home health care through [facility]. He states his wounds have now healed, and will likely be discharged from home health care by the end of the week. Patient states a longstanding history of type 2 diabetes and currently uses insulin as well as oral medications to manage this. However, he states he does not check his blood sugars very often, as this is caused a great deal of discomfort to his fingertips. His last hemoglobin A1c that was drawn while hospitalized was approximately 9.4%. He denies any hypoglycemic episodes. He states he is interested in getting the freestyle regan system if possible. In addition, he complains of diabetic neuropathic pain in both his feet and legs. He states the tramadol helps somewhat, but would like some medication to help with this pain if possible. The patient also states that he is currently being managed for prostate cancer. He does not know who he is seeing, and cannot recall his current regimen. However, he states that he has not had any surgical intervention, and does not know the degree of severity of his current prognosis. He states he will be started on the new medication (likely [place]), and will start taking that once he obtains it from the pharmacy. The patient is also being managed for hypertension and mixed hyperlipidemia. He is currently taking his medications as directed, but does not appear to be following a low-cholesterol/low-fat diet at this time.   He continues to have a great deal of swelling to his lower extremities, and states that although he has been recommended to elevate his legs frequently, he states he just does not do it due to discomfort. Current Outpatient Medications   Medication Sig Dispense Refill    ibuprofen (MOTRIN) 600 mg tablet 600 mg = 1 tab each dose, PO, tid, PRN: as needed for pain, # 90 tab, 0 Refills, Pharmacy: [id number]      glucometer,pre-loaded strips (GLUCOSE METER, DISP & STRIPS) Glucose meter, See Instructions, #: 1 EA, 0 Refill(s), Pharmacy: [id number]      Insulin Syringe-Needle U-100 (BD Insulin Syringe) 1 mL 25 gauge x 5/8\" syrg Insulin syringe 1 ml (100 units) 8mm needle, See Instructions, #: 100 EA, 11 Refill(s), Pharmacy: [id number]      lancets (Accu-Chek Softclix Lancets) misc Lancets, See Instructions, #: 400 EA, 4 Refill(s), Pharmacy: [id number]      furosemide (LASIX) 40 mg tablet One po daily for fluid 30 Tablet 12    meloxicam (MOBIC) 15 mg tablet Take 1 Tablet by mouth daily. For leg pain 30 Tablet 5    atorvastatin (LIPITOR) 40 mg tablet Take 40 mg by mouth daily.  insulin glargine (Lantus U-100 Insulin) 100 unit/mL injection 25 Units by SubCUTAneous route daily.  metFORMIN (GLUMETZA ER) 500 mg TG24 24 hour tablet Take 2 Tabs by mouth two (2) times a day.  traMADoL (ULTRAM) 50 mg tablet 50 mg = 1 tab each dose, PO, every 12 hours, PRN: as needed for pain, # 60 tab, 0 Refills, Pharmacy: [id number] (Patient not taking: Reported on 7/28/2021)      alcohol swabs (Alcohol Pads) padm See Instructions, for injection and FS daily ; max leo: 4/day, 11 Refills, Dispense: 120, EA, Pharmacy [id number] (Patient not taking: Reported on 8/11/2021)      polyethylene glycol (MIRALAX) 17 gram packet Take 1 Packet by mouth daily.  (Patient not taking: Reported on 7/28/2021) 30 Each 0    glipiZIDE (GLUCOTROL) 10 mg tablet Take 10 mg by mouth two (2) times a day. (Patient not taking: Reported on 2021)       Past Medical History:   Diagnosis Date    Asthma     Diabetes ([facility]     Lower leg edema     Prostate cancer ([facility]      History reviewed. No pertinent surgical history.   No Known Allergies    REVIEW OF SYSTEMS:                                        POSITIVE= bold text  Negative = regular text    General:                     fever, chills, sweats, generalized weakness, weight loss/gain,                                       loss of appetite   Eyes:                           blurred vision, eye pain, loss of vision, double vision  ENT:                            rhinorrhea, pharyngitis   Respiratory:               cough, sputum production, SOB, HOFF, wheezing, pleuritic pain   Cardiology:                chest pain, palpitations, orthopnea, PND, edema, syncope   Gastrointestinal:       abdominal pain , N/V, diarrhea, dysphagia, constipation, bleeding   Genitourinary:           frequency, urgency, dysuria, hematuria, incontinence   Muskuloskeletal :      arthralgia, myalgia, back pain  Hematology:              easy bruising, nose or gum bleeding, lymphadenopathy   Dermatological:         rash, ulceration, pruritis, color change / jaundice  Endocrine:                 hot flashes or polydipsia   Neurological:             headache, dizziness, confusion, focal weakness, paresthesia,                                      Speech difficulties, memory loss, gait difficulty  Psychological:          Feelings of anxiety, depression, agitation        Social History     Socioeconomic History    Marital status: SINGLE     Spouse name: Not on file    Number of children: Not on file    Years of education: Not on file    Highest education level: Not on file   Tobacco Use    Smoking status: Former Smoker     Packs/day: 0.50     Years: 2.00     Pack years: 1.00     Types: Cigarettes     Quit date:      Years since quittin.6    Smokeless tobacco: Never Used    Tobacco comment: quit > 8 years ago   Substance and Sexual Activity    Alcohol use: Yes     Comment: 1-2 per day (wine or beer)    Drug use: No   Social History Narrative    Works at [employer]. Stays there. Works as a [employer]     Social Determinants of Health     Financial Resource Strain:     Difficulty of Paying Living Expenses:    Food Insecurity:     Worried About [address] in the Last Year:    [address] in the Last Year:    Transportation Needs:     Lack of Transportation (Medical):  Lack of Transportation (Non-Medical):    Physical Activity:     Days of Exercise per Week:     Minutes of Exercise per Session:    Stress:     Feeling of Stress :    Social Connections:     Frequency of Communication with Friends and Family:     Frequency of Social Gatherings with Friends and Family:     Attends Voodoo Services:     Active Member of Clubs or Organizations:     Attends Club or Organization Meetings:     Marital Status:      Family History   Problem Relation Age of Onset    Stroke Mother         brain bleed    Stroke Father     Hypertension Father     Heart Attack Father     Hypertension Sister     Obesity Sister     Asthma Sister     Hypertension Sister     Hypertension Sister     No Known Problems Brother     No Known Problems Sister        OBJECTIVE:     Visit Vitals  /88 (BP 1 Location: Left upper arm, BP Patient Position: Sitting, BP Cuff Size: Large adult)   Pulse 94   Temp 98.6 °F (37 °C)   Resp 16   Ht 5' 6.5\" (1.689 m)   Wt 257 lb 6.4 oz (116.8 kg)   SpO2 96%   BMI 40.92 kg/m²       Constitutional: He appears well nourished, of stated age, and dressed appropriately. Eyes: Sclera anicteric, PERRLA, EOMI  Neck: Supple without lymphadenopathy. Thyroid normal, No JVD or bruits  Respiratory: Clear to ascultation X5, normal inspiratory effort, no adventitious breath sounds.   Cardiovascular: Regular rate and rhythm, no murmurs, rubs or gallops, PMI not displaced, No thrills. 4+ peripheral edema noted to lower extremities. Hematologic: No purpura, petechiae or unexplained bruising  Lymphatic: No lymph node enlargemant. Integumentary: No unusual rashes or suspicious skin lesions noted. Peripheral Vascular: Normal pulses palpable to PT/DP. Neuro: Non-focal exam, A & O X 3.   Psychiatric: Appropriate affect and demeanor, pleasant and cooperative. Patient's thought content and thought processing appear to be within normal limits. ASSESSMENT/PLAN:     ICD-10-CM ICD-9-CM    1. Type 2 diabetes mellitus with other circulatory complication, with long-term current use of insulin ([facility])  E11.59 250.70 AMB POC URINE, MICROALBUMIN, SEMIQUANT (1 RESULT)    Z79.4 V58.67 URINALYSIS W/ RFLX MICROSCOPIC      flash glucose scanning reader ([employer] 14 Day [place]) misc      flash glucose sensor ([employer] 14 Day Sensor) kit   2. Prostate cancer ([facility])  C61 185 PSA, DIAGNOSTIC (PROSTATE SPECIFIC AG)   3. Essential hypertension  I10 401.9 CBC W/O DIFF      METABOLIC PANEL, COMPREHENSIVE      AMB POC URINE, MICROALBUMIN, SEMIQUANT (1 RESULT)      URINALYSIS W/ RFLX MICROSCOPIC   4. Chronic kidney disease, unspecified CKD stage  W69.1 622.7 METABOLIC PANEL, COMPREHENSIVE      URINALYSIS W/ RFLX MICROSCOPIC   5. Mixed hyperlipidemia  E78.2 272.2 LIPID PANEL   6. Hypothyroidism, unspecified type  E03.9 244.9 TSH 3RD GENERATION   7. Iron deficiency anemia, unspecified iron deficiency anemia type  D50.9 280.9 CBC W/O DIFF   8. Vitamin D deficiency  E55.9 268.9 VITAMIN D, 25 HYDROXY   9. Peripheral edema  R60.9 782.3    10. H/O open leg wound  Z87.828 V15.59    11. Diabetic polyneuropathy associated with type 2 diabetes mellitus ([facility])  E11.42 250.60      357.2      1: We will do baseline labs today including: CBC, CMP, lipid panel, PSA, TSH, urinalysis, microalbumin, and vitamin D level.   2: I will start the patient on gabapentin 100 mg 3 times daily for management of neuropathic pain. 3: Patient will be prescribed freestyle regan system for management of blood sugars. 4: Patient to continue current medications for management of diabetes, hypertension, and hypercholesterolemia.  5: Patient to work on healthy lifestyle management gluten: Low-fat/low-cholesterol diet, avoidance of concentrated sweets, adequate amounts of fiber and water, and exercise as tolerated. 6: Patient to follow-up with urology and oncology as planned for prostate issues. 7: Patient will be started on glimepiride 4 mg to take 1 tablet each morning in addition to use of Metformin and Lantus. Do not take glipizide that was previously prescribed. 8: Patient to follow-up with me in approximately 3 months, or sooner as needed. Patient states understanding and agrees with plan. Orders Placed This Encounter    CBC W/O DIFF    METABOLIC PANEL, COMPREHENSIVE    LIPID PANEL    PROSTATE SPECIFIC AG    TSH 3RD GENERATION    URINALYSIS W/ RFLX MICROSCOPIC    VITAMIN D, 25 HYDROXY    AMB POC URINE, MICROALBUMIN, SEMIQUANT (1 RESULT)         ATTENTION:   This medical record was transcribed using an electronic medical records system. Although proofread, it may and can contain electronic and spelling errors. Other human spelling and other errors may be present. Corrections may be executed at a later time. Please feel free to contact us for any clarifications as needed. Signed,  Robby Andres.  Christin Schwab, PARMINDER WILLISN FNP-BC

## 2021-08-11 NOTE — PROGRESS NOTES
Antonio Cueto is a 64 y.o. male    Chief Complaint   Patient presents with    [facility]     new patient       Visit Vitals  /88 (BP 1 Location: Left upper arm, BP Patient Position: Sitting, BP Cuff Size: Large adult)   Pulse 94   Temp 98.6 °F (37 °C)   Resp 16   Ht 5' 6.5\" (1.689 m)   Wt 257 lb 6.4 oz (116.8 kg)   SpO2 96%   BMI 40.92 kg/m²           1. Have you been to the ER, urgent care clinic since your last visit? Hospitalized since your last visit? NO    2. Have you seen or consulted any other health care providers outside of the [address] [place] [address] since your last visit? Include any pap smears or colon screening.  NO

## 2021-08-12 LAB
25(OH)D3+25(OH)D2 SERPL-MCNC: 12.7 NG/ML (ref 30–100)
ALBUMIN SERPL-MCNC: 4 G/DL (ref 3.8–4.9)
ALBUMIN/GLOB SERPL: 1.3 {RATIO} (ref 1.2–2.2)
ALP SERPL-CCNC: 81 IU/L (ref 48–121)
ALT SERPL-CCNC: 12 IU/L (ref 0–44)
APPEARANCE UR: CLEAR
AST SERPL-CCNC: 17 IU/L (ref 0–40)
BACTERIA #/AREA URNS HPF: NORMAL /[HPF]
BILIRUB SERPL-MCNC: 0.4 MG/DL (ref 0–1.2)
BILIRUB UR QL STRIP: NEGATIVE
BUN SERPL-MCNC: 13 MG/DL (ref 6–24)
BUN/CREAT SERPL: 19 (ref 9–20)
CALCIUM SERPL-MCNC: 8.7 MG/DL (ref 8.7–10.2)
CASTS URNS QL MICRO: NORMAL /LPF
CHLORIDE SERPL-SCNC: 103 MMOL/L (ref 96–106)
CHOLEST SERPL-MCNC: 124 MG/DL (ref 100–199)
CO2 SERPL-SCNC: 23 MMOL/L (ref 20–29)
COLOR UR: YELLOW
CREAT SERPL-MCNC: 0.68 MG/DL (ref 0.76–1.27)
EPI CELLS #/AREA URNS HPF: NORMAL /HPF (ref 0–10)
ERYTHROCYTE [DISTWIDTH] IN BLOOD BY AUTOMATED COUNT: 14.5 % (ref 11.6–15.4)
GLOBULIN SER CALC-MCNC: 3.2 G/DL (ref 1.5–4.5)
GLUCOSE SERPL-MCNC: 103 MG/DL (ref 65–99)
GLUCOSE UR QL: NEGATIVE
HCT VFR BLD AUTO: 34.3 % (ref 37.5–51)
HDLC SERPL-MCNC: 61 MG/DL
HGB BLD-MCNC: 11 G/DL (ref 13–17.7)
HGB UR QL STRIP: NEGATIVE
KETONES UR QL STRIP: NEGATIVE
LDLC SERPL CALC-MCNC: 50 MG/DL (ref 0–99)
LEUKOCYTE ESTERASE UR QL STRIP: NEGATIVE
MCH RBC QN AUTO: 25.9 PG (ref 26.6–33)
MCHC RBC AUTO-ENTMCNC: 32.1 G/DL (ref 31.5–35.7)
MCV RBC AUTO: 81 FL (ref 79–97)
MICRO URNS: ABNORMAL
NITRITE UR QL STRIP: NEGATIVE
PH UR STRIP: 5.5 [PH] (ref 5–7.5)
PLATELET # BLD AUTO: 182 X10E3/UL (ref 150–450)
POTASSIUM SERPL-SCNC: 4.1 MMOL/L (ref 3.5–5.2)
PROT SERPL-MCNC: 7.2 G/DL (ref 6–8.5)
PROT UR QL STRIP: ABNORMAL
PSA SERPL-MCNC: 12.6 NG/ML (ref 0–4)
RBC # BLD AUTO: 4.25 X10E6/UL (ref 4.14–5.8)
RBC #/AREA URNS HPF: NORMAL /HPF (ref 0–2)
SODIUM SERPL-SCNC: 139 MMOL/L (ref 134–144)
SP GR UR: 1.02 (ref 1–1.03)
SPECIMEN STATUS REPORT, ROLRST: NORMAL
TRIGL SERPL-MCNC: 60 MG/DL (ref 0–149)
TSH SERPL DL<=0.005 MIU/L-ACNC: 0.8 UIU/ML (ref 0.45–4.5)
UROBILINOGEN UR STRIP-MCNC: 0.2 MG/DL (ref 0.2–1)
VLDLC SERPL CALC-MCNC: 13 MG/DL (ref 5–40)
WBC # BLD AUTO: 4.5 X10E3/UL (ref 3.4–10.8)
WBC #/AREA URNS HPF: NORMAL /HPF (ref 0–5)

## 2021-08-12 RX ORDER — ASPIRIN 325 MG
50000 TABLET, DELAYED RELEASE (ENTERIC COATED) ORAL
Qty: 12 CAPSULE | Refills: 0 | Status: SHIPPED | OUTPATIENT
Start: 2021-08-12

## 2021-08-12 NOTE — PROGRESS NOTES
Patient shows quite a bit of protein through urine, and likely due to poorly controlled diabetes. Vitamin D level shows deficiency.  I will start patient on cholecalciferol to take 1 capsule each week.  After 12 weeks, we will reduce to a lower daily dose. Urinalysis is unremarkable aside from protein leakage.  Thyroid appears to be functioning normally. Prostate antigen is quite elevated at 12. 6.  Patient to follow-up with urologist as scheduled. Cholesterol levels look in good standing at this time.  Patient to continue atorvastatin 40 mg nightly. Kidney function, liver functions, and electrolytes appear within normal range at this time.  Good. Blood counts marginally low, but acceptable.  No significant anemia at this time. Discussed with patient.

## 2021-08-12 NOTE — PROGRESS NOTES
Patient shows quite a bit of protein through urine, and likely due to poorly controlled diabetes. Vitamin D level shows deficiency. I will start patient on cholecalciferol to take 1 capsule each week. After 12 weeks, we will reduce to a lower daily dose. Urinalysis is unremarkable aside from protein leakage. Thyroid appears to be functioning normally. Prostate antigen is quite elevated at 12.6. Patient to follow-up with urologist as scheduled. Cholesterol levels look in good standing at this time. Patient to continue atorvastatin 40 mg nightly. Kidney function, liver functions, and electrolytes appear within normal range at this time. Good. Blood counts marginally low, but acceptable. No significant anemia at this time.

## 2021-11-05 ENCOUNTER — OFFICE VISIT (OUTPATIENT)
Dept: FAMILY MEDICINE CLINIC | Age: 56
End: 2021-11-05
Payer: MEDICAID

## 2021-11-05 VITALS
TEMPERATURE: 97.8 F | DIASTOLIC BLOOD PRESSURE: 80 MMHG | BODY MASS INDEX: 40.5 KG/M2 | HEART RATE: 80 BPM | OXYGEN SATURATION: 98 % | RESPIRATION RATE: 16 BRPM | HEIGHT: 66 IN | SYSTOLIC BLOOD PRESSURE: 114 MMHG | WEIGHT: 252 LBS

## 2021-11-05 DIAGNOSIS — E11.59 TYPE 2 DIABETES MELLITUS WITH OTHER CIRCULATORY COMPLICATION, WITH LONG-TERM CURRENT USE OF INSULIN (HCC): ICD-10-CM

## 2021-11-05 DIAGNOSIS — Z79.4 TYPE 2 DIABETES MELLITUS WITH OTHER CIRCULATORY COMPLICATION, WITH LONG-TERM CURRENT USE OF INSULIN (HCC): ICD-10-CM

## 2021-11-05 DIAGNOSIS — E11.9 DIABETES MELLITUS WITHOUT COMPLICATION (HCC): Primary | ICD-10-CM

## 2021-11-05 LAB
GLUCOSE POC: 150 MG/DL
HBA1C MFR BLD HPLC: 6.5 %

## 2021-11-05 PROCEDURE — 99213 OFFICE O/P EST LOW 20 MIN: CPT | Performed by: FAMILY MEDICINE

## 2021-11-05 PROCEDURE — 82962 GLUCOSE BLOOD TEST: CPT | Performed by: FAMILY MEDICINE

## 2021-11-05 PROCEDURE — 83036 HEMOGLOBIN GLYCOSYLATED A1C: CPT | Performed by: FAMILY MEDICINE

## 2021-11-05 RX ORDER — GLIMEPIRIDE 4 MG/1
4 TABLET ORAL
Qty: 90 TABLET | Refills: 3 | Status: SHIPPED | OUTPATIENT
Start: 2021-11-05

## 2021-11-05 NOTE — PROGRESS NOTES
HISTORY OF PRESENT ILLNESS  Erika De Leon is a 64 y.o. male. HPI Pt. Comes in for blood pressure, cholesterol, and diabetes check. No complaints of chest pain, shortness of breath, TIAs, claudication or edema. Not checking blood sugars at home. [place] hypoglycemic episodes. Is followed at [facility] for prostate cancer at [facility]. Currently on xytiga. No co bone pain. ROS    Physical Exam  Vitals and nursing note reviewed. Constitutional:       Appearance: He is well-developed. HENT:      Right Ear: External ear normal.      Left Ear: External ear normal.   Neck:      Thyroid: No thyromegaly. Cardiovascular:      Rate and Rhythm: Normal rate and regular rhythm. Pulses: Normal pulses. Heart sounds: Normal heart sounds. Pulmonary:      Effort: Pulmonary effort is normal. No respiratory distress. Breath sounds: Normal breath sounds. No wheezing. Abdominal:      General: Bowel sounds are normal. There is no distension. Palpations: Abdomen is soft. There is no mass. Tenderness: There is no abdominal tenderness. There is no guarding. Musculoskeletal:         General: Normal range of motion. Comments: 1+ edema bilaterally   Lymphadenopathy:      Cervical: No cervical adenopathy. ASSESSMENT and PLAN  Orders Placed This Encounter    AMB POC HEMOGLOBIN A1C    AMB POC GLUCOSE BLOOD, BY GLUCOSE MONITORING DEVICE    glimepiride (AMARYL) 4 mg tablet     Diagnoses and all orders for this visit:    1. Diabetes mellitus without complication ([address]  -     AMB POC HEMOGLOBIN A1C  -     AMB POC GLUCOSE BLOOD, BY GLUCOSE MONITORING DEVICE    2. Type 2 diabetes mellitus with other circulatory complication, with long-term current use of insulin ([facility])  -     glimepiride (AMARYL) 4 mg tablet; Take 1 Tablet by mouth every morning.  Indications: type 2 diabetes mellitus  -     AMB POC GLUCOSE BLOOD, BY GLUCOSE MONITORING DEVICE      Follow-up and Dispositions    · Return in about 6 months (around 5/5/2022).

## 2022-01-27 ENCOUNTER — HOSPITAL ENCOUNTER (EMERGENCY)
Age: 57
Discharge: HOME | End: 2022-01-27
Attending: OPHTHALMOLOGY
Payer: MEDICAID

## 2022-01-27 VITALS
DIASTOLIC BLOOD PRESSURE: 67 MMHG | HEART RATE: 84 BPM | SYSTOLIC BLOOD PRESSURE: 133 MMHG | RESPIRATION RATE: 18 BRPM | TEMPERATURE: 98 F | OXYGEN SATURATION: 100 %

## 2022-01-27 DIAGNOSIS — S81.801A WOUND OF RIGHT LOWER EXTREMITY, INITIAL ENCOUNTER: ICD-10-CM

## 2022-01-27 DIAGNOSIS — T14.8XXA BLEEDING FROM WOUND: Primary | ICD-10-CM

## 2022-01-27 PROCEDURE — 99283 EMERGENCY DEPT VISIT LOW MDM: CPT

## 2022-01-28 NOTE — DISCHARGE INSTRUCTIONS
Elevate your leg tonight, only bear weight when you need to do things like go to the bathroom   Do not remove dressing until tomorrow after noon unless it becomes saturated or soiled   Loosen the bandage with light soap and water when you do prepare to remove it   Apply a thin layer of neosporin over the wound site and cover with light dressing   Follow up with your primary care provider   Return to the ER for any worsening or worrisome symptoms

## 2022-01-28 NOTE — ED TRIAGE NOTES
Pt reports scratching leg at home and having a bleed on his right lower leg that he could not control, he called ems and bleeding was controlled before their arrival.

## 2022-01-28 NOTE — ED PROVIDER NOTES
DUGLAS Huizar is a 64 y.o. male with Hx of asthma, DMII, lymphedema, prostate CA who presents via EMS to [facility] ED with cc of bleeding from R leg wound. Patient states that he was scratching his right leg tonight when he accidentally scratched off a scab. He has had intermittent bleeding from a right shin wound since then. He placed a dressing on the wound at home, seemed that the bleeding was resolved, EMS states bleeding started again and they had to reapply dressing. Patient denies taking anticoagulation. PCP: None    There are no other complaints, changes or physical findings at this time. No F/C, N/V/D, cough, congestion, CP, SOB, urinary concerns. Past Medical History:   Diagnosis Date    Asthma     [facility]     Lower leg edema     Prostate cancer ([facility] [address]     gets [facility]       No past surgical history on file.       Family History:   Problem Relation Age of Onset    Stroke Mother         brain bleed    Stroke Father     Hypertension Father     Heart Attack Father     Hypertension Sister     Obesity Sister     Asthma Sister     Hypertension Sister     Hypertension Sister     No Known Problems Brother     No Known Problems Sister        Social History     Socioeconomic History    Marital status: SINGLE     Spouse name: Not on file    Number of children: Not on file    Years of education: Not on file    Highest education level: Not on file   Occupational History    Not on file   Tobacco Use    Smoking status: Former Smoker     Packs/day: 0.50     Years: 2.00     Pack years: 1.00     Types: Cigarettes     Quit date:      Years since quittin.0    Smokeless tobacco: Never Used    Tobacco comment: quit > 8 years ago   Substance and Sexual Activity    Alcohol use: Yes     Comment: 1-2 per day (wine or beer)    Drug use: No    Sexual activity: Not on file   Other Topics Concern    Not on file   Social History Narrative    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 Works as a [employer]     Social Determinants of Health     Financial Resource Strain:     Difficulty of Paying Living Expenses: Not on file   Food Insecurity:     Worried About [address] in the Last Year: Not on file    [address] in the Last Year: Not on file   Transportation Needs:     Lack of Transportation (Medical): Not on file    Lack of Transportation (Non-Medical): Not on file   Physical Activity:     Days of Exercise per Week: Not on file    Minutes of Exercise per Session: Not on file   Stress:     Feeling of Stress : Not on file   Social Connections:     Frequency of Communication with Friends and Family: Not on file    Frequency of Social Gatherings with Friends and Family: Not on file    Attends Shinto Services: Not on file    Active Member of [address] [place] [address] or Organizations: Not on file    Attends Club or Organization Meetings: Not on file    Marital Status: Not on file   Intimate Partner Violence:     Fear of Current or Ex-Partner: Not on file    Emotionally Abused: Not on file    Physically Abused: Not on file    Sexually Abused: Not on file   Housing Stability:     Unable to Pay for Housing in the Last Year: Not on file    Number of [place] in the Last Year: Not on file    Unstable Housing in the Last Year: Not on file         ALLERGIES: Patient has no known allergies. Review of Systems   Constitutional: Negative for activity change, appetite change, chills and fever. HENT: Negative for congestion, rhinorrhea and sore throat. Eyes: Negative for visual disturbance. Respiratory: Negative for cough and shortness of breath. Cardiovascular: Negative for chest pain. Gastrointestinal: Negative for abdominal pain, diarrhea, nausea and vomiting. Genitourinary: Negative for dysuria, flank pain, frequency and urgency. Musculoskeletal: Positive for arthralgias. Negative for back pain, gait problem and neck pain. Skin: Positive for wound. Negative for color change and rash. Neurological: Negative for dizziness, weakness, light-headedness and headaches. Psychiatric/Behavioral: Negative for agitation, behavioral problems and confusion. All other systems reviewed and are negative. Vitals:    01/27/22 2116   BP: (!) 149/87   Pulse: 82   Resp: 16   Temp: 98 °F (36.7 °C)   SpO2: 100%            Physical Exam  Vitals and nursing note reviewed. Constitutional:       General: He is not in acute distress. Appearance: He is well-developed. HENT:      Head: Normocephalic and atraumatic. Right Ear: External ear normal.      Left Ear: External ear normal.   Eyes:      Conjunctiva/sclera: Conjunctivae normal.      Pupils: Pupils are equal, round, and reactive to light. Cardiovascular:      Rate and Rhythm: Normal rate and regular rhythm. Heart sounds: Normal heart sounds. Pulmonary:      Effort: Pulmonary effort is normal.      Breath sounds: Normal breath sounds. Musculoskeletal:         General: Normal range of motion. Cervical back: Normal range of motion and neck supple. Right lower leg: Edema present. Left lower leg: Edema present. Skin:     General: Skin is warm. Comments: Excessively dry and scaling skin noted to BLLE   Open region 2x2 to anterior shin rLE with controlled, dark red bleeding present    Neurological:      Mental Status: He is alert and oriented to person, place, and time. Psychiatric:         Behavior: Behavior normal.         Thought Content: Thought content normal.         Judgment: Judgment normal.          MDM  Number of Diagnoses or Management Options  Bleeding from wound  Wound of right lower extremity, initial encounter  Diagnosis management comments: DDx: bleeding wound, scratch, excessively dry skin     Patient with bleeding wound to the front of his right shin. I placed quick clot with a compressive dressing on his leg with hemostasis. Leg was NVI after dressing placement. Patient was educated on wound care for home and reasons to return to the emergency department. He was encouraged to follow-up with his primary care provider for wound check. Amount and/or Complexity of Data Reviewed  Review and summarize past medical records: yes           Procedures    LABORATORY TESTS:  No results found for this or any previous visit (from the past 12 hour(s)). IMAGING RESULTS:  No orders to display       MEDICATIONS GIVEN:  Medications - No data to display    IMPRESSION:  1. Bleeding from wound    2. Wound of right lower extremity, initial encounter        PLAN:  1. Discharge Medication List as of 1/27/2022 10:02 PM        2. Follow-up Information     Follow up With Specialties Details Why Contact Info    [address] [facility] Go to  As needed, If symptoms worsen Norman Caruso [address] [facility]    Ritchie Nolasco MD Family Medicine Schedule an appointment as soon as possible for a visit   [address] [place] [address]  [phone]          3.  Return to ED if worse

## 2022-02-24 RX ORDER — ATORVASTATIN CALCIUM 40 MG/1
40 TABLET, FILM COATED ORAL DAILY
Qty: 90 TABLET | Refills: 1 | Status: ON HOLD | OUTPATIENT
Start: 2022-02-24 | End: 2022-05-16 | Stop reason: SDUPTHER

## 2022-03-18 PROBLEM — L03.116 BILATERAL LOWER LEG CELLULITIS: Status: ACTIVE | Noted: 2021-07-10

## 2022-03-18 PROBLEM — L03.115 BILATERAL LOWER LEG CELLULITIS: Status: ACTIVE | Noted: 2021-07-10

## 2022-03-18 PROBLEM — I10 ESSENTIAL HYPERTENSION: Status: ACTIVE | Noted: 2021-08-11

## 2022-03-18 PROBLEM — Z86.711 PERSONAL HISTORY OF PULMONARY EMBOLISM: Status: ACTIVE | Noted: 2021-08-11

## 2022-03-18 PROBLEM — E55.9 VITAMIN D DEFICIENCY: Status: ACTIVE | Noted: 2021-08-11

## 2022-03-18 PROBLEM — E78.2 MIXED HYPERLIPIDEMIA: Status: ACTIVE | Noted: 2021-08-11

## 2022-03-19 PROBLEM — L03.115 CELLULITIS OF RIGHT LEG: Status: ACTIVE | Noted: 2021-02-26

## 2022-03-19 PROBLEM — E66.9 OBESITY: Status: ACTIVE | Noted: 2021-08-11

## 2022-03-19 PROBLEM — E11.9 TYPE 2 DIABETES MELLITUS (HCC): Status: ACTIVE | Noted: 2017-01-01

## 2022-03-19 PROBLEM — E88.09 HYPOALBUMINEMIA: Status: ACTIVE | Noted: 2021-02-26

## 2022-03-19 PROBLEM — J45.909 ASTHMA: Status: ACTIVE | Noted: 2021-08-11

## 2022-03-19 PROBLEM — R60.0 LOWER LEG EDEMA: Status: ACTIVE | Noted: 2021-02-26

## 2022-03-19 PROBLEM — C61 PROSTATE CA (HCC): Status: ACTIVE | Noted: 2021-08-11

## 2022-03-20 PROBLEM — K20.90 ESOPHAGITIS: Status: ACTIVE | Noted: 2021-08-11

## 2022-03-20 PROBLEM — R78.81 BACTEREMIA: Status: ACTIVE | Noted: 2021-07-17

## 2022-03-20 PROBLEM — R73.9 HYPERGLYCEMIA: Status: ACTIVE | Noted: 2021-02-26

## 2022-05-05 ENCOUNTER — OFFICE VISIT (OUTPATIENT)
Dept: FAMILY MEDICINE CLINIC | Age: 57
End: 2022-05-05

## 2022-05-05 ENCOUNTER — HOSPITAL ENCOUNTER (INPATIENT)
Age: 57
LOS: 11 days | Discharge: HOME HEALTH | DRG: 383 | End: 2022-05-16
Attending: STUDENT IN AN ORGANIZED HEALTH CARE EDUCATION/TRAINING PROGRAM | Admitting: FAMILY MEDICINE
Payer: MEDICAID

## 2022-05-05 VITALS
TEMPERATURE: 98 F | WEIGHT: 272 LBS | BODY MASS INDEX: 43.71 KG/M2 | HEART RATE: 96 BPM | OXYGEN SATURATION: 99 % | SYSTOLIC BLOOD PRESSURE: 137 MMHG | DIASTOLIC BLOOD PRESSURE: 74 MMHG | RESPIRATION RATE: 16 BRPM | HEIGHT: 66 IN

## 2022-05-05 DIAGNOSIS — E11.620 CONTROLLED TYPE 2 DIABETES MELLITUS WITH DIABETIC DERMATITIS, WITH LONG-TERM CURRENT USE OF INSULIN (HCC): ICD-10-CM

## 2022-05-05 DIAGNOSIS — L03.116 BILATERAL LOWER LEG CELLULITIS: ICD-10-CM

## 2022-05-05 DIAGNOSIS — E66.01 CLASS 3 SEVERE OBESITY DUE TO EXCESS CALORIES WITH SERIOUS COMORBIDITY AND BODY MASS INDEX (BMI) OF 40.0 TO 44.9 IN ADULT (HCC): ICD-10-CM

## 2022-05-05 DIAGNOSIS — Z79.4 CONTROLLED TYPE 2 DIABETES MELLITUS WITH DIABETIC DERMATITIS, WITH LONG-TERM CURRENT USE OF INSULIN (HCC): ICD-10-CM

## 2022-05-05 DIAGNOSIS — L03.115 CELLULITIS OF RIGHT LOWER EXTREMITY: ICD-10-CM

## 2022-05-05 DIAGNOSIS — E11.42 DIABETIC POLYNEUROPATHY ASSOCIATED WITH TYPE 2 DIABETES MELLITUS (HCC): ICD-10-CM

## 2022-05-05 DIAGNOSIS — I87.2 VENOUS INSUFFICIENCY: Primary | ICD-10-CM

## 2022-05-05 DIAGNOSIS — L03.115 CELLULITIS OF RIGHT LEG: Primary | ICD-10-CM

## 2022-05-05 DIAGNOSIS — L03.115 BILATERAL LOWER LEG CELLULITIS: ICD-10-CM

## 2022-05-05 DIAGNOSIS — I87.2 CHRONIC VENOUS INSUFFICIENCY: ICD-10-CM

## 2022-05-05 LAB
ALBUMIN SERPL-MCNC: 3 G/DL (ref 3.5–5)
ALBUMIN/GLOB SERPL: 0.6 {RATIO} (ref 1.1–2.2)
ALP SERPL-CCNC: 83 U/L (ref 45–117)
ALT SERPL-CCNC: 27 U/L (ref 12–78)
ANION GAP SERPL CALC-SCNC: 7 MMOL/L (ref 5–15)
APPEARANCE UR: CLEAR
AST SERPL-CCNC: 20 U/L (ref 15–37)
BACTERIA URNS QL MICRO: NEGATIVE /HPF
BASOPHILS # BLD: 0 K/UL (ref 0–0.1)
BASOPHILS NFR BLD: 0 % (ref 0–1)
BILIRUB SERPL-MCNC: 0.2 MG/DL (ref 0.2–1)
BILIRUB UR QL: NEGATIVE
BNP SERPL-MCNC: 41 PG/ML
BUN SERPL-MCNC: 18 MG/DL (ref 6–20)
BUN/CREAT SERPL: 19 (ref 12–20)
CALCIUM SERPL-MCNC: 8.8 MG/DL (ref 8.5–10.1)
CHLORIDE SERPL-SCNC: 106 MMOL/L (ref 97–108)
CO2 SERPL-SCNC: 28 MMOL/L (ref 21–32)
COLOR UR: ABNORMAL
CREAT SERPL-MCNC: 0.95 MG/DL (ref 0.7–1.3)
DIFFERENTIAL METHOD BLD: ABNORMAL
EOSINOPHIL # BLD: 0.2 K/UL (ref 0–0.4)
EOSINOPHIL NFR BLD: 2 % (ref 0–7)
EPITH CASTS URNS QL MICRO: ABNORMAL /LPF
ERYTHROCYTE [DISTWIDTH] IN BLOOD BY AUTOMATED COUNT: 13.2 % (ref 11.5–14.5)
GLOBULIN SER CALC-MCNC: 5 G/DL (ref 2–4)
GLUCOSE BLD STRIP.AUTO-MCNC: 78 MG/DL (ref 65–117)
GLUCOSE SERPL-MCNC: 91 MG/DL (ref 65–100)
GLUCOSE UR STRIP.AUTO-MCNC: NEGATIVE MG/DL
HCT VFR BLD AUTO: 37.2 % (ref 36.6–50.3)
HGB BLD-MCNC: 11.4 G/DL (ref 12.1–17)
HGB UR QL STRIP: NEGATIVE
HYALINE CASTS URNS QL MICRO: ABNORMAL /LPF (ref 0–2)
IMM GRANULOCYTES # BLD AUTO: 0 K/UL (ref 0–0.04)
IMM GRANULOCYTES NFR BLD AUTO: 0 % (ref 0–0.5)
KETONES UR QL STRIP.AUTO: NEGATIVE MG/DL
LEUKOCYTE ESTERASE UR QL STRIP.AUTO: NEGATIVE
LYMPHOCYTES # BLD: 1.2 K/UL (ref 0.8–3.5)
LYMPHOCYTES NFR BLD: 16 % (ref 12–49)
MCH RBC QN AUTO: 27 PG (ref 26–34)
MCHC RBC AUTO-ENTMCNC: 30.6 G/DL (ref 30–36.5)
MCV RBC AUTO: 88.2 FL (ref 80–99)
MONOCYTES # BLD: 0.7 K/UL (ref 0–1)
MONOCYTES NFR BLD: 10 % (ref 5–13)
NEUTS SEG # BLD: 5.1 K/UL (ref 1.8–8)
NEUTS SEG NFR BLD: 72 % (ref 32–75)
NITRITE UR QL STRIP.AUTO: NEGATIVE
NRBC # BLD: 0 K/UL (ref 0–0.01)
NRBC BLD-RTO: 0 PER 100 WBC
PH UR STRIP: 5 [PH] (ref 5–8)
PLATELET # BLD AUTO: 256 K/UL (ref 150–400)
PMV BLD AUTO: 10.7 FL (ref 8.9–12.9)
POTASSIUM SERPL-SCNC: 3.8 MMOL/L (ref 3.5–5.1)
PROT SERPL-MCNC: 8 G/DL (ref 6.4–8.2)
PROT UR STRIP-MCNC: ABNORMAL MG/DL
RBC # BLD AUTO: 4.22 M/UL (ref 4.1–5.7)
RBC #/AREA URNS HPF: ABNORMAL /HPF (ref 0–5)
SERVICE CMNT-IMP: NORMAL
SODIUM SERPL-SCNC: 141 MMOL/L (ref 136–145)
SP GR UR REFRACTOMETRY: 1.01
UROBILINOGEN UR QL STRIP.AUTO: 0.2 EU/DL (ref 0.2–1)
WBC # BLD AUTO: 7.2 K/UL (ref 4.1–11.1)
WBC URNS QL MICRO: ABNORMAL /HPF (ref 0–4)

## 2022-05-05 PROCEDURE — 74011000250 HC RX REV CODE- 250: Performed by: FAMILY MEDICINE

## 2022-05-05 PROCEDURE — 99222 1ST HOSP IP/OBS MODERATE 55: CPT | Performed by: FAMILY MEDICINE

## 2022-05-05 PROCEDURE — 83880 ASSAY OF NATRIURETIC PEPTIDE: CPT

## 2022-05-05 PROCEDURE — 85025 COMPLETE CBC W/AUTO DIFF WBC: CPT

## 2022-05-05 PROCEDURE — 81001 URINALYSIS AUTO W/SCOPE: CPT

## 2022-05-05 PROCEDURE — 80053 COMPREHEN METABOLIC PANEL: CPT

## 2022-05-05 PROCEDURE — 74011000258 HC RX REV CODE- 258: Performed by: FAMILY MEDICINE

## 2022-05-05 PROCEDURE — 65270000029 HC RM PRIVATE

## 2022-05-05 PROCEDURE — 99285 EMERGENCY DEPT VISIT HI MDM: CPT

## 2022-05-05 PROCEDURE — 74011250636 HC RX REV CODE- 250/636: Performed by: FAMILY MEDICINE

## 2022-05-05 PROCEDURE — 82962 GLUCOSE BLOOD TEST: CPT

## 2022-05-05 PROCEDURE — 36415 COLL VENOUS BLD VENIPUNCTURE: CPT

## 2022-05-05 PROCEDURE — 74011250637 HC RX REV CODE- 250/637: Performed by: STUDENT IN AN ORGANIZED HEALTH CARE EDUCATION/TRAINING PROGRAM

## 2022-05-05 RX ORDER — MAGNESIUM SULFATE 100 %
4 CRYSTALS MISCELLANEOUS AS NEEDED
Status: DISCONTINUED | OUTPATIENT
Start: 2022-05-05 | End: 2022-05-16 | Stop reason: HOSPADM

## 2022-05-05 RX ORDER — HYDROCODONE BITARTRATE AND ACETAMINOPHEN 5; 325 MG/1; MG/1
1 TABLET ORAL ONCE
Status: COMPLETED | OUTPATIENT
Start: 2022-05-05 | End: 2022-05-05

## 2022-05-05 RX ORDER — SODIUM CHLORIDE 0.9 % (FLUSH) 0.9 %
5-40 SYRINGE (ML) INJECTION EVERY 8 HOURS
Status: DISCONTINUED | OUTPATIENT
Start: 2022-05-05 | End: 2022-05-16 | Stop reason: HOSPADM

## 2022-05-05 RX ORDER — SILDENAFIL 50 MG/1
TABLET, FILM COATED ORAL
COMMUNITY
Start: 2021-11-15

## 2022-05-05 RX ORDER — ENOXAPARIN SODIUM 100 MG/ML
30 INJECTION SUBCUTANEOUS EVERY 12 HOURS
Status: DISCONTINUED | OUTPATIENT
Start: 2022-05-05 | End: 2022-05-16 | Stop reason: HOSPADM

## 2022-05-05 RX ORDER — FUROSEMIDE 10 MG/ML
40 INJECTION INTRAMUSCULAR; INTRAVENOUS DAILY
Status: DISCONTINUED | OUTPATIENT
Start: 2022-05-05 | End: 2022-05-16 | Stop reason: HOSPADM

## 2022-05-05 RX ORDER — SODIUM CHLORIDE 0.9 % (FLUSH) 0.9 %
5-40 SYRINGE (ML) INJECTION AS NEEDED
Status: DISCONTINUED | OUTPATIENT
Start: 2022-05-05 | End: 2022-05-16 | Stop reason: HOSPADM

## 2022-05-05 RX ORDER — INSULIN LISPRO 100 [IU]/ML
INJECTION, SOLUTION INTRAVENOUS; SUBCUTANEOUS
Status: DISCONTINUED | OUTPATIENT
Start: 2022-05-05 | End: 2022-05-16 | Stop reason: HOSPADM

## 2022-05-05 RX ADMIN — HYDROCODONE BITARTRATE AND ACETAMINOPHEN 1 TABLET: 5; 325 TABLET ORAL at 18:38

## 2022-05-05 RX ADMIN — SODIUM CHLORIDE, PRESERVATIVE FREE 10 ML: 5 INJECTION INTRAVENOUS at 23:31

## 2022-05-05 RX ADMIN — PIPERACILLIN AND TAZOBACTAM 4.5 G: 4; .5 INJECTION, POWDER, LYOPHILIZED, FOR SOLUTION INTRAVENOUS at 18:49

## 2022-05-05 RX ADMIN — FUROSEMIDE 40 MG: 10 INJECTION, SOLUTION INTRAMUSCULAR; INTRAVENOUS at 18:40

## 2022-05-05 RX ADMIN — ENOXAPARIN SODIUM 30 MG: 100 INJECTION SUBCUTANEOUS at 18:40

## 2022-05-05 NOTE — LETTER
Dear [facility]    Please fax us the most recent eye exam resultss so that we may update the patient's records for continuity of care.      Our fax number: [phone]    Patient:   Betzy Murrell  1965

## 2022-05-05 NOTE — PROGRESS NOTES
Pharmacist note:   P&T-Approved DVT / VTE Prophylaxis Dosing    Per [address] [address] -approved protocol   Enoxaparin 40mg daily  has been adjusted to 30mg SC q12h  based on weight and renal function as shown in the table below.            Wt Readings from Last 1 Encounters:   05/05/22 123.4 kg (272 lb 0.8 oz)       Est. Creatinine Clearance: 77.4 mL/min (based on Ideal Body Weight)      ENOXAPARIN/Heparin  ROUTINE PROPHYLAXIS DOSING (Medically ill, routine surgery)                                                                                                                                                                                                                                Estimated CrCl (mL/min) Patient Weight (Kg)     <= 50 Kg   101-150 151-174 >= 175 Kg    30 or greater 30 mg  SC  Daily 40mg  SC   Daily 30mg   SC  Q12h 40 mg  SC  Q12h 60mg   SC  Q12h    15-29 UFH 5000 units SC  Q12h 30 mg   SC  Daily 30 mg   SC  Daily 40 mg   SC  Daily 60 mg  SC   Daily    < 15 or Dialysis UHF 5000 units SC  Q12h UFH 5000 units SC   Q8h UFH 7500 units SC Q8h        SC = Subcutaneously                          UFH= Unfractionated Heparin     Anticoag Policy-Protocol-Dosing -[address], [facility]

## 2022-05-05 NOTE — PROGRESS NOTES
HISTORY OF PRESENT ILLNESS  Stephanie Vazquez is a 62 y.o. male. HPI has been having drainage from both legs, for the last several days. Legs have been swollen. Takes lasix 40 mg daily. No fever, chills, dyspnea.      ROS    Physical Exam    ASSESSMENT and PLAN  pt refered to ER for admission

## 2022-05-05 NOTE — PROGRESS NOTES
Chief Complaint   Patient presents with    Hypertension    Cholesterol Problem    Vitamin D Deficiency    Follow-up       1. \"Have you been to the ER, urgent care clinic since your last visit? Hospitalized since your last visit? \" Yes Reason for visit: /1/27/22  [facility]    2. \"Have you seen or consulted any other health care providers outside of the [address] [place] [address] since your last visit? \" Yes Hematology [facility]     3. For patients aged 39-70: Has the patient had a colonoscopy / FIT/ Cologuard? No      If the patient is female:    4. For patients aged 41-77: Has the patient had a mammogram within the past 2 years? NA - based on age or sex      11. For patients aged 21-65: Has the patient had a pap smear?  NA - based on age or sex    Health Maintenance Due   Topic Date Due    Pneumococcal 0-64 years (1 - PCV) Never done    Foot Exam Q1  Never done    Eye Exam Retinal or Dilated  Never done    DTaP/Tdap/Td series (1 - Tdap) Never done    Colorectal Cancer Screening Combo  Never done    Shingrix Vaccine Age 50> (1 of 2) Never done    COVID-19 Vaccine (3 - Booster for [place] series) 08/13/2021

## 2022-05-05 NOTE — ED PROVIDER NOTES
EMERGENCY DEPARTMENT HISTORY AND PHYSICAL EXAM      Date: 5/5/2022  Patient Name: Orien Holter    History of Presenting Illness     Chief Complaint   Patient presents with    Peripheral Edema     bilateral; he was at [place] [address] office referred to ED; pt has open wounds on hislegs; bgl 112 ; has had pain in legs all week. HPI: Orien Holter, 62 y.o. male presents to the ED with cc of leg swelling. Was seen by his primary care doctor today and told to come to the emergency department. He has had issues with swelling of both of his legs, however this is gotten worse over the past week, especially worse on the right. He has wounds on both of his legs that have been looking especially worse on the right in the past week with increasing pain, swelling and drainage. He denies any fevers, denies any shortness of breath. There are no other complaints, changes, or physical findings at this time. PCP: Joselo Casillas MD    No current facility-administered medications on file prior to encounter. Current Outpatient Medications on File Prior to Encounter   Medication Sig Dispense Refill    Blood-Glu Meter,Cont-Transmit misc 1 Units by Not Applicable route two (2) times a day.  sildenafil citrate (VIAGRA) 50 mg tablet TAKE 1 TABLET BY MOUTH ONCE DAILY AS NEEDED FOR ERECTILE DYSFUNCTION      atorvastatin (LIPITOR) 40 mg tablet Take 1 Tablet by mouth daily. 90 Tablet 1    glimepiride (AMARYL) 4 mg tablet Take 1 Tablet by mouth every morning. Indications: type 2 diabetes mellitus 90 Tablet 3    cholecalciferol (VITAMIN D3) (50,000 UNITS /1250 MCG) capsule Take 1 Capsule by mouth every seven (7) days. Indications: low vitamin D levels 12 Capsule 0    Xtandi 80 mg tab Take  by mouth two (2) times a day.  flash glucose scanning reader (FreeStyle Christian 14 Day [place]) misc 1 Units by Does Not Apply route two (2) times a day.  (Patient not taking: Reported on 11/5/2021) 1 Each 0    flash glucose sensor (FreeStyle Christian 14 Day Sensor) kit 1 Units by Does Not Apply route every fourteen (14) days. (Patient not taking: Reported on 11/5/2021) 2 Kit 5    gabapentin (NEURONTIN) 100 mg capsule Take 1 Capsule by mouth three (3) times daily. Max Daily Amount: 300 mg. Indications: neuropathic pain 90 Capsule 1    ibuprofen (MOTRIN) 600 mg tablet 600 mg = 1 tab each dose, PO, tid, PRN: as needed for pain, # 90 tab, 0 Refills, Pharmacy: 3225 films 2049      glucometer,pre-loaded strips (GLUCOSE METER, DISP & STRIPS) Glucose meter, See Instructions, #: 1 EA, 0 Refill(s), Pharmacy: 3225 films 4204      Insulin Syringe-Needle U-100 (BD Insulin Syringe) 1 mL 25 gauge x 5/8\" syrg Insulin syringe 1 ml (100 units) 8mm needle, See Instructions, #: 100 EA, 11 Refill(s), Pharmacy: 3225 films 7436      lancets (Accu-Chek Softclix Lancets) misc Lancets, See Instructions, #: 400 EA, 4 Refill(s), Pharmacy: 3225 films 4384      furosemide (LASIX) 40 mg tablet One po daily for fluid 30 Tablet 12    meloxicam (MOBIC) 15 mg tablet Take 1 Tablet by mouth daily. For leg pain 30 Tablet 5    insulin glargine (Lantus U-100 Insulin) 100 unit/mL injection 25 Units by SubCUTAneous route daily.  metFORMIN (GLUMETZA ER) 500 mg TG24 24 hour tablet Take 2 Tabs by mouth two (2) times a day. (Patient not taking: Reported on 5/5/2022)         Past History     Past Medical History:  Past Medical History:   Diagnosis Date    Asthma     Diabetes ([address]     Lower leg edema     Prostate cancer ([address]     gets chemo at [facility]       Past Surgical History:  No past surgical history on file.     Family History:  Family History   Problem Relation Age of Onset    Stroke Mother         brain bleed    Stroke Father     Hypertension Father     Heart Attack Father     Hypertension Sister     Obesity Sister     Asthma Sister     Hypertension Sister     Hypertension Sister     No Known Problems Brother     No Known Problems Sister        Social History:  Social History     Tobacco Use    Smoking status: Former Smoker     Packs/day: 0.50     Years: 2.00     Pack years: 1.00     Types: Cigarettes     Quit date:      Years since quittin.3    Smokeless tobacco: Never Used    Tobacco comment: quit > 8 years ago   Substance Use Topics    Alcohol use: Yes     Comment: 1-2 per day (wine or beer)    Drug use: No       Allergies:  No Known Allergies      Review of Systems   no fever  No ear pain  No eye pain  no shortness of breath  no chest pain  no abdominal pain  no dysuria  Reports leg pain  Reports leg wounds  No lymphadenopathy  No weight loss    Physical Exam   Physical Exam  Constitutional:       General: He is not in acute distress. Appearance: He is not toxic-appearing. HENT:      Head: Normocephalic and atraumatic. Mouth/Throat:      Mouth: Mucous membranes are moist.   Eyes:      Extraocular Movements: Extraocular movements intact. Cardiovascular:      Rate and Rhythm: Normal rate and regular rhythm. Pulmonary:      Effort: Pulmonary effort is normal.      Breath sounds: Normal breath sounds. Abdominal:      Palpations: Abdomen is soft. Tenderness: There is no abdominal tenderness. Musculoskeletal:         General: Swelling and tenderness present. Cervical back: Neck supple. Comments: Bilateral edema of both lower extremities, however worse on the right as compared to the left, strong DP pulses bilaterally, right leg is more warm as compared to the left, scattered wounds over the anterior and posterior shin and calf regions, pink and moist based, there is some serous drainage on the bandages. Skin:     General: Skin is warm and dry. Neurological:      General: No focal deficit present. Mental Status: He is alert and oriented to person, place, and time.    Psychiatric:         Mood and Affect: Mood normal.             Diagnostic Study Results     Labs -     Recent Results (from the past 24 hour(s))   CBC WITH AUTOMATED DIFF    Collection Time: 05/05/22  5:11 PM   Result Value Ref Range    WBC 7.2 4.1 - 11.1 K/uL    RBC 4.22 4.10 - 5.70 M/uL    HGB 11.4 (L) 12.1 - 17.0 g/dL    HCT 37.2 36.6 - 50.3 %    MCV 88.2 80.0 - 99.0 FL    MCH 27.0 26.0 - 34.0 PG    MCHC 30.6 30.0 - 36.5 g/dL    RDW 13.2 11.5 - 14.5 %    PLATELET 044 419 - 225 K/uL    MPV 10.7 8.9 - 12.9 FL    NRBC 0.0 0  WBC    ABSOLUTE NRBC 0.00 0.00 - 0.01 K/uL    NEUTROPHILS 72 32 - 75 %    LYMPHOCYTES 16 12 - 49 %    MONOCYTES 10 5 - 13 %    EOSINOPHILS 2 0 - 7 %    BASOPHILS 0 0 - 1 %    IMMATURE GRANULOCYTES 0 0.0 - 0.5 %    ABS. NEUTROPHILS 5.1 1.8 - 8.0 K/UL    ABS. LYMPHOCYTES 1.2 0.8 - 3.5 K/UL    ABS. MONOCYTES 0.7 0.0 - 1.0 K/UL    ABS. EOSINOPHILS 0.2 0.0 - 0.4 K/UL    ABS. BASOPHILS 0.0 0.0 - 0.1 K/UL    ABS. IMM. GRANS. 0.0 0.00 - 0.04 K/UL    DF AUTOMATED     METABOLIC PANEL, COMPREHENSIVE    Collection Time: 05/05/22  5:11 PM   Result Value Ref Range    Sodium 141 136 - 145 mmol/L    Potassium 3.8 3.5 - 5.1 mmol/L    Chloride 106 97 - 108 mmol/L    CO2 28 21 - 32 mmol/L    Anion gap 7 5 - 15 mmol/L    Glucose 91 65 - 100 mg/dL    BUN 18 6 - 20 MG/DL    Creatinine 0.95 0.70 - 1.30 MG/DL    BUN/Creatinine ratio 19 12 - 20      GFR est AA >60 >60 ml/min/1.73m2    GFR est non-AA >60 >60 ml/min/1.73m2    Calcium 8.8 8.5 - 10.1 MG/DL    Bilirubin, total 0.2 0.2 - 1.0 MG/DL    ALT (SGPT) 27 12 - 78 U/L    AST (SGOT) 20 15 - 37 U/L    Alk.  phosphatase 83 45 - 117 U/L    Protein, total 8.0 6.4 - 8.2 g/dL    Albumin 3.0 (L) 3.5 - 5.0 g/dL    Globulin 5.0 (H) 2.0 - 4.0 g/dL    A-G Ratio 0.6 (L) 1.1 - 2.2     NT-PRO BNP    Collection Time: 05/05/22  5:11 PM   Result Value Ref Range    NT pro-BNP 41 <125 PG/ML   GLUCOSE, POC    Collection Time: 05/05/22  7:01 PM   Result Value Ref Range    Glucose (POC) 78 65 - 117 mg/dL    Performed by Sherren Degree        Radiologic Studies -   No orders to display     CT Results  (Last 48 hours)    None        CXR Results  (Last 48 hours)    None            Medical Decision Making   I am the first provider for this patient. I reviewed the vital signs, available nursing notes, past medical history, past surgical history, family history and social history. Vital Signs-Reviewed the patient's vital signs. Patient Vitals for the past 24 hrs:   Temp Pulse Resp BP SpO2   05/05/22 1840 -- 99 -- (!) 155/98 --   05/05/22 1833 -- -- -- (!) 155/98 93 %   05/05/22 1818 -- -- -- -- 92 %   05/05/22 1803 -- -- -- (!) 160/108 95 %   05/05/22 1748 -- -- -- (!) 149/94 95 %   05/05/22 1638 97.6 °F (36.4 °C) 94 20 136/77 100 %         Provider Notes (Medical Decision Making):   14-year-old male sent from primary care doctor's office for increasing pain and swelling of more his right leg. Does have history of lymphedema per chart review, however with increasing pain redness and swelling of the right leg, this is concerning for overlying cellulitis. He is otherwise afebrile and nontoxic-appearing, denies any shortness of breath, lungs clear, saturating greater than 90% on room air, unlikely significant pulmonary edema or other cardiopulmonary emergency. ED Course:     Initial assessment performed. The patients presenting problems have been discussed, and they are in agreement with the care plan formulated and outlined with them. I have encouraged them to ask questions as they arise throughout their visit. CBC negative for leukocytosis or anemia, basic metabolic panel with normal renal function, no worrisome electrolyte abnormalities, BNP is not significantly elevated. Patient is admitted to Dr. Evelyn Suazo service, looks like he has ordered antibiotics. Patient agreeable to admission. Critical Care Time:         Disposition:  Admit     PLAN:  1. Current Discharge Medication List        2.    Follow-up Information    None       Return to ED if worse     Diagnosis     Clinical Impression: acute right lower extremity cellulitis

## 2022-05-06 ENCOUNTER — APPOINTMENT (OUTPATIENT)
Dept: GENERAL RADIOLOGY | Age: 57
DRG: 383 | End: 2022-05-06
Attending: FAMILY MEDICINE
Payer: MEDICAID

## 2022-05-06 ENCOUNTER — APPOINTMENT (OUTPATIENT)
Dept: VASCULAR SURGERY | Age: 57
DRG: 383 | End: 2022-05-06
Attending: FAMILY MEDICINE
Payer: MEDICAID

## 2022-05-06 LAB
APPEARANCE UR: CLEAR
BACTERIA URNS QL MICRO: NEGATIVE /HPF
BILIRUB UR QL: NEGATIVE
BNP SERPL-MCNC: 55 PG/ML
COLOR UR: ABNORMAL
EPITH CASTS URNS QL MICRO: ABNORMAL /LPF
GLUCOSE BLD STRIP.AUTO-MCNC: 134 MG/DL (ref 65–117)
GLUCOSE BLD STRIP.AUTO-MCNC: 177 MG/DL (ref 65–117)
GLUCOSE BLD STRIP.AUTO-MCNC: 190 MG/DL (ref 65–117)
GLUCOSE BLD STRIP.AUTO-MCNC: 204 MG/DL (ref 65–117)
GLUCOSE UR STRIP.AUTO-MCNC: 500 MG/DL
HGB UR QL STRIP: NEGATIVE
HYALINE CASTS URNS QL MICRO: ABNORMAL /LPF (ref 0–2)
KETONES UR QL STRIP.AUTO: NEGATIVE MG/DL
LEUKOCYTE ESTERASE UR QL STRIP.AUTO: NEGATIVE
NITRITE UR QL STRIP.AUTO: NEGATIVE
PH UR STRIP: 5 [PH] (ref 5–8)
PROT UR STRIP-MCNC: NEGATIVE MG/DL
RBC #/AREA URNS HPF: ABNORMAL /HPF (ref 0–5)
SERVICE CMNT-IMP: ABNORMAL
SP GR UR REFRACTOMETRY: 1.01
T4 FREE SERPL-MCNC: 0.9 NG/DL (ref 0.8–1.5)
TSH SERPL DL<=0.05 MIU/L-ACNC: 0.54 UIU/ML (ref 0.36–3.74)
UROBILINOGEN UR QL STRIP.AUTO: 0.2 EU/DL (ref 0.2–1)
WBC URNS QL MICRO: ABNORMAL /HPF (ref 0–4)

## 2022-05-06 PROCEDURE — 84443 ASSAY THYROID STIM HORMONE: CPT

## 2022-05-06 PROCEDURE — 36415 COLL VENOUS BLD VENIPUNCTURE: CPT

## 2022-05-06 PROCEDURE — 65270000029 HC RM PRIVATE

## 2022-05-06 PROCEDURE — 81001 URINALYSIS AUTO W/SCOPE: CPT

## 2022-05-06 PROCEDURE — 87186 SC STD MICRODIL/AGAR DIL: CPT

## 2022-05-06 PROCEDURE — 74011000258 HC RX REV CODE- 258: Performed by: FAMILY MEDICINE

## 2022-05-06 PROCEDURE — 93970 EXTREMITY STUDY: CPT

## 2022-05-06 PROCEDURE — 74011636637 HC RX REV CODE- 636/637: Performed by: FAMILY MEDICINE

## 2022-05-06 PROCEDURE — 84439 ASSAY OF FREE THYROXINE: CPT

## 2022-05-06 PROCEDURE — 87077 CULTURE AEROBIC IDENTIFY: CPT

## 2022-05-06 PROCEDURE — 87147 CULTURE TYPE IMMUNOLOGIC: CPT

## 2022-05-06 PROCEDURE — 74011250636 HC RX REV CODE- 250/636: Performed by: FAMILY MEDICINE

## 2022-05-06 PROCEDURE — 87205 SMEAR GRAM STAIN: CPT

## 2022-05-06 PROCEDURE — 99232 SBSQ HOSP IP/OBS MODERATE 35: CPT | Performed by: FAMILY MEDICINE

## 2022-05-06 PROCEDURE — 74011000250 HC RX REV CODE- 250: Performed by: FAMILY MEDICINE

## 2022-05-06 PROCEDURE — 71046 X-RAY EXAM CHEST 2 VIEWS: CPT

## 2022-05-06 PROCEDURE — 74011250637 HC RX REV CODE- 250/637: Performed by: FAMILY MEDICINE

## 2022-05-06 PROCEDURE — 82962 GLUCOSE BLOOD TEST: CPT

## 2022-05-06 PROCEDURE — 83880 ASSAY OF NATRIURETIC PEPTIDE: CPT

## 2022-05-06 RX ORDER — INSULIN GLARGINE 100 [IU]/ML
25 INJECTION, SOLUTION SUBCUTANEOUS DAILY
Status: DISCONTINUED | OUTPATIENT
Start: 2022-05-06 | End: 2022-05-16 | Stop reason: HOSPADM

## 2022-05-06 RX ORDER — ATORVASTATIN CALCIUM 40 MG/1
40 TABLET, FILM COATED ORAL DAILY
Status: DISCONTINUED | OUTPATIENT
Start: 2022-05-06 | End: 2022-05-16 | Stop reason: HOSPADM

## 2022-05-06 RX ORDER — OXYCODONE AND ACETAMINOPHEN 5; 325 MG/1; MG/1
1 TABLET ORAL
Status: DISCONTINUED | OUTPATIENT
Start: 2022-05-06 | End: 2022-05-16 | Stop reason: HOSPADM

## 2022-05-06 RX ORDER — IBUPROFEN 600 MG/1
600 TABLET ORAL
Status: DISCONTINUED | OUTPATIENT
Start: 2022-05-06 | End: 2022-05-16 | Stop reason: HOSPADM

## 2022-05-06 RX ORDER — GABAPENTIN 100 MG/1
100 CAPSULE ORAL 3 TIMES DAILY
Status: DISCONTINUED | OUTPATIENT
Start: 2022-05-06 | End: 2022-05-16 | Stop reason: HOSPADM

## 2022-05-06 RX ORDER — LIDOCAINE 40 MG/G
CREAM TOPICAL DAILY
Status: DISCONTINUED | OUTPATIENT
Start: 2022-05-07 | End: 2022-05-10

## 2022-05-06 RX ORDER — NAPROXEN 250 MG/1
500 TABLET ORAL 2 TIMES DAILY WITH MEALS
Status: DISCONTINUED | OUTPATIENT
Start: 2022-05-06 | End: 2022-05-16 | Stop reason: HOSPADM

## 2022-05-06 RX ADMIN — GABAPENTIN 100 MG: 100 CAPSULE ORAL at 15:13

## 2022-05-06 RX ADMIN — GABAPENTIN 100 MG: 100 CAPSULE ORAL at 21:20

## 2022-05-06 RX ADMIN — PIPERACILLIN AND TAZOBACTAM 3.38 G: 3; .375 INJECTION, POWDER, LYOPHILIZED, FOR SOLUTION INTRAVENOUS at 08:48

## 2022-05-06 RX ADMIN — OXYCODONE AND ACETAMINOPHEN 1 TABLET: 5; 325 TABLET ORAL at 08:47

## 2022-05-06 RX ADMIN — Medication 25 UNITS: at 10:37

## 2022-05-06 RX ADMIN — ENOXAPARIN SODIUM 30 MG: 100 INJECTION SUBCUTANEOUS at 21:20

## 2022-05-06 RX ADMIN — NAPROXEN 500 MG: 250 TABLET ORAL at 10:53

## 2022-05-06 RX ADMIN — SODIUM CHLORIDE, PRESERVATIVE FREE 10 ML: 5 INJECTION INTRAVENOUS at 15:13

## 2022-05-06 RX ADMIN — ENOXAPARIN SODIUM 30 MG: 100 INJECTION SUBCUTANEOUS at 08:48

## 2022-05-06 RX ADMIN — GABAPENTIN 100 MG: 100 CAPSULE ORAL at 10:37

## 2022-05-06 RX ADMIN — SODIUM CHLORIDE, PRESERVATIVE FREE 10 ML: 5 INJECTION INTRAVENOUS at 21:20

## 2022-05-06 RX ADMIN — NAPROXEN 500 MG: 250 TABLET ORAL at 17:20

## 2022-05-06 RX ADMIN — IBUPROFEN 600 MG: 600 TABLET ORAL at 15:13

## 2022-05-06 RX ADMIN — FUROSEMIDE 40 MG: 10 INJECTION, SOLUTION INTRAMUSCULAR; INTRAVENOUS at 08:47

## 2022-05-06 RX ADMIN — PIPERACILLIN AND TAZOBACTAM 3.38 G: 3; .375 INJECTION, POWDER, LYOPHILIZED, FOR SOLUTION INTRAVENOUS at 01:28

## 2022-05-06 RX ADMIN — ATORVASTATIN CALCIUM 40 MG: 40 TABLET, FILM COATED ORAL at 10:37

## 2022-05-06 RX ADMIN — SODIUM CHLORIDE, PRESERVATIVE FREE 10 ML: 5 INJECTION INTRAVENOUS at 05:19

## 2022-05-06 RX ADMIN — PIPERACILLIN AND TAZOBACTAM 3.38 G: 3; .375 INJECTION, POWDER, LYOPHILIZED, FOR SOLUTION INTRAVENOUS at 17:20

## 2022-05-06 RX ADMIN — Medication 2 UNITS: at 12:50

## 2022-05-06 NOTE — PROGRESS NOTES
General Daily Progress Note    Admit Date: 5/5/2022  Hospital day 2    Subjective:     Patient has no complaint of fever, chills. Continues to have R leg pain. Has chrnoically swollen legs. Works on feet all the time. ..   Medication side effects: none    Current Facility-Administered Medications   Medication Dose Route Frequency    naproxen (NAPROSYN) tablet 500 mg  500 mg Oral BID WITH MEALS    oxyCODONE-acetaminophen (PERCOCET) 5-325 mg per tablet 1 Tablet  1 Tablet Oral Q8H PRN    furosemide (LASIX) injection 40 mg  40 mg IntraVENous DAILY    insulin lispro (HUMALOG) injection   SubCUTAneous TIDAC    glucose chewable tablet 16 g  4 Tablet Oral PRN    glucagon (GLUCAGEN) injection 1 mg  1 mg IntraMUSCular PRN    dextrose 10 % infusion 0-250 mL  0-250 mL IntraVENous PRN    sodium chloride (NS) flush 5-40 mL  5-40 mL IntraVENous Q8H    sodium chloride (NS) flush 5-40 mL  5-40 mL IntraVENous PRN    enoxaparin (LOVENOX) injection 30 mg  30 mg SubCUTAneous Q12H    piperacillin-tazobactam (ZOSYN) 3.375 g in 0.9% sodium chloride (MBP/ADV) 100 mL MBP  3.375 g IntraVENous Q8H        Review of Systems  Pertinent items are noted in HPI. Objective:     Patient Vitals for the past 8 hrs:   BP Temp Pulse Resp SpO2   05/06/22 0004 (!) 153/94 97.8 °F (36.6 °C) 98 18 92 %     05/05 1901 - 05/06 0700  In: -   Out: 5294 [Urine:2275]  No intake/output data recorded. Physical Exam:   Visit Vitals  BP (!) 153/94 (BP 1 Location: Right upper arm, BP Patient Position: Semi fowlers)   Pulse 98   Temp 97.8 °F (36.6 °C)   Resp 18   Ht 5' 6\" (1.676 m)   Wt 272 lb 0.8 oz (123.4 kg)   SpO2 92%   BMI 43.91 kg/m²     Lungs: clear to auscultation bilaterally  Heart: regular rate and rhythm, S1, S2 normal, no murmur, click, rub or gallop  Abdomen: soft, non-tender. Bowel sounds normal. No masses,  no organomegaly  Extremities: edema , 3+ of R leg, 2+ of l leg. R leg- has multiple areas of skin breakdown and oozing.  Medial tibial area particularly raw and tender. ECG: normal sinus rhythm     Data Review   Recent Results (from the past 24 hour(s))   CBC WITH AUTOMATED DIFF    Collection Time: 05/05/22  5:11 PM   Result Value Ref Range    WBC 7.2 4.1 - 11.1 K/uL    RBC 4.22 4.10 - 5.70 M/uL    HGB 11.4 (L) 12.1 - 17.0 g/dL    HCT 37.2 36.6 - 50.3 %    MCV 88.2 80.0 - 99.0 FL    MCH 27.0 26.0 - 34.0 PG    MCHC 30.6 30.0 - 36.5 g/dL    RDW 13.2 11.5 - 14.5 %    PLATELET 043 925 - 254 K/uL    MPV 10.7 8.9 - 12.9 FL    NRBC 0.0 0  WBC    ABSOLUTE NRBC 0.00 0.00 - 0.01 K/uL    NEUTROPHILS 72 32 - 75 %    LYMPHOCYTES 16 12 - 49 %    MONOCYTES 10 5 - 13 %    EOSINOPHILS 2 0 - 7 %    BASOPHILS 0 0 - 1 %    IMMATURE GRANULOCYTES 0 0.0 - 0.5 %    ABS. NEUTROPHILS 5.1 1.8 - 8.0 K/UL    ABS. LYMPHOCYTES 1.2 0.8 - 3.5 K/UL    ABS. MONOCYTES 0.7 0.0 - 1.0 K/UL    ABS. EOSINOPHILS 0.2 0.0 - 0.4 K/UL    ABS. BASOPHILS 0.0 0.0 - 0.1 K/UL    ABS. IMM. GRANS. 0.0 0.00 - 0.04 K/UL    DF AUTOMATED     METABOLIC PANEL, COMPREHENSIVE    Collection Time: 05/05/22  5:11 PM   Result Value Ref Range    Sodium 141 136 - 145 mmol/L    Potassium 3.8 3.5 - 5.1 mmol/L    Chloride 106 97 - 108 mmol/L    CO2 28 21 - 32 mmol/L    Anion gap 7 5 - 15 mmol/L    Glucose 91 65 - 100 mg/dL    BUN 18 6 - 20 MG/DL    Creatinine 0.95 0.70 - 1.30 MG/DL    BUN/Creatinine ratio 19 12 - 20      GFR est AA >60 >60 ml/min/1.73m2    GFR est non-AA >60 >60 ml/min/1.73m2    Calcium 8.8 8.5 - 10.1 MG/DL    Bilirubin, total 0.2 0.2 - 1.0 MG/DL    ALT (SGPT) 27 12 - 78 U/L    AST (SGOT) 20 15 - 37 U/L    Alk.  phosphatase 83 45 - 117 U/L    Protein, total 8.0 6.4 - 8.2 g/dL    Albumin 3.0 (L) 3.5 - 5.0 g/dL    Globulin 5.0 (H) 2.0 - 4.0 g/dL    A-G Ratio 0.6 (L) 1.1 - 2.2     NT-PRO BNP    Collection Time: 05/05/22  5:11 PM   Result Value Ref Range    NT pro-BNP 41 <125 PG/ML   GLUCOSE, POC    Collection Time: 05/05/22  7:01 PM   Result Value Ref Range    Glucose (POC) 78 65 - 117 mg/dL Performed by Sherren Degree    URINALYSIS W/ RFLX MICROSCOPIC    Collection Time: 05/05/22  7:18 PM   Result Value Ref Range    Color YELLOW/STRAW      Appearance CLEAR CLEAR      Specific gravity 1.010      pH (UA) 5.0 5.0 - 8.0      Protein TRACE (A) NEG mg/dL    Glucose Negative NEG mg/dL    Ketone Negative NEG mg/dL    Bilirubin Negative NEG      Blood Negative NEG      Urobilinogen 0.2 0.2 - 1.0 EU/dL    Nitrites Negative NEG      Leukocyte Esterase Negative NEG      WBC 0-4 0 - 4 /hpf    RBC 0-5 0 - 5 /hpf    Epithelial cells FEW FEW /lpf    Bacteria Negative NEG /hpf    Hyaline cast 0-2 0 - 2 /lpf           Assessment:     Active Problems:    Cellulitis of right leg (2/26/2021)        Plan:     1. Cellulitis R leg with multiple areas of skin breakdown- continue Zosyn. Wound care consult. .   2. Edema lower extremities- probable Chronic venous insufficiency- will check ultrasound this am. Continue lasix for now. 3. Diabetes  4. Morbid obesity.

## 2022-05-06 NOTE — H&P
[address]  HISTORY AND PHYSICAL    Name:  Hanna Tubbs  MR#:  [id number]  :  1965  ACCOUNT #:  [de-identified]  ADMIT DATE:  2022      HISTORY OF PRESENT ILLNESS:  The patient is a 68-year-old -American male with a history of morbid obesity, chronic venous insufficiency, diabetes, history of metastatic prostate cancer and asthma. He came to the office today complaining of a two-week history of swelling in both legs, right worse than left. The right leg below the knee had developed several areas of skin breakdown, oozing, and increasing pain. He denies any fever, chills, cough, shortness of breath. He has been admitted in the past for similar problems and at one time apparently had staph sepsis from this. PAST MEDICAL HISTORY:  Significant for prostate cancer metastatic, takes chemo at [facility], history of diabetes, history of chronic venous insufficiency, history of asthma. CURRENT MEDICATIONS ON ADMISSION:  1. Atorvastatin 40 mg daily. 2.  Glimepiride 4 mg once daily. 3.  Vitamin D 50,000 units once a week. 4.  Xtandi 80 mg twice daily. 5.  Gabapentin 100 mg t.i.d.  6.  Ibuprofen 600 mg p.r.n.  7.  Furosemide 40 mg daily. 8.  Meloxicam 15 mg daily. 9.  Lantus insulin 25 units daily. 10.  Metformin extended release 500 mg two tablets twice daily. ALLERGIES:  NONE KNOWN. HABITS:  Smoking history, the patient is a former smoker, quit in . Alcohol is one to two drinks per day. SOCIAL HISTORY:  The patient works at [employer] He actually also lives there. 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 REVIEW OF SYSTEMS:  CARDIOVASCULAR:  No prior history of cardiac disease or current complaints of chest pain. No history of congestive heart failure. RESPIRATORY:  No cough. No shortness of breath. GI:  No nausea, vomiting, abdominal pain. :  No difficulty voiding. GENERAL:  No weight loss.   SKIN:  Has had problems with recurrent skin infection and venous insufficiency in the past.  He was in the hospital with staph haemolyticus bacteremia this past summer with infected leg wounds. Treated with cefazolin. Remainder of the review of systems is negative. PHYSICAL EXAMINATION:  GENERAL:  Currently the patient is awake, alert, oriented, cooperative. VITAL SIGNS:  Temperature 97.6, blood pressure 136/77, respiratory rate 20, O2 saturation 100% on room air. LUNGS:  Clear to auscultation and percussion. CARDIOVASCULAR:  Regular rhythm and rate. No murmurs, thrills, rubs, or gallops. ABDOMEN:  Soft without mass, tenderness or organomegaly. EXTREMITIES:  The right leg was markedly swollen from the knee down. There were multiple areas of open skin wounds, several of these had convalesced and one in particular along the medial proximal calf was approximately 6 x 8 cm in diameter. These areas were very red and tender, also had several open areas on the back of the leg. The left leg was also mildly swollen from the knee down. There was minimal open areas of minimal drainage in this leg. Distal pulses are 1+ and symmetrical.    ASSESSMENT:  1. Chronic venous insufficiency of the right leg with multiple open areas. 2.  Secondary cellulitis. 3.  Diabetes. 4.  History of metastatic prostate cancer. 5.  Morbid obesity. PLAN:  Admit the patient to the hospital, obtain wound care, start on IV antibiotics. We will do sliding scale insulin for now. The patient will probably benefit from some venous compression stockings, we will order these at the time of discharge.         Kelli Velasco MD [id number]  D:  05/05/2022 17:46  T:  05/05/2022 22:40  JOB #:  [id number]

## 2022-05-06 NOTE — ED NOTES
Bedside and Verbal shift change report given to Gelacio (oncoming nurse) by Izzy Kim (offgoing nurse). Report included the following information SBAR, Kardex, ED Summary, Intake/Output, MAR and Recent Results.

## 2022-05-06 NOTE — ED NOTES
Patient is being transferred to [facility] Tele Room # 2145. Report given to Yessi Rondon on Tejal Chandler for routine progression of care. Report consisted of the following information SBAR and ED Summary. Patient transferred to receiving unit by: transporter (RN or tech name). Outstanding consults needed: No     Next labs due: Yes    The following personal items will be sent with the patient during transfer to the floor: All valuables:    Cardiac monitoring ordered: No     The following CURRENT information was reported to the receiving RN:    Code status: Full Code at time of transfer    Last set of vital signs:  Vital Signs  Level of Consciousness: Alert (0) (05/05/22 1638)  Temp: 97.6 °F (36.4 °C) (05/05/22 1638)  Temp Source: Oral (05/05/22 1638)  Pulse (Heart Rate): 99 (05/05/22 1840)  Resp Rate: 20 (05/05/22 1638)  BP: (!) 155/94 (05/05/22 1858)  MAP (Monitor): 112 (05/05/22 1858)  MAP (Calculated): 114 (05/05/22 1858)  BP 1 Method: Automatic (05/05/22 1638)  MEWS Score: 1 (05/05/22 1638)         Oxygen Therapy  O2 Sat (%): 95 % (05/05/22 1858)  Pulse via Oximetry: 97 beats per minute (05/05/22 1858)  O2 Device: None (Room air) (05/05/22 1638)      Last pain assessment:  Pain 1  Pain Scale 1: Numeric (0 - 10)  Pain Intensity 1: 10  Patient Stated Pain Goal: 0  Pain Location 1: Leg  Pain Orientation 1: Right  Pain Description 1: Aching  Pain Intervention(s) 1: Medication (see MAR)      Wounds: Yes    Urinary catheter: voiding  Is there a knott order: No     LDAs:       Peripheral IV 05/05/22 Left Antecubital (Active)         Opportunity for questions and clarification was provided.     Katherin Mckeon RN

## 2022-05-06 NOTE — PROGRESS NOTES
Transition of Care Plan:    RUR:9% low risk   Disposition: home with HH?? Or outpatient wound care? Follow up appointments: PCP and specialist   DME needed: none indicated   Transportation at Discharge: potentially Medicaid Transport   [place] or means to access home: Pt has access        IM Medicare Letter: N/a  Is patient a BCPI-A Bundle:    N/A       If yes, was Bundle Letter given?:  N/a  Is patient a [place] and connected with the [place]? If yes, was [employer] transfer form completed and VA notified? Caregiver Contact:Vangie Chery (Mother)   [phone]   Discharge Caregiver contacted prior to discharge?  Will contact if Pt desires   Care Conference needed?:  Not indicated at this time                  Reason for Admission:  Cellulitis right  Edema lower extremities                        RUR Score: 8% low risk                     Plan for utilizing home health:   Has has [facility] in the past. [facility] in the past around last year        PCP: First and Last name:  Heaven Rice MD     Name of Practice: yesterday   Are you a current patient: Yes/No: yes   Approximate date of last visit: yesterday 5/5/22   Can you participate in a virtual visit with your PCP:                     Current Advanced Directive/Advance Care Plan: Full Code      Healthcare Decision Maker:   Click here to complete [address] including selection of the Healthcare Decision Maker Relationship (ie \"Primary\")    Precious Walden (ACP) Conversation      Date of Conversation: 5/6/22  Conducted with: Patient with [address]:     Primary Decision Maker: Vangie Chery - Mother - [phone]  Click here to complete [address] including selection of the Healthcare Decision Maker Relationship (ie \"Primary\")          Content/Action Overview:   Has NO ACP documents/care preferences - information provided, considering goals and options  Reviewed DNR/DNI and patient elects Full Code (Attempt Resuscitation)      CM discussed ACP with Pt. Length of Voluntary ACP Conversation in minutes:  <16 minutes (Non-Billable)               Primary Decision MakeHelena Milligan - [phone]                  Transition of Care Plan:       Pt is a 62year old male admitted to [facility] on 5/5/22. CM spoke with Pt to complete initial assessment. Pt appeared alert and oriented x4 during initial assessment. Pt is reported to work and live at [facility]. Pt does not use any DME. Pt is reported to be independent at baseline. Pt's preferred pharmacy is [facility]. Care Management Interventions  PCP Verified by CM: Yes  Last Visit to PCP: 05/05/22  Mode of Transport at Discharge:  Other (see comment) (family)  Transition of Care Consult (CM Consult): Discharge Planning  Discharge Durable Medical Equipment: No  Physical Therapy Consult: No  Occupational Therapy Consult: No  Speech Therapy Consult: No  Support Systems: Parent(s)  Confirm Follow Up Transport: Family  Discharge Location  Patient Expects to be Discharged to[de-identified] [address], [address] [place] [address],[address]

## 2022-05-06 NOTE — PROGRESS NOTES
Bedside and Verbal shift change report given to Nela Starkey RN (oncoming nurse) by Marco Chowdhury (offgoing nurse).   Report given with SBAR, Kardex, Intake/Output, MAR and Recent Results

## 2022-05-07 LAB
BACTERIA SPEC CULT: NORMAL
BACTERIA SPEC CULT: NORMAL
GLUCOSE BLD STRIP.AUTO-MCNC: 132 MG/DL (ref 65–117)
GLUCOSE BLD STRIP.AUTO-MCNC: 163 MG/DL (ref 65–117)
GLUCOSE BLD STRIP.AUTO-MCNC: 166 MG/DL (ref 65–117)
GLUCOSE BLD STRIP.AUTO-MCNC: 181 MG/DL (ref 65–117)
SERVICE CMNT-IMP: ABNORMAL
SERVICE CMNT-IMP: NORMAL

## 2022-05-07 PROCEDURE — 74011000250 HC RX REV CODE- 250: Performed by: FAMILY MEDICINE

## 2022-05-07 PROCEDURE — 82962 GLUCOSE BLOOD TEST: CPT

## 2022-05-07 PROCEDURE — 74011636637 HC RX REV CODE- 636/637: Performed by: FAMILY MEDICINE

## 2022-05-07 PROCEDURE — 74011250636 HC RX REV CODE- 250/636: Performed by: FAMILY MEDICINE

## 2022-05-07 PROCEDURE — 74011000258 HC RX REV CODE- 258: Performed by: FAMILY MEDICINE

## 2022-05-07 PROCEDURE — 99233 SBSQ HOSP IP/OBS HIGH 50: CPT | Performed by: INTERNAL MEDICINE

## 2022-05-07 PROCEDURE — 65270000029 HC RM PRIVATE

## 2022-05-07 PROCEDURE — 74011250637 HC RX REV CODE- 250/637: Performed by: FAMILY MEDICINE

## 2022-05-07 RX ADMIN — SODIUM CHLORIDE, PRESERVATIVE FREE 10 ML: 5 INJECTION INTRAVENOUS at 13:23

## 2022-05-07 RX ADMIN — NAPROXEN 500 MG: 250 TABLET ORAL at 17:59

## 2022-05-07 RX ADMIN — FUROSEMIDE 40 MG: 10 INJECTION, SOLUTION INTRAMUSCULAR; INTRAVENOUS at 09:57

## 2022-05-07 RX ADMIN — PIPERACILLIN AND TAZOBACTAM 3.38 G: 3; .375 INJECTION, POWDER, LYOPHILIZED, FOR SOLUTION INTRAVENOUS at 01:35

## 2022-05-07 RX ADMIN — GABAPENTIN 100 MG: 100 CAPSULE ORAL at 21:16

## 2022-05-07 RX ADMIN — SODIUM CHLORIDE, PRESERVATIVE FREE 10 ML: 5 INJECTION INTRAVENOUS at 21:17

## 2022-05-07 RX ADMIN — Medication 2 UNITS: at 18:02

## 2022-05-07 RX ADMIN — Medication 2 UNITS: at 09:55

## 2022-05-07 RX ADMIN — GABAPENTIN 100 MG: 100 CAPSULE ORAL at 17:59

## 2022-05-07 RX ADMIN — ENOXAPARIN SODIUM 30 MG: 100 INJECTION SUBCUTANEOUS at 07:47

## 2022-05-07 RX ADMIN — PIPERACILLIN AND TAZOBACTAM 3.38 G: 3; .375 INJECTION, POWDER, LYOPHILIZED, FOR SOLUTION INTRAVENOUS at 09:56

## 2022-05-07 RX ADMIN — Medication 25 UNITS: at 09:55

## 2022-05-07 RX ADMIN — ENOXAPARIN SODIUM 30 MG: 100 INJECTION SUBCUTANEOUS at 18:20

## 2022-05-07 RX ADMIN — OXYCODONE AND ACETAMINOPHEN 1 TABLET: 5; 325 TABLET ORAL at 09:57

## 2022-05-07 RX ADMIN — ATORVASTATIN CALCIUM 40 MG: 40 TABLET, FILM COATED ORAL at 09:57

## 2022-05-07 RX ADMIN — PIPERACILLIN AND TAZOBACTAM 3.38 G: 3; .375 INJECTION, POWDER, LYOPHILIZED, FOR SOLUTION INTRAVENOUS at 17:59

## 2022-05-07 RX ADMIN — NAPROXEN 500 MG: 250 TABLET ORAL at 09:57

## 2022-05-07 RX ADMIN — SODIUM CHLORIDE, PRESERVATIVE FREE 10 ML: 5 INJECTION INTRAVENOUS at 06:59

## 2022-05-07 RX ADMIN — GABAPENTIN 100 MG: 100 CAPSULE ORAL at 09:58

## 2022-05-07 RX ADMIN — OXYCODONE AND ACETAMINOPHEN 1 TABLET: 5; 325 TABLET ORAL at 20:58

## 2022-05-07 NOTE — PROGRESS NOTES
End of Shift Note    Bedside shift change report given to [id number] (oncoming nurse) by Constantin Romero RN (offgoing nurse).   Report included the following information SBAR, Kardex, Intake/Output, MAR, Accordion, Recent Results and Med Rec Status    Shift worked:  7PM-7AM     Shift summary and any significant changes:    Pt rested well duration of the shift     Concerns for physician to address:       Zone phone for oncoming shift:          Constantin Romero RN

## 2022-05-07 NOTE — PROGRESS NOTES
INTERNAL MEDICINE ATTENDING NOTE     Patient Name: Zara Bryan   : 1965   Admit: 2022    ASSESSMENT / PLAN    1. Cellulitis R leg with multiple areas of skin breakdown- continue Zosyn. Wound care consult. 2. Edema lower extremities- probable Chronic venous insufficiency. Continue lasix for now. 3. Diabetes: Continue current Tx.   4. Morbid obesity. SUBJECTIVE: Mr. Zara Bryan is a patient of Tina Espinoza MD and was seen by me today during rounds. At this time, he is resting comfortably. The patient has no new complaints today. ROS otherwise unremarkable except as noted elsewhere. OBJECTIVE: Blood pressure 127/70, pulse 90, temperature 98.1 °F (36.7 °C), resp. rate 20, height 5' 6\" (1.676 m), weight 272 lb 0.8 oz (123.4 kg), SpO2 98 %. Gen: Patient is in no acute distress. Lungs: CTAB. Heart: RRR. Abd: S, NT, ND, BS present. Exremities: Warm. Bilateral LE edema, with legs wrapped, draining serous fluid. Recent Results (from the past 12 hour(s))   GLUCOSE, POC    Collection Time: 22  8:50 AM   Result Value Ref Range    Glucose (POC) 166 (H) 65 - 117 mg/dL    Performed by Glenn Bolus    GLUCOSE, POC    Collection Time: 22 11:05 AM   Result Value Ref Range    Glucose (POC) 132 (H) 65 - 117 mg/dL    Performed by Zach Or PCT         Duplex LE: No DVT. CXR: No acute process. Paula Frank MD, FACP  Best contact is via Pager: [phone], or via hospital  at [phone]. I can also be reached by Perfect Serve.

## 2022-05-08 LAB
ANION GAP SERPL CALC-SCNC: 2 MMOL/L (ref 5–15)
BASOPHILS # BLD: 0 K/UL (ref 0–0.1)
BASOPHILS NFR BLD: 0 % (ref 0–1)
BUN SERPL-MCNC: 14 MG/DL (ref 6–20)
BUN/CREAT SERPL: 18 (ref 12–20)
CALCIUM SERPL-MCNC: 8.3 MG/DL (ref 8.5–10.1)
CHLORIDE SERPL-SCNC: 105 MMOL/L (ref 97–108)
CO2 SERPL-SCNC: 31 MMOL/L (ref 21–32)
CREAT SERPL-MCNC: 0.77 MG/DL (ref 0.7–1.3)
DIFFERENTIAL METHOD BLD: ABNORMAL
EOSINOPHIL # BLD: 0.2 K/UL (ref 0–0.4)
EOSINOPHIL NFR BLD: 3 % (ref 0–7)
ERYTHROCYTE [DISTWIDTH] IN BLOOD BY AUTOMATED COUNT: 13.2 % (ref 11.5–14.5)
GLUCOSE BLD STRIP.AUTO-MCNC: 131 MG/DL (ref 65–117)
GLUCOSE BLD STRIP.AUTO-MCNC: 139 MG/DL (ref 65–117)
GLUCOSE BLD STRIP.AUTO-MCNC: 153 MG/DL (ref 65–117)
GLUCOSE SERPL-MCNC: 124 MG/DL (ref 65–100)
HCT VFR BLD AUTO: 32.4 % (ref 36.6–50.3)
HGB BLD-MCNC: 9.9 G/DL (ref 12.1–17)
IMM GRANULOCYTES # BLD AUTO: 0 K/UL (ref 0–0.04)
IMM GRANULOCYTES NFR BLD AUTO: 0 % (ref 0–0.5)
LYMPHOCYTES # BLD: 1.2 K/UL (ref 0.8–3.5)
LYMPHOCYTES NFR BLD: 23 % (ref 12–49)
MCH RBC QN AUTO: 26.9 PG (ref 26–34)
MCHC RBC AUTO-ENTMCNC: 30.6 G/DL (ref 30–36.5)
MCV RBC AUTO: 88 FL (ref 80–99)
MONOCYTES # BLD: 0.5 K/UL (ref 0–1)
MONOCYTES NFR BLD: 10 % (ref 5–13)
NEUTS SEG # BLD: 3.2 K/UL (ref 1.8–8)
NEUTS SEG NFR BLD: 64 % (ref 32–75)
NRBC # BLD: 0 K/UL (ref 0–0.01)
NRBC BLD-RTO: 0 PER 100 WBC
PLATELET # BLD AUTO: 244 K/UL (ref 150–400)
PMV BLD AUTO: 10.5 FL (ref 8.9–12.9)
POTASSIUM SERPL-SCNC: 3.5 MMOL/L (ref 3.5–5.1)
RBC # BLD AUTO: 3.68 M/UL (ref 4.1–5.7)
SERVICE CMNT-IMP: ABNORMAL
SODIUM SERPL-SCNC: 138 MMOL/L (ref 136–145)
WBC # BLD AUTO: 5.1 K/UL (ref 4.1–11.1)

## 2022-05-08 PROCEDURE — 80048 BASIC METABOLIC PNL TOTAL CA: CPT

## 2022-05-08 PROCEDURE — 82962 GLUCOSE BLOOD TEST: CPT

## 2022-05-08 PROCEDURE — 74011250636 HC RX REV CODE- 250/636: Performed by: FAMILY MEDICINE

## 2022-05-08 PROCEDURE — 65270000029 HC RM PRIVATE

## 2022-05-08 PROCEDURE — 74011636637 HC RX REV CODE- 636/637: Performed by: FAMILY MEDICINE

## 2022-05-08 PROCEDURE — 74011000258 HC RX REV CODE- 258: Performed by: FAMILY MEDICINE

## 2022-05-08 PROCEDURE — 99233 SBSQ HOSP IP/OBS HIGH 50: CPT | Performed by: INTERNAL MEDICINE

## 2022-05-08 PROCEDURE — 85025 COMPLETE CBC W/AUTO DIFF WBC: CPT

## 2022-05-08 PROCEDURE — 74011000250 HC RX REV CODE- 250: Performed by: FAMILY MEDICINE

## 2022-05-08 PROCEDURE — 36415 COLL VENOUS BLD VENIPUNCTURE: CPT

## 2022-05-08 PROCEDURE — 74011250637 HC RX REV CODE- 250/637: Performed by: FAMILY MEDICINE

## 2022-05-08 RX ADMIN — OXYCODONE AND ACETAMINOPHEN 1 TABLET: 5; 325 TABLET ORAL at 20:00

## 2022-05-08 RX ADMIN — NAPROXEN 500 MG: 250 TABLET ORAL at 17:31

## 2022-05-08 RX ADMIN — FUROSEMIDE 40 MG: 10 INJECTION, SOLUTION INTRAMUSCULAR; INTRAVENOUS at 11:43

## 2022-05-08 RX ADMIN — ATORVASTATIN CALCIUM 40 MG: 40 TABLET, FILM COATED ORAL at 11:43

## 2022-05-08 RX ADMIN — SODIUM CHLORIDE, PRESERVATIVE FREE 10 ML: 5 INJECTION INTRAVENOUS at 22:26

## 2022-05-08 RX ADMIN — GABAPENTIN 100 MG: 100 CAPSULE ORAL at 22:26

## 2022-05-08 RX ADMIN — NAPROXEN 500 MG: 250 TABLET ORAL at 11:43

## 2022-05-08 RX ADMIN — ENOXAPARIN SODIUM 30 MG: 100 INJECTION SUBCUTANEOUS at 06:20

## 2022-05-08 RX ADMIN — PIPERACILLIN AND TAZOBACTAM 3.38 G: 3; .375 INJECTION, POWDER, LYOPHILIZED, FOR SOLUTION INTRAVENOUS at 11:43

## 2022-05-08 RX ADMIN — SODIUM CHLORIDE, PRESERVATIVE FREE 10 ML: 5 INJECTION INTRAVENOUS at 06:20

## 2022-05-08 RX ADMIN — Medication 25 UNITS: at 11:43

## 2022-05-08 RX ADMIN — SODIUM CHLORIDE, PRESERVATIVE FREE 10 ML: 5 INJECTION INTRAVENOUS at 17:32

## 2022-05-08 RX ADMIN — GABAPENTIN 100 MG: 100 CAPSULE ORAL at 11:43

## 2022-05-08 RX ADMIN — GABAPENTIN 100 MG: 100 CAPSULE ORAL at 17:31

## 2022-05-08 RX ADMIN — PIPERACILLIN AND TAZOBACTAM 3.38 G: 3; .375 INJECTION, POWDER, LYOPHILIZED, FOR SOLUTION INTRAVENOUS at 17:30

## 2022-05-08 RX ADMIN — LIDOCAINE: 40 CREAM TOPICAL at 14:54

## 2022-05-08 RX ADMIN — ENOXAPARIN SODIUM 30 MG: 100 INJECTION SUBCUTANEOUS at 22:26

## 2022-05-08 RX ADMIN — Medication 2 UNITS: at 12:11

## 2022-05-08 RX ADMIN — PIPERACILLIN AND TAZOBACTAM 3.38 G: 3; .375 INJECTION, POWDER, LYOPHILIZED, FOR SOLUTION INTRAVENOUS at 00:34

## 2022-05-08 NOTE — PROGRESS NOTES
INTERNAL MEDICINE ATTENDING NOTE     Patient Name: Zara Bryan   : 1965   Admit: 2022    ASSESSMENT / PLAN    1. Cellulitis R leg with multiple areas of skin breakdown- continue Zosyn. Wound care consult. 2. Edema lower extremities- probable Chronic venous insufficiency. Continue lasix for now. 3. Diabetes: Continue current Tx.   4. Morbid obesity. SUBJECTIVE: Mr. Zara Bryan is a patient of Tina Espinoza MD and was seen by me today during rounds. At this time, he is resting comfortably. \"I feel down today. \" The patient has no new complaints today. ROS otherwise unremarkable except as noted elsewhere. OBJECTIVE: Blood pressure 130/75, pulse 81, temperature 97.9 °F (36.6 °C), resp. rate 20, height 5' 6\" (1.676 m), weight 272 lb 0.8 oz (123.4 kg), SpO2 98 %. Gen: Patient is in no acute distress. Lungs: CTAB. Heart: RRR. Abd: S, NT, ND, BS present. Exremities: Warm. Bilateral LE edema, with legs wrapped, draining serous fluid.      Recent Results (from the past 12 hour(s))   METABOLIC PANEL, BASIC    Collection Time: 22  1:13 AM   Result Value Ref Range    Sodium 138 136 - 145 mmol/L    Potassium 3.5 3.5 - 5.1 mmol/L    Chloride 105 97 - 108 mmol/L    CO2 31 21 - 32 mmol/L    Anion gap 2 (L) 5 - 15 mmol/L    Glucose 124 (H) 65 - 100 mg/dL    BUN 14 6 - 20 MG/DL    Creatinine 0.77 0.70 - 1.30 MG/DL    BUN/Creatinine ratio 18 12 - 20      GFR est AA >60 >60 ml/min/1.73m2    GFR est non-AA >60 >60 ml/min/1.73m2    Calcium 8.3 (L) 8.5 - 10.1 MG/DL   CBC WITH AUTOMATED DIFF    Collection Time: 22  1:13 AM   Result Value Ref Range    WBC 5.1 4.1 - 11.1 K/uL    RBC 3.68 (L) 4.10 - 5.70 M/uL    HGB 9.9 (L) 12.1 - 17.0 g/dL    HCT 32.4 (L) 36.6 - 50.3 %    MCV 88.0 80.0 - 99.0 FL    MCH 26.9 26.0 - 34.0 PG    MCHC 30.6 30.0 - 36.5 g/dL    RDW 13.2 11.5 - 14.5 %    PLATELET 731 382 - 444 K/uL    MPV 10.5 8.9 - 12.9 FL    NRBC 0.0 0  WBC ABSOLUTE NRBC 0.00 0.00 - 0.01 K/uL    NEUTROPHILS 64 32 - 75 %    LYMPHOCYTES 23 12 - 49 %    MONOCYTES 10 5 - 13 %    EOSINOPHILS 3 0 - 7 %    BASOPHILS 0 0 - 1 %    IMMATURE GRANULOCYTES 0 0.0 - 0.5 %    ABS. NEUTROPHILS 3.2 1.8 - 8.0 K/UL    ABS. LYMPHOCYTES 1.2 0.8 - 3.5 K/UL    ABS. MONOCYTES 0.5 0.0 - 1.0 K/UL    ABS. EOSINOPHILS 0.2 0.0 - 0.4 K/UL    ABS. BASOPHILS 0.0 0.0 - 0.1 K/UL    ABS. IMM. GRANS. 0.0 0.00 - 0.04 K/UL    DF AUTOMATED     GLUCOSE, POC    Collection Time: 05/08/22  8:17 AM   Result Value Ref Range    Glucose (POC) 131 (H) 65 - 117 mg/dL    Performed by Kalin Dumont (TRYANETH RN)         Duplex LE: No DVT. CXR: No acute process. Saeid Gregg MD, FACP  Best contact is via Pager: [phone], or via hospital  at [phone]. I can also be reached by Perfect Serve.

## 2022-05-08 NOTE — ED NOTES
Patients wound care done per orders. Patient tolerated well with minimal pain. Minimal yellow drainage on bandages. No other complaints at this time. Patient given some fluids and crackers.

## 2022-05-08 NOTE — PROGRESS NOTES
End of Shift Note    Bedside shift change report given to Twila (oncoming nurse) by Carroll Hernández RN (offgoing nurse).   Report included the following information SBAR, Kardex, Intake/Output, MAR, Accordion, Recent Results and Med Rec Status    Shift worked:  7PM=7AM     Shift summary and any significant changes:     No significant  changes duration of the shft     Concerns for physician to address:       Zone phone for oncoming shift:            Carroll Hernández RN

## 2022-05-08 NOTE — ED NOTES
Patient report given to RIRI Wilson. All questions answered, patient resting comfortably in bed eating dinner. No complaints at this time.

## 2022-05-08 NOTE — ED NOTES
Patient report given to RIRI Wilson and all questions answered. Patient resting in chair with no complaints and daughter at bedside. No concerns at this time.

## 2022-05-09 LAB
ANION GAP SERPL CALC-SCNC: 3 MMOL/L (ref 5–15)
BASOPHILS # BLD: 0 K/UL (ref 0–0.1)
BASOPHILS NFR BLD: 0 % (ref 0–1)
BUN SERPL-MCNC: 14 MG/DL (ref 6–20)
BUN/CREAT SERPL: 18 (ref 12–20)
CALCIUM SERPL-MCNC: 8.5 MG/DL (ref 8.5–10.1)
CHLORIDE SERPL-SCNC: 104 MMOL/L (ref 97–108)
CO2 SERPL-SCNC: 32 MMOL/L (ref 21–32)
CREAT SERPL-MCNC: 0.79 MG/DL (ref 0.7–1.3)
DIFFERENTIAL METHOD BLD: ABNORMAL
EOSINOPHIL # BLD: 0.2 K/UL (ref 0–0.4)
EOSINOPHIL NFR BLD: 4 % (ref 0–7)
ERYTHROCYTE [DISTWIDTH] IN BLOOD BY AUTOMATED COUNT: 13.2 % (ref 11.5–14.5)
GLUCOSE BLD STRIP.AUTO-MCNC: 125 MG/DL (ref 65–117)
GLUCOSE BLD STRIP.AUTO-MCNC: 239 MG/DL (ref 65–117)
GLUCOSE BLD STRIP.AUTO-MCNC: 97 MG/DL (ref 65–117)
GLUCOSE SERPL-MCNC: 168 MG/DL (ref 65–100)
HCT VFR BLD AUTO: 33.9 % (ref 36.6–50.3)
HGB BLD-MCNC: 10.1 G/DL (ref 12.1–17)
IMM GRANULOCYTES # BLD AUTO: 0 K/UL (ref 0–0.04)
IMM GRANULOCYTES NFR BLD AUTO: 1 % (ref 0–0.5)
LYMPHOCYTES # BLD: 1.3 K/UL (ref 0.8–3.5)
LYMPHOCYTES NFR BLD: 25 % (ref 12–49)
MCH RBC QN AUTO: 26.3 PG (ref 26–34)
MCHC RBC AUTO-ENTMCNC: 29.8 G/DL (ref 30–36.5)
MCV RBC AUTO: 88.3 FL (ref 80–99)
MONOCYTES # BLD: 0.5 K/UL (ref 0–1)
MONOCYTES NFR BLD: 9 % (ref 5–13)
NEUTS SEG # BLD: 3.3 K/UL (ref 1.8–8)
NEUTS SEG NFR BLD: 61 % (ref 32–75)
NRBC # BLD: 0 K/UL (ref 0–0.01)
NRBC BLD-RTO: 0 PER 100 WBC
PLATELET # BLD AUTO: 266 K/UL (ref 150–400)
PMV BLD AUTO: 10.5 FL (ref 8.9–12.9)
POTASSIUM SERPL-SCNC: 4 MMOL/L (ref 3.5–5.1)
RBC # BLD AUTO: 3.84 M/UL (ref 4.1–5.7)
SERVICE CMNT-IMP: ABNORMAL
SERVICE CMNT-IMP: ABNORMAL
SERVICE CMNT-IMP: NORMAL
SODIUM SERPL-SCNC: 139 MMOL/L (ref 136–145)
WBC # BLD AUTO: 5.3 K/UL (ref 4.1–11.1)

## 2022-05-09 PROCEDURE — 74011250636 HC RX REV CODE- 250/636: Performed by: FAMILY MEDICINE

## 2022-05-09 PROCEDURE — 82962 GLUCOSE BLOOD TEST: CPT

## 2022-05-09 PROCEDURE — 74011636637 HC RX REV CODE- 636/637: Performed by: FAMILY MEDICINE

## 2022-05-09 PROCEDURE — 36415 COLL VENOUS BLD VENIPUNCTURE: CPT

## 2022-05-09 PROCEDURE — 85025 COMPLETE CBC W/AUTO DIFF WBC: CPT

## 2022-05-09 PROCEDURE — 65270000029 HC RM PRIVATE

## 2022-05-09 PROCEDURE — 80048 BASIC METABOLIC PNL TOTAL CA: CPT

## 2022-05-09 PROCEDURE — 74011000250 HC RX REV CODE- 250: Performed by: FAMILY MEDICINE

## 2022-05-09 PROCEDURE — 99232 SBSQ HOSP IP/OBS MODERATE 35: CPT | Performed by: FAMILY MEDICINE

## 2022-05-09 PROCEDURE — 74011250637 HC RX REV CODE- 250/637: Performed by: FAMILY MEDICINE

## 2022-05-09 PROCEDURE — 74011000258 HC RX REV CODE- 258: Performed by: FAMILY MEDICINE

## 2022-05-09 RX ADMIN — PIPERACILLIN AND TAZOBACTAM 3.38 G: 3; .375 INJECTION, POWDER, LYOPHILIZED, FOR SOLUTION INTRAVENOUS at 18:46

## 2022-05-09 RX ADMIN — Medication 25 UNITS: at 11:38

## 2022-05-09 RX ADMIN — SODIUM CHLORIDE, PRESERVATIVE FREE 10 ML: 5 INJECTION INTRAVENOUS at 14:00

## 2022-05-09 RX ADMIN — NAPROXEN 500 MG: 250 TABLET ORAL at 18:46

## 2022-05-09 RX ADMIN — NAPROXEN 500 MG: 250 TABLET ORAL at 09:10

## 2022-05-09 RX ADMIN — ENOXAPARIN SODIUM 30 MG: 100 INJECTION SUBCUTANEOUS at 06:06

## 2022-05-09 RX ADMIN — OXYCODONE AND ACETAMINOPHEN 1 TABLET: 5; 325 TABLET ORAL at 18:49

## 2022-05-09 RX ADMIN — Medication 3 UNITS: at 11:38

## 2022-05-09 RX ADMIN — FUROSEMIDE 40 MG: 10 INJECTION, SOLUTION INTRAMUSCULAR; INTRAVENOUS at 09:10

## 2022-05-09 RX ADMIN — LIDOCAINE: 40 CREAM TOPICAL at 09:00

## 2022-05-09 RX ADMIN — ATORVASTATIN CALCIUM 40 MG: 40 TABLET, FILM COATED ORAL at 09:11

## 2022-05-09 RX ADMIN — GABAPENTIN 100 MG: 100 CAPSULE ORAL at 09:10

## 2022-05-09 RX ADMIN — GABAPENTIN 100 MG: 100 CAPSULE ORAL at 16:01

## 2022-05-09 RX ADMIN — GABAPENTIN 100 MG: 100 CAPSULE ORAL at 22:56

## 2022-05-09 RX ADMIN — PIPERACILLIN AND TAZOBACTAM 3.38 G: 3; .375 INJECTION, POWDER, LYOPHILIZED, FOR SOLUTION INTRAVENOUS at 02:01

## 2022-05-09 RX ADMIN — OXYCODONE AND ACETAMINOPHEN 1 TABLET: 5; 325 TABLET ORAL at 09:10

## 2022-05-09 RX ADMIN — ENOXAPARIN SODIUM 30 MG: 100 INJECTION SUBCUTANEOUS at 18:46

## 2022-05-09 RX ADMIN — PIPERACILLIN AND TAZOBACTAM 3.38 G: 3; .375 INJECTION, POWDER, LYOPHILIZED, FOR SOLUTION INTRAVENOUS at 09:10

## 2022-05-09 RX ADMIN — SODIUM CHLORIDE, PRESERVATIVE FREE 10 ML: 5 INJECTION INTRAVENOUS at 05:29

## 2022-05-09 RX ADMIN — SODIUM CHLORIDE, PRESERVATIVE FREE 10 ML: 5 INJECTION INTRAVENOUS at 22:56

## 2022-05-09 RX ADMIN — IBUPROFEN 600 MG: 600 TABLET ORAL at 23:31

## 2022-05-09 NOTE — PROGRESS NOTES
Problem: Falls - Risk of  Goal: *Absence of Falls  Description: Document Gerardosuraj Duarte Fall Risk and appropriate interventions in the flowsheet.   Outcome: Progressing Towards Goal  Note: Fall Risk Interventions:  Mobility Interventions: Bed/chair exit alarm,Communicate number of staff needed for ambulation/transfer,Patient to call before getting OOB         Medication Interventions: Evaluate medications/consider consulting pharmacy                   Problem: Pain  Goal: *Control of Pain  Outcome: Progressing Towards Goal

## 2022-05-09 NOTE — ROUTINE PROCESS
Bedside, Verbal and Written shift change report given to Cathy Tinoco RN  (oncoming nurse) by Fide Bennett RN  (offgoing nurse). Report included the following information SBAR, Kardex, MAR and Recent Results.

## 2022-05-09 NOTE — PROGRESS NOTES
General Daily Progress Note    Admit Date: 5/5/2022  Hospital day 5    Subjective:     Patient has no complaint of fever, chills. Swelling in legs somewhat better. Legs remain painful. ..   Medication side effects: none    Current Facility-Administered Medications   Medication Dose Route Frequency    atorvastatin (LIPITOR) tablet 40 mg  40 mg Oral DAILY    gabapentin (NEURONTIN) capsule 100 mg  100 mg Oral TID    ibuprofen (MOTRIN) tablet 600 mg  600 mg Oral Q6H PRN    insulin glargine (LANTUS) injection 25 Units  25 Units SubCUTAneous DAILY    naproxen (NAPROSYN) tablet 500 mg  500 mg Oral BID WITH MEALS    oxyCODONE-acetaminophen (PERCOCET) 5-325 mg per tablet 1 Tablet  1 Tablet Oral Q8H PRN    lidocaine (XYLOCAINE) 4 % cream   Topical DAILY    furosemide (LASIX) injection 40 mg  40 mg IntraVENous DAILY    insulin lispro (HUMALOG) injection   SubCUTAneous TIDAC    glucose chewable tablet 16 g  4 Tablet Oral PRN    glucagon (GLUCAGEN) injection 1 mg  1 mg IntraMUSCular PRN    dextrose 10 % infusion 0-250 mL  0-250 mL IntraVENous PRN    sodium chloride (NS) flush 5-40 mL  5-40 mL IntraVENous Q8H    sodium chloride (NS) flush 5-40 mL  5-40 mL IntraVENous PRN    enoxaparin (LOVENOX) injection 30 mg  30 mg SubCUTAneous Q12H    piperacillin-tazobactam (ZOSYN) 3.375 g in 0.9% sodium chloride (MBP/ADV) 100 mL MBP  3.375 g IntraVENous Q8H        Review of Systems  Pertinent items are noted in HPI. Objective:     Patient Vitals for the past 8 hrs:   BP Temp Pulse Resp SpO2   05/09/22 0419 (!) 143/88 97.9 °F (36.6 °C) 80 16 97 %     No intake/output data recorded.   05/07 1901 - 05/09 0700  In: 240 [P.O.:240]  Out: 800 [Urine:800]    Physical Exam:   Visit Vitals  BP (!) 143/88   Pulse 80   Temp 97.9 °F (36.6 °C)   Resp 16   Ht 5' 6\" (1.676 m)   Wt 272 lb 0.8 oz (123.4 kg)   SpO2 97%   BMI 43.91 kg/m²     Lungs: clear to auscultation bilaterally  Heart: regular rate and rhythm, S1, S2 normal, no murmur, click, rub or gallop  Abdomen: soft, non-tender. Bowel sounds normal. No masses,  no organomegaly  Extremities: edema 1-2 + bilaterally. Dressings removed- minimal oozing of legs. Areas of skin breakdown look improved- lots of pink granulation tissue. ECG: normal sinus rhythm     Data Review   Recent Results (from the past 24 hour(s))   GLUCOSE, POC    Collection Time: 05/08/22  8:17 AM   Result Value Ref Range    Glucose (POC) 131 (H) 65 - 117 mg/dL    Performed by Mai Guzmán (JHONNY RN)    GLUCOSE, POC    Collection Time: 05/08/22 12:08 PM   Result Value Ref Range    Glucose (POC) 153 (H) 65 - 117 mg/dL    Performed by Mai Guzmán (JHONNY RN)    GLUCOSE, POC    Collection Time: 05/08/22  4:53 PM   Result Value Ref Range    Glucose (POC) 139 (H) 65 - 117 mg/dL    Performed by Vince Heaton RN    METABOLIC PANEL, BASIC    Collection Time: 05/09/22  2:08 AM   Result Value Ref Range    Sodium 139 136 - 145 mmol/L    Potassium 4.0 3.5 - 5.1 mmol/L    Chloride 104 97 - 108 mmol/L    CO2 32 21 - 32 mmol/L    Anion gap 3 (L) 5 - 15 mmol/L    Glucose 168 (H) 65 - 100 mg/dL    BUN 14 6 - 20 MG/DL    Creatinine 0.79 0.70 - 1.30 MG/DL    BUN/Creatinine ratio 18 12 - 20      GFR est AA >60 >60 ml/min/1.73m2    GFR est non-AA >60 >60 ml/min/1.73m2    Calcium 8.5 8.5 - 10.1 MG/DL   CBC WITH AUTOMATED DIFF    Collection Time: 05/09/22  2:08 AM   Result Value Ref Range    WBC 5.3 4.1 - 11.1 K/uL    RBC 3.84 (L) 4.10 - 5.70 M/uL    HGB 10.1 (L) 12.1 - 17.0 g/dL    HCT 33.9 (L) 36.6 - 50.3 %    MCV 88.3 80.0 - 99.0 FL    MCH 26.3 26.0 - 34.0 PG    MCHC 29.8 (L) 30.0 - 36.5 g/dL    RDW 13.2 11.5 - 14.5 %    PLATELET 972 121 - 837 K/uL    MPV 10.5 8.9 - 12.9 FL    NRBC 0.0 0  WBC    ABSOLUTE NRBC 0.00 0.00 - 0.01 K/uL    NEUTROPHILS 61 32 - 75 %    LYMPHOCYTES 25 12 - 49 %    MONOCYTES 9 5 - 13 %    EOSINOPHILS 4 0 - 7 %    BASOPHILS 0 0 - 1 %    IMMATURE GRANULOCYTES 1 (H) 0.0 - 0.5 %    ABS.  NEUTROPHILS 3.3 1.8 - 8.0 K/UL    ABS. LYMPHOCYTES 1.3 0.8 - 3.5 K/UL    ABS. MONOCYTES 0.5 0.0 - 1.0 K/UL    ABS. EOSINOPHILS 0.2 0.0 - 0.4 K/UL    ABS. BASOPHILS 0.0 0.0 - 0.1 K/UL    ABS. IMM. GRANS. 0.0 0.00 - 0.04 K/UL    DF AUTOMATED             Assessment:     Active Problems:    Cellulitis of right leg (2/26/2021)        Plan:     1. Cellulitis R leg with multiple areas of skin breakdown- continue Zosyn. Appreciate Ms. Neri help. Continue dressings. Overall looks much better than on admission. 2. Edema lower extremities- probable Chronic venous insufficiency- ultrasound shows venous insufficiency on the left. . Continue lasix for now. 3. Diabetes  4. Morbid obesity.

## 2022-05-09 NOTE — PROGRESS NOTES
Problem: Falls - Risk of  Goal: *Absence of Falls  Description: Document Ramone Krishnan Fall Risk and appropriate interventions in the flowsheet.   Outcome: Progressing Towards Goal  Note: Fall Risk Interventions:  Mobility Interventions: Bed/chair exit alarm,Communicate number of staff needed for ambulation/transfer,Patient to call before getting OOB         Medication Interventions: Bed/chair exit alarm,Patient to call before getting OOB,Teach patient to arise slowly                   Problem: Patient Education: Go to Patient Education Activity  Goal: Patient/Family Education  Outcome: Progressing Towards Goal     Problem: Pain  Goal: *Control of Pain  Outcome: Progressing Towards Goal  Goal: *PALLIATIVE CARE:  Alleviation of Pain  Outcome: Progressing Towards Goal     Problem: Patient Education: Go to Patient Education Activity  Goal: Patient/Family Education  Outcome: Progressing Towards Goal     Problem: Cellulitis Care Plan (Adult)  Goal: *Control of acute pain  Outcome: Progressing Towards Goal  Goal: *Skin integrity maintained  Outcome: Progressing Towards Goal  Goal: *Absence of infection signs and symptoms  Outcome: Progressing Towards Goal     Problem: Patient Education: Go to Patient Education Activity  Goal: Patient/Family Education  Outcome: Progressing Towards Goal

## 2022-05-09 NOTE — PROGRESS NOTES
Transition of Care Plan:     RUR: 8%   Disposition: home with HH vs placement   Follow up appointments: PCP and specialist   DME needed: none indicated   Transportation at Discharge: potentially Medicaid Transport   [employer] or means to access home: Pt has access        IM Medicare Letter: N/a  Is patient a BCPI-A Bundle:    N/A                  If yes, was Bundle Letter given?:  N/a  Is patient a  and connected with the [address]? If yes, was [employer] transfer form completed and [facility] notified? Caregiver Contact:Vangie Chery (Mother)   [phone]   Discharge Caregiver contacted prior to discharge? Will contact if Pt desires   Care Conference needed?:  Not indicated at this time           Chart reviewed. Acknowledged CM consult. Pt will benefit from PT/OT evaluations prior to d/c to determine placement needs. Note that pt has Medicaid only, therefore no SNF benefit for short term rehab. Pt's options for d/c would include LTC placement vs IPR placement/HHC pending PT/OT evaluations. Unit CM to follow up tomorrow regarding questions concerning applying for disability. Will continue to follow to confirm disposition.     KRYSTA Davis   Care Manager, [facility]  [phone]

## 2022-05-10 LAB
ANION GAP SERPL CALC-SCNC: 1 MMOL/L (ref 5–15)
BACTERIA SPEC CULT: ABNORMAL
BASOPHILS # BLD: 0 K/UL (ref 0–0.1)
BASOPHILS NFR BLD: 0 % (ref 0–1)
BUN SERPL-MCNC: 14 MG/DL (ref 6–20)
BUN/CREAT SERPL: 17 (ref 12–20)
CALCIUM SERPL-MCNC: 8.6 MG/DL (ref 8.5–10.1)
CHLORIDE SERPL-SCNC: 104 MMOL/L (ref 97–108)
CO2 SERPL-SCNC: 33 MMOL/L (ref 21–32)
CREAT SERPL-MCNC: 0.81 MG/DL (ref 0.7–1.3)
DIFFERENTIAL METHOD BLD: ABNORMAL
EOSINOPHIL # BLD: 0.1 K/UL (ref 0–0.4)
EOSINOPHIL NFR BLD: 3 % (ref 0–7)
ERYTHROCYTE [DISTWIDTH] IN BLOOD BY AUTOMATED COUNT: 12.9 % (ref 11.5–14.5)
GLUCOSE BLD STRIP.AUTO-MCNC: 121 MG/DL (ref 65–117)
GLUCOSE BLD STRIP.AUTO-MCNC: 133 MG/DL (ref 65–117)
GLUCOSE BLD STRIP.AUTO-MCNC: 154 MG/DL (ref 65–117)
GLUCOSE BLD STRIP.AUTO-MCNC: 154 MG/DL (ref 65–117)
GLUCOSE SERPL-MCNC: 136 MG/DL (ref 65–100)
GRAM STN SPEC: ABNORMAL
GRAM STN SPEC: ABNORMAL
HCT VFR BLD AUTO: 33.9 % (ref 36.6–50.3)
HGB BLD-MCNC: 10.2 G/DL (ref 12.1–17)
IMM GRANULOCYTES # BLD AUTO: 0 K/UL (ref 0–0.04)
IMM GRANULOCYTES NFR BLD AUTO: 0 % (ref 0–0.5)
LYMPHOCYTES # BLD: 1.3 K/UL (ref 0.8–3.5)
LYMPHOCYTES NFR BLD: 28 % (ref 12–49)
MCH RBC QN AUTO: 26.6 PG (ref 26–34)
MCHC RBC AUTO-ENTMCNC: 30.1 G/DL (ref 30–36.5)
MCV RBC AUTO: 88.5 FL (ref 80–99)
MONOCYTES # BLD: 0.5 K/UL (ref 0–1)
MONOCYTES NFR BLD: 10 % (ref 5–13)
NEUTS SEG # BLD: 2.8 K/UL (ref 1.8–8)
NEUTS SEG NFR BLD: 59 % (ref 32–75)
NRBC # BLD: 0 K/UL (ref 0–0.01)
NRBC BLD-RTO: 0 PER 100 WBC
PLATELET # BLD AUTO: 265 K/UL (ref 150–400)
PMV BLD AUTO: 10.3 FL (ref 8.9–12.9)
POTASSIUM SERPL-SCNC: 4.1 MMOL/L (ref 3.5–5.1)
RBC # BLD AUTO: 3.83 M/UL (ref 4.1–5.7)
SERVICE CMNT-IMP: ABNORMAL
SODIUM SERPL-SCNC: 138 MMOL/L (ref 136–145)
WBC # BLD AUTO: 4.8 K/UL (ref 4.1–11.1)

## 2022-05-10 PROCEDURE — 65270000029 HC RM PRIVATE

## 2022-05-10 PROCEDURE — 80048 BASIC METABOLIC PNL TOTAL CA: CPT

## 2022-05-10 PROCEDURE — 36415 COLL VENOUS BLD VENIPUNCTURE: CPT

## 2022-05-10 PROCEDURE — 97165 OT EVAL LOW COMPLEX 30 MIN: CPT | Performed by: OCCUPATIONAL THERAPIST

## 2022-05-10 PROCEDURE — 97161 PT EVAL LOW COMPLEX 20 MIN: CPT

## 2022-05-10 PROCEDURE — 74011250637 HC RX REV CODE- 250/637: Performed by: FAMILY MEDICINE

## 2022-05-10 PROCEDURE — 99232 SBSQ HOSP IP/OBS MODERATE 35: CPT | Performed by: FAMILY MEDICINE

## 2022-05-10 PROCEDURE — 97530 THERAPEUTIC ACTIVITIES: CPT | Performed by: OCCUPATIONAL THERAPIST

## 2022-05-10 PROCEDURE — 74011636637 HC RX REV CODE- 636/637: Performed by: FAMILY MEDICINE

## 2022-05-10 PROCEDURE — 82962 GLUCOSE BLOOD TEST: CPT

## 2022-05-10 PROCEDURE — 74011000258 HC RX REV CODE- 258: Performed by: FAMILY MEDICINE

## 2022-05-10 PROCEDURE — 85025 COMPLETE CBC W/AUTO DIFF WBC: CPT

## 2022-05-10 PROCEDURE — 97530 THERAPEUTIC ACTIVITIES: CPT

## 2022-05-10 PROCEDURE — 74011250636 HC RX REV CODE- 250/636: Performed by: FAMILY MEDICINE

## 2022-05-10 PROCEDURE — 74011000250 HC RX REV CODE- 250: Performed by: FAMILY MEDICINE

## 2022-05-10 RX ADMIN — PIPERACILLIN AND TAZOBACTAM 3.38 G: 3; .375 INJECTION, POWDER, LYOPHILIZED, FOR SOLUTION INTRAVENOUS at 08:16

## 2022-05-10 RX ADMIN — SODIUM CHLORIDE, PRESERVATIVE FREE 10 ML: 5 INJECTION INTRAVENOUS at 14:59

## 2022-05-10 RX ADMIN — GABAPENTIN 100 MG: 100 CAPSULE ORAL at 08:16

## 2022-05-10 RX ADMIN — ENOXAPARIN SODIUM 30 MG: 100 INJECTION SUBCUTANEOUS at 06:53

## 2022-05-10 RX ADMIN — Medication 25 UNITS: at 09:12

## 2022-05-10 RX ADMIN — NAPROXEN 500 MG: 250 TABLET ORAL at 08:16

## 2022-05-10 RX ADMIN — Medication 2 UNITS: at 11:20

## 2022-05-10 RX ADMIN — OXYCODONE AND ACETAMINOPHEN 1 TABLET: 5; 325 TABLET ORAL at 08:16

## 2022-05-10 RX ADMIN — SODIUM CHLORIDE, PRESERVATIVE FREE 10 ML: 5 INJECTION INTRAVENOUS at 22:41

## 2022-05-10 RX ADMIN — GABAPENTIN 100 MG: 100 CAPSULE ORAL at 16:20

## 2022-05-10 RX ADMIN — PIPERACILLIN AND TAZOBACTAM 3.38 G: 3; .375 INJECTION, POWDER, LYOPHILIZED, FOR SOLUTION INTRAVENOUS at 01:21

## 2022-05-10 RX ADMIN — ENOXAPARIN SODIUM 30 MG: 100 INJECTION SUBCUTANEOUS at 22:39

## 2022-05-10 RX ADMIN — NAPROXEN 500 MG: 250 TABLET ORAL at 16:20

## 2022-05-10 RX ADMIN — PIPERACILLIN AND TAZOBACTAM 3.38 G: 3; .375 INJECTION, POWDER, LYOPHILIZED, FOR SOLUTION INTRAVENOUS at 16:20

## 2022-05-10 RX ADMIN — SODIUM CHLORIDE, PRESERVATIVE FREE 10 ML: 5 INJECTION INTRAVENOUS at 11:23

## 2022-05-10 RX ADMIN — GABAPENTIN 100 MG: 100 CAPSULE ORAL at 22:39

## 2022-05-10 RX ADMIN — LIDOCAINE: 40 CREAM TOPICAL at 11:20

## 2022-05-10 RX ADMIN — ATORVASTATIN CALCIUM 40 MG: 40 TABLET, FILM COATED ORAL at 08:16

## 2022-05-10 RX ADMIN — FUROSEMIDE 40 MG: 10 INJECTION, SOLUTION INTRAMUSCULAR; INTRAVENOUS at 08:16

## 2022-05-10 NOTE — WOUND CARE
Wound care follow up for the leg wound care:  Chart reviewed. It appears that wound care has only been done once since I saw the patient on 5-6-22. Staff were supposed to do it daily starting on 5-8-22 (and this is because he had a good cleaning on 5-6-22) and it was done Sunday only but not yesterday. Assessment and Treatment: The left leg has no more open wounds - only dry scales now. Will leave open to air. the Right leg anterior wound is pink without pustules today. The wound was cleansed and dried and the wound care was done as ordered but the raw skin area was dressed with the last piece of the Ioplex. Xeroform covered the rest of the healing epithelialized wounds. ABD pads applied and wrapped the right leg with Small roll brandee. Taped and dated the dressing for today. Plan: Continue the current ordered wound care to be done DAILY by nursing.    Denny Regan RN, BSN, [facility]

## 2022-05-10 NOTE — PROGRESS NOTES
Physician Progress Note      PATIENT:               Pascale Pulido  CSN #:                  [id number]  :                       1965  ADMIT DATE:       2022 5:19 PM  DISCH DATE:  RESPONDING  PROVIDER #:        Fish CROWLEY MD          QUERY TEXT:    Pt admitted with Cellulitis R leg with multiple areas of skin breakdown. Pt noted to have DM. If possible, please document in progress notes and discharge summary the relationship, if any, between cellulitis and DM. The medical record reflects the following:  Risk Factors: Hx: morbid obesity, chronic venous insufficiency, diabetes, history of metastatic prostate cancer and asthma  Clinical Indicators: POC BS: 78 ^ 190 ^ 177. ....gluc: 91. .. [facility] ED NOTED: Bilateral edema of both lower extremities, however worse on the right as compared to the left, strong DP pulses bilaterally, right leg is more warm as compared to the left, scattered wounds over the anterior and posterior shin and calf regions, pink and moist based, there is some serous drainage on the bandages. Treatment: Monitor BS; IV ABx; Wound care consult    Thank you,    Bahman Lou  CDI  Options provided:  -- Cellulitis R leg with multiple areas of skin breakdown associated with Diabetes  -- Cellulitis R leg with multiple areas of skin breakdown unrelated to Diabetes  -- Other - I will add my own diagnosis  -- Disagree - Not applicable / Not valid  -- Disagree - Clinically unable to determine / Unknown  -- Refer to Clinical Documentation Reviewer    PROVIDER RESPONSE TEXT:    Cellulitis R leg with multiple areas of skin breakdown unrelated to Diabetes.     Query created by: Jayson Franklin on 2022 3:45 PM      Electronically signed by:  Stephan Gallardo MD 5/10/2022 6:33 PM

## 2022-05-10 NOTE — PROGRESS NOTES
OCCUPATIONAL THERAPY EVALUATION/DISCHARGE  Patient: Nubia Sosa (38 y.o. male)  Date: 5/10/2022  Primary Diagnosis: Cellulitis of right leg [L03.115]       Precautions: fall, skin       ASSESSMENT  Based on the objective data described below, the patient presents with admission for cellulitis of his R LE. Pt reports that he has been up in his room performing self care and mobility without an AD. Pt was independent in bed mobility and ambulating in room. He was slow, but steady when picking up an object from the floor and a reacher was recommended. Pt reports that he will likely not be able to return to work once home due to his medical condition with RLE. Pt is generally independent at baseline and is close to his baseline at this time. No further acute OT needs. Current Level of Function (ADLs/self-care): generally independent to set up    Functional Outcome Measure: The patient scored 80/100 on the Barthel Index outcome measure which is indicative of independent adl performance. 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 Recommendation for discharge: (in order for the patient to meet his/her long term goals)  No skilled occupational therapy/ follow up rehabilitation needs identified at this time. This discharge recommendation:  Has not yet been discussed the attending provider and/or case management    IF patient discharges home will need the following DME: reacher       SUBJECTIVE:   Patient stated I am on my feet all day at work.     OBJECTIVE DATA SUMMARY:   HISTORY:   Past Medical History:   Diagnosis Date    Asthma     [facility] ([facility]     Lower leg edema     Prostate cancer ([facility]     gets [facility] at [facility]   History reviewed. No pertinent surgical history.     Prior Level of Function/Environment/Context: pt lives alone in his place of employment--[place] home. 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 Pt reports independence in adls and IADLs and able to walk several blocks in his neighborhood. 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)  [place]: Other Family Member(s) (pt has 4 sisters)  Patient Expects to be Discharged to[de-identified] Home  Tub or Shower Type:  (pt has been sponge bathing due to his wounds)    Hand dominance: Right    EXAMINATION OF PERFORMANCE DEFICITS:  Cognitive/Behavioral Status:  Neurologic State: Alert  Orientation Level: Appropriate for age  Cognition: Appropriate decision making; Follows commands  Perception: Appears intact  Perseveration: No perseveration noted  Safety/Judgement: Awareness of environment; Fall prevention; Insight into deficits    Skin: impaired, cellulitis RLE wound is bandaged--wound care following    Edema: BLEs    Hearing: Auditory  Auditory Impairment: None    Vision/Perceptual:                           Acuity:  (not formally assessed)    Corrective Lenses:  (not glasses present)    Range of Motion:  BUEs:  Within functional limits                            Strength:  BUEs:  Within functional limits                   Coordination:     Fine Motor Skills-Upper: Left Intact; Right Intact    Gross Motor Skills-Upper: Left Intact; Right Intact    Tone & Sensation:   Tone is normal  Sensation not assessed                            Balance:  Sitting: Intact  Standing: Intact  Slow to reach to floor when performing dynamic standing challenge  Functional Mobility and Transfers for ADLs:  Bed Mobility:  Supine to Sit: Independent  Scooting: Independent    Transfers:  Sit to Stand: Independent  Stand to Sit: Independent  Bed to Chair:  (able to ambulate in room and transfer)  Bathroom Mobility:  (per nursing pt has been ambulating to bathroom I)  Assistive Device :  (none)    ADL Assessment:  Feeding: Independent    Oral Facial Hygiene/Grooming: Independent    Bathing: Setup (sponge bathing)    Upper Body Dressing: Independent    Lower Body Dressing: Independent (will need assist with RLE bandages)    Toileting: Independent                ADL Intervention and task modifications:   Pt ambulated in room with good balance and able to  object from floor,slowly, but without LOB-would benefit from using a reacher. Pt reports that he has been performing self care independently. He feels that he will need wound care at discharge to assist him with managing his RLE wounds. Cognitive Retraining  Safety/Judgement: Awareness of environment; Fall prevention; Insight into deficits    Therapeutic Exercise:  Pt stated that he was aware of exercises to perform at bed level or in sitting   Functional Measure:    Barthel Index:  Bathin  Bladder: 10  Bowels: 10  Groomin  Dressing: 10  Feeding: 10  Mobility: 0  Stairs: 5  Toilet Use: 10  Transfer (Bed to Chair and Back): 15  Total: 80/100      The Barthel ADL Index: Guidelines  1. The index should be used as a record of what a patient does, not as a record of what a patient could do. 2. The main aim is to establish degree of independence from any help, physical or verbal, however minor and for whatever reason. 3. The need for supervision renders the patient not independent. 4. A patient's performance should be established using the best available evidence. Asking the patient, friends/relatives and nurses are the usual sources, but direct observation and common sense are also important. However direct testing is not needed. 5. Usually the patient's performance over the preceding 24-48 hours is important, but occasionally longer periods will be relevant.   6. Middle categories imply that the patient supplies over 50 per cent of the effort. 7. Use of aids to be independent is allowed. Score Interpretation (from [facility])    Independent   60-79 Minimally independent   40-59 Partially dependent   20-39 Very dependent   <20 Totally dependent     -Parker Jackson., Barthel, D.W. (1965). Functional evaluation: the Barthel Index. [address] ([address] (1997). The Barthel activities of daily living index: self-reporting versus actual performance in the old (> or = 75 years). Journal of [address] [place] [address] 45(7), 14 [facility], J.XENIAF, Jorge Wood., Felicity Villanueva. (1999). Measuring the change in disability after inpatient rehabilitation; comparison of the responsiveness of the Barthel Index and Functional [place] Measure. Journal of Neurology, Neurosurgery, and Psychiatry, 66(4), 672-697. Juarez Chávez, N.J.A, ELDER Silva, & Shabbir Peterson, MPerriA. (2004) Assessment of post-stroke quality of life in cost-effectiveness studies: The usefulness of the Barthel Index and the EuroQoL-5D. Quality of Life Research, 15, 153-48       Occupational Therapy Evaluation Charge Determination   History Examination Decision-Making   MEDIUM Complexity : Expanded review of history including physical, cognitive and psychosocial  history  MEDIUM Complexity : 3-5 performance deficits relating to physical, cognitive , or psychosocial skils that result in activity limitations and / or participation restrictions MEDIUM Complexity : Patient may present with comorbidities that affect occupational performnce.  Miniml to moderate modification of tasks or assistance (eg, physical or verbal ) with assesment(s) is necessary to enable patient to complete evaluation       Based on the above components, the patient evaluation is determined to be of the following complexity level: MEDIUM  Pain Rating:  No complaint    Activity Tolerance:   Fair  Limited ambulation/mobility to room due to medical condition RLE  After treatment patient left in no apparent distress:    Call bell within reach and sitting at EOB, nursing informed    COMMUNICATION/EDUCATION:   The patients plan of care was discussed with: Physical therapist and Registered nurse.      Thank you for this referral.  Janina Atwood, OTR/L  Time Calculation: 16 mins

## 2022-05-10 NOTE — PROGRESS NOTES
PHYSICAL THERAPY EVALUATION/DISCHARGE  Patient: Orien Holter (89 y.o. male)  Date: 5/10/2022  Primary Diagnosis: Cellulitis of right leg [L03.115]       Precautions:          ASSESSMENT  Based on the objective data described below, the patient presents with baseline Independent mobility. He has been admitted for cellulitis of his RLE. On arrival he reports he has been up ad amanda in his room without an ad. He demonstrated I bed mobility and ambulation in the room and actually asked for a broom to sweep his floor. His gait was slow but steady and he could also retrieve an object from the floor. It was recommended to him that he obtain a reacher for home use. No other deficits noted that warrant further PT intervention at this time. Will sign off. Functional Outcome Measure: The patient scored 80/100 on the Barthel Index outcome measure which is indicative of 20% impaired function/adls. 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 PLAN :  Recommendation for discharge: (in order for the patient to meet his/her long term goals)  No skilled physical therapy/ follow up rehabilitation needs identified at this time. This discharge recommendation:  A follow-up discussion with the attending provider and/or case management is planned    IF patient discharges home will need the following DME: none       SUBJECTIVE:   Patient stated I am foing fine.     OBJECTIVE DATA SUMMARY:   HISTORY:    Past Medical History:   Diagnosis Date    Asthma     Diabetes ([facility] [facility]     Lower leg edema     Prostate cancer ([facility] [address] [facility]     gets chemo at [facility]   History reviewed. No pertinent surgical history. Prior level of function: I, ambulatory without an ad. 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)  Rishi Sams: Other Family Member(s) (pt has 4 sisters)  Patient Expects to be Discharged to[de-identified] Home  Tub or Shower Type:  (pt has been sponge bathing due to his wounds)    EXAMINATION/PRESENTATION/DECISION MAKING:   Critical Behavior:  Neurologic State: Alert  Orientation Level: Appropriate for age  Cognition: Appropriate decision making,Follows commands  Safety/Judgement: Awareness of environment,Fall prevention,Insight into deficits  Hearing: Auditory  Auditory Impairment: None  Range Of Motion:  AROM: Within functional limits            Strength:    Strength: Generally decreased, functional            Tone & Sensation:   Tone: Normal              Sensation: Intact               Coordination:  Coordination: Within functional limits  Vision:   Acuity:  (not formally assessed)  Corrective Lenses:  (not glasses present)  Functional Mobility:  Bed Mobility:     Supine to Sit: Independent     Scooting: Independent  Transfers:  Sit to Stand: Independent  Stand to Sit: Independent        Bed to Chair:  (able to ambulate in room and transfer)         Balance:   Sitting: Intact  Standing: Intact  Ambulation/Gait Training:      Gait Description (WDL): Exceptions to WDL      Base of Support: Widened     Speed/Jazmyn: Pace decreased (<100 feet/min)  Step Length: Left shortened;Right shortened         Functional Measure:  Barthel Index:    Bathin  Bladder: 10  Bowels: 10  Groomin  Dressing: 10  Feeding: 10  Mobility: 0  Stairs: 5  Toilet Use: 10  Transfer (Bed to Chair and Back): 15  Total: 80/100       The Barthel ADL Index: Guidelines  1. The index should be used as a record of what a patient does, not as a record of what a patient could do.   2. The main aim is to establish degree of independence from any help, physical or verbal, however minor and for whatever reason. 3. The need for supervision renders the patient not independent. 4. A patient's performance should be established using the best available evidence. Asking the patient, friends/relatives and nurses are the usual sources, but direct observation and common sense are also important. However direct testing is not needed. 5. Usually the patient's performance over the preceding 24-48 hours is important, but occasionally longer periods will be relevant. 6. Middle categories imply that the patient supplies over 50 per cent of the effort. 7. Use of aids to be independent is allowed. Score Interpretation (from [address] [facility] 83)    Independent   60-79 Minimally independent   40-59 Partially dependent   20-39 Very dependent   <20 Totally dependent     -Parker Jackson., Barthel, DPerriW. (1965). Functional evaluation: the Barthel Index. [facility] ([address] (1997). The Barthel activities of daily living index: self-reporting versus actual performance in the old (> or = 75 years). Journal [address] 45(7), 14 [facility], ..., Cinthia Daniels., Sanjuana Platejay. (1999). Measuring the change in disability after inpatient rehabilitation; comparison of the responsiveness of the Barthel Index and Functional [place] Measure. Journal of Neurology, Neurosurgery, and Psychiatry, 66(4), 190-982. MARISOL Park, ELDER Silva, & Sonia Pardo, MPerriA. (2004) Assessment of post-stroke quality of life in cost-effectiveness studies: The usefulness of the Barthel Index and the EuroQoL-5D. Quality of Life Research, 15, 122-52     Activity Tolerance:   Good      After treatment patient left in no apparent distress:   Sitting EOB    COMMUNICATION/EDUCATION:   The patients plan of care was discussed with: Occupational therapist and Registered nurse.      Fall prevention education was provided and the patient/caregiver indicated understanding.     Thank you for this referral.  Janett Ocampo, PT   Time Calculation: 14 mins

## 2022-05-10 NOTE — PROGRESS NOTES
Spiritual Care Assessment/Progress Note  [facility]      NAME: Deejay Figueora      MRN: [id number]  AGE: 62 y.o.  SEX: male  Yazidism Affiliation: No preference   Language: English     5/10/2022     Total Time (in minutes): 12     Spiritual Assessment begun in [facility] 2 MED TELE through conversation with:         [x]Patient        [] Family    [] Friend(s)        Reason for Consult: Initial/Spiritual assessment, patient floor     Spiritual beliefs: (Please include comment if needed)     [x] Identifies with a jory tradition:         [] Supported by a jory community:            [] Claims no spiritual orientation:           [] Seeking spiritual identity:                [] Adheres to an individual form of spirituality:           [] Not able to assess:                           Identified resources for coping:      [x] Prayer                               [] Music                  [] Guided Imagery     [] Family/friends                 [] Pet visits     [] Devotional reading                         [] Unknown     [] Other:                                               Interventions offered during this visit: (See comments for more details)    Patient Interventions: Yazidism beliefs/image of God discussed,Prayer (assurance of),Initial/Spiritual assessment, patient floor,Affirmation of jory           Plan of Care:     [] Support spiritual and/or cultural needs    [] Support AMD and/or advance care planning process      [] Support grieving process   [] Coordinate Rites and/or Rituals    [] Coordination with community clergy   [] No spiritual needs identified at this time   [] Detailed Plan of Care below (See Comments)  [] Make referral to Music Therapy  [] Make referral to Pet Therapy     [] Make referral to Addiction services  [] Make referral to [facility]  [] Make referral to Spiritual Care Partner  [] No future visits requested        [x] Contact Spiritual Care for further referrals Comments:  Visit was in 2142 for initial spiritual assessment. 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 He read mitchell on badge and welcomed . He indicated he was doing well today, he is [place] and jory is important for coping.  encouraged him to finish his meal and assured him of chaplains availability through staff referrals. Patient thanked  for stopping by. Visited by: Madonna Olmstead.    Paging Service: [phone])

## 2022-05-10 NOTE — PROGRESS NOTES
General Daily Progress Note    Admit Date: 5/5/2022  Hospital day 6    Subjective:     Patient has no complaint of fever, chills. Legs are feeling somewhat  Better. ..   Medication side effects: none    Current Facility-Administered Medications   Medication Dose Route Frequency    atorvastatin (LIPITOR) tablet 40 mg  40 mg Oral DAILY    gabapentin (NEURONTIN) capsule 100 mg  100 mg Oral TID    ibuprofen (MOTRIN) tablet 600 mg  600 mg Oral Q6H PRN    insulin glargine (LANTUS) injection 25 Units  25 Units SubCUTAneous DAILY    naproxen (NAPROSYN) tablet 500 mg  500 mg Oral BID WITH MEALS    oxyCODONE-acetaminophen (PERCOCET) 5-325 mg per tablet 1 Tablet  1 Tablet Oral Q8H PRN    furosemide (LASIX) injection 40 mg  40 mg IntraVENous DAILY    insulin lispro (HUMALOG) injection   SubCUTAneous TIDAC    glucose chewable tablet 16 g  4 Tablet Oral PRN    glucagon (GLUCAGEN) injection 1 mg  1 mg IntraMUSCular PRN    dextrose 10 % infusion 0-250 mL  0-250 mL IntraVENous PRN    sodium chloride (NS) flush 5-40 mL  5-40 mL IntraVENous Q8H    sodium chloride (NS) flush 5-40 mL  5-40 mL IntraVENous PRN    enoxaparin (LOVENOX) injection 30 mg  30 mg SubCUTAneous Q12H    piperacillin-tazobactam (ZOSYN) 3.375 g in 0.9% sodium chloride (MBP/ADV) 100 mL MBP  3.375 g IntraVENous Q8H        Review of Systems  Pertinent items are noted in HPI. Objective:     Patient Vitals for the past 8 hrs:   BP Temp Pulse Resp SpO2   05/10/22 1218 136/88 98.1 °F (36.7 °C) 88 18 94 %     No intake/output data recorded. No intake/output data recorded. Physical Exam:   Visit Vitals  /88   Pulse 88   Temp 98.1 °F (36.7 °C)   Resp 18   Ht 5' 6\" (1.676 m)   Wt 272 lb 0.8 oz (123.4 kg)   SpO2 94%   BMI 43.91 kg/m²     Lungs: clear to auscultation bilaterally  Heart: regular rate and rhythm, S1, S2 normal, no murmur, click, rub or gallop  Abdomen: soft, non-tender.  Bowel sounds normal. No masses,  no organomegaly  Extremities: edema , !+ of R leg. Wounds healing upfairly necily. ECG: normal sinus rhythm     Data Review   Recent Results (from the past 24 hour(s))   METABOLIC PANEL, BASIC    Collection Time: 05/10/22  3:31 AM   Result Value Ref Range    Sodium 138 136 - 145 mmol/L    Potassium 4.1 3.5 - 5.1 mmol/L    Chloride 104 97 - 108 mmol/L    CO2 33 (H) 21 - 32 mmol/L    Anion gap 1 (L) 5 - 15 mmol/L    Glucose 136 (H) 65 - 100 mg/dL    BUN 14 6 - 20 MG/DL    Creatinine 0.81 0.70 - 1.30 MG/DL    BUN/Creatinine ratio 17 12 - 20      GFR est AA >60 >60 ml/min/1.73m2    GFR est non-AA >60 >60 ml/min/1.73m2    Calcium 8.6 8.5 - 10.1 MG/DL   CBC WITH AUTOMATED DIFF    Collection Time: 05/10/22  3:31 AM   Result Value Ref Range    WBC 4.8 4.1 - 11.1 K/uL    RBC 3.83 (L) 4.10 - 5.70 M/uL    HGB 10.2 (L) 12.1 - 17.0 g/dL    HCT 33.9 (L) 36.6 - 50.3 %    MCV 88.5 80.0 - 99.0 FL    MCH 26.6 26.0 - 34.0 PG    MCHC 30.1 30.0 - 36.5 g/dL    RDW 12.9 11.5 - 14.5 %    PLATELET 594 893 - 765 K/uL    MPV 10.3 8.9 - 12.9 FL    NRBC 0.0 0  WBC    ABSOLUTE NRBC 0.00 0.00 - 0.01 K/uL    NEUTROPHILS 59 32 - 75 %    LYMPHOCYTES 28 12 - 49 %    MONOCYTES 10 5 - 13 %    EOSINOPHILS 3 0 - 7 %    BASOPHILS 0 0 - 1 %    IMMATURE GRANULOCYTES 0 0.0 - 0.5 %    ABS. NEUTROPHILS 2.8 1.8 - 8.0 K/UL    ABS. LYMPHOCYTES 1.3 0.8 - 3.5 K/UL    ABS. MONOCYTES 0.5 0.0 - 1.0 K/UL    ABS. EOSINOPHILS 0.1 0.0 - 0.4 K/UL    ABS. BASOPHILS 0.0 0.0 - 0.1 K/UL    ABS. IMM.  GRANS. 0.0 0.00 - 0.04 K/UL    DF AUTOMATED     GLUCOSE, POC    Collection Time: 05/10/22  7:46 AM   Result Value Ref Range    Glucose (POC) 121 (H) 65 - 117 mg/dL    Performed by Portia Weinberg PCT    GLUCOSE, POC    Collection Time: 05/10/22 10:38 AM   Result Value Ref Range    Glucose (POC) 154 (H) 65 - 117 mg/dL    Performed by Portia Weinberg PCT    GLUCOSE, POC    Collection Time: 05/10/22  4:17 PM   Result Value Ref Range    Glucose (POC) 133 (H) 65 - 117 mg/dL    Performed by Delfino King PCT            Assessment:     Active Problems:    Cellulitis of right leg (2/26/2021)        Plan:          1. Cellulitis R leg with multiple areas of skin breakdown- continue Zosyn. Appreciate Ms. Neri help. Continue dressings. Overall looks much better than on admission. 2. Edema lower extremities- probable Chronic venous insufficiency- ultrasound shows venous insufficiency on the left. . Continue lasix for now.   3. Diabetes- stable  4. Morbid obesity.   5. Disposition- pt currently works at [employer], a home for  [employer]. Is provided free board under current arrangement. If he didn't go back there, I asked him where he  Would  Go, he  Belen Reap that he would probably wind up living \"on the street\". He wants to apply for disability, and he could probably get this eventually. Meanwhile we need to figure out a reasonable disposition. Returning to  His usual job might work short term if wounds continue to heal and he gets some good compression stockings. If not , a LTC situation will be considered.

## 2022-05-11 LAB
GLUCOSE BLD STRIP.AUTO-MCNC: 107 MG/DL (ref 65–117)
GLUCOSE BLD STRIP.AUTO-MCNC: 148 MG/DL (ref 65–117)
GLUCOSE BLD STRIP.AUTO-MCNC: 188 MG/DL (ref 65–117)
GLUCOSE BLD STRIP.AUTO-MCNC: 246 MG/DL (ref 65–117)
SERVICE CMNT-IMP: ABNORMAL
SERVICE CMNT-IMP: NORMAL

## 2022-05-11 PROCEDURE — 74011636637 HC RX REV CODE- 636/637: Performed by: FAMILY MEDICINE

## 2022-05-11 PROCEDURE — 74011000258 HC RX REV CODE- 258: Performed by: FAMILY MEDICINE

## 2022-05-11 PROCEDURE — 74011250637 HC RX REV CODE- 250/637: Performed by: FAMILY MEDICINE

## 2022-05-11 PROCEDURE — 65270000029 HC RM PRIVATE

## 2022-05-11 PROCEDURE — 82962 GLUCOSE BLOOD TEST: CPT

## 2022-05-11 PROCEDURE — 99232 SBSQ HOSP IP/OBS MODERATE 35: CPT | Performed by: FAMILY MEDICINE

## 2022-05-11 PROCEDURE — 74011250636 HC RX REV CODE- 250/636: Performed by: FAMILY MEDICINE

## 2022-05-11 RX ADMIN — GABAPENTIN 100 MG: 100 CAPSULE ORAL at 20:51

## 2022-05-11 RX ADMIN — Medication 2 UNITS: at 12:17

## 2022-05-11 RX ADMIN — PIPERACILLIN AND TAZOBACTAM 3.38 G: 3; .375 INJECTION, POWDER, LYOPHILIZED, FOR SOLUTION INTRAVENOUS at 17:42

## 2022-05-11 RX ADMIN — ATORVASTATIN CALCIUM 40 MG: 40 TABLET, FILM COATED ORAL at 09:48

## 2022-05-11 RX ADMIN — GABAPENTIN 100 MG: 100 CAPSULE ORAL at 22:00

## 2022-05-11 RX ADMIN — GABAPENTIN 100 MG: 100 CAPSULE ORAL at 17:42

## 2022-05-11 RX ADMIN — NAPROXEN 500 MG: 250 TABLET ORAL at 09:48

## 2022-05-11 RX ADMIN — FUROSEMIDE 40 MG: 10 INJECTION, SOLUTION INTRAMUSCULAR; INTRAVENOUS at 09:47

## 2022-05-11 RX ADMIN — NAPROXEN 500 MG: 250 TABLET ORAL at 17:42

## 2022-05-11 RX ADMIN — PIPERACILLIN AND TAZOBACTAM 3.38 G: 3; .375 INJECTION, POWDER, LYOPHILIZED, FOR SOLUTION INTRAVENOUS at 09:48

## 2022-05-11 RX ADMIN — ENOXAPARIN SODIUM 30 MG: 100 INJECTION SUBCUTANEOUS at 09:48

## 2022-05-11 RX ADMIN — PIPERACILLIN AND TAZOBACTAM 3.38 G: 3; .375 INJECTION, POWDER, LYOPHILIZED, FOR SOLUTION INTRAVENOUS at 01:45

## 2022-05-11 RX ADMIN — Medication 2 UNITS: at 18:00

## 2022-05-11 RX ADMIN — ENOXAPARIN SODIUM 30 MG: 100 INJECTION SUBCUTANEOUS at 20:51

## 2022-05-11 RX ADMIN — Medication 2 UNITS: at 20:49

## 2022-05-11 RX ADMIN — GABAPENTIN 100 MG: 100 CAPSULE ORAL at 09:48

## 2022-05-11 NOTE — PROGRESS NOTES
General Daily Progress Note    Admit Date: 5/5/2022  Hospital day 6    Subjective:     Patient has no complaint of fever, chills, dyspnea. Still having moderate R leg pain. ..   Medication side effects: none    Current Facility-Administered Medications   Medication Dose Route Frequency    atorvastatin (LIPITOR) tablet 40 mg  40 mg Oral DAILY    gabapentin (NEURONTIN) capsule 100 mg  100 mg Oral TID    ibuprofen (MOTRIN) tablet 600 mg  600 mg Oral Q6H PRN    insulin glargine (LANTUS) injection 25 Units  25 Units SubCUTAneous DAILY    naproxen (NAPROSYN) tablet 500 mg  500 mg Oral BID WITH MEALS    oxyCODONE-acetaminophen (PERCOCET) 5-325 mg per tablet 1 Tablet  1 Tablet Oral Q8H PRN    furosemide (LASIX) injection 40 mg  40 mg IntraVENous DAILY    insulin lispro (HUMALOG) injection   SubCUTAneous TIDAC    glucose chewable tablet 16 g  4 Tablet Oral PRN    glucagon (GLUCAGEN) injection 1 mg  1 mg IntraMUSCular PRN    dextrose 10 % infusion 0-250 mL  0-250 mL IntraVENous PRN    sodium chloride (NS) flush 5-40 mL  5-40 mL IntraVENous Q8H    sodium chloride (NS) flush 5-40 mL  5-40 mL IntraVENous PRN    enoxaparin (LOVENOX) injection 30 mg  30 mg SubCUTAneous Q12H    piperacillin-tazobactam (ZOSYN) 3.375 g in 0.9% sodium chloride (MBP/ADV) 100 mL MBP  3.375 g IntraVENous Q8H        Review of Systems  Pertinent items are noted in HPI. Objective:     No data found. No intake/output data recorded. No intake/output data recorded. Physical Exam:   Visit Vitals  BP (!) 162/98   Pulse 82   Temp 97.9 °F (36.6 °C)   Resp 18   Ht 5' 6\" (1.676 m)   Wt 272 lb 0.8 oz (123.4 kg)   SpO2 95%   BMI 43.91 kg/m²     Lungs: clear to auscultation bilaterally  Heart: regular rate and rhythm, S1, S2 normal, no murmur, click, rub or gallop  Abdomen: soft, non-tender.  Bowel sounds normal. No masses,  no organomegaly  Extremities: edema , 1+ bilaterally      ECG: normal sinus rhythm     Data Review   Recent Results (from the past 24 hour(s))   GLUCOSE, POC    Collection Time: 05/10/22 10:38 AM   Result Value Ref Range    Glucose (POC) 154 (H) 65 - 117 mg/dL    Performed by Portia Weinberg PCT    GLUCOSE, POC    Collection Time: 05/10/22  4:17 PM   Result Value Ref Range    Glucose (POC) 133 (H) 65 - 117 mg/dL    Performed by Carla Parson PCT    GLUCOSE, POC    Collection Time: 05/10/22  8:40 PM   Result Value Ref Range    Glucose (POC) 154 (H) 65 - 117 mg/dL    Performed by Isidro Betts RN    GLUCOSE, POC    Collection Time: 05/11/22  7:36 AM   Result Value Ref Range    Glucose (POC) 107 65 - 117 mg/dL    Performed by Ankush Senior (PCT)            Assessment:     Active Problems:    Cellulitis of right leg (2/26/2021)        Plan:     1. Cellulitis R leg with multiple areas of skin breakdown- continue [facility] [place] Ms. Neri help. Continue dressings. Overall looks much better than on admission. 2. Edema lower extremities- probable Chronic venous insufficiency- ultrasound shows venous insufficiency on the left. . Continue lasix for now.   3. Diabetes- stable  4. Morbid obesity.   5. Disposition- pt currently works at [employer], a home for  [employer]. Is provided free board under current arrangement. If he didn't go back there, I asked him where he  Would  Go, he  Valiant Brady that he would probably wind up living \"on the street\". He wants to apply for disability, and he could probably get this eventually. Meanwhile we need to figure out a reasonable disposition. Returning to  His usual job might work short term if wounds continue to heal and he gets some good compression stockings. If not , a LTC situation will be considered. 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 None known

## 2022-05-11 NOTE — PROGRESS NOTES
Transition of Care Plan:     RUR:9% low risk   Disposition: home with HH?? Or outpatient wound care? Follow up appointments: PCP and specialist   DME needed: none indicated   Transportation at Discharge: potentially Medicaid Transport   [place] or means to access home: Pt has access        IM Medicare Letter: N/a  Is patient a BCPI-A Bundle:    N/A                  If yes, was Bundle Letter given?:  N/a  Is patient a [place] and connected with the [place]? If yes, was [employer] transfer form completed and VA notified? Caregiver Contact:Vangie Chery (Mother)   [phone]   Discharge Caregiver contacted prior to discharge? Will contact if Pt desires   Care Conference needed?:  Not indicated at this time                         CM acknowledged consult for assisting Pt with applying for disability. Pt would need to contact his local social security office to start the process.      CM notified Pt of this         Tiff Abbott, [facility], [address] [facility]

## 2022-05-12 LAB
GLUCOSE BLD STRIP.AUTO-MCNC: 116 MG/DL (ref 65–117)
GLUCOSE BLD STRIP.AUTO-MCNC: 128 MG/DL (ref 65–117)
GLUCOSE BLD STRIP.AUTO-MCNC: 166 MG/DL (ref 65–117)
GLUCOSE BLD STRIP.AUTO-MCNC: 166 MG/DL (ref 65–117)
GLUCOSE BLD STRIP.AUTO-MCNC: 186 MG/DL (ref 65–117)
SERVICE CMNT-IMP: ABNORMAL
SERVICE CMNT-IMP: NORMAL

## 2022-05-12 PROCEDURE — 99232 SBSQ HOSP IP/OBS MODERATE 35: CPT | Performed by: FAMILY MEDICINE

## 2022-05-12 PROCEDURE — 74011250637 HC RX REV CODE- 250/637: Performed by: FAMILY MEDICINE

## 2022-05-12 PROCEDURE — 74011000250 HC RX REV CODE- 250: Performed by: FAMILY MEDICINE

## 2022-05-12 PROCEDURE — 65270000029 HC RM PRIVATE

## 2022-05-12 PROCEDURE — 82962 GLUCOSE BLOOD TEST: CPT

## 2022-05-12 PROCEDURE — 74011250636 HC RX REV CODE- 250/636: Performed by: FAMILY MEDICINE

## 2022-05-12 PROCEDURE — 74011000258 HC RX REV CODE- 258: Performed by: FAMILY MEDICINE

## 2022-05-12 PROCEDURE — 74011636637 HC RX REV CODE- 636/637: Performed by: FAMILY MEDICINE

## 2022-05-12 RX ADMIN — SODIUM CHLORIDE, PRESERVATIVE FREE 10 ML: 5 INJECTION INTRAVENOUS at 02:12

## 2022-05-12 RX ADMIN — ENOXAPARIN SODIUM 30 MG: 100 INJECTION SUBCUTANEOUS at 06:52

## 2022-05-12 RX ADMIN — GABAPENTIN 100 MG: 100 CAPSULE ORAL at 09:16

## 2022-05-12 RX ADMIN — FUROSEMIDE 40 MG: 10 INJECTION, SOLUTION INTRAMUSCULAR; INTRAVENOUS at 09:16

## 2022-05-12 RX ADMIN — GABAPENTIN 100 MG: 100 CAPSULE ORAL at 17:07

## 2022-05-12 RX ADMIN — NAPROXEN 500 MG: 250 TABLET ORAL at 17:07

## 2022-05-12 RX ADMIN — PIPERACILLIN AND TAZOBACTAM 3.38 G: 3; .375 INJECTION, POWDER, LYOPHILIZED, FOR SOLUTION INTRAVENOUS at 09:16

## 2022-05-12 RX ADMIN — NAPROXEN 500 MG: 250 TABLET ORAL at 09:16

## 2022-05-12 RX ADMIN — SODIUM CHLORIDE, PRESERVATIVE FREE 10 ML: 5 INJECTION INTRAVENOUS at 06:40

## 2022-05-12 RX ADMIN — ENOXAPARIN SODIUM 30 MG: 100 INJECTION SUBCUTANEOUS at 20:19

## 2022-05-12 RX ADMIN — ATORVASTATIN CALCIUM 40 MG: 40 TABLET, FILM COATED ORAL at 09:16

## 2022-05-12 RX ADMIN — Medication 2 UNITS: at 17:08

## 2022-05-12 RX ADMIN — Medication 25 UNITS: at 09:16

## 2022-05-12 RX ADMIN — PIPERACILLIN AND TAZOBACTAM 3.38 G: 3; .375 INJECTION, POWDER, LYOPHILIZED, FOR SOLUTION INTRAVENOUS at 02:11

## 2022-05-12 RX ADMIN — GABAPENTIN 100 MG: 100 CAPSULE ORAL at 20:19

## 2022-05-12 NOTE — PROGRESS NOTES
Comprehensive Nutrition Assessment    Type and Reason for Visit: Initial,RD nutrition re-screen/LOS    Nutrition Recommendations/Plan:   1. Continue current diet      Nutrition Assessment:   Patient medically noted for cellulitis right leg. PMH DM, prostate cancer, and HTN. Chart reviewed for length of stay. Has been receiving a regular diet. Patient reports a good appetite and eating well. Menu at bedside and ordering meals per his preference. No nutrition questions or concerns reported by patient. Monitor BG and adjust to 4 CHO choice diet as needed. Nutrition Related Findings:    [phone]  1+ e jayna  Atorvastatin, lasix, Lantus, Humalog    Wound Type: Venous stasis    Current Nutrition Intake & Therapies:  Average Meal Intake: %     ADULT DIET Regular    Anthropometric Measures:  Height: 5' 6\" (167.6 cm)  Ideal Body Weight (IBW): 142 lbs (65 kg)     Current Body Wt:  123.4 kg (272 lb 0.8 oz), 191.6 % IBW. Current BMI (kg/m2): 43.9                          BMI Category: Obese class 3 (BMI 40.0 or greater)    Estimated Daily Nutrient Needs:  Energy Requirements Based On: Formula  Weight Used for Energy Requirements: Current  Energy (kcal/day): 2110 kcal (BMR 2008 x 1. 3AF -500kcal)  Weight Used for Protein Requirements: Current  Protein (g/day): 99g (0.8 g/kg bw)  Method Used for Fluid Requirements: 1 ml/kcal  Fluid (ml/day): 2000 mL    Nutrition Diagnosis:   No nutrition diagnosis at this time     Nutrition Interventions:   Food and/or Nutrient Delivery: Continue current diet  Nutrition Education/Counseling: No recommendations at this time  Coordination of Nutrition Care: Continue to monitor while inpatient       Goals:     Goals:  (PO intake >75% of meals/snacks next 5-7 days)       Nutrition Monitoring and Evaluation:   Behavioral-Environmental Outcomes: None identified  Food/Nutrient Intake Outcomes: Food and nutrient intake  Physical Signs/Symptoms Outcomes: Biochemical data,Weight,Skin    Discharge Planning:    Continue current diet    Jane Gil RD  Contact: ext [phone]

## 2022-05-12 NOTE — PROGRESS NOTES
General Daily Progress Note    Admit Date: 5/5/2022  Hospital day 8    Subjective:     Patient has no complaint of fever, chills. Continues to have pain in R leg. ..   Medication side effects: none    Current Facility-Administered Medications   Medication Dose Route Frequency    atorvastatin (LIPITOR) tablet 40 mg  40 mg Oral DAILY    gabapentin (NEURONTIN) capsule 100 mg  100 mg Oral TID    ibuprofen (MOTRIN) tablet 600 mg  600 mg Oral Q6H PRN    insulin glargine (LANTUS) injection 25 Units  25 Units SubCUTAneous DAILY    naproxen (NAPROSYN) tablet 500 mg  500 mg Oral BID WITH MEALS    oxyCODONE-acetaminophen (PERCOCET) 5-325 mg per tablet 1 Tablet  1 Tablet Oral Q8H PRN    furosemide (LASIX) injection 40 mg  40 mg IntraVENous DAILY    insulin lispro (HUMALOG) injection   SubCUTAneous TIDAC    glucose chewable tablet 16 g  4 Tablet Oral PRN    glucagon (GLUCAGEN) injection 1 mg  1 mg IntraMUSCular PRN    dextrose 10 % infusion 0-250 mL  0-250 mL IntraVENous PRN    sodium chloride (NS) flush 5-40 mL  5-40 mL IntraVENous Q8H    sodium chloride (NS) flush 5-40 mL  5-40 mL IntraVENous PRN    enoxaparin (LOVENOX) injection 30 mg  30 mg SubCUTAneous Q12H        Review of Systems  Pertinent items are noted in HPI. Objective:     Patient Vitals for the past 8 hrs:   BP Temp Pulse Resp SpO2 Height   05/12/22 1541 -- -- -- -- -- 5' 6\" (1.676 m)   05/12/22 1520 (!) 155/80 98.3 °F (36.8 °C) (!) 103 16 100 % --     No intake/output data recorded. No intake/output data recorded. Physical Exam:   Visit Vitals  BP (!) 155/80 (BP 1 Location: Left upper arm, BP Patient Position: Sitting)   Pulse (!) 103   Temp 98.3 °F (36.8 °C)   Resp 16   Ht 5' 6\" (1.676 m)   Wt 272 lb 0.8 oz (123.4 kg)   SpO2 100%   BMI 43.91 kg/m²     Lungs: clear to auscultation bilaterally  Heart: regular rate and rhythm, S1, S2 normal, no murmur, click, rub or gallop  Abdomen: soft, non-tender.  Bowel sounds normal. No masses,  no organomegaly  Extremities: edema , 1+ edema bilaterally. stillhas a few open sores R lower leg, remains moderately tender. ECG: normal sinus rhythm     Data Review   Recent Results (from the past 24 hour(s))   GLUCOSE, POC    Collection Time: 05/11/22  7:35 PM   Result Value Ref Range    Glucose (POC) 246 (H) 65 - 117 mg/dL    Performed by Alexia Forbes (TRV RN)    GLUCOSE, POC    Collection Time: 05/12/22  8:03 AM   Result Value Ref Range    Glucose (POC) 116 65 - 117 mg/dL    Performed by Denise Lake (TRV RN)    GLUCOSE, POC    Collection Time: 05/12/22  8:54 AM   Result Value Ref Range    Glucose (POC) 166 (H) 65 - 117 mg/dL    Performed by Sangeeta Necessary    GLUCOSE, POC    Collection Time: 05/12/22 12:00 PM   Result Value Ref Range    Glucose (POC) 128 (H) 65 - 117 mg/dL    Performed by Denise Lake (TRV RN)    GLUCOSE, POC    Collection Time: 05/12/22  3:42 PM   Result Value Ref Range    Glucose (POC) 186 (H) 65 - 117 mg/dL    Performed by Sangeeta Necessary            Assessment:     Active Problems:    Cellulitis of right leg (2/26/2021)        Plan:       1. Cellulitis R leg with multiple areas of skin breakdown- continue [facility] Ms[facility]ri Joses help. Continue dressings. Overall looks much better than on admission. 2. Edema lower extremities- probable Chronic venous insufficiency- ultrasound shows venous insufficiency on the left. . Continue lasix for now.   3. Diabetes- stable  4. Morbid obesity.   5. Disposition- pt currently works at [employer], a home for [employer]. Is provided free board under current arrangement. If he didn't go back there, I asked him where he  Would  Go, he  Said that he would probably wind up living \"on the street\". He wants to apply for disability, and he could probably get this eventually. Meanwhile we need to figure out a reasonable disposition. Returning to [employer] usual job might work short term if wounds continue to heal and he gets some good compression stockings. If not , a LTC situation will be considered. 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

## 2022-05-13 LAB
GLUCOSE BLD STRIP.AUTO-MCNC: 138 MG/DL (ref 65–117)
GLUCOSE BLD STRIP.AUTO-MCNC: 155 MG/DL (ref 65–117)
GLUCOSE BLD STRIP.AUTO-MCNC: 171 MG/DL (ref 65–117)
GLUCOSE BLD STRIP.AUTO-MCNC: 185 MG/DL (ref 65–117)
SERVICE CMNT-IMP: ABNORMAL

## 2022-05-13 PROCEDURE — 99232 SBSQ HOSP IP/OBS MODERATE 35: CPT | Performed by: FAMILY MEDICINE

## 2022-05-13 PROCEDURE — 74011000250 HC RX REV CODE- 250: Performed by: FAMILY MEDICINE

## 2022-05-13 PROCEDURE — 74011250637 HC RX REV CODE- 250/637: Performed by: FAMILY MEDICINE

## 2022-05-13 PROCEDURE — 74011636637 HC RX REV CODE- 636/637: Performed by: FAMILY MEDICINE

## 2022-05-13 PROCEDURE — 74011250636 HC RX REV CODE- 250/636: Performed by: FAMILY MEDICINE

## 2022-05-13 PROCEDURE — 65270000029 HC RM PRIVATE

## 2022-05-13 PROCEDURE — 82962 GLUCOSE BLOOD TEST: CPT

## 2022-05-13 RX ADMIN — ENOXAPARIN SODIUM 30 MG: 100 INJECTION SUBCUTANEOUS at 18:19

## 2022-05-13 RX ADMIN — GABAPENTIN 100 MG: 100 CAPSULE ORAL at 10:13

## 2022-05-13 RX ADMIN — ATORVASTATIN CALCIUM 40 MG: 40 TABLET, FILM COATED ORAL at 10:13

## 2022-05-13 RX ADMIN — NAPROXEN 500 MG: 250 TABLET ORAL at 18:19

## 2022-05-13 RX ADMIN — IBUPROFEN 600 MG: 600 TABLET ORAL at 10:13

## 2022-05-13 RX ADMIN — SODIUM CHLORIDE, PRESERVATIVE FREE 10 ML: 5 INJECTION INTRAVENOUS at 10:22

## 2022-05-13 RX ADMIN — FUROSEMIDE 40 MG: 10 INJECTION, SOLUTION INTRAMUSCULAR; INTRAVENOUS at 10:14

## 2022-05-13 RX ADMIN — SODIUM CHLORIDE, PRESERVATIVE FREE 10 ML: 5 INJECTION INTRAVENOUS at 02:54

## 2022-05-13 RX ADMIN — NAPROXEN 500 MG: 250 TABLET ORAL at 10:13

## 2022-05-13 RX ADMIN — GABAPENTIN 100 MG: 100 CAPSULE ORAL at 18:19

## 2022-05-13 RX ADMIN — Medication 25 UNITS: at 10:12

## 2022-05-13 RX ADMIN — ENOXAPARIN SODIUM 30 MG: 100 INJECTION SUBCUTANEOUS at 10:14

## 2022-05-13 RX ADMIN — Medication 2 UNITS: at 18:19

## 2022-05-13 RX ADMIN — SODIUM CHLORIDE, PRESERVATIVE FREE 10 ML: 5 INJECTION INTRAVENOUS at 18:20

## 2022-05-13 NOTE — PROGRESS NOTES
General Daily Progress Note    Admit Date: 5/5/2022  Hospital day several    Subjective:     Patient has no complaint of fever, chills. Still having some R leg pain. ..   Medication side effects: none    Current Facility-Administered Medications   Medication Dose Route Frequency    piperacillin-tazobactam (ZOSYN) 2.25 g in 0.9% sodium chloride (MBP/ADV) 50 mL MBP  2.25 g IntraVENous Q8H    atorvastatin (LIPITOR) tablet 40 mg  40 mg Oral DAILY    gabapentin (NEURONTIN) capsule 100 mg  100 mg Oral TID    ibuprofen (MOTRIN) tablet 600 mg  600 mg Oral Q6H PRN    insulin glargine (LANTUS) injection 25 Units  25 Units SubCUTAneous DAILY    naproxen (NAPROSYN) tablet 500 mg  500 mg Oral BID WITH MEALS    oxyCODONE-acetaminophen (PERCOCET) 5-325 mg per tablet 1 Tablet  1 Tablet Oral Q8H PRN    furosemide (LASIX) injection 40 mg  40 mg IntraVENous DAILY    insulin lispro (HUMALOG) injection   SubCUTAneous TIDAC    glucose chewable tablet 16 g  4 Tablet Oral PRN    glucagon (GLUCAGEN) injection 1 mg  1 mg IntraMUSCular PRN    dextrose 10 % infusion 0-250 mL  0-250 mL IntraVENous PRN    sodium chloride (NS) flush 5-40 mL  5-40 mL IntraVENous Q8H    sodium chloride (NS) flush 5-40 mL  5-40 mL IntraVENous PRN    enoxaparin (LOVENOX) injection 30 mg  30 mg SubCUTAneous Q12H        Review of Systems  Pertinent items are noted in HPI. Objective:     Patient Vitals for the past 8 hrs:   BP Temp Pulse Resp SpO2   05/13/22 0852 138/89 98 °F (36.7 °C) 90 18 96 %     No intake/output data recorded. No intake/output data recorded. Physical Exam:   Visit Vitals  /89 (BP 1 Location: Right arm, BP Patient Position: At rest)   Pulse 90   Temp 98 °F (36.7 °C)   Resp 18   Ht 5' 6\" (1.676 m)   Wt 272 lb 0.8 oz (123.4 kg)   SpO2 96%   BMI 43.91 kg/m²     Lungs: clear to auscultation bilaterally  Heart: regular rate and rhythm, S1, S2 normal, no murmur, click, rub or gallop  Abdomen: soft, non-tender.  Bowel sounds normal. No masses,  no organomegaly  Extremities: edema , 1+ bilaterally. Several open sores remain present, moderately tender to palpation. ECG: normal sinus rhythm     Data Review   Recent Results (from the past 24 hour(s))   GLUCOSE, POC    Collection Time: 05/12/22  3:42 PM   Result Value Ref Range    Glucose (POC) 186 (H) 65 - 117 mg/dL    Performed by Zion Locke    GLUCOSE, POC    Collection Time: 05/12/22  9:37 PM   Result Value Ref Range    Glucose (POC) 166 (H) 65 - 117 mg/dL    Performed by Latrice Zhang PCT    GLUCOSE, POC    Collection Time: 05/13/22  8:07 AM   Result Value Ref Range    Glucose (POC) 138 (H) 65 - 117 mg/dL    Performed by Denita Barthel    GLUCOSE, POC    Collection Time: 05/13/22 11:04 AM   Result Value Ref Range    Glucose (POC) 185 (H) 65 - 117 mg/dL    Performed by Vivian Green (PCT)            Assessment:     Active Problems:    Cellulitis of right leg (2/26/2021)        Plan:              1. Cellulitis R leg with multiple areas of skin breakdown- continue [facility] Ms. Garcias help. Continue dressings. Overall looks much better than on admission. 2. Edema lower extremities- probable Chronic venous insufficiency- ultrasound shows venous insufficiency on the left. . Continue lasix for now.   3. Diabetes- stable  4. Morbid obesity.   5. Disposition- pt currently works at [employer], a home for [employer]. Is provided free board under current arrangement. If he didn't go back there, I asked him where he  Would  Go, he  Said that he would probably wind up living \"on the street\". He wants to apply for disability, and he could probably get this eventually. Meanwhile we need to figure out a reasonable disposition. Returning to [facility] [employer] [facility] usual job might work short term if wounds continue to heal and he gets some good compression stockings.  If not , a LTC situation will be considered. 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

## 2022-05-13 NOTE — PROGRESS NOTES
Pharmacy Note     Zosyn 2.25g q8h ordered for treatment of SSTI. Per Omero Flynn, zosyn will be changed to 4.5 g once then 3.375 g q8h . Estimated Creatinine Clearance: Estimated Creatinine Clearance: 124.7 mL/min (by C-G formula based on SCr of 0.81 mg/dL). Dialysis Status, BILLY, CKD: N    BMI:  Body mass index is 43.91 kg/m². Rationale for Adjustment:  renal/protocol. Pharmacy will continue to monitor and adjust dose as necessary. Please call with any questions.   Keyanna Manriquez, PharmD  Remote Order Verification Pharmacist  [place] Phone # [phone]

## 2022-05-13 NOTE — PROGRESS NOTES
Transition of Care Plan:     RUR:11% low risk   Disposition: home   Follow up appointments: PCP and specialist   DME needed: none indicated   Transportation at [address] or means to access home: Pt has access        IM Medicare Letter: N/a  Is patient a BCPI-A Bundle:    N/A                  If yes, was Bundle Letter given?:  N/a  Is patient a  and connected with the [facility]?                If yes, was [employer] transfer form completed and VA notified? Caregiver Contact:Vangie Chery (Mother)   [phone]   Discharge Caregiver contacted prior to discharge? Will contact if Pt [address] needed?:  Not indicated at this time          Per chart review, Pt has no therapy needs at this time. Plan for Pt to return to [facility] once medically stable.        Brian Acuna, [place]

## 2022-05-14 PROBLEM — I87.8 CHRONIC VENOUS STASIS: Status: ACTIVE | Noted: 2022-05-14

## 2022-05-14 LAB
GLUCOSE BLD STRIP.AUTO-MCNC: 135 MG/DL (ref 65–117)
GLUCOSE BLD STRIP.AUTO-MCNC: 182 MG/DL (ref 65–117)
GLUCOSE BLD STRIP.AUTO-MCNC: 212 MG/DL (ref 65–117)
GLUCOSE BLD STRIP.AUTO-MCNC: 222 MG/DL (ref 65–117)
SERVICE CMNT-IMP: ABNORMAL

## 2022-05-14 PROCEDURE — 74011000258 HC RX REV CODE- 258: Performed by: FAMILY MEDICINE

## 2022-05-14 PROCEDURE — 74011250637 HC RX REV CODE- 250/637: Performed by: FAMILY MEDICINE

## 2022-05-14 PROCEDURE — 82962 GLUCOSE BLOOD TEST: CPT

## 2022-05-14 PROCEDURE — 74011000250 HC RX REV CODE- 250: Performed by: FAMILY MEDICINE

## 2022-05-14 PROCEDURE — 65270000029 HC RM PRIVATE

## 2022-05-14 PROCEDURE — 99233 SBSQ HOSP IP/OBS HIGH 50: CPT | Performed by: INTERNAL MEDICINE

## 2022-05-14 PROCEDURE — 74011636637 HC RX REV CODE- 636/637: Performed by: FAMILY MEDICINE

## 2022-05-14 PROCEDURE — 74011250636 HC RX REV CODE- 250/636: Performed by: FAMILY MEDICINE

## 2022-05-14 RX ORDER — ADHESIVE BANDAGE
30 BANDAGE TOPICAL DAILY PRN
Status: DISCONTINUED | OUTPATIENT
Start: 2022-05-14 | End: 2022-05-16 | Stop reason: HOSPADM

## 2022-05-14 RX ADMIN — Medication 25 UNITS: at 10:50

## 2022-05-14 RX ADMIN — GABAPENTIN 100 MG: 100 CAPSULE ORAL at 23:21

## 2022-05-14 RX ADMIN — ENOXAPARIN SODIUM 30 MG: 100 INJECTION SUBCUTANEOUS at 06:35

## 2022-05-14 RX ADMIN — NAPROXEN 500 MG: 250 TABLET ORAL at 18:14

## 2022-05-14 RX ADMIN — PIPERACILLIN AND TAZOBACTAM 3.38 G: 3; .375 INJECTION, POWDER, LYOPHILIZED, FOR SOLUTION INTRAVENOUS at 06:34

## 2022-05-14 RX ADMIN — GABAPENTIN 100 MG: 100 CAPSULE ORAL at 18:13

## 2022-05-14 RX ADMIN — Medication 3 UNITS: at 18:12

## 2022-05-14 RX ADMIN — PIPERACILLIN AND TAZOBACTAM 3.38 G: 3; .375 INJECTION, POWDER, LYOPHILIZED, FOR SOLUTION INTRAVENOUS at 20:46

## 2022-05-14 RX ADMIN — GABAPENTIN 100 MG: 100 CAPSULE ORAL at 10:50

## 2022-05-14 RX ADMIN — PIPERACILLIN AND TAZOBACTAM 3.38 G: 3; .375 INJECTION, POWDER, LYOPHILIZED, FOR SOLUTION INTRAVENOUS at 12:35

## 2022-05-14 RX ADMIN — ATORVASTATIN CALCIUM 40 MG: 40 TABLET, FILM COATED ORAL at 10:50

## 2022-05-14 RX ADMIN — FUROSEMIDE 40 MG: 10 INJECTION, SOLUTION INTRAMUSCULAR; INTRAVENOUS at 10:50

## 2022-05-14 RX ADMIN — SODIUM CHLORIDE, PRESERVATIVE FREE 10 ML: 5 INJECTION INTRAVENOUS at 00:30

## 2022-05-14 RX ADMIN — SODIUM CHLORIDE, PRESERVATIVE FREE 10 ML: 5 INJECTION INTRAVENOUS at 23:21

## 2022-05-14 RX ADMIN — ENOXAPARIN SODIUM 30 MG: 100 INJECTION SUBCUTANEOUS at 18:15

## 2022-05-14 RX ADMIN — OXYCODONE AND ACETAMINOPHEN 1 TABLET: 5; 325 TABLET ORAL at 10:50

## 2022-05-14 RX ADMIN — NAPROXEN 500 MG: 250 TABLET ORAL at 10:50

## 2022-05-14 RX ADMIN — Medication 2 UNITS: at 11:30

## 2022-05-14 RX ADMIN — GABAPENTIN 100 MG: 100 CAPSULE ORAL at 00:28

## 2022-05-14 RX ADMIN — PIPERACILLIN AND TAZOBACTAM 3.38 G: 3; .375 INJECTION, POWDER, LYOPHILIZED, FOR SOLUTION INTRAVENOUS at 00:29

## 2022-05-14 RX ADMIN — SODIUM CHLORIDE, PRESERVATIVE FREE 5 ML: 5 INJECTION INTRAVENOUS at 14:00

## 2022-05-14 NOTE — PROGRESS NOTES
INTERNAL MEDICINE PROGRESS NOTE    NAME:  [facility]   :   1965   MRN:   [id number]     Date/Time:  2022 7:04 AM  Subjective:   History:  Chart reviewed and patient seen and examined and discussed with his nurse this AM and all events noted. He has a history of HTN, DM, Obesity, Asthma and chronic venous stasis disease and has been admitted with RLE Cellulitis. This AM he has no c/o of SOB, CP or cardiac or respiratory c/o. The swelling in his legs has decreased as has the drainage from the RLE open areas. He is constipated w/o other GI c/o and he has no  c/o. He has no neurologic c/o and no other c/o on complete ROS.       Medications reviewed:  Current Facility-Administered Medications   Medication Dose Route Frequency    magnesium hydroxide (MILK OF MAGNESIA) 400 mg/5 mL oral suspension 30 mL  30 mL Oral DAILY PRN    piperacillin-tazobactam (ZOSYN) 3.375 g in 0.9% sodium chloride (MBP/ADV) 100 mL MBP  3.375 g IntraVENous Q8H    atorvastatin (LIPITOR) tablet 40 mg  40 mg Oral DAILY    gabapentin (NEURONTIN) capsule 100 mg  100 mg Oral TID    ibuprofen (MOTRIN) tablet 600 mg  600 mg Oral Q6H PRN    insulin glargine (LANTUS) injection 25 Units  25 Units SubCUTAneous DAILY    naproxen (NAPROSYN) tablet 500 mg  500 mg Oral BID WITH MEALS    oxyCODONE-acetaminophen (PERCOCET) 5-325 mg per tablet 1 Tablet  1 Tablet Oral Q8H PRN    furosemide (LASIX) injection 40 mg  40 mg IntraVENous DAILY    insulin lispro (HUMALOG) injection   SubCUTAneous TIDAC    glucose chewable tablet 16 g  4 Tablet Oral PRN    glucagon (GLUCAGEN) injection 1 mg  1 mg IntraMUSCular PRN    dextrose 10 % infusion 0-250 mL  0-250 mL IntraVENous PRN    sodium chloride (NS) flush 5-40 mL  5-40 mL IntraVENous Q8H    sodium chloride (NS) flush 5-40 mL  5-40 mL IntraVENous PRN    enoxaparin (LOVENOX) injection 30 mg  30 mg SubCUTAneous Q12H        Objective:   Vitals:  Visit Vitals  /85 (BP 1 Location: Left upper arm, BP Patient Position: Sitting)   Pulse 84   Temp 97.7 °F (36.5 °C)   Resp 18   Ht 5' 6\" (1.676 m)   Wt 272 lb 0.8 oz (123.4 kg)   SpO2 95%   BMI 43.91 kg/m²      O2 Device: None (Room air) Temp (24hrs), Av.8 °F (36.6 °C), Min:97.7 °F (36.5 °C), Max:97.9 °F (36.6 °C)      Last 24hr Input/Output:    Intake/Output Summary (Last 24 hours) at 2022 1042  Last data filed at 2022 0725  Gross per 24 hour   Intake 360 ml   Output 400 ml   Net -40 ml        PHYSICAL EXAM:  General:     Alert, cooperative, no distress, appears stated age. Head:    Normocephalic, without obvious abnormality, atraumatic. Eyes:    Conjunctivae/corneas clear. PERRLA  Nose:   Nares normal. No drainage or sinus tenderness. Throat:     Lips, mucosa, and tongue normal.  No Thrush  Neck:   Supple, symmetrical,  no adenopathy, thyroid: non tender     no carotid bruit and no JVD. Back:     Symmetric,  No CVA tenderness. Lungs:    Clear to auscultation bilaterally. No Wheezing or Rhonchi. No rales. Heart:    Regular rate and rhythm,  no murmur, rub or gallop. Abdomen:    Soft, non-tender. Not distended. Bowel sounds normal. No masses  Extremities:  Extremities with chronic stasis changes bilateral. Superficial open areas RLE w/o significant drainage, atraumatic, No cyanosis. Tr edema. No clubbing  Lymph nodes:  Cervical, supraclavicular normal.  Neurologic:  Normal strength, Alert and oriented X 3.    Skin:                 No rash      Lab Data Reviewed:    Recent Results (from the past 24 hour(s))   GLUCOSE, POC    Collection Time: 22 11:04 AM   Result Value Ref Range    Glucose (POC) 185 (H) 65 - 117 mg/dL    Performed by Ivan De La Cruz (PCT)    GLUCOSE, POC    Collection Time: 22  4:11 PM   Result Value Ref Range    Glucose (POC) 171 (H) 65 - 117 mg/dL    Performed by Erendira Lema (PCT)    GLUCOSE, POC    Collection Time: 22  9:27 PM   Result Value Ref Range    Glucose (POC) 155 (H) 65 - 117 mg/dL Performed by Ciera Rossi PCT    GLUCOSE, POC    Collection Time: 05/14/22  7:23 AM   Result Value Ref Range    Glucose (POC) 135 (H) 65 - 117 mg/dL    Performed by Brenda Davison PCT          Assessment/Plan:     Principal Problem:    Cellulitis of right leg (2/26/2021)    Active Problems:    Type 2 diabetes mellitus ([address] (1/1/2017)      Essential hypertension (8/11/2021)      Obesity (8/11/2021)      Asthma (8/11/2021)      Chronic venous stasis (5/14/2022)       ___________________________________________________  PLAN:    1. Continue Zosyn  2. Continue local wound care, wound care notes reviewed  3. Venous insufficiency by US. Continue Lasix  4. Follow renal function, 14/0.81 on 5/10, repeat tomorrow  5. Follow BS with DM and treat with SSI  6. Morbid Obesity a long term issue  7. MOM for constipation  8. 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     If need to contact me use hospital  [phone], DO NOT USE PERFECT SERVE    ___________________________________________________    Attending Physician: Rock Nuzhat MD

## 2022-05-14 NOTE — PROGRESS NOTES
Problem: Falls - Risk of  Goal: *Absence of Falls  Description: Document Marciana Distance Fall Risk and appropriate interventions in the flowsheet. Outcome: Progressing Towards Goal  Note: Fall Risk Interventions:  Mobility Interventions: Bed/chair exit alarm         Medication Interventions: Bed/chair exit alarm    Elimination Interventions: Bed/chair exit alarm,Call light in reach    History of Falls Interventions: Evaluate medications/consider consulting pharmacy         Problem: Patient Education: Go to Patient Education Activity  Goal: Patient/Family Education  Outcome: Progressing Towards Goal     Problem: Pain  Goal: *Control of Pain  Outcome: Progressing Towards Goal  Goal: *PALLIATIVE CARE:  Alleviation of Pain  Outcome: Progressing Towards Goal     Problem: Patient Education: Go to Patient Education Activity  Goal: Patient/Family Education  Outcome: Progressing Towards Goal     Problem: Cellulitis Care Plan (Adult)  Goal: *Control of acute pain  Outcome: Progressing Towards Goal  Goal: *Skin integrity maintained  Outcome: Progressing Towards Goal  Goal: *Absence of infection signs and symptoms  Outcome: Progressing Towards Goal     Problem: Patient Education: Go to Patient Education Activity  Goal: Patient/Family Education  Outcome: Progressing Towards Goal     Problem: Pressure Injury - Risk of  Goal: *Prevention of pressure injury  Description: Document Wesley Scale and appropriate interventions in the flowsheet.   Outcome: Progressing Towards Goal  Note: Pressure Injury Interventions:  Sensory Interventions: Assess changes in LOC    Moisture Interventions: Apply protective barrier, creams and emollients    Activity Interventions: Increase time out of bed    Mobility Interventions: Float heels,HOB 30 degrees or less    Nutrition Interventions: Document food/fluid/supplement intake    Friction and Shear Interventions: HOB 30 degrees or less                Problem: Patient Education: Go to Patient Education Activity  Goal: Patient/Family Education  Outcome: Progressing Towards Goal

## 2022-05-15 LAB
GLUCOSE BLD STRIP.AUTO-MCNC: 129 MG/DL (ref 65–117)
GLUCOSE BLD STRIP.AUTO-MCNC: 144 MG/DL (ref 65–117)
GLUCOSE BLD STRIP.AUTO-MCNC: 205 MG/DL (ref 65–117)
GLUCOSE BLD STRIP.AUTO-MCNC: 239 MG/DL (ref 65–117)
SERVICE CMNT-IMP: ABNORMAL

## 2022-05-15 PROCEDURE — 99232 SBSQ HOSP IP/OBS MODERATE 35: CPT | Performed by: INTERNAL MEDICINE

## 2022-05-15 PROCEDURE — 65270000029 HC RM PRIVATE

## 2022-05-15 PROCEDURE — 82962 GLUCOSE BLOOD TEST: CPT

## 2022-05-15 PROCEDURE — 74011250637 HC RX REV CODE- 250/637: Performed by: FAMILY MEDICINE

## 2022-05-15 PROCEDURE — 74011000258 HC RX REV CODE- 258: Performed by: FAMILY MEDICINE

## 2022-05-15 PROCEDURE — 74011250636 HC RX REV CODE- 250/636: Performed by: FAMILY MEDICINE

## 2022-05-15 PROCEDURE — 74011000250 HC RX REV CODE- 250: Performed by: FAMILY MEDICINE

## 2022-05-15 PROCEDURE — 74011636637 HC RX REV CODE- 636/637: Performed by: FAMILY MEDICINE

## 2022-05-15 RX ADMIN — PIPERACILLIN AND TAZOBACTAM 3.38 G: 3; .375 INJECTION, POWDER, LYOPHILIZED, FOR SOLUTION INTRAVENOUS at 14:44

## 2022-05-15 RX ADMIN — PIPERACILLIN AND TAZOBACTAM 3.38 G: 3; .375 INJECTION, POWDER, LYOPHILIZED, FOR SOLUTION INTRAVENOUS at 03:33

## 2022-05-15 RX ADMIN — ATORVASTATIN CALCIUM 40 MG: 40 TABLET, FILM COATED ORAL at 09:57

## 2022-05-15 RX ADMIN — Medication 3 UNITS: at 11:30

## 2022-05-15 RX ADMIN — ENOXAPARIN SODIUM 30 MG: 100 INJECTION SUBCUTANEOUS at 06:10

## 2022-05-15 RX ADMIN — GABAPENTIN 100 MG: 100 CAPSULE ORAL at 17:43

## 2022-05-15 RX ADMIN — OXYCODONE AND ACETAMINOPHEN 1 TABLET: 5; 325 TABLET ORAL at 00:49

## 2022-05-15 RX ADMIN — SODIUM CHLORIDE, PRESERVATIVE FREE 10 ML: 5 INJECTION INTRAVENOUS at 06:00

## 2022-05-15 RX ADMIN — SODIUM CHLORIDE, PRESERVATIVE FREE 10 ML: 5 INJECTION INTRAVENOUS at 23:26

## 2022-05-15 RX ADMIN — GABAPENTIN 100 MG: 100 CAPSULE ORAL at 09:57

## 2022-05-15 RX ADMIN — FUROSEMIDE 40 MG: 10 INJECTION, SOLUTION INTRAMUSCULAR; INTRAVENOUS at 09:57

## 2022-05-15 RX ADMIN — IBUPROFEN 600 MG: 600 TABLET ORAL at 03:35

## 2022-05-15 RX ADMIN — GABAPENTIN 100 MG: 100 CAPSULE ORAL at 23:26

## 2022-05-15 RX ADMIN — SODIUM CHLORIDE, PRESERVATIVE FREE 10 ML: 5 INJECTION INTRAVENOUS at 14:44

## 2022-05-15 RX ADMIN — NAPROXEN 500 MG: 250 TABLET ORAL at 09:57

## 2022-05-15 RX ADMIN — Medication 25 UNITS: at 09:59

## 2022-05-15 RX ADMIN — NAPROXEN 500 MG: 250 TABLET ORAL at 17:43

## 2022-05-15 RX ADMIN — Medication 2 UNITS: at 09:58

## 2022-05-15 RX ADMIN — PIPERACILLIN AND TAZOBACTAM 3.38 G: 3; .375 INJECTION, POWDER, LYOPHILIZED, FOR SOLUTION INTRAVENOUS at 23:25

## 2022-05-15 NOTE — PROGRESS NOTES
INTERNAL MEDICINE PROGRESS NOTE    NAME:  Emerick Boxer   :   1965   MRN:   [id number]     Date/Time:  5/15/2022 7:38 AM  Subjective:   History:  Chart reviewed and patient seen and examined and discussed with his nurse this AM and all events noted. He has a history of HTN, DM, Obesity, Asthma and chronic venous stasis disease and has been admitted with RLE Cellulitis. This AM he has no c/o of SOB, CP or cardiac or respiratory c/o. The swelling in his legs has decreased as has the drainage from the RLE open areas. His constipation is better and w/o other GI c/o and he has no  c/o. He has no neurologic c/o and no other c/o on complete ROS.       Medications reviewed:  Current Facility-Administered Medications   Medication Dose Route Frequency    magnesium hydroxide (MILK OF MAGNESIA) 400 mg/5 mL oral suspension 30 mL  30 mL Oral DAILY PRN    piperacillin-tazobactam (ZOSYN) 3.375 g in 0.9% sodium chloride (MBP/ADV) 100 mL MBP  3.375 g IntraVENous Q8H    atorvastatin (LIPITOR) tablet 40 mg  40 mg Oral DAILY    gabapentin (NEURONTIN) capsule 100 mg  100 mg Oral TID    ibuprofen (MOTRIN) tablet 600 mg  600 mg Oral Q6H PRN    insulin glargine (LANTUS) injection 25 Units  25 Units SubCUTAneous DAILY    naproxen (NAPROSYN) tablet 500 mg  500 mg Oral BID WITH MEALS    oxyCODONE-acetaminophen (PERCOCET) 5-325 mg per tablet 1 Tablet  1 Tablet Oral Q8H PRN    furosemide (LASIX) injection 40 mg  40 mg IntraVENous DAILY    insulin lispro (HUMALOG) injection   SubCUTAneous TIDAC    glucose chewable tablet 16 g  4 Tablet Oral PRN    glucagon (GLUCAGEN) injection 1 mg  1 mg IntraMUSCular PRN    dextrose 10 % infusion 0-250 mL  0-250 mL IntraVENous PRN    sodium chloride (NS) flush 5-40 mL  5-40 mL IntraVENous Q8H    sodium chloride (NS) flush 5-40 mL  5-40 mL IntraVENous PRN    enoxaparin (LOVENOX) injection 30 mg  30 mg SubCUTAneous Q12H        Objective:   Vitals:  Visit Vitals  /64 (BP 1 Location: Left upper arm, BP Patient Position: At rest)   Pulse 90   Temp 97.9 °F (36.6 °C)   Resp 17   Ht 5' 6\" (1.676 m)   Wt 272 lb 0.8 oz (123.4 kg)   SpO2 96%   BMI 43.91 kg/m²      O2 Device: None (Room air) Temp (24hrs), Av °F (36.7 °C), Min:97.9 °F (36.6 °C), Max:98.1 °F (36.7 °C)      Last 24hr Input/Output:    Intake/Output Summary (Last 24 hours) at 5/15/2022 0738  Last data filed at 5/15/2022 0049  Gross per 24 hour   Intake 400 ml   Output --   Net 400 ml        PHYSICAL EXAM:  General:     Alert, cooperative, no distress, appears stated age. Head:    Normocephalic, without obvious abnormality, atraumatic. Eyes:    Conjunctivae/corneas clear. PERRLA  Nose:   Nares normal. No drainage or sinus tenderness. Throat:     Lips, mucosa, and tongue normal.  No Thrush  Neck:   Supple, symmetrical,  no adenopathy, thyroid: non tender     no carotid bruit and no JVD. Back:     Symmetric,  No CVA tenderness. Lungs:    Clear to auscultation bilaterally. No Wheezing or Rhonchi. No rales. Heart:    Regular rate and rhythm,  no murmur, rub or gallop. Abdomen:    Soft, non-tender. Not distended. Bowel sounds normal. No masses  Extremities:  Extremities with chronic stasis changes bilateral. Superficial open areas RLE w/o significant drainage, bandaged today with dry dressing, atraumatic, No cyanosis. Tr edema. No clubbing  Lymph nodes:  Cervical, supraclavicular normal.  Neurologic:  Normal strength, Alert and oriented X 3.    Skin:                 No rash      Lab Data Reviewed:    Recent Results (from the past 24 hour(s))   GLUCOSE, POC    Collection Time: 22 11:40 AM   Result Value Ref Range    Glucose (POC) 182 (H) 65 - 117 mg/dL    Performed by [employer] PCT    GLUCOSE, POC    Collection Time: 22  4:22 PM   Result Value Ref Range    Glucose (POC) 222 (H) 65 - 117 mg/dL    Performed by [facility] PCT    GLUCOSE, POC    Collection Time: 22  9:34 PM   Result Value Ref Range Glucose (POC) 212 (H) 65 - 117 mg/dL    Performed by Kendall De La Cruz PCT          Assessment/Plan:     Principal Problem:    Cellulitis of right leg (2/26/2021)    Active Problems:    Type 2 diabetes mellitus ([address] (1/1/2017)      Essential hypertension (8/11/2021)      Obesity (8/11/2021)      Asthma (8/11/2021)      Chronic venous stasis (5/14/2022)       ___________________________________________________  PLAN:    1. Continue Zosyn  2. Continue local wound care, wound care notes reviewed  3. Venous insufficiency by US. Continue Lasix  4. Follow renal function, 14/0.81 on 5/10, repeat tomorrow  5. Follow BS with DM and treat with SSI  6. Morbid Obesity a long term issue  7. MOM PRN for constipation  8. 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     If need to contact me use hospital  [phone], DO NOT USE PERFECT SERVE    ___________________________________________________    Attending Physician: Kristopher Hilliard MD

## 2022-05-15 NOTE — PROGRESS NOTES
Problem: Falls - Risk of  Goal: *Absence of Falls  Description: Document Tali Vicente Fall Risk and appropriate interventions in the flowsheet. Outcome: Progressing Towards Goal  Note: Fall Risk Interventions:  Mobility Interventions: Bed/chair exit alarm         Medication Interventions: Bed/chair exit alarm    Elimination Interventions: Bed/chair exit alarm,Call light in reach    History of Falls Interventions: Bed/chair exit alarm         Problem: Pain  Goal: *Control of Pain  Outcome: Progressing Towards Goal     Problem: Pressure Injury - Risk of  Goal: *Prevention of pressure injury  Description: Document Wesley Scale and appropriate interventions in the flowsheet.   Outcome: Progressing Towards Goal  Note: Pressure Injury Interventions:  Sensory Interventions: Assess changes in LOC    Moisture Interventions: Apply protective barrier, creams and emollients,Absorbent underpads    Activity Interventions: Increase time out of bed    Mobility Interventions: Float heels    Nutrition Interventions: Document food/fluid/supplement intake    Friction and Shear Interventions: HOB 30 degrees or less

## 2022-05-15 NOTE — PROGRESS NOTES
Problem: Falls - Risk of  Goal: *Absence of Falls  Description: Document Chrissie Kenny Fall Risk and appropriate interventions in the flowsheet.   Outcome: Progressing Towards Goal  Note: Fall Risk Interventions:  Mobility Interventions: Bed/chair exit alarm         Medication Interventions: Bed/chair exit alarm    Elimination Interventions: Bed/chair exit alarm,Call light in reach    History of Falls Interventions: Bed/chair exit alarm         Problem: Patient Education: Go to Patient Education Activity  Goal: Patient/Family Education  Outcome: Progressing Towards Goal     Problem: Pain  Goal: *Control of Pain  Outcome: Progressing Towards Goal  Goal: *PALLIATIVE CARE:  Alleviation of Pain  Outcome: Progressing Towards Goal     Problem: Patient Education: Go to Patient Education Activity  Goal: Patient/Family Education  Outcome: Progressing Towards Goal     Problem: Cellulitis Care Plan (Adult)  Goal: *Control of acute pain  Outcome: Progressing Towards Goal  Goal: *Skin integrity maintained  Outcome: Progressing Towards Goal  Goal: *Absence of infection signs and symptoms  Outcome: Progressing Towards Goal

## 2022-05-16 VITALS
HEART RATE: 87 BPM | WEIGHT: 272.05 LBS | OXYGEN SATURATION: 93 % | DIASTOLIC BLOOD PRESSURE: 68 MMHG | HEIGHT: 66 IN | BODY MASS INDEX: 43.72 KG/M2 | TEMPERATURE: 97.8 F | RESPIRATION RATE: 18 BRPM | SYSTOLIC BLOOD PRESSURE: 115 MMHG

## 2022-05-16 LAB
ANION GAP SERPL CALC-SCNC: 3 MMOL/L (ref 5–15)
BUN SERPL-MCNC: 19 MG/DL (ref 6–20)
BUN/CREAT SERPL: 20 (ref 12–20)
CALCIUM SERPL-MCNC: 9.4 MG/DL (ref 8.5–10.1)
CHLORIDE SERPL-SCNC: 105 MMOL/L (ref 97–108)
CO2 SERPL-SCNC: 29 MMOL/L (ref 21–32)
CREAT SERPL-MCNC: 0.97 MG/DL (ref 0.7–1.3)
GLUCOSE BLD STRIP.AUTO-MCNC: 140 MG/DL (ref 65–117)
GLUCOSE BLD STRIP.AUTO-MCNC: 141 MG/DL (ref 65–117)
GLUCOSE SERPL-MCNC: 166 MG/DL (ref 65–100)
POTASSIUM SERPL-SCNC: 4.6 MMOL/L (ref 3.5–5.1)
SERVICE CMNT-IMP: ABNORMAL
SERVICE CMNT-IMP: ABNORMAL
SODIUM SERPL-SCNC: 137 MMOL/L (ref 136–145)

## 2022-05-16 PROCEDURE — 36415 COLL VENOUS BLD VENIPUNCTURE: CPT

## 2022-05-16 PROCEDURE — 74011250636 HC RX REV CODE- 250/636: Performed by: FAMILY MEDICINE

## 2022-05-16 PROCEDURE — 80048 BASIC METABOLIC PNL TOTAL CA: CPT

## 2022-05-16 PROCEDURE — 82962 GLUCOSE BLOOD TEST: CPT

## 2022-05-16 PROCEDURE — 99239 HOSP IP/OBS DSCHRG MGMT >30: CPT | Performed by: FAMILY MEDICINE

## 2022-05-16 PROCEDURE — 74011000250 HC RX REV CODE- 250: Performed by: FAMILY MEDICINE

## 2022-05-16 PROCEDURE — 74011000258 HC RX REV CODE- 258: Performed by: FAMILY MEDICINE

## 2022-05-16 PROCEDURE — 74011250637 HC RX REV CODE- 250/637: Performed by: FAMILY MEDICINE

## 2022-05-16 PROCEDURE — 74011636637 HC RX REV CODE- 636/637: Performed by: FAMILY MEDICINE

## 2022-05-16 RX ORDER — FUROSEMIDE 40 MG/1
TABLET ORAL
Qty: 60 TABLET | Refills: 3 | Status: SHIPPED | OUTPATIENT
Start: 2022-05-16 | End: 2022-07-20 | Stop reason: SDUPTHER

## 2022-05-16 RX ORDER — IBUPROFEN 600 MG/1
600 TABLET ORAL
Qty: 90 TABLET | Refills: 2 | Status: SHIPPED | OUTPATIENT
Start: 2022-05-16

## 2022-05-16 RX ORDER — GABAPENTIN 100 MG/1
100 CAPSULE ORAL 3 TIMES DAILY
Qty: 90 CAPSULE | Refills: 3 | Status: SHIPPED | OUTPATIENT
Start: 2022-05-16

## 2022-05-16 RX ORDER — ATORVASTATIN CALCIUM 40 MG/1
40 TABLET, FILM COATED ORAL DAILY
Qty: 90 TABLET | Refills: 3 | Status: SHIPPED | OUTPATIENT
Start: 2022-05-16 | End: 2022-07-20 | Stop reason: SDUPTHER

## 2022-05-16 RX ADMIN — PIPERACILLIN AND TAZOBACTAM 3.38 G: 3; .375 INJECTION, POWDER, LYOPHILIZED, FOR SOLUTION INTRAVENOUS at 08:18

## 2022-05-16 RX ADMIN — FUROSEMIDE 40 MG: 10 INJECTION, SOLUTION INTRAMUSCULAR; INTRAVENOUS at 08:11

## 2022-05-16 RX ADMIN — Medication 2 UNITS: at 11:56

## 2022-05-16 RX ADMIN — SODIUM CHLORIDE, PRESERVATIVE FREE 10 ML: 5 INJECTION INTRAVENOUS at 05:58

## 2022-05-16 RX ADMIN — Medication 25 UNITS: at 09:00

## 2022-05-16 RX ADMIN — GABAPENTIN 100 MG: 100 CAPSULE ORAL at 08:11

## 2022-05-16 RX ADMIN — ENOXAPARIN SODIUM 30 MG: 100 INJECTION SUBCUTANEOUS at 08:12

## 2022-05-16 RX ADMIN — NAPROXEN 500 MG: 250 TABLET ORAL at 08:12

## 2022-05-16 RX ADMIN — Medication 2 UNITS: at 08:12

## 2022-05-16 RX ADMIN — ATORVASTATIN CALCIUM 40 MG: 40 TABLET, FILM COATED ORAL at 08:11

## 2022-05-16 NOTE — PROGRESS NOTES
Pharmacist Discharge Medication Reconciliation    Significant PMH:   Past Medical History:   Diagnosis Date    Asthma     Chronic venous insufficiency     Diabetes ([facility]     Lower leg edema     Prostate cancer ([facility]     gets chemo at [facility]     Encounter Diagnoses:   Encounter Diagnoses   Name Primary? Cellulitis of right leg Yes    Bilateral lower leg cellulitis     Controlled type 2 diabetes mellitus with diabetic dermatitis, with long-term current use of insulin (HCC)     Chronic venous insufficiency     Class 3 severe obesity due to excess calories with serious comorbidity and body mass index (BMI) of 40.0 to 44.9 in adult [facility])     Diabetic polyneuropathy associated with type 2 diabetes mellitus ([facility]      Allergies: Patient has no known allergies. Discharge Medications:   Current Discharge Medication List        CONTINUE these medications which have CHANGED    Details   furosemide (LASIX) 40 mg tablet 2 tabs  po daily for fluid  Qty: 60 Tablet, Refills: 3  Start date: 5/16/2022      gabapentin (NEURONTIN) 100 mg capsule Take 1 Capsule by mouth three (3) times daily. Max Daily Amount: 300 mg. For leg pain  Indications: neuropathic pain  Qty: 90 Capsule, Refills: 3  Start date: 5/16/2022    Associated Diagnoses: Diabetic polyneuropathy associated with type 2 diabetes mellitus (HCC)      ibuprofen (MOTRIN) 600 mg tablet Take 1 Tablet by mouth every eight (8) hours as needed for Pain (leg pain). Qty: 90 Tablet, Refills: 2  Start date: 5/16/2022      atorvastatin (LIPITOR) 40 mg tablet Take 1 Tablet by mouth daily. For cholesterol  Qty: 90 Tablet, Refills: 3  Start date: 5/16/2022           CONTINUE these medications which have NOT CHANGED    Details   Blood-Glu Meter,Cont-Transmit misc 1 Units by Not Applicable route two (2) times a day.       sildenafil citrate (VIAGRA) 50 mg tablet TAKE 1 TABLET BY MOUTH ONCE DAILY AS NEEDED FOR ERECTILE DYSFUNCTION      glimepiride (AMARYL) 4 mg tablet Take 1 Tablet by mouth every morning. Indications: type 2 diabetes mellitus  Qty: 90 Tablet, Refills: 3    Associated Diagnoses: Type 2 diabetes mellitus with other circulatory complication, with long-term current use of insulin ([facility])      cholecalciferol (VITAMIN D3) (50,000 UNITS /1250 MCG) capsule Take 1 Capsule by mouth every seven (7) days. Indications: low vitamin D levels  Qty: 12 Capsule, Refills: 0    Associated Diagnoses: Vitamin D deficiency      Xtandi 80 mg tab Take  by mouth two (2) times a day. flash glucose scanning reader (FreeStyle Christian 14 Day [place]) misc 1 Units by Does Not Apply route two (2) times a day. Qty: 1 Each, Refills: 0    Associated Diagnoses: Type 2 diabetes mellitus with other circulatory complication, with long-term current use of insulin ([facility])      flash glucose sensor (FreeStyle Christian 14 Day Sensor) kit 1 Units by Does Not Apply route every fourteen (14) days. Qty: 2 Kit, Refills: 5    Associated Diagnoses: Type 2 diabetes mellitus with other circulatory complication, with long-term current use of insulin ([facility])      glucometer,pre-loaded strips (GLUCOSE METER, DISP & STRIPS) Glucose meter, See Instructions, #: 1 EA, 0 Refill(s), Pharmacy: [id number]      Insulin Syringe-Needle U-100 (BD Insulin Syringe) 1 mL 25 gauge x 5/8\" syrg Insulin syringe 1 ml (100 units) 8mm needle, See Instructions, #: 100 EA, 11 Refill(s), Pharmacy: [facility] [id number]      lancets (Accu-Chek Softclix Lancets) misc Lancets, See Instructions, #: 400 EA, 4 Refill(s), Pharmacy: [facility] [id number]      insulin glargine (Lantus U-100 Insulin) 100 unit/mL injection 25 Units by SubCUTAneous route daily. metFORMIN (GLUMETZA ER) 500 mg TG24 24 hour tablet Take 2 Tabs by mouth two (2) times a day.            STOP taking these medications       meloxicam (MOBIC) 15 mg tablet Comments:   Reason for Stopping:               The patient's chart, MAR and AVS were reviewed by Jacob Duncan [facility].     Discharging Provider: Joselo Casillas MD    Thank you,     Dionisio Montana, [facility]

## 2022-05-16 NOTE — PROGRESS NOTES
Transition of Care Plan:     RUR:8% low risk   Disposition: home   Follow up appointments: PCP and specialist   DME needed: none indicated   Transportation at [address] or means to access home: Pt has access        IM Medicare Letter: N/a  Is patient a BCPI-A Bundle:    N/A                  If yes, was Bundle Letter given?:  N/a  Is patient a [employer] and connected with the [place]?                If yes, was [employer] transfer form completed and VA notified? Caregiver Contact:Vangie Chery (Mother)   [phone]   Discharge Caregiver contacted prior to discharge? Will contact if Pt [address] needed?:  Not indicated at this time        Update: Magalie Shilo is able to accept Pt at this time. CM followed-up with Pt to verify transportation for discharge. Pt stated that he has someone to pick him up at discharge. CM notified floor nurse of this. 9:30am: CM acknowledged consult to arrange [facility] and outpatient wound care appointment. Both services cannot be provided at the same time. Pt would have to receive only one of these services. Pt would likely benefit from [facility] services as the Pt does not drive. CM met with Pt. Pt is agreeable to [facility] services. Pt has had [facility] in the past. CM to send referral to them. CM inquired about Pt's transportation, Pt stated that he would follow-up with CM on transportation. CM encouraged Pt to notify as soon as possible in the event Medicaid transport would need to be arranged.        Brian Elizalde, [facility]

## 2022-05-16 NOTE — PROGRESS NOTES
Doing well. Will dc home this am. Arrange for followup at wound care clinic. Also arrange home health followup have stressed to pt importance of getting compression stockings and to elevate legs whenever he has a break. Recheck in office on Friday.

## 2022-05-16 NOTE — WOUND CARE
Wound care reassessment / follow up on day of discharge:  Chart reviewed and patient assessed. The left leg does not have any open wounds but the right lower leg still has a small open area that is partial thickness with pink epithelial tissue / new skin forming where the partial thickness wounds were located. There is no drainage noted any longer. The wound on the right leg only needs about 5 more days of moist wound care with xeroform, ABD and brandee wrap. Pt. Has been instructed on soap and water cleansing of the legs and feet and to apply the Hydroguard cream to the leg until the bottle is gone. Once the leg wounds have healed, daily skin cleansing and application of Lac-Hydrin lotion to be applied to keep the skin healthy / softer. Also recommend compression stockings (Extra Large) to manage the edema a long with elevating the feet / floating the heels.    Valentin Merino RN, BSN, [employer]

## 2022-05-16 NOTE — PROGRESS NOTES
Discharge instructions reviewed with patient. Patient reminded to  medications from pharmacy. IV removed, catheter intact, and no bleeding noted. Patient stated he was waiting for his ride, preparing for discharge.

## 2022-05-16 NOTE — DISCHARGE INSTRUCTIONS
What to do at home with the lower legs: The wound on the right leg only needs about 5 more days of moist wound care with xeroform, ABD and brandee wrap. Pt. Has been instructed on soap and water cleansing of the legs and feet and to apply the Hydroguard cream to the leg until the bottle is gone. Once the leg wounds have healed, daily skin cleansing and application of Lac-Hydrin lotion to be applied to keep the skin healthy / softer. Also recommend compression stockings (Extra Large) to manage the edema a long with elevating the feet / floating the heels. Lac-Hydrin is sold over the counter and has brand names like Amlactin and Gerilac 12% lotions but the \"active ingredient\" on the back of the tube says, \"Lac-Hydrin\" or \"Ammonium lactate\". Use this at least daily on the legs after cleansing them.

## 2022-05-16 NOTE — PROGRESS NOTES
Hospital follow-up PCP transitional care appointment has been scheduled with Dr. Champ Osorio on 5/23/22 at 1100. [facility] asked for an appt on 5/20/22 per PCP note in chart. PCP does not have availability per PCP staff. Pending patient discharge.   Nicolle Rolle, Care Management Assistant

## 2022-05-19 NOTE — DISCHARGE SUMMARY
[address] SUMMARY    Name:  Cecelia Santos  MR#:  [id number]  :  1965  ACCOUNT #:  [de-identified]  ADMIT DATE:  2022  DISCHARGE DATE:  2022    FINAL DIAGNOSES:  1. Cellulitis of the right leg. 2.  Chronic venous insufficiency of both legs with multiple open areas, particularly on the right leg. 3.  Diabetes. 4.  History of metastatic prostate cancer. 5.  Morbid obesity. DISCHARGE MEDICATIONS:  1. Furosemide 40 mg 2 tablets daily. 2.  Gabapentin 100 mg t.i.d.  3.  Ibuprofen 600 mg every 8 hours as needed for pain. 4.  Atorvastatin 40 mg daily. 5.  Viagra 50 mg p.r.n.  6.  Glimepiride 1 mg daily. 7.  Xtandi 80 mg b.i.d.  8.  Lantus insulin 25 units daily. FOLLOWUP:  Follow up with me in my office in a week. HISTORY OF PRESENT ILLNESS:  The patient is a 22-year-old -American male with a history of morbid obesity, chronic venous insufficiency, diabetes, metastatic prostate cancer, and asthma, came to the office on the day of admission complaining of two-week history of swelling in both legs right worst than left. Right leg below the knee had developed several areas of skin breakdown, marked oozing, increasing pain. He denied any fevers, chills, cough or shortness of breath. He had been admitted in the past with similar problems and last one had Staph sepsis from this. PAST MEDICAL HISTORY:  Significant for metastatic prostate cancer. He [facility] [place] at [facility]. History of diabetes, history of chronic venous insufficiency, history of asthma. MEDICATIONS ON ADMISSION:  Included  1. Atorvastatin 40 mg daily. 2.  Glimepiride 4 mg once daily. 3.  Vitamin D 50,000 units once a week. 4.  Xtandi 80 mg twice daily. 5.  Gabapentin 100 mg t.i.d.  6.  Ibuprofen 600 mg p.r.n.  7.  Furosemide 40 mg daily. 8.  Meloxicam 15 mg daily. 9.  Lantus insulin 25 units daily. ALLERGIES:  NONE KNOWN.     HABITS:  Smoking, the patient is a former smoker, quit 2013. Alcohol 1 to 2 drinks per day. 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 Apparently works from about 5 a.m. to 7 p.m. all 7 days a week and is on his feet most of the time. Part of his day is . REVIEW OF SYSTEMS:  Please see HPI. PHYSICAL EXAMINATION ON ADMISSION:  GENERAL:  The patient is awake, alert, oriented and cooperative. VITAL SIGNS:  Temperature 97.6, blood pressure 136/77, respiratory rate 20. O2 sat 100% on room air. LUNGS:  Clear to auscultation and percussion. CARDIOVASCULAR:  Regular rate and rhythm. No murmurs, thrills, rubs or gallops. ABDOMEN:  Soft without mass, tenderness or organomegaly. EXTREMITIES:  Right leg is markedly swollen from the knee down and multiple areas of open skin wounds. Several of these had convalesced in one particular along the medial proximal calf, was 6-8 cm in diameter. These areas were very red and tender. Also had several opening areas on the back of the leg. Left leg was mildly swollen from the knee down, there were minimal open area and minimal drainage in this leg. Distal pulses on both legs are 1+ symmetrical.    HOSPITAL COURSE:  The patient was admitted, his legs kept elevated and bandaged. He was treated with IV Zosyn. Blood cultures were negative. Seen in consultation by wound care. He had venous Dopplers in both legs, no evidence of DVT. Reflex in the left common femoral and the left proximal greater saphenous vein were noted. The patient's leg slowly improved. The drainage in his legs stopped. The sores were healing up nicely. He was able to walk in the room. Sores were given time to heal as once the patient goes back to his home, they will probably put a lot pressure for him to get up and work and be on his feet all day long. He was stable and discharged home on 05/16. Home health wound care was arranged.   The patient was advised to not to go back to work until he sees me on 05/20.         Yuliana Alanis MD      [id number]  D:  05/18/2022 16:20  T:  05/19/2022 2:35  JOB #:  [id number]

## 2022-05-23 ENCOUNTER — OFFICE VISIT (OUTPATIENT)
Dept: FAMILY MEDICINE CLINIC | Age: 57
End: 2022-05-23
Payer: MEDICAID

## 2022-05-23 VITALS
DIASTOLIC BLOOD PRESSURE: 72 MMHG | TEMPERATURE: 97.8 F | SYSTOLIC BLOOD PRESSURE: 126 MMHG | RESPIRATION RATE: 16 BRPM | WEIGHT: 258.2 LBS | HEART RATE: 102 BPM | HEIGHT: 66 IN | BODY MASS INDEX: 41.5 KG/M2 | OXYGEN SATURATION: 97 %

## 2022-05-23 DIAGNOSIS — R60.0 LOCALIZED EDEMA: Primary | ICD-10-CM

## 2022-05-23 DIAGNOSIS — I87.2 CHRONIC VENOUS INSUFFICIENCY: ICD-10-CM

## 2022-05-23 DIAGNOSIS — Z09 HOSPITAL DISCHARGE FOLLOW-UP: ICD-10-CM

## 2022-05-23 PROCEDURE — 99213 OFFICE O/P EST LOW 20 MIN: CPT | Performed by: FAMILY MEDICINE

## 2022-05-23 NOTE — PROGRESS NOTES
HISTORY OF PRESENT ILLNESS  Seena Severance is a 62 y.o. male. HPI In for hospital followup. R leg is doing ok. Thinks that will get his compression stocking today. Has lost 14 lbs from 2 weeks ago. Still taking lasix. No drainage from legs. ROS    Physical Exam  Vitals and nursing note reviewed. Constitutional:       Appearance: He is well-developed. HENT:      Right Ear: External ear normal.      Left Ear: External ear normal.   Neck:      Thyroid: No thyromegaly. Cardiovascular:      Rate and Rhythm: Normal rate and regular rhythm. Heart sounds: Normal heart sounds. Gallop: eucerin cheryl. Pulmonary:      Effort: Pulmonary effort is normal. No respiratory distress. Breath sounds: Normal breath sounds. No wheezing. Abdominal:      General: Bowel sounds are normal. There is no distension. Palpations: Abdomen is soft. There is no mass. Tenderness: There is no abdominal tenderness. There is no guarding. Musculoskeletal:         General: Normal range of motion. Comments: 2+ edema bilaterally. No open sores at present. Moderate scaling bilaterally   Lymphadenopathy:      Cervical: No cervical adenopathy. ASSESSMENT and PLAN  Orders Placed This Encounter    METABOLIC PANEL, BASIC    white petrolatum-mineral oiL (EUCERIN) topical cream     Diagnoses and all orders for this visit:    1. Localized edema  -     METABOLIC PANEL, BASIC; Future    2. Chronic venous insufficiency    Other orders  -     white petrolatum-mineral oiL (EUCERIN) topical cream; Apply  to affected area as needed for Dry Skin. Pt needs to get venous compression stockings. Recheck in 4 weeks. eucerin cream bid to legs.

## 2022-05-23 NOTE — PROGRESS NOTES
Chief Complaint   Patient presents with   [facility] Follow Up       1. \"Have you been to the ER, urgent care clinic since your last visit? Hospitalized since your last visit? \" Yes [id number] - 5/5/22 through 5/16/22- cellulitis of leg    2. \"Have you seen or consulted any other health care providers outside of the [address] [place] [address] since your last visit? \" No     3. For patients aged 39-70: Has the patient had a colonoscopy / FIT/ Cologuard? No      If the patient is female:    4. For patients aged 41-77: Has the patient had a mammogram within the past 2 years? NA - based on age or sex      11. For patients aged 21-65: Has the patient had a pap smear?  NA - based on age or sex    Health Maintenance Due   Topic Date Due    Pneumococcal 0-64 years (1 - PCV) Never done    Foot Exam Q1  Never done    Eye Exam Retinal or Dilated  Never done    DTaP/Tdap/Td series (1 - Tdap) Never done    Colorectal Cancer Screening Combo  Never done    Shingrix Vaccine Age 50> (1 of 2) Never done    COVID-19 Vaccine (3 - Booster for [employer] series) 08/13/2021

## 2022-05-24 LAB
BUN SERPL-MCNC: 17 MG/DL (ref 6–24)
BUN/CREAT SERPL: 22 (ref 9–20)
CALCIUM SERPL-MCNC: 8.9 MG/DL (ref 8.7–10.2)
CHLORIDE SERPL-SCNC: 103 MMOL/L (ref 96–106)
CO2 SERPL-SCNC: 16 MMOL/L (ref 20–29)
CREAT SERPL-MCNC: 0.76 MG/DL (ref 0.76–1.27)
EGFR: 105 ML/MIN/1.73
GLUCOSE SERPL-MCNC: 143 MG/DL (ref 65–99)
POTASSIUM SERPL-SCNC: 4.7 MMOL/L (ref 3.5–5.2)
SODIUM SERPL-SCNC: 135 MMOL/L (ref 134–144)
SPECIMEN STATUS REPORT, ROLRST: NORMAL

## 2022-07-19 ENCOUNTER — NURSE TRIAGE (OUTPATIENT)
Dept: OTHER | Facility: CLINIC | Age: 57
End: 2022-07-19

## 2022-07-19 NOTE — TELEPHONE ENCOUNTER
Received call from Ravi Diaz at [facility] with [employer] Complaint. Subjective: Caller states \"leg wound, left leg\"     Current Symptoms: sores draining    Onset: 1 week ago; gradual, worsening    Associated Symptoms: NA    Pain Severity: difficult to assess, states a little pain    Temperature:denies    What has been tried: wrapping leg    LMP: NA Pregnant: NA    Recommended disposition: Go to ED/UCC Now (Or to Office with PCP Approval)    Care advice provided, patient verbalizes understanding; denies any other questions or concerns; instructed to call back for any new or worsening symptoms. ed and office refusal states no ride today will keep appt for tomorrow    Attention Provider: Thank you for allowing me to participate in the care of your patient. The patient was connected to triage in response to information provided to the ECC. Please do not respond through this encounter as the response is not directed to a shared pool.       Reason for Disposition   SEVERE swelling (e.g., swelling extends above knee, entire leg is swollen, weeping fluid)    Protocols used: LEG SWELLING AND EDEMA-ADULT-OH

## 2022-07-20 ENCOUNTER — OFFICE VISIT (OUTPATIENT)
Dept: FAMILY MEDICINE CLINIC | Age: 57
End: 2022-07-20
Payer: MEDICAID

## 2022-07-20 VITALS
DIASTOLIC BLOOD PRESSURE: 87 MMHG | OXYGEN SATURATION: 95 % | WEIGHT: 267.2 LBS | HEART RATE: 97 BPM | RESPIRATION RATE: 16 BRPM | BODY MASS INDEX: 42.94 KG/M2 | HEIGHT: 66 IN | SYSTOLIC BLOOD PRESSURE: 155 MMHG | TEMPERATURE: 97.7 F

## 2022-07-20 DIAGNOSIS — M79.604 PAIN IN BOTH LOWER EXTREMITIES: ICD-10-CM

## 2022-07-20 DIAGNOSIS — M79.605 PAIN IN BOTH LOWER EXTREMITIES: ICD-10-CM

## 2022-07-20 DIAGNOSIS — L97.919 ULCERS OF BOTH LOWER LEGS (HCC): ICD-10-CM

## 2022-07-20 DIAGNOSIS — L97.929 ULCERS OF BOTH LOWER LEGS (HCC): ICD-10-CM

## 2022-07-20 DIAGNOSIS — I87.2 VENOUS INSUFFICIENCY: Primary | ICD-10-CM

## 2022-07-20 PROCEDURE — 99214 OFFICE O/P EST MOD 30 MIN: CPT | Performed by: FAMILY MEDICINE

## 2022-07-20 RX ORDER — HYDROCODONE BITARTRATE AND ACETAMINOPHEN 5; 325 MG/1; MG/1
1 TABLET ORAL
Qty: 30 TABLET | Refills: 0 | Status: SHIPPED | OUTPATIENT
Start: 2022-07-20 | End: 2022-07-23

## 2022-07-20 RX ORDER — AMOXICILLIN AND CLAVULANATE POTASSIUM 875; 125 MG/1; MG/1
1 TABLET, FILM COATED ORAL 2 TIMES DAILY
Qty: 20 TABLET | Refills: 0 | Status: SHIPPED | OUTPATIENT
Start: 2022-07-20 | End: 2022-07-30

## 2022-07-20 RX ORDER — FUROSEMIDE 40 MG/1
TABLET ORAL
Qty: 60 TABLET | Refills: 3 | Status: SHIPPED | OUTPATIENT
Start: 2022-07-20

## 2022-07-20 RX ORDER — ATORVASTATIN CALCIUM 40 MG/1
40 TABLET, FILM COATED ORAL DAILY
Qty: 90 TABLET | Refills: 3 | Status: SHIPPED | OUTPATIENT
Start: 2022-07-20

## 2022-07-20 RX ORDER — SYRINGE AND NEEDLE,INSULIN,1ML 25GX1"
SYRINGE, EMPTY DISPOSABLE MISCELLANEOUS
Qty: 100 EACH | Refills: 1 | Status: SHIPPED | OUTPATIENT
Start: 2022-07-20

## 2022-07-20 RX ORDER — INSULIN GLARGINE 100 [IU]/ML
25 INJECTION, SOLUTION SUBCUTANEOUS DAILY
Qty: 1 ML | Refills: 5 | Status: SHIPPED | OUTPATIENT
Start: 2022-07-20

## 2022-07-20 NOTE — PROGRESS NOTES
HISTORY OF PRESENT ILLNESS  Basilio Becerra is a 62 y.o. male. HPI L leg has been draining. For 2 weeks. R leg has been doing ok. Moderate pain in L leg. Has been taking tylenol or ibuprofen for pain. No fever, chills. Went back to work, sits down at times. Hasnt been using any stockings. They couldn't help him at [facility]. Has dealt with [facility]   In the past.   ROS    Physical Exam  Vitals and nursing note reviewed. Constitutional:       Appearance: He is well-developed. HENT:      Right Ear: External ear normal.      Left Ear: External ear normal.   Neck:      Thyroid: No thyromegaly. Cardiovascular:      Rate and Rhythm: Normal rate and regular rhythm. Heart sounds: Normal heart sounds. Pulmonary:      Effort: Pulmonary effort is normal. No respiratory distress. Breath sounds: Normal breath sounds. No wheezing. Abdominal:      General: Bowel sounds are normal. There is no distension. Palpations: Abdomen is soft. There is no mass. Tenderness: There is no abdominal tenderness. There is no guarding. Musculoskeletal:         General: Normal range of motion. Comments: Lower extremities- 2+ edema bilaterally  L leg- has several shallow open lesion 1-2 cm in diameter most are scabbed over  there is one open area on anterior proximal tibia draining clear fluid   Lymphadenopathy:      Cervical: No cervical adenopathy. ASSESSMENT and PLAN  Orders Placed This Encounter    REFERRAL TO WOUND CARE    furosemide (LASIX) 40 mg tablet    atorvastatin (LIPITOR) 40 mg tablet    Insulin Syringe-Needle U-100 (BD Insulin Syringe) 1 mL 25 gauge x 5/8\" syrg    insulin glargine (LANTUS) 100 unit/mL injection    amoxicillin-clavulanate (AUGMENTIN) 875-125 mg per tablet    HYDROcodone-acetaminophen (NORCO) 5-325 mg per tablet     Diagnoses and all orders for this visit:    1. Venous insufficiency  -     REFERRAL TO WOUND CARE    2.  Ulcers of both lower legs (HCC)  - REFERRAL TO WOUND CARE    3. Pain in both lower extremities  -     HYDROcodone-acetaminophen (NORCO) 5-325 mg per tablet; Take 1 Tablet by mouth every eight (8) hours as needed for Pain for up to 3 days. Max Daily Amount: 3 Tablets. Other orders  -     furosemide (LASIX) 40 mg tablet; 2 tabs  po daily for fluid  -     atorvastatin (LIPITOR) 40 mg tablet; Take 1 Tablet by mouth in the morning. For cholesterol  -     Insulin Syringe-Needle U-100 (BD Insulin Syringe) 1 mL 25 gauge x 5/8\" syrg; Insulin syringe 1 ml (100 units) 8mm needle, See Instructions, #: 100 EA, 11 Refill(s), Pharmacy: [address] [place] [address]  -     insulin glargine (LANTUS) 100 unit/mL injection; Take 25 Units by SubCUTAneous route in the morning.  -     amoxicillin-clavulanate (AUGMENTIN) 875-125 mg per tablet; Take 1 Tablet by mouth two (2) times a day for 10 days. Follow-up and Dispositions    Return in about 3 weeks (around 8/10/2022). Apt with wound care clinic next week. Will also arrange home health. L leg bandaged. Somewhat disappointed that didn't get venous compression stocking.

## 2022-07-20 NOTE — PROGRESS NOTES
Chief Complaint   Patient presents with    Wound Check       1. \"Have you been to the ER, urgent care clinic since your last visit? Hospitalized since your last visit? \" Yes Reason for visit: 7/18/22    2. \"Have you seen or consulted any other health care providers outside of the [address] [place] [address] since your last visit? \" Yes Hematology   - [facility]     3. For patients aged 39-70: Has the patient had a colonoscopy / FIT/ Cologuard? No      If the patient is female:    4. For patients aged 41-77: Has the patient had a mammogram within the past 2 years? NA - based on age or sex      11. For patients aged 21-65: Has the patient had a pap smear? NA - based on age or sex    Health Maintenance Due   Topic Date Due    Pneumococcal 0-64 years (1 - PCV) Never done    Foot Exam Q1  Never done    Eye Exam Retinal or Dilated  Never done    DTaP/Tdap/Td series (1 - Tdap) Never done    Colorectal Cancer Screening Combo  Never done    Shingrix Vaccine Age 50> (1 of 2) Never done    COVID-19 Vaccine (3 - Booster for Pfizer series) 08/13/2021    MICROALBUMIN Q1  08/11/2022     Patient notes pain in both legs but has drainage in left lower leg.

## 2022-07-27 ENCOUNTER — HOSPITAL ENCOUNTER (OUTPATIENT)
Dept: WOUND CARE | Age: 57
Discharge: HOME | End: 2022-07-27
Payer: MEDICAID

## 2022-07-27 VITALS
DIASTOLIC BLOOD PRESSURE: 61 MMHG | RESPIRATION RATE: 18 BRPM | HEART RATE: 107 BPM | SYSTOLIC BLOOD PRESSURE: 131 MMHG | TEMPERATURE: 97.1 F

## 2022-07-27 DIAGNOSIS — I87.2 CHRONIC VENOUS INSUFFICIENCY: Primary | ICD-10-CM

## 2022-07-27 DIAGNOSIS — I87.8 CHRONIC VENOUS STASIS: ICD-10-CM

## 2022-07-27 PROCEDURE — 29581 APPL MULTLAYER CMPRN SYS LEG: CPT

## 2022-07-27 PROCEDURE — 99213 OFFICE O/P EST LOW 20 MIN: CPT

## 2022-07-27 RX ORDER — CLOBETASOL PROPIONATE 0.5 MG/G
OINTMENT TOPICAL ONCE
Status: CANCELLED | OUTPATIENT
Start: 2022-07-27 | End: 2022-07-27

## 2022-07-27 RX ORDER — MUPIROCIN 20 MG/G
OINTMENT TOPICAL ONCE
Status: CANCELLED | OUTPATIENT
Start: 2022-07-27 | End: 2022-07-27

## 2022-07-27 RX ORDER — TRIAMCINOLONE ACETONIDE 1 MG/G
OINTMENT TOPICAL ONCE
Status: CANCELLED | OUTPATIENT
Start: 2022-07-27 | End: 2022-07-27

## 2022-07-27 RX ORDER — LIDOCAINE HYDROCHLORIDE 20 MG/ML
JELLY TOPICAL ONCE
Status: CANCELLED | OUTPATIENT
Start: 2022-07-27 | End: 2022-07-27

## 2022-07-27 RX ORDER — BACITRACIN ZINC AND POLYMYXIN B SULFATE 500; 1000 [USP'U]/G; [USP'U]/G
OINTMENT TOPICAL ONCE
Status: CANCELLED | OUTPATIENT
Start: 2022-07-27 | End: 2022-07-27

## 2022-07-27 RX ORDER — BACITRACIN 500 [USP'U]/G
OINTMENT TOPICAL ONCE
Status: CANCELLED | OUTPATIENT
Start: 2022-07-27 | End: 2022-07-27

## 2022-07-27 RX ORDER — GENTAMICIN SULFATE 1 MG/G
OINTMENT TOPICAL ONCE
Status: CANCELLED | OUTPATIENT
Start: 2022-07-27 | End: 2022-07-27

## 2022-07-27 RX ORDER — BETAMETHASONE DIPROPIONATE 0.5 MG/G
OINTMENT TOPICAL ONCE
Status: CANCELLED | OUTPATIENT
Start: 2022-07-27 | End: 2022-07-27

## 2022-07-27 RX ORDER — LIDOCAINE HYDROCHLORIDE 40 MG/ML
SOLUTION TOPICAL ONCE
Status: CANCELLED | OUTPATIENT
Start: 2022-07-27 | End: 2022-07-27

## 2022-07-27 RX ORDER — SILVER SULFADIAZINE 10 G/1000G
CREAM TOPICAL ONCE
Status: CANCELLED | OUTPATIENT
Start: 2022-07-27 | End: 2022-07-27

## 2022-07-27 RX ORDER — LIDOCAINE 40 MG/G
CREAM TOPICAL ONCE
Status: CANCELLED | OUTPATIENT
Start: 2022-07-27 | End: 2022-07-27

## 2022-07-27 NOTE — WOUND CARE
Multilayer Compression Wrap   (Not Unna) Below the Knee    NAME:  Carroll Ponce  YOB: 1965  MEDICAL RECORD NUMBER:  [id number]  DATE:  7/27/2022    Removed old Multilayer wrap if indicated and wash leg with mild soap/water. Applied moisturizing agent to dry skin as needed. Applied primary and secondary dressing as ordered. Applied multilayered dressing below the knee to right lower leg. Applied multilayered dressing below the knee to left lower leg. Instructed patient/caregiver not to remove dressing and to keep it clean and dry. Instructed patient/caregiver on complications to report to provider, such as pain, numbness in toes, heavy drainage, and slippage of dressing. Instructed patient on purpose of compression dressing and on activity and exercise recommendations.     Response to treatment: Well tolerated by patient       Electronically signed by Lalito Parker RN on 7/27/2022 at 9:54 AM

## 2022-07-27 NOTE — DISCHARGE INSTRUCTIONS
Discharge Instructions [address], [address] [place] [address] [place] Head, [id number]  Telephone: [phone]    NAME:  Thomas Stephens  YOB: 1965  MEDICAL RECORD NUMBER:  [id number]  DATE:  [de-identified]  WOUND CARE ORDERS:  Left leg wound :Cleanse with Soap and water , apply primary dressing vasoline gauze , cover with secondary dressing   abd pad . Apply 2 layers with Calamine  Pt./pcg/HH nurse to change (freq) once a week in clinic and as needed for compromise. F/U in 1 week. TREATMENT ORDERS:    Elevate leg(s) above the level of the heart when sitting. Avoid prolonged standing in one place. Do no get dressing/wrap wet. Follow Diet as prescribed:   [] Diet as tolerated: [] Calorie Diabetic Diet: Low carb and no Sugar [] No Added Salt:  [] Increase Protein: [] Limit the amount of liquid you are drinking and avoid drinking in between meals           Return Appointment:  [x] Return Appointment: With DR Tamera Stroud  in  1 [facility])  [] Nurse Visit   [] Ordered tests:    Electronically signed Rashard Jay RN on 7/27/2022 at 9:42 AM     [address] Information: Should you experience any significant changes in your wound(s) or have questions about your wound care, please contact the [address] [place] [address] at [address] [place] [address] 8:00 am - 4:30. If you need help with your wound outside these hours and cannot wait until we are again available, contact your PCP or go to the hospital emergency room. PLEASE NOTE: IF YOU ARE UNABLE TO OBTAIN WOUND SUPPLIES, CONTINUE TO USE THE SUPPLIES YOU HAVE AVAILABLE UNTIL YOU ARE ABLE TO REACH US. IT IS MOST IMPORTANT TO KEEP THE WOUND COVERED AT ALL TIMES.      Physician Signature:_______________________    Date: ___________ Time:  ____________

## 2022-07-27 NOTE — H&P
Wound Center  History and Physical    Date of Service: 22     Date of Initial Visit for Current Problem:  22    Subjective:     Chief Complaint:  Basilio Becerra is a 62 y.o.  male who presents with L lower leg medial/posterior wounds of 3 months duration. Referred by:  Dr. Marycruz Allred    HPI:   Chronic bilateral lower extremity edema. Wound caused by: venous stasis/chronic edema  Current wound care:  Offloading wound: yes and n/a  Appetite: good  Wound associated pain: mild  Diabetic: Yes  Smoker: No    Past Medical History:   Diagnosis Date    Asthma     Chronic venous insufficiency     Diabetes (HCC)     Lower leg edema     Prostate cancer ([address]     gets chemo at [facility]      No past surgical history on file. Family History   Problem Relation Age of Onset    Stroke Mother         brain bleed    Stroke Father     Hypertension Father     Heart Attack Father     Hypertension Sister     Obesity Sister     Asthma Sister     Hypertension Sister     Hypertension Sister     No Known Problems Brother     No Known Problems Sister       Social History     Tobacco Use    Smoking status: Former     Packs/day: 0.50     Years: 2.00     Pack years: 1.00     Types: Cigarettes     Quit date:      Years since quittin.5    Smokeless tobacco: Never    Tobacco comments:     quit > 8 years ago   Substance Use Topics    Alcohol use: Yes     Comment: 1-2 per day (wine or beer)       Prior to Admission medications    Medication Sig Start Date End Date Taking? Authorizing Provider   furosemide (LASIX) 40 mg tablet 2 tabs  po daily for fluid 22  Yes Dayton Briceno MD   atorvastatin (LIPITOR) 40 mg tablet Take 1 Tablet by mouth in the morning.  For cholesterol 22  Yes Dayton Briceno MD   insulin glargine (LANTUS) 100 unit/mL injection Take 25 Units by SubCUTAneous route in the morning. 22  Yes Dayton Briceno MD   amoxicillin-clavulanate (AUGMENTIN) 875-125 mg per tablet Take 1 Tablet by mouth two (2) times a day for 10 days. 7/20/22 7/30/22 Yes Jeanette Lyle MD   gabapentin (NEURONTIN) 100 mg capsule Take 1 Capsule by mouth three (3) times daily. Max Daily Amount: 300 mg. For leg pain  Indications: neuropathic pain 5/16/22  Yes Jeanette Lyle MD   ibuprofen (MOTRIN) 600 mg tablet Take 1 Tablet by mouth every eight (8) hours as needed for Pain (leg pain). 5/16/22  Yes Jeanette Lyle MD   glimepiride (AMARYL) 4 mg tablet Take 1 Tablet by mouth every morning. Indications: type 2 diabetes mellitus 11/5/21  Yes Jeanette Lyle MD   Xtandi 80 mg tab Take  by mouth two (2) times a day. 7/28/21  Yes Provider, Historical   Insulin Syringe-Needle U-100 (BD Insulin Syringe) 1 mL 25 gauge x 5/8\" syrg Insulin syringe 1 ml (100 units) 8mm needle, See Instructions, #: 100 EA, 11 Refill(s), Pharmacy: [facility] [id number] 7/20/22   Jeanette Lyle MD   white petrolatum-mineral oiL (EUCERIN) topical cream Apply  to affected area as needed for Dry Skin. 5/23/22   Jeanette Lyle MD   Blood-Glu Meter,Cont-Transmit misc 1 Units by Not Applicable route two (2) times a day. 8/11/21   Provider, Historical   sildenafil citrate (VIAGRA) 50 mg tablet TAKE 1 TABLET BY MOUTH ONCE DAILY AS NEEDED FOR ERECTILE DYSFUNCTION 11/15/21   Provider, Historical   cholecalciferol (VITAMIN D3) (50,000 UNITS /1250 MCG) capsule Take 1 Capsule by mouth every seven (7) days. Indications: low vitamin D levels  Patient not taking: No sig reported 8/12/21   Teri Golds MARICARMEN, NP   flash glucose scanning reader [facility] Christian 14 Day [place]) misc 1 Units by Does Not Apply route two (2) times a day. Patient not taking: No sig reported 8/11/21   Teri Cathy D NP   flash glucose sensor (FreeStyle Christian 14 Day Sensor) kit 1 Units by Does Not Apply route every fourteen (14) days.   Patient not taking: No sig reported 8/11/21   Teri Cathy HERNADEZ NP   glucometer,pre-loaded strips (GLUCOSE METER, DISP & STRIPS) Glucose meter, See Instructions, #: 1 EA, 0 Refill(s), Pharmacy: [facility] [id number] 2/24/21   Provider, Historical   lancets misc Lancets, See Instructions, #: 400 EA, 4 Refill(s), Pharmacy: [facility] [id number] 2/24/21   Provider, Historical   metFORMIN (GLUMETZA ER) 500 mg TG24 24 hour tablet Take 2 Tabs by mouth two (2) times a day. Patient not taking: No sig reported    Provider, Historical     Not on File     Review of Systems:  A comprehensive review of systems was negative except for that written in the History of Present Illness. and PMH. Objective:     Physical Exam:     Visit Vitals  /61 (BP 1 Location: Right upper arm)   Pulse (!) 107   Temp 97.1 °F (36.2 °C)   Resp 18     General: well developed, well nourished, pleasant , NAD. Hygiene good  Psych: cooperative. Pleasant. No anxiety or depression. Normal mood and affect. Neuro: alert and oriented to person/place/situation. Otherwise nonfocal.  HEENT: Normocephalic, atraumatic. EOMI. Conjunctiva clear. No scleral icterus. Chest: Respirations nonlabored. Abdomen:  Nondistended. Lower extremities: color normal; temperature normal. Hair growth is not present. Calves are supple, nontender, approximately equally sized in comparison. Ulcer Location: L lower leg medial and posterior  Etiology: venous stasis/lymphedema  Wound: 21 x 17 x 0.1 cm  Ulcer bed: Fibrotic slough    Periwound: Normal  Exudate: Moderate amount Serous exudate  Depth of Wound: To Fat Layer     R leg is quite edematous as well  Assessment:     62 y.o. male with L lower leg medial/posterior venous stasis ulcers. Lymphedema. Plan:   Wound was at least mechanically debrided using a 4x4. More extensive debridement, if done, is outlined below. Ulcer Debridement    Ulcer Number Group measurement;  Left Calf To Fat Layer;  Venous      Character of Ulcer: New     Indication for Debridement: Slough, Exudate    Pre debridement measurements: See above    Risks of procedure were discussed with patient. Consent has been signed. Anesthetic: Lidocaine 4% topical cream     Level of Debridement: Fibrin/ Exudate/ Debris/ Biofilm     Tissue and/or Material removed: Fibrin/ Slough    Pre-debridement severity: Fat Layer Exposed     Post debridement severity: Fat Layer exposed    Instrument(s) used: Curette    Bleeding controlled with: Pressure    Estimated blood loss: Minimal    Post debridement measurements: unchanged    Post procedural pain: mild    Patient tolerated procedure well. Dressing: Vaseline gauze, 2 layer wrap. Frequency: weekly    Patient understood and agrees with plan. Questions answered. Weekly visits and serial debridements also discussed.   Follow up with me in 1 week    Signed By: Bryant Lopez MD     July 27, 2022

## 2022-07-27 NOTE — WOUND CARE
07/27/22 0920   Wound Leg lower Left medial-posterior 07/27/22   Date First Assessed/Time First Assessed: 07/27/22 0919   Present on Hospital Admission: No  Wound Approximate Age at First Assessment (Weeks): 12 weeks  Primary Wound Type: (c)   Location: Leg lower  Wound Location Orientation: Left  Wound Description. ..    Wound Image     Wound Etiology   (appears to be venous)   Dressing Status   (gauze,rolled gauze)   Cleansed Soap and water   Wound Length (cm) 21 cm   Wound Width (cm) 17 cm   Wound Depth (cm) 0.1 cm   Wound Surface Area (cm^2) 357 cm^2   Wound Volume (cm^3) 35.7 cm^3   Wound Assessment Pink/red  (macerated)   Drainage Amount Moderate   Drainage Description Serous   Wound Odor None   Ann-Wound/Incision Assessment Edematous   Edges Undefined edges   Wound Thickness Description Full thickness   Visit Vitals  /61 (BP 1 Location: Right upper arm)   Pulse (!) 107   Temp 97.1 °F (36.2 °C)   Resp 18

## 2022-08-03 ENCOUNTER — HOSPITAL ENCOUNTER (OUTPATIENT)
Dept: WOUND CARE | Age: 57
Discharge: HOME | End: 2022-08-03
Payer: MEDICAID

## 2022-08-03 VITALS
DIASTOLIC BLOOD PRESSURE: 76 MMHG | HEART RATE: 106 BPM | TEMPERATURE: 97.4 F | RESPIRATION RATE: 18 BRPM | SYSTOLIC BLOOD PRESSURE: 139 MMHG

## 2022-08-03 DIAGNOSIS — I87.2 CHRONIC VENOUS INSUFFICIENCY: Primary | ICD-10-CM

## 2022-08-03 DIAGNOSIS — I87.8 CHRONIC VENOUS STASIS: ICD-10-CM

## 2022-08-03 PROCEDURE — 29581 APPL MULTLAYER CMPRN SYS LEG: CPT

## 2022-08-03 RX ORDER — LIDOCAINE HYDROCHLORIDE 20 MG/ML
JELLY TOPICAL ONCE
Status: CANCELLED | OUTPATIENT
Start: 2022-08-03 | End: 2022-08-03

## 2022-08-03 RX ORDER — BETAMETHASONE DIPROPIONATE 0.5 MG/G
OINTMENT TOPICAL ONCE
Status: CANCELLED | OUTPATIENT
Start: 2022-08-03 | End: 2022-08-03

## 2022-08-03 RX ORDER — SILVER SULFADIAZINE 10 G/1000G
CREAM TOPICAL ONCE
Status: CANCELLED | OUTPATIENT
Start: 2022-08-03 | End: 2022-08-03

## 2022-08-03 RX ORDER — BACITRACIN 500 [USP'U]/G
OINTMENT TOPICAL ONCE
Status: CANCELLED | OUTPATIENT
Start: 2022-08-03 | End: 2022-08-03

## 2022-08-03 RX ORDER — BACITRACIN ZINC AND POLYMYXIN B SULFATE 500; 1000 [USP'U]/G; [USP'U]/G
OINTMENT TOPICAL ONCE
Status: CANCELLED | OUTPATIENT
Start: 2022-08-03 | End: 2022-08-03

## 2022-08-03 RX ORDER — TRIAMCINOLONE ACETONIDE 1 MG/G
OINTMENT TOPICAL ONCE
Status: CANCELLED | OUTPATIENT
Start: 2022-08-03 | End: 2022-08-03

## 2022-08-03 RX ORDER — LIDOCAINE 40 MG/G
CREAM TOPICAL ONCE
Status: CANCELLED | OUTPATIENT
Start: 2022-08-03 | End: 2022-08-03

## 2022-08-03 RX ORDER — LIDOCAINE HYDROCHLORIDE 40 MG/ML
SOLUTION TOPICAL ONCE
Status: CANCELLED | OUTPATIENT
Start: 2022-08-03 | End: 2022-08-03

## 2022-08-03 RX ORDER — GENTAMICIN SULFATE 1 MG/G
OINTMENT TOPICAL ONCE
Status: CANCELLED | OUTPATIENT
Start: 2022-08-03 | End: 2022-08-03

## 2022-08-03 RX ORDER — MUPIROCIN 20 MG/G
OINTMENT TOPICAL ONCE
Status: CANCELLED | OUTPATIENT
Start: 2022-08-03 | End: 2022-08-03

## 2022-08-03 RX ORDER — CLOBETASOL PROPIONATE 0.5 MG/G
OINTMENT TOPICAL ONCE
Status: CANCELLED | OUTPATIENT
Start: 2022-08-03 | End: 2022-08-03

## 2022-08-03 NOTE — WOUND CARE
08/03/22 0922   Right Leg Edema Point of Measurement   Leg circumference 48.8 cm   Ankle circumference 31 cm   Compression Therapy 2 layer compression wrap   Left Leg Edema Point of Measurement   Leg circumference 47.8 cm   Ankle circumference 30.1 cm   Compression Therapy 2 layer compression wrap   LLE Peripheral Vascular    Capillary Refill Less than/equal to 3 seconds   Color Appropriate for race   Temperature Warm   Sensation Hypersensitivity   Pedal Pulse Palpable   RLE Peripheral Vascular    Capillary Refill Less than/equal to 3 seconds   Color Appropriate for race   Temperature Warm   Sensation Hypersensitivity   Pedal Pulse Palpable   Wound Leg lower Left medial-posterior 07/27/22   Date First Assessed/Time First Assessed: 07/27/22 0919   Present on Hospital Admission: No  Wound Approximate Age at First Assessment (Weeks): 12 weeks  Primary Wound Type: (c)   Location: Leg lower  Wound Location Orientation: Left  Wound Description. ..    Wound Image     Wound Etiology Other (Comment)  (edema)   Dressing Status Old drainage noted   Cleansed Soap and water   Dressing/Treatment   (vaseline G, ABD and 2 layer with calamine)   Wound Length (cm) 12 cm   Wound Width (cm) 12 cm   Wound Depth (cm) 0.1 cm   Wound Surface Area (cm^2) 144 cm^2   Change in Wound Size % 59.66   Wound Volume (cm^3) 14.4 cm^3   Wound Healing % 60   Wound Assessment Pink/red  (maceration)   Drainage Amount Moderate   Drainage Description Serous   Wound Odor None   Ann-Wound/Incision Assessment Edematous   Edges Undefined edges   Wound Thickness Description Full thickness     Visit Vitals  /76 (BP 1 Location: Left upper arm, BP Patient Position: At rest)   Pulse (!) 106   Temp 97.4 °F (36.3 °C)   Resp 18

## 2022-08-03 NOTE — DISCHARGE INSTRUCTIONS
Discharge Instructions/Wound Orders  [address], [address] [place] [address] [place], [id number]  Telephone: [phone]    NAME:  Mirna Oswald  YOB: 1965  MEDICAL RECORD NUMBER:  [id number]  DATE:  08/03/22  Wound Care Orders:  Left leg wounds :Cleanse with Soap and water , apply primary dressing xeroform cover with secondary dressing   abd pad . Bilateral lower legs Apply 2 layers with Calamine  Pt./pcg/HH nurse to change (freq) 2 x week and as needed for compromise. F/U in 1 week. Dietary:  [] Diet as tolerated: [] Diabetic Diet:Low carb and no Sugar   [] No Added Salt:[] Increase Protein:   [x] Other:Limit the amount of liquid you are drinking and avoid drinking in between meals   Activity:  [x] Activity as tolerated:     Return Appointment:  [x] Return Appointment: With DR Pedro Danielson  in  1 [facility])  [] Ordered tests: will send orders to [facility] for dressing changes. Electronically signed Ronny Morrow RN on 8/3/2022 at 9:48 AM   [address] [place] [address] Information: Should you experience any significant changes in your wound(s) or have questions about your wound care, please contact the [address] [place] [address] at [address] [place] [address] 8:00 am - 4:30. If you need help with your wound outside these hours and cannot wait until we are again available, contact your PCP or go to the hospital emergency room. PLEASE NOTE: IF YOU ARE UNABLE TO OBTAIN WOUND SUPPLIES, CONTINUE TO USE THE SUPPLIES YOU HAVE AVAILABLE UNTIL YOU ARE ABLE TO REACH US. IT IS MOST IMPORTANT TO KEEP THE WOUND COVERED AT ALL TIMES.     Physician Signature:_______________________    Date: ___________ Time:  ____________

## 2022-08-03 NOTE — PROGRESS NOTES
Wound Center  Progress Note     Date of Service: 8/3/22      Date of Initial Visit for Current Problem:  22     Subjective:      Chief Complaint:  Chinmay Davis is a 62 y.o.  male who presents with L lower leg medial/posterior wounds of 3 months duration. Referred by:  Dr. Claudetta Grant     HPI:   Chronic bilateral lower extremity edema. Wound caused by: venous stasis/chronic edema  Current wound care:  Offloading wound: yes and n/a  Appetite: good  Wound associated pain: mild  Diabetic: Yes  Smoker: No          Past Medical History:   Diagnosis Date    Asthma      Chronic venous insufficiency      Diabetes (HCC)      Lower leg edema      Prostate cancer ([facility] [address]       gets chemo at [facility]      No past surgical history on file. Family History   Problem Relation Age of Onset    Stroke Mother           brain bleed    Stroke Father      Hypertension Father      Heart Attack Father      Hypertension Sister      Obesity Sister      Asthma Sister      Hypertension Sister      Hypertension Sister      No Known Problems Brother      No Known Problems Sister        Social History            Tobacco Use    Smoking status: Former       Packs/day: 0.50       Years: 2.00       Pack years: 1.00       Types: Cigarettes       Quit date:        Years since quittin.5    Smokeless tobacco: Never    Tobacco comments:       quit > 8 years ago   Substance Use Topics    Alcohol use: Yes       Comment: 1-2 per day (wine or beer)               Prior to Admission medications   Medication Sig Start Date End Date Taking? Authorizing Provider   furosemide (LASIX) 40 mg tablet 2 tabs  po daily for fluid 22   Yes Romy Sierra MD   atorvastatin (LIPITOR) 40 mg tablet Take 1 Tablet by mouth in the morning.  For cholesterol 22   Yes Romy Sierra MD   insulin glargine (LANTUS) 100 unit/mL injection Take 25 Units by SubCUTAneous route in the morning. 22   Yes Romy Sierra MD amoxicillin-clavulanate (AUGMENTIN) 875-125 mg per tablet Take 1 Tablet by mouth two (2) times a day for 10 days. 7/20/22 7/30/22 Yes Robe Mckeon MD   gabapentin (NEURONTIN) 100 mg capsule Take 1 Capsule by mouth three (3) times daily. Max Daily Amount: 300 mg. For leg pain  Indications: neuropathic pain 5/16/22   Yes Robe Mckeon MD   ibuprofen (MOTRIN) 600 mg tablet Take 1 Tablet by mouth every eight (8) hours as needed for Pain (leg pain). 5/16/22   Yes Robe Mckeon MD   glimepiride (AMARYL) 4 mg tablet Take 1 Tablet by mouth every morning. Indications: type 2 diabetes mellitus 11/5/21   Yes Robe Mckeon MD   Xtandi 80 mg tab Take  by mouth two (2) times a day. 7/28/21   Yes Provider, Historical   Insulin Syringe-Needle U-100 (BD Insulin Syringe) 1 mL 25 gauge x 5/8\" syrg Insulin syringe 1 ml (100 units) 8mm needle, See Instructions, #: 100 EA, 11 Refill(s), Pharmacy: [facility] [id number] 7/20/22     Robe Mckeon MD   white petrolatum-mineral oiL (EUCERIN) topical cream Apply  to affected area as needed for Dry Skin. 5/23/22     Robe Mckeon MD   Blood-Glu Meter,Cont-Transmit misc 1 Units by Not Applicable route two (2) times a day. 8/11/21     Provider, Historical   sildenafil citrate (VIAGRA) 50 mg tablet TAKE 1 TABLET BY MOUTH ONCE DAILY AS NEEDED FOR ERECTILE DYSFUNCTION 11/15/21     Provider, Historical   cholecalciferol (VITAMIN D3) (50,000 UNITS /1250 MCG) capsule Take 1 Capsule by mouth every seven (7) days. Indications: low vitamin D levels  Patient not taking: No sig reported 8/12/21     Blanca HERNADEZ NP   flash glucose scanning reader [facility] Christian 14 Day [place]) misc 1 Units by Does Not Apply route two (2) times a day. Patient not taking: No sig reported 8/11/21     Blanca HERNADEZ NP   flash glucose sensor (FreeStyle Christian 14 Day Sensor) kit 1 Units by Does Not Apply route every fourteen (14) days.   Patient not taking: No sig reported 8/11/21 Marta Marley, ELISA   glucometer,pre-loaded strips (GLUCOSE METER, DISP & STRIPS) Glucose meter, See Instructions, #: 1 EA, 0 Refill(s), Pharmacy: [facility] [id number] 2/24/21     Provider, Historical   lancets misc Lancets, See Instructions, #: 400 EA, 4 Refill(s), Pharmacy: [facility] [id number] 2/24/21     Provider, Historical   metFORMIN (GLUMETZA ER) 500 mg TG24 24 hour tablet Take 2 Tabs by mouth two (2) times a day. Patient not taking: No sig reported       Provider, Historical      Not on File      Review of Systems:  A comprehensive review of systems was negative except for that written in the History of Present Illness. and PMH. Objective:      Physical Exam:      Visit Vitals: VSS, afebrile. General: well developed, well nourished, pleasant , NAD. Hygiene good  Psych: cooperative. Pleasant. No anxiety or depression. Normal mood and affect. Neuro: alert and oriented to person/place/situation. Otherwise nonfocal.  HEENT: Normocephalic, atraumatic. EOMI. Conjunctiva clear. No scleral icterus. Chest: Respirations nonlabored. Abdomen:  Nondistended. Lower extremities: color normal; temperature normal. Hair growth is not present. Calves are supple, nontender, approximately equally sized in comparison. Ulcer Location: L lower leg medial and posterior  Etiology: venous stasis/lymphedema  Wound: 12 x 12 x 0.1 cm  Ulcer bed: Fibrotic slough    Periwound: Normal  Exudate: Moderate amount Serous exudate  Depth of Wound: To Fat Layer      R leg is quite edematous as well  Assessment:      62 y.o. male with L lower leg medial/posterior venous stasis ulcers. Lymphedema. Plan:   Wound was at least mechanically debrided using a 4x4. More extensive debridement, if done, is outlined below. Dressing: Xeroform, 2 layer wrap. Frequency: weekly  Large amount of drainage, will try to change wraps twice a week. Patient understood and agrees with plan. Questions answered. Weekly visits and serial debridements also discussed. Follow up with me in 1 week     Signed By: Jessica Poole MD        Face to Face Documentation for [address] Name: Darya Galvan    YOB: 1965    Date of Face to Face:  8/3/2022                                    Face to Face Encounter findings are related to primary reason for home care:   yes    I certify that this patient is under my care and has a Face to Face Encounter related to the primary reason for home health. 1. I certify that the patient needs intermittent skilled nursing care, physical therapy and/or speech therapy YES. 2. I certify that this patient is homebound for the following reason(s): Requires considerable and taxing effort to leave the home     3. The qualifying diagnosis is venous stasis with drainage. Lymphedema.         Jessica Poole MD 8/3/2022 10:02 AM

## 2022-08-03 NOTE — WOUND CARE
Multilayer Compression Wrap   (Not Unna) Below the Knee    NAME:  Jeromy Shearer  YOB: 1965  MEDICAL RECORD NUMBER:  [id number]  DATE:  8/3/2022    Removed old Multilayer wrap if indicated and wash leg with mild soap/water. Applied moisturizing agent to dry skin as needed. Applied primary and secondary dressing as ordered. Applied multilayered dressing below the knee to right lower leg. Applied multilayered dressing below the knee to left lower leg. Instructed patient/caregiver not to remove dressing and to keep it clean and dry. Instructed patient/caregiver on complications to report to provider, such as pain, numbness in toes, heavy drainage, and slippage of dressing. Instructed patient on purpose of compression dressing and on activity and exercise recommendations. Response to treatment: Well tolerated by patient       Electronically signed by Carroll Etienne RN on 8/3/2022 at 9:55 AM      08/03/22 0955   Wound Leg lower Left medial-posterior 07/27/22   Date First Assessed/Time First Assessed: 07/27/22 0919   Present on Hospital Admission: No  Wound Approximate Age at First Assessment (Weeks): 12 weeks  Primary Wound Type: (c)   Location: Leg lower  Wound Location Orientation: Left  Wound Description. .. Dressing Status New dressing applied   Dressing/Treatment Xeroform;ABD pad  (2 layer with calamine)       Referral sent to [facility].

## 2022-08-10 ENCOUNTER — HOSPITAL ENCOUNTER (OUTPATIENT)
Dept: WOUND CARE | Age: 57
Discharge: HOME | End: 2022-08-10
Payer: MEDICARE

## 2022-08-10 VITALS
TEMPERATURE: 97.5 F | HEART RATE: 104 BPM | RESPIRATION RATE: 18 BRPM | DIASTOLIC BLOOD PRESSURE: 76 MMHG | SYSTOLIC BLOOD PRESSURE: 133 MMHG

## 2022-08-10 DIAGNOSIS — I87.2 CHRONIC VENOUS INSUFFICIENCY: Primary | ICD-10-CM

## 2022-08-10 DIAGNOSIS — I87.8 CHRONIC VENOUS STASIS: ICD-10-CM

## 2022-08-10 PROCEDURE — 29581 APPL MULTLAYER CMPRN SYS LEG: CPT

## 2022-08-10 RX ORDER — LIDOCAINE HYDROCHLORIDE 20 MG/ML
JELLY TOPICAL ONCE
OUTPATIENT
Start: 2022-08-10 | End: 2022-08-10

## 2022-08-10 RX ORDER — BETAMETHASONE DIPROPIONATE 0.5 MG/G
OINTMENT TOPICAL ONCE
OUTPATIENT
Start: 2022-08-10 | End: 2022-08-10

## 2022-08-10 RX ORDER — BACITRACIN 500 [USP'U]/G
OINTMENT TOPICAL ONCE
OUTPATIENT
Start: 2022-08-10 | End: 2022-08-10

## 2022-08-10 RX ORDER — TRIAMCINOLONE ACETONIDE 1 MG/G
OINTMENT TOPICAL ONCE
OUTPATIENT
Start: 2022-08-10 | End: 2022-08-10

## 2022-08-10 RX ORDER — SILVER SULFADIAZINE 10 G/1000G
CREAM TOPICAL ONCE
OUTPATIENT
Start: 2022-08-10 | End: 2022-08-10

## 2022-08-10 RX ORDER — LIDOCAINE 40 MG/G
CREAM TOPICAL ONCE
OUTPATIENT
Start: 2022-08-10 | End: 2022-08-10

## 2022-08-10 RX ORDER — GENTAMICIN SULFATE 1 MG/G
OINTMENT TOPICAL ONCE
OUTPATIENT
Start: 2022-08-10 | End: 2022-08-10

## 2022-08-10 RX ORDER — CLOBETASOL PROPIONATE 0.5 MG/G
OINTMENT TOPICAL ONCE
OUTPATIENT
Start: 2022-08-10 | End: 2022-08-10

## 2022-08-10 RX ORDER — LIDOCAINE HYDROCHLORIDE 40 MG/ML
SOLUTION TOPICAL ONCE
OUTPATIENT
Start: 2022-08-10 | End: 2022-08-10

## 2022-08-10 RX ORDER — MUPIROCIN 20 MG/G
OINTMENT TOPICAL ONCE
OUTPATIENT
Start: 2022-08-10 | End: 2022-08-10

## 2022-08-10 RX ORDER — BACITRACIN ZINC AND POLYMYXIN B SULFATE 500; 1000 [USP'U]/G; [USP'U]/G
OINTMENT TOPICAL ONCE
OUTPATIENT
Start: 2022-08-10 | End: 2022-08-10

## 2022-08-10 NOTE — WOUND CARE
08/10/22 0919   Right Leg Edema Point of Measurement   Leg circumference 48.5 cm   Ankle circumference 32.5 cm   Compression Therapy 2 layer compression wrap   Left Leg Edema Point of Measurement   Leg circumference 50 cm   Ankle circumference 31 cm   LLE Peripheral Vascular    Capillary Refill Less than/equal to 3 seconds   Color Appropriate for race   Temperature Warm   Sensation Hypersensitivity   Pedal Pulse Palpable   RLE Peripheral Vascular    Capillary Refill Less than/equal to 3 seconds   Color Appropriate for race   Temperature Warm   Sensation Hypersensitivity   Pedal Pulse Palpable   Wound Leg lower Left medial-posterior 07/27/22   Date First Assessed/Time First Assessed: 07/27/22 0919   Present on Hospital Admission: No  Wound Approximate Age at First Assessment (Weeks): 12 weeks  Primary Wound Type: (c)   Location: Leg lower  Wound Location Orientation: Left  Wound Description. ..    Wound Image    Wound Etiology Other (Comment)   Dressing Status Old drainage noted   Cleansed Soap and water   Dressing/Treatment Xeroform;ABD pad;Roll gauze   Wound Length (cm) 18 cm   Wound Width (cm) 11.2 cm   Wound Depth (cm) 0.1 cm   Wound Surface Area (cm^2) 201.6 cm^2   Change in Wound Size % 43.53   Wound Volume (cm^3) 20.16 cm^3   Wound Healing % 44   Wound Assessment Pink/red  (macerated)   Drainage Amount Moderate   Drainage Description Serous   Wound Odor None   Ann-Wound/Incision Assessment Edematous   Edges Undefined edges   Wound Thickness Description Full thickness   Pain 1   Pain Scale 1 Numeric (0 - 10)   Pain Intensity 1 8   Pain Onset 1 Chronic   Pain Location 1 Generalized   Pain Description 1 Aching   Pain Intervention(s) 1 Rest;Medication (see MAR)     Visit Vitals  /76 (BP 1 Location: Left upper arm, BP Patient Position: At rest)   Pulse (!) 104   Temp 97.5 °F (36.4 °C)   Resp 18

## 2022-08-10 NOTE — PROGRESS NOTES
Wound Center  Progress Note     Date of Service: 8/10/22      Date of Initial Visit for Current Problem:  22     Subjective:      Chief Complaint:  Chinmay Davis is a 62 y.o.  male who presents with L lower leg medial/posterior wounds of 3 months duration. Referred by:  Dr. Claudetta Grant     HPI:   Chronic bilateral lower extremity edema. Wound caused by: venous stasis/chronic edema  Current wound care:  Offloading wound: yes and n/a  Appetite: good  Wound associated pain: mild  Diabetic: Yes  Smoker: No             Past Medical History:   Diagnosis Date    Asthma      Chronic venous insufficiency      Diabetes (HCC)      Lower leg edema      Prostate cancer ([facility] [address]       gets chemo at [facility]      No past surgical history on file. Family History   Problem Relation Age of Onset    Stroke Mother           brain bleed    Stroke Father      Hypertension Father      Heart Attack Father      Hypertension Sister      Obesity Sister      Asthma Sister      Hypertension Sister      Hypertension Sister      No Known Problems Brother      No Known Problems Sister        Social History                Tobacco Use    Smoking status: Former       Packs/day: 0.50       Years: 2.00       Pack years: 1.00       Types: Cigarettes       Quit date:        Years since quittin.5    Smokeless tobacco: Never    Tobacco comments:       quit > 8 years ago   Substance Use Topics    Alcohol use: Yes       Comment: 1-2 per day (wine or beer)                     Prior to Admission medications   Medication Sig Start Date End Date Taking? Authorizing Provider   furosemide (LASIX) 40 mg tablet 2 tabs  po daily for fluid 22   Yes Romy Sierra MD   atorvastatin (LIPITOR) 40 mg tablet Take 1 Tablet by mouth in the morning.  For cholesterol 22   Yes Romy Sierra MD   insulin glargine (LANTUS) 100 unit/mL injection Take 25 Units by SubCUTAneous route in the morning. 22   Yes Paul Ericka Trinidad MD   amoxicillin-clavulanate (AUGMENTIN) 875-125 mg per tablet Take 1 Tablet by mouth two (2) times a day for 10 days. 7/20/22 7/30/22 Yes Ming Brand MD   gabapentin (NEURONTIN) 100 mg capsule Take 1 Capsule by mouth three (3) times daily. Max Daily Amount: 300 mg. For leg pain  Indications: neuropathic pain 5/16/22   Yes Ming Brand MD   ibuprofen (MOTRIN) 600 mg tablet Take 1 Tablet by mouth every eight (8) hours as needed for Pain (leg pain). 5/16/22   Yes Ming Brand MD   glimepiride (AMARYL) 4 mg tablet Take 1 Tablet by mouth every morning. Indications: type 2 diabetes mellitus 11/5/21   Yes Ming Brand MD   Xtandi 80 mg tab Take  by mouth two (2) times a day. 7/28/21   Yes Provider, Historical   Insulin Syringe-Needle U-100 (BD Insulin Syringe) 1 mL 25 gauge x 5/8\" syrg Insulin syringe 1 ml (100 units) 8mm needle, See Instructions, #: 100 EA, 11 Refill(s), Pharmacy: [facility] [id number] 7/20/22     Ming Brand MD   white petrolatum-mineral oiL (EUCERIN) topical cream Apply  to affected area as needed for Dry Skin. 5/23/22     Ming Brand MD   Blood-Glu Meter,Cont-Transmit misc 1 Units by Not Applicable route two (2) times a day. 8/11/21     Provider, Historical   sildenafil citrate (VIAGRA) 50 mg tablet TAKE 1 TABLET BY MOUTH ONCE DAILY AS NEEDED FOR ERECTILE DYSFUNCTION 11/15/21     Provider, Historical   cholecalciferol (VITAMIN D3) (50,000 UNITS /1250 MCG) capsule Take 1 Capsule by mouth every seven (7) days. Indications: low vitamin D levels  Patient not taking: No sig reported 8/12/21     Luwana Settler D, NP   flash glucose scanning reader [facility] Christian 14 Day [place]) misc 1 Units by Does Not Apply route two (2) times a day. Patient not taking: No sig reported 8/11/21     Luwana Settler D, NP   flash glucose sensor (FreeStyle Christian 14 Day Sensor) kit 1 Units by Does Not Apply route every fourteen (14) days.   Patient not taking: No sig reported 8/11/21     Andre Given, NP   glucometer,pre-loaded strips (GLUCOSE METER, DISP & STRIPS) Glucose meter, See Instructions, #: 1 EA, 0 Refill(s), Pharmacy: [facility] [id number] 2/24/21     Provider, Historical   lancets misc Lancets, See Instructions, #: 400 EA, 4 Refill(s), Pharmacy: [facility] [id number] 2/24/21     Provider, Historical   metFORMIN (GLUMETZA ER) 500 mg TG24 24 hour tablet Take 2 Tabs by mouth two (2) times a day. Patient not taking: No sig reported       Provider, Historical      Not on File      Review of Systems:  A comprehensive review of systems was negative except for that written in the History of Present Illness. and PMH. Objective:      Physical Exam:      Visit Vitals: VSS, afebrile. General: well developed, well nourished, pleasant , NAD. Hygiene good  Psych: cooperative. Pleasant. No anxiety or depression. Normal mood and affect. Neuro: alert and oriented to person/place/situation. Otherwise nonfocal.  HEENT: Normocephalic, atraumatic. EOMI. Conjunctiva clear. No scleral icterus. Chest: Respirations nonlabored. Abdomen:  Nondistended. Lower extremities: color normal; temperature normal. Hair growth is not present. Calves are supple, nontender, approximately equally sized in comparison. Ulcer Location: L lower leg medial and posterior  Etiology: venous stasis/lymphedema  Wound: 18 x 11.2 x 0.1 cm  Ulcer bed: Fibrotic slough    Periwound: Normal  Exudate: Moderate amount Serous exudate  Depth of Wound: To Fat Layer      R leg is quite edematous as well  Assessment:      62 y.o. male with L lower leg medial/posterior venous stasis ulcers. Lymphedema. Plan:   Wound was at least mechanically debrided using a 4x4. More extensive debridement, if done, is outlined below. Dressing: Xeroform, 2 layer wrap. Frequency: weekly  Large amount of drainage, will try to change wraps twice a week. Patient understood and agrees with plan.  Questions answered. Weekly visits and serial debridements also discussed.   Follow up with me in 1 week     Signed By: Brook Ramos MD

## 2022-08-10 NOTE — DISCHARGE INSTRUCTIONS
Discharge Instructions/Wound Orders  [address], [address] [place] [address] [place], [id number]  Telephone: [phone]    NAME:  Dong Turcios  YOB: 1965  MEDICAL RECORD NUMBER:  [id number]  DATE:  08/10/22  Wound Care Orders:  Left leg wound :Cleanse with Soap and water , apply primary dressing xeroform cover with secondary dressing   abd pad . Apply to Bilateral lower legs2 layers with Calamine  Pt./pcg/HH nurse to change (freq) 2 x week and as needed for compromise. F/U in 2 week. Dietary:  [] Diet as tolerated: [] Diabetic Diet:Low carb and no Sugar   [] No Added Salt:[] Increase Protein:   [] Other:Limit the amount of liquid you are drinking and avoid drinking in between meals   Activity:  [] Activity as tolerated:     Return Appointment:  [x] Return Appointment: With DR Domniic Nick  in  [facility])  [] Ordered tests:   Electronically signed Nery Carver RN on 8/10/2022 at 9:43 AM   [address] [place] [address] Information: Should you experience any significant changes in your wound(s) or have questions about your wound care, please contact the [address] [place] [address] at [address] [place] [address] 8:00 am - 4:30. If you need help with your wound outside these hours and cannot wait until we are again available, contact your PCP or go to the hospital emergency room. PLEASE NOTE: IF YOU ARE UNABLE TO OBTAIN WOUND SUPPLIES, CONTINUE TO USE THE SUPPLIES YOU HAVE AVAILABLE UNTIL YOU ARE ABLE TO REACH US. IT IS MOST IMPORTANT TO KEEP THE WOUND COVERED AT ALL TIMES.     Physician Signature:_______________________    Date: ___________ Time:  ____________

## 2022-08-10 NOTE — WOUND CARE
Multilayer Compression Wrap   (Not Unna) Below the Knee    NAME:  Esteban Long  YOB: 1965  MEDICAL RECORD NUMBER:  [id number]  DATE:  8/10/2022    Removed old Multilayer wrap if indicated and wash leg with mild soap/water. Applied moisturizing agent to dry skin as needed. Applied primary and secondary dressing as ordered. Applied multilayered dressing below the knee to right lower leg. Applied multilayered dressing below the knee to left lower leg. Instructed patient/caregiver not to remove dressing and to keep it clean and dry. Instructed patient/caregiver on complications to report to provider, such as pain, numbness in toes, heavy drainage, and slippage of dressing. Instructed patient on purpose of compression dressing and on activity and exercise recommendations. Response to treatment: Well tolerated by patient       Electronically signed by Corinne Damon RN on 8/10/2022 at 9:50 AM      08/10/22 0950   Wound Leg lower Left medial-posterior 07/27/22   Date First Assessed/Time First Assessed: 07/27/22 0919   Present on Hospital Admission: No  Wound Approximate Age at First Assessment (Weeks): 12 weeks  Primary Wound Type: (c)   Location: Leg lower  Wound Location Orientation: Left  Wound Description. ..    Dressing Status New dressing applied   Dressing/Treatment Xeroform;ABD pad  (2 layer wrap with calamine)

## 2022-08-22 ENCOUNTER — TELEPHONE (OUTPATIENT)
Dept: FAMILY MEDICINE CLINIC | Age: 57
End: 2022-08-22

## 2022-08-22 NOTE — TELEPHONE ENCOUNTER
Madelyn Marie, with [address] called. Every visit that they have made with the patient, the b/p has been elevated. (180/102,160/86,152/90)  Please call @[phone].

## 2022-08-29 ENCOUNTER — TELEPHONE (OUTPATIENT)
Dept: FAMILY MEDICINE CLINIC | Age: 57
End: 2022-08-29

## 2022-08-29 NOTE — TELEPHONE ENCOUNTER
----- Message from Maryellengiovana Lance sent at 8/29/2022 12:13 PM EDT -----  Subject: Message to Provider    QUESTIONS  Information for Provider? 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 Can you please give   him a call as soon as possible.   ---------------------------------------------------------------------------  --------------  Kofi SAMPSON  [phone]; OK to leave message on voicemail  ---------------------------------------------------------------------------  --------------  SCRIPT ANSWERS  Relationship to Patient?  Self

## 2022-08-30 NOTE — PROGRESS NOTES
Pharmacy Antimicrobial Kinetic Dosing    Indication for Antimicrobials: SSTI     Current Regimen of Each Antimicrobial:  Zosyn 3.375 gm every 8 hours (7/10, day 1)  Vancomycin - pharmacy to dose (7/10, day 1)    Previous Antimicrobial Therapy:  Unasyn 3 gm q6h (Start Date ; Day # 2)  Completed unasyn/augmentin ~ 2021    Goal Level: AUC: 400-600 mg/hr/Liter/day    Date Dose & Interval Measured (mcg/mL) Extrapolated (mcg/mL)                       Date & time of next level:     Significant Positive Cultures:     Conditions for Dosing Consideration: None    Labs:  Recent Labs     21   CREA 0.70   BUN 12     Recent Labs     21   WBC 6.8     Temp (24hrs), Av.5 °F (36.9 °C), Min:98.3 °F (36.8 °C), Max:98.7 °F (37.1 °C)        Creatinine Clearance (mL/min):   CrCl (Ideal Body Weight): 117.8   If actual weight < IBW: CrCl (Actual Body Weight) 196.2    Impression/Plan:   Vancomycin 2000 mg x1, then 1250 mg every 12 hours  Broaden to zosyn. suspect that this could be from previous ssti that did not resolve on unasyn/augmentin  Antimicrobial stop date tbd for vancomycin, 5 days zosyn     Pharmacy will follow daily and adjust medications as appropriate for renal function and/or serum levels.     Thank you,  Robyn White, PHARMD    Vancomycin Dosing Document    Documents located on pharmacy Teams site: Clinical Practice -> Antimicrobial Stewardship -> Antibiotics_Vancomycin     Aminoglycoside Dosing Document    Documents located on pharmacy Teams site: Clinical Practice -> Antimicrobial Stewardship -> Antibiotics_Aminoglycosides Spoke with pt  Shew as in ER on 8/12/22 for  symptoms and fever  States she is still urinating blood  States it is especially dark in the morning  She will drink a a lot of water and it will get diluted a little  She is still having a lot of pain in her lower back   Denies any fever    Advised pt to go to [facility] today to be evaluated as there are no openings   She states she will go in the morning   Advised pt to go as soon as she is able    Encounter Closed

## 2022-09-01 NOTE — TELEPHONE ENCOUNTER
Patient notified that we do not have disability forms. Patient states he can not physically do job and will be applying for disability. Patient instructed to get paperwork and bring or send to pcp for completion.

## 2022-11-21 DIAGNOSIS — E11.42 DIABETIC POLYNEUROPATHY ASSOCIATED WITH TYPE 2 DIABETES MELLITUS (HCC): ICD-10-CM

## 2022-11-21 RX ORDER — GABAPENTIN 100 MG/1
CAPSULE ORAL
Qty: 90 CAPSULE | Refills: 0 | Status: SHIPPED | OUTPATIENT
Start: 2022-11-21

## 2022-12-04 NOTE — WOUND CARE
12/04/22 1600   Provider Notification   Reason for Communication New consult   Provider Name   (dr Irene Pizarro)   Method of Communication Page   Response Other (Comment)  (Will see pt tomorrow.)   Notification Time  Wound care consulted for \"multiple open leg ulcers right leg\":  Chart reviewed and patient assessed. The patient is a 80-year-old  male with a history of morbid obesity, chronic venous insufficiency, diabetes (last A1C on record here was 6.5 in November 2021), history of metastatic prostate cancer and asthma. He went to Dr. Sherman Triana office yesterday complaining of a two-week history of swelling in both legs, right worse than left. The right leg below the knee had developed several areas of skin breakdown, oozing, and increasing pain. He denies any fever, chills, cough, shortness of breath. He has been admitted in the past for similar problems and at one time apparently had staph sepsis from this. He is 272 lbs. BMI is 43.9. Pt. Stated that he \"has been taking care of the legs on his own for a few weeks - wound cleanser and wrapping\". 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 Both legs on admission:       The right leg is edematous, very warm and has open wounds with some pus / tan educated with some green drainage as well on the old dressing that was removed. Leg wound (anterior lateral) wiped with NS gauze and then pus expressed, culture obtained. Right medial lower leg         Left lower leg with open wounds partial thickness and pustules. The leg is edematous, warm, has a good DP pulse. Posterior left leg:        Left anterior leg with edema and pustules when palpated:       Left lateral lower leg with edema and pustules:       Treatment today:  The legs were cleansed with CHG soap (DynaHex) and wiped with wet disposable wash cloths several times with some pressure to remove the old drainage, dirt and to soften the pustules so they will drain.  The leg wounds were then simply wiped with NS wet gauze and then dressed with Ioplex foam and covered with large high drainage ABD and wrapped with Dorothy (NOT the Bulkee). Taped to secure and date written on the dressing. Plan: Wound care to be done by nursing starting on Sunday afternoon. Leave current dressing in place until then. Float the heels - use the heel boots at night or when in the bed for long periods of time. Nursing to Pre-medicate with PO pain meds about 45 minutes prior to the wound care on Sunday and daily after that. Wound Culture sent to the lab by nursing.   Jane Gil RN, BSN, [facility]

## 2022-12-27 ENCOUNTER — HOSPITAL ENCOUNTER (EMERGENCY)
Age: 57
Discharge: HOME | End: 2022-12-27
Attending: STUDENT IN AN ORGANIZED HEALTH CARE EDUCATION/TRAINING PROGRAM
Payer: MEDICARE

## 2022-12-27 VITALS
HEART RATE: 106 BPM | DIASTOLIC BLOOD PRESSURE: 92 MMHG | RESPIRATION RATE: 18 BRPM | HEIGHT: 66 IN | WEIGHT: 270.5 LBS | BODY MASS INDEX: 43.47 KG/M2 | OXYGEN SATURATION: 97 % | TEMPERATURE: 98.6 F | SYSTOLIC BLOOD PRESSURE: 146 MMHG

## 2022-12-27 DIAGNOSIS — L03.116 CELLULITIS OF LEFT LOWER EXTREMITY: Primary | ICD-10-CM

## 2022-12-27 LAB
ALBUMIN SERPL-MCNC: 3 G/DL (ref 3.5–5)
ALBUMIN/GLOB SERPL: 0.6 {RATIO} (ref 1.1–2.2)
ALP SERPL-CCNC: 98 U/L (ref 45–117)
ALT SERPL-CCNC: 15 U/L (ref 12–78)
ANION GAP SERPL CALC-SCNC: 5 MMOL/L (ref 5–15)
AST SERPL-CCNC: 12 U/L (ref 15–37)
BASOPHILS # BLD: 0 K/UL (ref 0–0.1)
BASOPHILS NFR BLD: 0 % (ref 0–1)
BILIRUB SERPL-MCNC: 0.3 MG/DL (ref 0.2–1)
BUN SERPL-MCNC: 16 MG/DL (ref 6–20)
BUN/CREAT SERPL: 16 (ref 12–20)
CALCIUM SERPL-MCNC: 9.1 MG/DL (ref 8.5–10.1)
CHLORIDE SERPL-SCNC: 104 MMOL/L (ref 97–108)
CO2 SERPL-SCNC: 30 MMOL/L (ref 21–32)
COMMENT, HOLDF: NORMAL
CREAT SERPL-MCNC: 0.99 MG/DL (ref 0.7–1.3)
DIFFERENTIAL METHOD BLD: ABNORMAL
EOSINOPHIL # BLD: 0.1 K/UL (ref 0–0.4)
EOSINOPHIL NFR BLD: 2 % (ref 0–7)
ERYTHROCYTE [DISTWIDTH] IN BLOOD BY AUTOMATED COUNT: 14.3 % (ref 11.5–14.5)
GLOBULIN SER CALC-MCNC: 4.8 G/DL (ref 2–4)
GLUCOSE SERPL-MCNC: 291 MG/DL (ref 65–100)
HCT VFR BLD AUTO: 36.4 % (ref 36.6–50.3)
HGB BLD-MCNC: 11.1 G/DL (ref 12.1–17)
IMM GRANULOCYTES # BLD AUTO: 0 K/UL (ref 0–0.04)
IMM GRANULOCYTES NFR BLD AUTO: 0 % (ref 0–0.5)
LYMPHOCYTES # BLD: 1 K/UL (ref 0.8–3.5)
LYMPHOCYTES NFR BLD: 17 % (ref 12–49)
MAGNESIUM SERPL-MCNC: 2.2 MG/DL (ref 1.6–2.4)
MCH RBC QN AUTO: 26.2 PG (ref 26–34)
MCHC RBC AUTO-ENTMCNC: 30.5 G/DL (ref 30–36.5)
MCV RBC AUTO: 86.1 FL (ref 80–99)
MONOCYTES # BLD: 0.4 K/UL (ref 0–1)
MONOCYTES NFR BLD: 7 % (ref 5–13)
NEUTS SEG # BLD: 4.2 K/UL (ref 1.8–8)
NEUTS SEG NFR BLD: 74 % (ref 32–75)
NRBC # BLD: 0 K/UL (ref 0–0.01)
NRBC BLD-RTO: 0 PER 100 WBC
PLATELET # BLD AUTO: 202 K/UL (ref 150–400)
PMV BLD AUTO: 11.4 FL (ref 8.9–12.9)
POTASSIUM SERPL-SCNC: 4.1 MMOL/L (ref 3.5–5.1)
PROT SERPL-MCNC: 7.8 G/DL (ref 6.4–8.2)
RBC # BLD AUTO: 4.23 M/UL (ref 4.1–5.7)
SAMPLES BEING HELD,HOLD: NORMAL
SODIUM SERPL-SCNC: 139 MMOL/L (ref 136–145)
WBC # BLD AUTO: 5.8 K/UL (ref 4.1–11.1)

## 2022-12-27 PROCEDURE — 74011250637 HC RX REV CODE- 250/637: Performed by: STUDENT IN AN ORGANIZED HEALTH CARE EDUCATION/TRAINING PROGRAM

## 2022-12-27 PROCEDURE — 85025 COMPLETE CBC W/AUTO DIFF WBC: CPT

## 2022-12-27 PROCEDURE — 96374 THER/PROPH/DIAG INJ IV PUSH: CPT

## 2022-12-27 PROCEDURE — 36415 COLL VENOUS BLD VENIPUNCTURE: CPT

## 2022-12-27 PROCEDURE — 80053 COMPREHEN METABOLIC PANEL: CPT

## 2022-12-27 PROCEDURE — 83735 ASSAY OF MAGNESIUM: CPT

## 2022-12-27 PROCEDURE — 74011250636 HC RX REV CODE- 250/636: Performed by: STUDENT IN AN ORGANIZED HEALTH CARE EDUCATION/TRAINING PROGRAM

## 2022-12-27 PROCEDURE — 99284 EMERGENCY DEPT VISIT MOD MDM: CPT

## 2022-12-27 RX ORDER — DOXYCYCLINE HYCLATE 100 MG
100 TABLET ORAL 2 TIMES DAILY
Qty: 14 TABLET | Refills: 0 | Status: SHIPPED | OUTPATIENT
Start: 2022-12-27 | End: 2023-01-03

## 2022-12-27 RX ORDER — FUROSEMIDE 10 MG/ML
40 INJECTION INTRAMUSCULAR; INTRAVENOUS
Status: COMPLETED | OUTPATIENT
Start: 2022-12-27 | End: 2022-12-27

## 2022-12-27 RX ORDER — DOXYCYCLINE HYCLATE 100 MG
100 TABLET ORAL
Status: COMPLETED | OUTPATIENT
Start: 2022-12-27 | End: 2022-12-27

## 2022-12-27 RX ADMIN — DOXYCYCLINE HYCLATE 100 MG: 100 TABLET, COATED ORAL at 13:13

## 2022-12-27 RX ADMIN — FUROSEMIDE 40 MG: 10 INJECTION, SOLUTION INTRAMUSCULAR; INTRAVENOUS at 13:12

## 2022-12-27 NOTE — ED PROVIDER NOTES
EMERGENCY DEPARTMENT HISTORY AND PHYSICAL EXAM      Date: 12/27/2022  Patient Name: Marco Antonio Reyes    History of Presenting Illness     Chief Complaint   Patient presents with    Leg Swelling     Pt ambulatory into triage with a cc of bilateral leg swelling; pt states that he tried to soak is legs in bleach and \"someone put too much bleach in the water and now my legs are draining and too big\"       History Provided By: Patient    Marco Antonio Reyes, 62 y.o. male     Is a 51-year-old male present with concern of lower extremity edema. Patient states he does have history of edema in the lower extremity, take Lasix daily, states has been taking as directed but continues to have worsening swelling. Patient also noticed that he has had weeping of the left lower extremity and recently was trying to treat it with bleach. Patient states that he is worried that the bleach made it worse. Patient denies any chest pain, shortness of breath or other associate symptoms. Patient states that he has had no fevers, chills, nausea vomiting or other constitutional symptoms, patient states that he does not currently see wound care for his wound but dress them at home by himself, no other complaints today. There are no other complaints, changes, or physical findings at this time. PCP: Ritchie Nolasco MD    No current facility-administered medications on file prior to encounter. Current Outpatient Medications on File Prior to Encounter   Medication Sig Dispense Refill    gabapentin (NEURONTIN) 100 mg capsule TAKE 1 CAPSULE BY MOUTH THREE TIMES DAILY MAX  DAILY  AMOUNT  OF  300  MG  FOR  LEG  PAIN 90 Capsule 0    furosemide (LASIX) 40 mg tablet 2 tabs  po daily for fluid 60 Tablet 3    atorvastatin (LIPITOR) 40 mg tablet Take 1 Tablet by mouth in the morning.  For cholesterol 90 Tablet 3    Insulin Syringe-Needle U-100 (BD Insulin Syringe) 1 mL 25 gauge x 5/8\" syrg Insulin syringe 1 ml (100 units) 8mm needle, See Instructions, #: 100 EA, 11 Refill(s), Pharmacy: [facility] [id number] 100 Each 1    insulin glargine (LANTUS) 100 unit/mL injection Take 25 Units by SubCUTAneous route in the morning. 1 mL 5    white petrolatum-mineral oiL (EUCERIN) topical cream Apply  to affected area as needed for Dry Skin. 105 g 5    ibuprofen (MOTRIN) 600 mg tablet Take 1 Tablet by mouth every eight (8) hours as needed for Pain (leg pain). 90 Tablet 2    Blood-Glu Meter,Cont-Transmit misc 1 Units by Not Applicable route two (2) times a day. sildenafil citrate (VIAGRA) 50 mg tablet TAKE 1 TABLET BY MOUTH ONCE DAILY AS NEEDED FOR ERECTILE DYSFUNCTION      glimepiride (AMARYL) 4 mg tablet Take 1 Tablet by mouth every morning. Indications: type 2 diabetes mellitus 90 Tablet 3    cholecalciferol (VITAMIN D3) (50,000 UNITS /1250 MCG) capsule Take 1 Capsule by mouth every seven (7) days. Indications: low vitamin D levels (Patient not taking: No sig reported) 12 Capsule 0    Xtandi 80 mg tab Take  by mouth two (2) times a day. flash glucose scanning reader (FreeStyle Christian 14 Day Sugarloaf) misc 1 Units by Does Not Apply route two (2) times a day. (Patient not taking: No sig reported) 1 Each 0    flash glucose sensor (FreeStyle Christian 14 Day Sensor) kit 1 Units by Does Not Apply route every fourteen (14) days. (Patient not taking: No sig reported) 2 Kit 5    glucometer,pre-loaded strips (GLUCOSE METER, DISP & STRIPS) Glucose meter, See Instructions, #: 1 EA, 0 Refill(s), Pharmacy: [facility] [id number]      lancets misc Lancets, See Instructions, #: 400 EA, 4 Refill(s), Pharmacy: [facility] [id number]      [facility]) 500 mg TG24 24 hour tablet Take 2 Tabs by mouth two (2) times a day.  (Patient not taking: No sig reported)         Past History     Past Medical History:  Past Medical History:   Diagnosis Date    Asthma     Chronic venous insufficiency     Diabetes (HCC)     Lower leg edema Prostate cancer [facility])     gets chemo at [facility]       Past Surgical History:  No past surgical history on file. Family History:  Family History   Problem Relation Age of Onset    Stroke Mother         brain bleed    Stroke Father     Hypertension Father     Heart Attack Father     Hypertension Sister     Obesity Sister     Asthma Sister     Hypertension Sister     Hypertension Sister     No Known Problems Brother     No Known Problems Sister        Social History:  Social History     Tobacco Use    Smoking status: Former     Packs/day: 0.50     Years: 2.00     Pack years: 1.00     Types: Cigarettes     Quit date:      Years since quittin.9    Smokeless tobacco: Never    Tobacco comments:     quit > 8 years ago   Substance Use Topics    Alcohol use: Yes     Comment: 1-2 per day (wine or beer)    Drug use: No       Allergies:  No Known Allergies      Review of Systems   Review of Systems   Constitutional:  Positive for activity change and chills. Negative for fatigue and fever. HENT:  Negative for congestion and sneezing. Respiratory:  Negative for cough and shortness of breath. Cardiovascular:  Positive for leg swelling. Negative for chest pain. Gastrointestinal:  Negative for abdominal pain, constipation, diarrhea, nausea and vomiting. Genitourinary:  Negative for decreased urine volume, dysuria and frequency. Musculoskeletal:  Negative for arthralgias and myalgias. Skin:  Positive for color change and wound. Negative for rash. Allergic/Immunologic: Negative for immunocompromised state. Neurological:  Negative for headaches. Hematological:  Does not bruise/bleed easily. Psychiatric/Behavioral:  Negative for confusion. Physical Exam   Physical Exam  Vitals reviewed. Constitutional:       General: He is not in acute distress. Appearance: He is not ill-appearing, toxic-appearing or diaphoretic. HENT:      Head: Normocephalic and atraumatic.       Mouth/Throat:      Mouth: Mucous membranes are moist.   Eyes:      Conjunctiva/sclera: Conjunctivae normal.   Cardiovascular:      Rate and Rhythm: Tachycardia present. Pulmonary:      Effort: Pulmonary effort is normal. No tachypnea, accessory muscle usage or respiratory distress. Abdominal:      General: Abdomen is flat. Musculoskeletal:      Cervical back: Neck supple. Right lower leg: Edema present. Left lower leg: Edema present. Skin:     General: Skin is warm and dry. Findings: Erythema present. Comments: Erythema, warmth and yellow  drainage of left lower extremity over shin, area of skin breakdown and bleeding over midfoot   Neurological:      General: No focal deficit present. Mental Status: He is alert. Psychiatric:         Mood and Affect: Mood normal.       Diagnostic Study Results     Labs -     Recent Results (from the past 24 hour(s))   CBC WITH AUTOMATED DIFF    Collection Time: 12/27/22 11:24 AM   Result Value Ref Range    WBC 5.8 4.1 - 11.1 K/uL    RBC 4.23 4. 10 - 5.70 M/uL    HGB 11.1 (L) 12.1 - 17.0 g/dL    HCT 36.4 (L) 36.6 - 50.3 %    MCV 86.1 80.0 - 99.0 FL    MCH 26.2 26.0 - 34.0 PG    MCHC 30.5 30.0 - 36.5 g/dL    RDW 14.3 11.5 - 14.5 %    PLATELET 654 713 - 897 K/uL    MPV 11.4 8.9 - 12.9 FL    NRBC 0.0 0  WBC    ABSOLUTE NRBC 0.00 0.00 - 0.01 K/uL    NEUTROPHILS 74 32 - 75 %    LYMPHOCYTES 17 12 - 49 %    MONOCYTES 7 5 - 13 %    EOSINOPHILS 2 0 - 7 %    BASOPHILS 0 0 - 1 %    IMMATURE GRANULOCYTES 0 0.0 - 0.5 %    ABS. NEUTROPHILS 4.2 1.8 - 8.0 K/UL    ABS. LYMPHOCYTES 1.0 0.8 - 3.5 K/UL    ABS. MONOCYTES 0.4 0.0 - 1.0 K/UL    ABS. EOSINOPHILS 0.1 0.0 - 0.4 K/UL    ABS. BASOPHILS 0.0 0.0 - 0.1 K/UL    ABS. IMM.  GRANS. 0.0 0.00 - 0.04 K/UL    DF AUTOMATED     METABOLIC PANEL, COMPREHENSIVE    Collection Time: 12/27/22 11:24 AM   Result Value Ref Range    Sodium 139 136 - 145 mmol/L    Potassium 4.1 3.5 - 5.1 mmol/L    Chloride 104 97 - 108 mmol/L    CO2 30 21 - 32 mmol/L Anion gap 5 5 - 15 mmol/L    Glucose 291 (H) 65 - 100 mg/dL    BUN 16 6 - 20 MG/DL    Creatinine 0.99 0.70 - 1.30 MG/DL    BUN/Creatinine ratio 16 12 - 20      eGFR >60 >60 ml/min/1.73m2    Calcium 9.1 8.5 - 10.1 MG/DL    Bilirubin, total 0.3 0.2 - 1.0 MG/DL    ALT (SGPT) 15 12 - 78 U/L    AST (SGOT) 12 (L) 15 - 37 U/L    Alk. phosphatase 98 45 - 117 U/L    Protein, total 7.8 6.4 - 8.2 g/dL    Albumin 3.0 (L) 3.5 - 5.0 g/dL    Globulin 4.8 (H) 2.0 - 4.0 g/dL    A-G Ratio 0.6 (L) 1.1 - 2.2     SAMPLES BEING HELD    Collection Time: 12/27/22 11:24 AM   Result Value Ref Range    SAMPLES BEING HELD PST     COMMENT        Add-on orders for these samples will be processed based on acceptable specimen integrity and analyte stability, which may vary by analyte. MAGNESIUM    Collection Time: 12/27/22 11:24 AM   Result Value Ref Range    Magnesium 2.2 1.6 - 2.4 mg/dL       Radiologic Studies -   No orders to display     CT Results  (Last 48 hours)      None          CXR Results  (Last 48 hours)      None              Medical Decision Making   I am the first provider for this patient. I reviewed the vital signs, available nursing notes, past medical history, past surgical history, family history and social history. Vital Signs-Reviewed the patient's vital signs. Patient Vitals for the past 12 hrs:   Temp Pulse Resp BP SpO2   12/27/22 1313 -- -- -- -- 97 %   12/27/22 1300 -- -- -- (!) 146/92 98 %   12/27/22 1230 -- -- -- (!) 133/94 98 %   12/27/22 1200 -- -- -- (!) 149/98 99 %   12/27/22 1151 -- -- -- -- 98 %   12/27/22 1136 -- -- -- (!) 171/155 --   12/27/22 1022 98.6 °F (37 °C) (!) 106 18 (!) 144/88 98 %       Records Reviewed: Nursing records and medical records reviewed    Ddx: Cellulitis, lower extremity edema, venous insufficiency    Initial assessment performed. The patients presenting problems have been discussed, and they are in agreement with the care plan formulated and outlined with them.   I have encouraged them to ask questions as they arise throughout their visit. MDM  Number of Diagnoses or Management Options  Cellulitis of left lower extremity  Diagnosis management comments: Is a 54-year-old male with history of asthma, leg edema, diabetes, venous insufficiency on Lasix with concern of lower extremity edema, left lower extremity pain and weeping. Patient states that he attempted to clean his legs 2 to 3 days ago with bleach, states that he has noticed worsening swelling since then, states he has been taking his Lasix 40 twice daily as directed but continues to have swelling. Patient also states that he has had significant drainage. Also endorsing fatigue and chills but denies any fevers, nausea or vomiting. Patient states he has never had this happen in the past, denies any chest pain, shortness of breath or other associated symptoms. Patient overall well-appearing on arrival although does have significant sun changes in his lower extremities, suspicious of possible venous stasis versus cellulitis, will provide patient with Lasix 40, obtain basic lab work including CBC, CMP for further evaluation and reassessment. Procedures    Critical Care: none    Disposition: DC    DISCHARGE PLAN:  1. Current Discharge Medication List        2. Follow-up Information       Follow up With Specialties Details Why Contact Info    Mayi Gaffney MD [facility] Medicine Schedule an appointment as soon as possible for a visit   [address]       \[facility] [place] [facility]\"" EMERGENCY DEPT Emergency Medicine  If symptoms worsen [address] [place] [address]  [phone]    [address]  [phone]          3. Return to ED if worse     Diagnosis     Clinical Impression:   1.  Cellulitis of left lower extremity        Attestations:    Lavern Heath MD    Please note that this dictation was completed with [email or web address], the [employer] voice recognition software. Quite often unanticipated grammatical, syntax, homophones, and other interpretive errors are inadvertently transcribed by the computer software. Please disregard these errors. Please excuse any errors that have escaped final proofreading. Thank you.

## 2022-12-27 NOTE — DISCHARGE INSTRUCTIONS
Please continue monitor symptoms closely, please call your primary care doctor for close follow-up for your infection of your leg. Please call wound care clinic for additional follow-up and return to ER if you develop any fevers, chills, worsening pain or swelling.

## 2022-12-27 NOTE — Clinical Note
[address]  [facility] EMERGENCY DEPT  [address] [facility] [address]  [phone]    Work/School Note    Date: 12/27/2022    To Whom It May concern:    Xochilt Muro was seen and treated today in the emergency room by the following provider(s):  Attending Provider: Leigh Sharp MD.      Xochilt Muro is excused from work/school on 12/27/22 and 12/28/22. He is medically clear to return to work/school on 12/29/2022.        Sincerely,          Silvina Miles MD

## 2023-01-05 ENCOUNTER — HOSPITAL ENCOUNTER (OUTPATIENT)
Dept: WOUND CARE | Age: 58
Discharge: HOME | End: 2023-01-05
Payer: MEDICAID

## 2023-01-05 VITALS
DIASTOLIC BLOOD PRESSURE: 71 MMHG | HEART RATE: 94 BPM | SYSTOLIC BLOOD PRESSURE: 112 MMHG | RESPIRATION RATE: 18 BRPM | TEMPERATURE: 97.5 F

## 2023-01-05 DIAGNOSIS — L97.922 NON-PRESSURE CHRONIC ULCER OF LEFT LOWER LEG WITH FAT LAYER EXPOSED (HCC): ICD-10-CM

## 2023-01-05 DIAGNOSIS — R60.0 BILATERAL LEG EDEMA: ICD-10-CM

## 2023-01-05 DIAGNOSIS — L97.322 NON-PRESSURE CHRONIC ULCER OF LEFT ANKLE WITH FAT LAYER EXPOSED (HCC): ICD-10-CM

## 2023-01-05 DIAGNOSIS — L97.912 NON-PRESSURE CHRONIC ULCER OF RIGHT LOWER LEG WITH FAT LAYER EXPOSED (HCC): ICD-10-CM

## 2023-01-05 DIAGNOSIS — I87.2 CHRONIC VENOUS INSUFFICIENCY: Primary | ICD-10-CM

## 2023-01-05 DIAGNOSIS — I87.8 CHRONIC VENOUS STASIS: ICD-10-CM

## 2023-01-05 PROCEDURE — 99203 OFFICE O/P NEW LOW 30 MIN: CPT | Performed by: SURGERY

## 2023-01-05 PROCEDURE — 99213 OFFICE O/P EST LOW 20 MIN: CPT

## 2023-01-05 RX ORDER — BACITRACIN 500 [USP'U]/G
OINTMENT TOPICAL ONCE
OUTPATIENT
Start: 2023-01-05 | End: 2023-01-05

## 2023-01-05 RX ORDER — SILVER SULFADIAZINE 10 G/1000G
CREAM TOPICAL ONCE
OUTPATIENT
Start: 2023-01-05 | End: 2023-01-05

## 2023-01-05 RX ORDER — GENTAMICIN SULFATE 1 MG/G
OINTMENT TOPICAL ONCE
OUTPATIENT
Start: 2023-01-05 | End: 2023-01-05

## 2023-01-05 RX ORDER — LIDOCAINE HYDROCHLORIDE 20 MG/ML
JELLY TOPICAL ONCE
OUTPATIENT
Start: 2023-01-05 | End: 2023-01-05

## 2023-01-05 RX ORDER — LIDOCAINE 40 MG/G
CREAM TOPICAL ONCE
OUTPATIENT
Start: 2023-01-05 | End: 2023-01-05

## 2023-01-05 RX ORDER — CLOBETASOL PROPIONATE 0.5 MG/G
OINTMENT TOPICAL ONCE
OUTPATIENT
Start: 2023-01-05 | End: 2023-01-05

## 2023-01-05 RX ORDER — BETAMETHASONE DIPROPIONATE 0.5 MG/G
OINTMENT TOPICAL ONCE
OUTPATIENT
Start: 2023-01-05 | End: 2023-01-05

## 2023-01-05 RX ORDER — MUPIROCIN 20 MG/G
OINTMENT TOPICAL ONCE
OUTPATIENT
Start: 2023-01-05 | End: 2023-01-05

## 2023-01-05 RX ORDER — TRIAMCINOLONE ACETONIDE 1 MG/G
OINTMENT TOPICAL ONCE
OUTPATIENT
Start: 2023-01-05 | End: 2023-01-05

## 2023-01-05 RX ORDER — LIDOCAINE HYDROCHLORIDE 40 MG/ML
SOLUTION TOPICAL ONCE
OUTPATIENT
Start: 2023-01-05 | End: 2023-01-05

## 2023-01-05 RX ORDER — BACITRACIN ZINC AND POLYMYXIN B SULFATE 500; 1000 [USP'U]/G; [USP'U]/G
OINTMENT TOPICAL ONCE
OUTPATIENT
Start: 2023-01-05 | End: 2023-01-05

## 2023-01-05 RX ORDER — DOXYCYCLINE 100 MG/1
100 TABLET ORAL 2 TIMES DAILY
COMMUNITY

## 2023-01-05 NOTE — WOUND CARE
01/05/23 0937   Right Leg Edema Point of Measurement   Leg circumference 52.5 cm   Ankle circumference 32 cm   Compression Therapy Other  (Educated on compression stockings)   Left Leg Edema Point of Measurement   Leg circumference 51 cm   Ankle circumference 30 cm   Compression Therapy Other  (Educated patient on compression stockings)   Peripheral Vascular   Peripheral Vascular (WDL) X   LLE Peripheral Vascular    Capillary Refill Less than/equal to 3 seconds   Color Appropriate for race   Temperature Warm   Sensation Present   Pedal Pulse Palpable   Wound Leg lower Left   Date First Assessed/Time First Assessed: 01/05/23 0936   Wound Approximate Age at First Assessment (Weeks): 2 weeks  Primary Wound Type: Venous  Location: Leg lower  Wound Location Orientation: Left   Wound Image    Wound Etiology Venous   Dressing Status Old drainage noted   Cleansed Soap and water   Wound Length (cm) 27.1 cm   Wound Width (cm) 27 cm   Wound Depth (cm) 0.1 cm   Wound Surface Area (cm^2) 731.7 cm^2   Wound Volume (cm^3) 73.17 cm^3   Wound Assessment Pink/red; Hyper granulation tissue   Drainage Amount Moderate   Drainage Description Serous   Wound Odor None   Ann-Wound/Incision Assessment Edematous   Edges Flat/open edges   Wound Thickness Description Full thickness   Wound Ankle Left   Date First Assessed/Time First Assessed: 01/05/23 0937   Wound Approximate Age at First Assessment (Weeks): 2 weeks  Primary Wound Type: Venous  Location: Ankle  Wound Location Orientation: Left   Wound Image    Wound Etiology Venous   Dressing Status Old drainage noted   Cleansed Soap and water   Wound Length (cm) 0.9 cm   Wound Width (cm) 1.2 cm   Wound Depth (cm) 0.1 cm   Wound Surface Area (cm^2) 1.08 cm^2   Wound Volume (cm^3) 0.108 cm^3   Wound Assessment Pink/red   Drainage Amount Moderate   Drainage Description Serous   Wound Odor None   Ann-Wound/Incision Assessment Edematous; Maceration   Edges Flat/open edges   Wound Thickness Description Full thickness     Visit Vitals  /71 (BP Patient Position: At rest)   Pulse 94   Temp 97.5 °F (36.4 °C)   Resp 18

## 2023-01-05 NOTE — DISCHARGE INSTRUCTIONS
Discharge Instructions/Wound Orders  [address], [address] [place] [address] [place], [id number]  Telephone: [phone]    NAME:  Nguyen Buckner  YOB: 1965  MEDICAL RECORD NUMBER:  [id number]  DATE:  01/05/23  Wound Care Orders:  Bilateral lower leg wounds :Cleanse with Soap and water , apply primary dressing xeroform cover with secondary dressing   abd pad, gauze, and roll gauze . Apply Double tubi  Pt./pcg/HH nurse to change (freq) 3 x week and as needed for compromise. F/U in 1 week. Dietary:  [] Diet as tolerated: [] Diabetic Diet:Low carb and no Sugar   [] No Added Salt:[] Increase Protein:   [] Other:Limit the amount of liquid you are drinking and avoid drinking in between meals   Activity:  [] Activity as tolerated:     Return Appointment:  [x] Return Appointment: With DR Mikhail Buckley  in  [facility])  [x] Ordered tests: Bilateral ELLA's   Electronically signed Luis Wong RN on 1/5/2023 at 9:52 AM   [address] [place] [address] Information: Should you experience any significant changes in your wound(s) or have questions about your wound care, please contact the [address] [place] [address] at [address] [place] [address] 8:00 am - 4:30. If you need help with your wound outside these hours and cannot wait until we are again available, contact your PCP or go to the hospital emergency room. PLEASE NOTE: IF YOU ARE UNABLE TO OBTAIN WOUND SUPPLIES, CONTINUE TO USE THE SUPPLIES YOU HAVE AVAILABLE UNTIL YOU ARE ABLE TO REACH US. IT IS MOST IMPORTANT TO KEEP THE WOUND COVERED AT ALL TIMES.     Physician Signature:_______________________    Date: ___________ Time:  ____________

## 2023-01-05 NOTE — H&P
[address]  HISTORY AND PHYSICAL    Name:  Erica Lerma  MR#:  [id number]  :  1965  ACCOUNT #:  [de-identified]  ADMIT DATE:  2023    HISTORY OF PRESENT ILLNESS:  The patient is a 49-year-old man who is referred to the [address] [place] [address] regarding ulcerations on both lower legs. He had been seen previously in the fall of  for similar complaints. The patient was seen in the ER on 2022 and given doxycycline for what was felt to be lower extremity cellulitis. The patient reports a long history of significant leg swelling. His medications include 80 mg of furosemide daily which he says he does take and this does produce a diuresis. The patient reports drinking large quantities of fluid during the day, estimated to be over 1 gallon. The patient reports that he sleeps part of the night lying on a cot and part of the night sitting up in a chair. The patient has history of diabetes mellitus and on 2022 at [address] [place] [address] had hemoglobin A1c measured at greater than 14. The patient admits that he does not consistently follow a diabetic diet. He has apparently missed appointments with a diabetic specialist.  He does take insulin. The patient denies history of anginal symptoms and denies history of MI or coronary intervention. He does have history of asthma. He is ambulatory and denies shortness of breath with ambulation. The patient's past medical history includes hypertension, hyperlipidemia, obesity, and prostate cancer for which he is receiving treatment at [address] [place] [address]. The patient quit smoking in . Reported weight 270 pounds, height 5 feet 6 inches. PHYSICAL EXAMINATION:  GENERAL:  The patient is an alert man in no acute distress. VITAL SIGNS:  Blood pressure 112/71, pulse 94, respirations 18, temperature 97.5. HEAD AND NECK:  No jaundice. LUNGS:  Clear bilaterally without rales, rhonchi, or wheezes.   HEART:  Regular without murmur, gallop, or rub. NEURO:  The patient is alert and oriented. He moves all extremities. Facial movement is symmetrical.  Speech is normal.    WOUND EXAMINATION:  Examination of the right lower extremity did not reveal palpable dorsalis pedis or posterior tibial pulses. There were reduced Doppler signals at the dorsalis pedis and posterior tibial sites. There was 3+ pitting edema in the right lower extremity going up to the groin. There was an ulcer on the right lower leg, 1.8 x 2.5 x 0.1 cm in dimension with granulation and with a small amount of watery drainage. Examination of the left lower extremity did not reveal palpable dorsalis pedis or posterior tibial pulses. There was a biphasic Doppler signal at the left posterior tibial.  There was a very weak arterial signal at the left dorsalis pedis site. There was 3+ pitting edema in the left lower extremity up to the level of the groin. There were multiple scattered ulcers with granulation and draining watery fluid on the left lower leg with total involved area of 27.1 x 27.0 x 0.1 cm. There was an ulcer of the left ankle 0.9 x 1.2 x 0.1 cm with granulation. I recommended the patient follow a strict diabetic diet at all times. I emphasized the urgent need to control blood sugar. This will be important both for wound healing and avoiding further worsening of his arterial insufficiency as well as avoiding harmful impact on multiple areas of his health. He should follow up as recommended with physicians regarding management of the diabetes. The patient should continue taking his current diuretic dose which is 80 mg of furosemide daily. He should very significantly cut back on fluid intake. I would recommend drinking moderate amounts of fluid with meals and very little fluid between meals. I have send a message to his primary physician recommending an increase dosing on his diuretic, perhaps starting bumetanide.     I recommended the patient lie down all night for sleeping. He should never sleep sitting up in a chair. He should also lie down and elevate legs (feet above heart) periodically during the day. Bilateral ELLA was ordered to assess his arterial insufficiency. I have not initiated full compression dressings at this point because of his evidence of arterial insufficiency. Dressing ordered:  Xeroform over ulcers covered by gauze and ABD and roll gauze. Apply double layer Tubigrip to right and left legs from base of toes to upper calf. Dressing to be changed three times per week. The patient will follow up in the [address] [place] [address] in one week. FINAL DIAGNOSES:  Nonpressure ulcerations in right lower extremity with fat layer exposed, nonpressure ulceration in the left lower leg with fat layer exposed, bilateral leg edema, uncontrolled diabetes mellitus.         MD MIGDALIA [email or web address]  D:  01/05/2023 10:27  T:  01/05/2023 12:02  JOB #:  [id number]

## 2023-01-09 RX ORDER — BUMETANIDE 2 MG/1
2 TABLET ORAL DAILY
Qty: 30 TABLET | Refills: 5 | Status: SHIPPED | OUTPATIENT
Start: 2023-01-09

## 2023-01-11 ENCOUNTER — NURSE TRIAGE (OUTPATIENT)
Dept: OTHER | Facility: CLINIC | Age: 58
End: 2023-01-11

## 2023-01-11 DIAGNOSIS — E11.42 DIABETIC POLYNEUROPATHY ASSOCIATED WITH TYPE 2 DIABETES MELLITUS (HCC): ICD-10-CM

## 2023-01-11 RX ORDER — GABAPENTIN 100 MG/1
CAPSULE ORAL
Qty: 90 CAPSULE | Refills: 0 | Status: SHIPPED | OUTPATIENT
Start: 2023-01-11

## 2023-01-11 RX ORDER — IBUPROFEN 600 MG/1
TABLET ORAL
Qty: 90 TABLET | Refills: 0 | Status: SHIPPED | OUTPATIENT
Start: 2023-01-11

## 2023-01-11 NOTE — TELEPHONE ENCOUNTER
Location of patient: [address] spence from [place] at [facility] with [employer]. Subjective: Caller states \"I have swelling in my legs and I go to the wound clinic for that. I'm still retaining fluid in both legs all the way up to the thighs. The fluid is coming out of my legs. It's not as bad as it was, but there's still some fluid coming out. \"     Onset: months    Pain Severity:   both legs are painful    Temperature:  no fever    What has been tried: wound clinic and wrapping the legs    Recommended disposition: See in Office Within 2 Weeks - Pt states that he can get a ride to come see Provider on Tuesday, 1/17/23. Triage RN stated to pt to please call for any new or worsening symptoms. Care advice provided, patient verbalizes understanding; denies any other questions or concerns; instructed to call back for any new or worsening symptoms. Christine Liu, Service Expert, is working to get an appt for pt. Attention Provider: Thank you for allowing me to participate in the care of your patient. The patient was connected to triage in response to information provided to the [facility]. Please do not respond through this encounter as the response is not directed to a shared pool.   Reason for Disposition   [1] MILD swelling of both ankles (i.e., pedal edema) AND [2] is a chronic symptom (recurrent or ongoing AND present > 4 weeks)    Protocols used: Leg Swelling and Edema-ADULT-

## 2023-01-18 ENCOUNTER — HOSPITAL ENCOUNTER (OUTPATIENT)
Dept: WOUND CARE | Age: 58
Discharge: HOME | End: 2023-01-18
Payer: MEDICAID

## 2023-01-18 VITALS
HEART RATE: 94 BPM | RESPIRATION RATE: 18 BRPM | TEMPERATURE: 97.6 F | SYSTOLIC BLOOD PRESSURE: 136 MMHG | DIASTOLIC BLOOD PRESSURE: 75 MMHG

## 2023-01-18 DIAGNOSIS — I87.8 CHRONIC VENOUS STASIS: ICD-10-CM

## 2023-01-18 DIAGNOSIS — I87.2 CHRONIC VENOUS INSUFFICIENCY: Primary | ICD-10-CM

## 2023-01-18 PROCEDURE — 29581 APPL MULTLAYER CMPRN SYS LEG: CPT

## 2023-01-18 RX ORDER — CLOBETASOL PROPIONATE 0.5 MG/G
OINTMENT TOPICAL ONCE
OUTPATIENT
Start: 2023-01-18 | End: 2023-01-18

## 2023-01-18 RX ORDER — SILVER SULFADIAZINE 10 G/1000G
CREAM TOPICAL ONCE
OUTPATIENT
Start: 2023-01-18 | End: 2023-01-18

## 2023-01-18 RX ORDER — LIDOCAINE HYDROCHLORIDE 20 MG/ML
JELLY TOPICAL ONCE
OUTPATIENT
Start: 2023-01-18 | End: 2023-01-18

## 2023-01-18 RX ORDER — BETAMETHASONE DIPROPIONATE 0.5 MG/G
OINTMENT TOPICAL ONCE
OUTPATIENT
Start: 2023-01-18 | End: 2023-01-18

## 2023-01-18 RX ORDER — TRIAMCINOLONE ACETONIDE 1 MG/G
OINTMENT TOPICAL ONCE
OUTPATIENT
Start: 2023-01-18 | End: 2023-01-18

## 2023-01-18 RX ORDER — BACITRACIN 500 [USP'U]/G
OINTMENT TOPICAL ONCE
OUTPATIENT
Start: 2023-01-18 | End: 2023-01-18

## 2023-01-18 RX ORDER — GENTAMICIN SULFATE 1 MG/G
OINTMENT TOPICAL ONCE
OUTPATIENT
Start: 2023-01-18 | End: 2023-01-18

## 2023-01-18 RX ORDER — BACITRACIN ZINC AND POLYMYXIN B SULFATE 500; 1000 [USP'U]/G; [USP'U]/G
OINTMENT TOPICAL ONCE
OUTPATIENT
Start: 2023-01-18 | End: 2023-01-18

## 2023-01-18 RX ORDER — LIDOCAINE HYDROCHLORIDE 40 MG/ML
SOLUTION TOPICAL ONCE
OUTPATIENT
Start: 2023-01-18 | End: 2023-01-18

## 2023-01-18 RX ORDER — LIDOCAINE 40 MG/G
CREAM TOPICAL ONCE
OUTPATIENT
Start: 2023-01-18 | End: 2023-01-18

## 2023-01-18 RX ORDER — MUPIROCIN 20 MG/G
OINTMENT TOPICAL ONCE
OUTPATIENT
Start: 2023-01-18 | End: 2023-01-18

## 2023-01-18 NOTE — PROGRESS NOTES
[facility]  PROGRESS NOTE     DOS: 23    Name:  Elba Sandoval  MR#:  [id number]  :  1965  ADMIT DATE (FOR THIS EPISODE):  2023     HISTORY OF PRESENT ILLNESS:  The patient is a 22-year-old man who is referred to the [address] [place] [address] regarding ulcerations on both lower legs. He had been seen previously in the fall of  for similar complaints. The patient was seen in the ER on 2022 and given doxycycline for what was felt to be lower extremity cellulitis. The patient reports a long history of significant leg swelling. His medications include 80 mg of furosemide daily which he says he does take and this does produce a diuresis. The patient reports drinking large quantities of fluid during the day, estimated to be over 1 gallon. The patient reports that he sleeps part of the night lying on a cot and part of the night sitting up in a chair. The patient has history of diabetes mellitus and on 2022 at [facility] had hemoglobin A1c measured at greater than 14. The patient admits that he does not consistently follow a diabetic diet. He has apparently missed appointments with a diabetic specialist.  He does take insulin. The patient denies history of anginal symptoms and denies history of MI or coronary intervention. He does have history of asthma. He is ambulatory and denies shortness of breath with ambulation. The patient's past medical history includes hypertension, hyperlipidemia, obesity, and prostate cancer for which he is receiving treatment at [facility]. The patient quit smoking in . Reported weight 270 pounds, height 5 feet 6 inches. PHYSICAL EXAMINATION:  GENERAL:  The patient is an alert man in no acute distress. VITAL SIGNS:  Blood pressure 112/71, pulse 94, respirations 18, temperature 97.5. HEAD AND NECK:  No jaundice.   LUNGS:  Clear bilaterally without rales, rhonchi, or wheezes. HEART:  Regular without murmur, gallop, or rub. NEURO:  The patient is alert and oriented. He moves all extremities. Facial movement is symmetrical.  Speech is normal.     WOUND EXAMINATION:  Examination of the right lower extremity did not reveal palpable dorsalis pedis or posterior tibial pulses. There was 3+ pitting edema in the right lower extremity going up to the groin. However, his Doppler pulses are excellent bilaterally - all 4 of them. Examination of the left lower extremity did not reveal palpable dorsalis pedis or posterior tibial pulses. There was 3+ pitting edema in the left lower extremity up to the level of the groin. There were multiple scattered ulcers with granulation and draining watery fluid on the left lower leg with total involved area of 10.7 x 24.0 x 0.1 cm. There was an ulcer of the left ankle 1.4 x 1.0 x 0.1 cm with granulation. The patient should continue taking his current diuretic dose which is Bumex. He should very significantly cut back on fluid intake. I would recommend drinking moderate amounts of fluid with meals and very little fluid between meals. I have send a message to his primary physician recommending an increase dosing on his diuretic, perhaps starting bumetanide. I recommended the patient lie down all night for sleeping. He should never sleep sitting up in a chair. He should also lie down and elevate legs (feet above heart) periodically during the day. Dressing ordered:  Xeroform over ulcers covered by gauze and ABD and roll gauze. Apply 4 layer wrap to reduce severe edema to right and left legs from base of toes to upper calf. The patient will follow up in the [address] [place] [address] in one week. FINAL DIAGNOSES:  Nonpressure ulcerations in right lower extremity with fat layer exposed, nonpressure ulceration in the left lower leg with fat layer exposed, bilateral leg edema, uncontrolled diabetes mellitus.

## 2023-01-18 NOTE — WOUND CARE
01/18/23 0905   Wound Leg lower Left   Date First Assessed/Time First Assessed: 01/05/23 0936   Wound Approximate Age at First Assessment (Weeks): 2 weeks  Primary Wound Type: Venous  Location: Leg lower  Wound Location Orientation: Left   Wound Image    Wound Etiology Venous   Dressing Status Old drainage noted   Cleansed Soap and water;Cleansed with saline   Dressing/Treatment   (Xeroform, G, RG, Coban)   Wound Length (cm) 10.7 cm   Wound Width (cm) 24 cm   Wound Depth (cm) 0.1 cm   Wound Surface Area (cm^2) 256.8 cm^2   Change in Wound Size % 64.9   Wound Volume (cm^3) 25.68 cm^3   Wound Healing % 65   Wound Assessment [place]/red;Slough   Drainage Amount Moderate   Drainage Description Serous   Wound Odor None   Ann-Wound/Incision Assessment Intact   Edges Flat/open edges   Wound Thickness Description Full thickness   Visit Vitals  /75   Pulse 94   Temp 97.6 °F (36.4 °C)   Resp 18

## 2023-01-18 NOTE — DISCHARGE INSTRUCTIONS
Discharge Instructions [address], [address] [place] [address] [place], [id number]  Telephone: [phone]    NAME:  Polly Carrizales  YOB: 1965  MEDICAL RECORD NUMBER:  [id number]  DATE:  1/18/2023  WOUND CARE ORDERS:  Bilateral lower legs :Cleanse with saline , apply primary dressing xeroform cover with secondary dressing   abd pad . Apply 4 layers  Pt./pcg/HH nurse to change (freq) once a week in clinic and as needed for compromise. F/U in 1 week. TREATMENT ORDERS:    Elevate leg(s) above the level of the heart when sitting. Avoid prolonged standing in one place. Do no get dressing/wrap wet. Follow Diet as prescribed:   [x] Diet as tolerated: [x] Calorie Diabetic Diet: Low carb and no Sugar [x] No Added Salt:  [x] Increase Protein: [] Limit the amount of liquid you are drinking and avoid drinking in between meals           Return Appointment:  [x] Return Appointment: With DR Sharmin Wilson  in  [address] [place] [address])  [] Nurse Visit :  days  [] Ordered tests:    Electronically signed Juan C Ha RN on 1/18/2023 at [address] [address]: Should you experience any significant changes in your wound(s) or have questions about your wound care, please contact the [address] [place] [address] at [address] [place] [address] 8:00 am - 4:30. If you need help with your wound outside these hours and cannot wait until we are again available, contact your PCP or go to the hospital emergency room. PLEASE NOTE: IF YOU ARE UNABLE TO OBTAIN WOUND SUPPLIES, CONTINUE TO USE THE SUPPLIES YOU HAVE AVAILABLE UNTIL YOU ARE ABLE TO REACH US. IT IS MOST IMPORTANT TO KEEP THE WOUND COVERED AT ALL TIMES.      Physician Signature:_______________________    Date: ___________ Time:  ____________

## 2023-01-18 NOTE — WOUND CARE
01/18/23 0905   Wound Leg lower Left   Date First Assessed/Time First Assessed: 01/05/23 0936   Wound Approximate Age at First Assessment (Weeks): 2 weeks  Primary Wound Type: Venous  Location: Leg lower  Wound Location Orientation: Left   Wound Image    Wound Etiology Venous   Dressing Status Old drainage noted   Cleansed Soap and water;Cleansed with saline   Dressing/Treatment   (Xeroform, G, RG, Coban)   Wound Length (cm) 10.7 cm   Wound Width (cm) 24 cm   Wound Depth (cm) 0.1 cm   Wound Surface Area (cm^2) 256.8 cm^2   Change in Wound Size % 64.9   Wound Volume (cm^3) 25.68 cm^3   Wound Healing % 65   Wound Assessment [place]/red;Slough   Drainage Amount Moderate   Drainage Description Serous   Wound Odor None   Ann-Wound/Incision Assessment Intact   Edges Flat/open edges   Wound Thickness Description Full thickness   Wound Ankle Left   Date First Assessed/Time First Assessed: 01/05/23 0937   Wound Approximate Age at First Assessment (Weeks): 2 weeks  Primary Wound Type: Venous  Location: Ankle  Wound Location Orientation: Left   Wound Image    Wound Etiology Venous   Dressing Status   (Open to air)   Cleansed Soap and water   Wound Length (cm) 0.1 cm   Wound Width (cm) 0.1 cm   Wound Depth (cm) 0.1 cm   Wound Surface Area (cm^2) 0.01 cm^2   Change in Wound Size % 99.07   Wound Volume (cm^3) 0.001 cm^3   Wound Healing % 99   Wound Assessment Epithelialization   Drainage Amount None   Wound Odor None   Ann-Wound/Incision Assessment Intact   Edges Attached edges   Wound Thickness Description Full thickness   Wound Leg lower Right   Date First Assessed/Time First Assessed: 01/05/23 0954   Wound Approximate Age at First Assessment (Weeks): 2 weeks  Primary Wound Type: Venous  Location: Leg lower  Wound Location Orientation: Right   Wound Etiology Venous   Dressing Status   (Open to air)   Cleansed Soap and water   Wound Length (cm) 0.1 cm   Wound Width (cm) 0.1 cm   Wound Depth (cm) 0.1 cm   Wound Surface Area (cm^2) 0.01 cm^2   Change in Wound Size % 99.78   Wound Volume (cm^3) 0.001 cm^3   Wound Healing % 100   Wound Assessment Epithelialization   Drainage Amount None   Wound Odor None   Ann-Wound/Incision Assessment Intact   Edges Attached edges   Wound Thickness Description Full thickness   Wound Leg lower Anterior; Left #4 01/18/23   Date First Assessed/Time First Assessed: 01/18/23 0911   Present on Hospital Admission: Yes  Wound Approximate Age at First Assessment (Weeks): 2 weeks  Primary Wound Type: Venous Ulcer  Location: Leg lower  Wound Location Orientation: Anterior; Left  . ..    Wound Image    Wound Etiology Venous   Dressing Status Old drainage noted   Cleansed Soap and water;Cleansed with saline   Dressing/Treatment   (Xeroform, Gauze, RG, Coban)   Wound Length (cm) 1.4 cm   Wound Width (cm) 1 cm   Wound Depth (cm) 0.1 cm   Wound Surface Area (cm^2) 1.4 cm^2   Wound Volume (cm^3) 0.14 cm^3   Wound Assessment Pink/red   Drainage Amount Moderate   Drainage Description Serous   Wound Odor None   Ann-Wound/Incision Assessment Intact   Edges Defined edges   Wound Thickness Description Full thickness   Visit Vitals  /75   Pulse 94   Temp 97.6 °F (36.4 °C)   Resp 18

## 2023-01-25 ENCOUNTER — HOSPITAL ENCOUNTER (OUTPATIENT)
Dept: WOUND CARE | Age: 58
Discharge: HOME | End: 2023-01-25
Payer: MEDICAID

## 2023-01-25 VITALS
RESPIRATION RATE: 18 BRPM | DIASTOLIC BLOOD PRESSURE: 82 MMHG | TEMPERATURE: 97.3 F | HEART RATE: 98 BPM | SYSTOLIC BLOOD PRESSURE: 139 MMHG

## 2023-01-25 DIAGNOSIS — I87.8 CHRONIC VENOUS STASIS: ICD-10-CM

## 2023-01-25 DIAGNOSIS — I87.2 CHRONIC VENOUS INSUFFICIENCY: Primary | ICD-10-CM

## 2023-01-25 PROCEDURE — 29581 APPL MULTLAYER CMPRN SYS LEG: CPT

## 2023-01-25 RX ORDER — BETAMETHASONE DIPROPIONATE 0.5 MG/G
OINTMENT TOPICAL ONCE
OUTPATIENT
Start: 2023-01-25 | End: 2023-01-25

## 2023-01-25 RX ORDER — LIDOCAINE HYDROCHLORIDE 20 MG/ML
JELLY TOPICAL ONCE
OUTPATIENT
Start: 2023-01-25 | End: 2023-01-25

## 2023-01-25 RX ORDER — LIDOCAINE HYDROCHLORIDE 40 MG/ML
SOLUTION TOPICAL ONCE
OUTPATIENT
Start: 2023-01-25 | End: 2023-01-25

## 2023-01-25 RX ORDER — CLOBETASOL PROPIONATE 0.5 MG/G
OINTMENT TOPICAL ONCE
OUTPATIENT
Start: 2023-01-25 | End: 2023-01-25

## 2023-01-25 RX ORDER — BACITRACIN ZINC AND POLYMYXIN B SULFATE 500; 1000 [USP'U]/G; [USP'U]/G
OINTMENT TOPICAL ONCE
OUTPATIENT
Start: 2023-01-25 | End: 2023-01-25

## 2023-01-25 RX ORDER — MUPIROCIN 20 MG/G
OINTMENT TOPICAL ONCE
OUTPATIENT
Start: 2023-01-25 | End: 2023-01-25

## 2023-01-25 RX ORDER — GENTAMICIN SULFATE 1 MG/G
OINTMENT TOPICAL ONCE
OUTPATIENT
Start: 2023-01-25 | End: 2023-01-25

## 2023-01-25 RX ORDER — SILVER SULFADIAZINE 10 G/1000G
CREAM TOPICAL ONCE
OUTPATIENT
Start: 2023-01-25 | End: 2023-01-25

## 2023-01-25 RX ORDER — TRIAMCINOLONE ACETONIDE 1 MG/G
OINTMENT TOPICAL ONCE
OUTPATIENT
Start: 2023-01-25 | End: 2023-01-25

## 2023-01-25 RX ORDER — LIDOCAINE 40 MG/G
CREAM TOPICAL ONCE
OUTPATIENT
Start: 2023-01-25 | End: 2023-01-25

## 2023-01-25 RX ORDER — BACITRACIN 500 [USP'U]/G
OINTMENT TOPICAL ONCE
OUTPATIENT
Start: 2023-01-25 | End: 2023-01-25

## 2023-01-25 NOTE — WOUND CARE
01/25/23 1010   Wound Leg lower Anterior; Left #4 01/18/23   Date First Assessed/Time First Assessed: 01/18/23 0911   Present on Hospital Admission: Yes  Wound Approximate Age at First Assessment (Weeks): 2 weeks  Primary Wound Type: Venous Ulcer  Location: Leg lower  Wound Location Orientation: Anterior; Left  . .. Dressing Status New dressing applied   Dressing/Treatment   (2 layer with calamine)   Wound Leg lower Left   Date First Assessed/Time First Assessed: 01/05/23 0936   Wound Approximate Age at First Assessment (Weeks): 2 weeks  Primary Wound Type: Venous  Location: Leg lower  Wound Location Orientation: Left   Dressing Status New dressing applied   Dressing/Treatment   (2 layer with calamine)   Multilayer Compression Wrap   (Not Unna) Below the Knee    NAME:  Campos Sanches OF BIRTH:  1965  MEDICAL RECORD NUMBER:  [id number]  DATE:  1/25/2023    Removed old Multilayer wrap if indicated and wash leg with mild soap/water. Applied moisturizing agent to dry skin as needed. Applied primary and secondary dressing as ordered. Applied multilayered dressing below the knee to right lower leg. Applied multilayered dressing below the knee to left lower leg. Instructed patient/caregiver not to remove dressing and to keep it clean and dry. Instructed patient/caregiver on complications to report to provider, such as pain, numbness in toes, heavy drainage, and slippage of dressing. Instructed patient on purpose of compression dressing and on activity and exercise recommendations.     Response to treatment: Well tolerated by patient       Electronically signed by Timur Haney RN on 1/25/2023 at 10:10 AM

## 2023-01-25 NOTE — WOUND CARE
01/25/23 0951   Right Leg Edema Point of Measurement   Leg circumference 47 cm   Ankle circumference 28.3 cm   Compression Therapy 4 layer compression wrap   Left Leg Edema Point of Measurement   Leg circumference 47.4 cm   Ankle circumference 27.6 cm   Compression Therapy 4 layer compression wrap   Wound Leg lower Anterior; Left #4 01/18/23   Date First Assessed/Time First Assessed: 01/18/23 0911   Present on Hospital Admission: Yes  Wound Approximate Age at First Assessment (Weeks): 2 weeks  Primary Wound Type: Venous Ulcer  Location: Leg lower  Wound Location Orientation: Anterior; Left  . ..    Wound Image    Wound Etiology Venous   Dressing Status Old drainage noted   Cleansed Soap and water   Wound Length (cm) 0.1 cm   Wound Width (cm) 0.1 cm   Wound Depth (cm) 0.1 cm   Wound Surface Area (cm^2) 0.01 cm^2   Change in Wound Size % 99.29   Wound Volume (cm^3) 0.001 cm^3   Wound Healing % 99   Wound Assessment Epithelialization   Drainage Amount Moderate  (dry drainage)   Drainage Description Serous   Wound Odor None   Ann-Wound/Incision Assessment Intact   Edges Defined edges   Wound Thickness Description Full thickness   Wound Leg lower Left   Date First Assessed/Time First Assessed: 01/05/23 0936   Wound Approximate Age at First Assessment (Weeks): 2 weeks  Primary Wound Type: Venous  Location: Leg lower  Wound Location Orientation: Left   Wound Image    Wound Etiology Venous   Dressing Status Old drainage noted   Cleansed Soap and water   Wound Length (cm) 0.1 cm   Wound Width (cm) 0.1 cm   Wound Depth (cm) 0.1 cm   Wound Surface Area (cm^2) 0.01 cm^2   Change in Wound Size % 100   Wound Volume (cm^3) 0.001 cm^3   Wound Healing % 100   Wound Assessment Epithelialization   Drainage Amount Moderate  (dry drainage)   Drainage Description Serous   Wound Odor None   Ann-Wound/Incision Assessment Intact   Edges Flat/open edges   Wound Thickness Description Full thickness     Visit Vitals  /82 (BP 1 Location: Left upper arm, BP Patient Position: At rest;Reclining)   Pulse 98   Temp 97.3 °F (36.3 °C)   Resp 18

## 2023-01-25 NOTE — PROGRESS NOTES
[facility]  PROGRESS NOTE     DOS: 23     Name:  Eva Lock  MR#:  [id number]  :  1965  ADMIT DATE (FOR THIS EPISODE):  2023     HISTORY OF PRESENT ILLNESS:  The patient is a 49-year-old man who is referred to the [address] [place] [address] regarding ulcerations on both lower legs. He had been seen previously in the fall of  for similar complaints. The patient was seen in the ER on 2022 and given doxycycline for what was felt to be lower extremity cellulitis. The patient reports a long history of significant leg swelling. His medications include 80 mg of furosemide daily which he says he does take and this does produce a diuresis. The patient reports drinking large quantities of fluid during the day, estimated to be over 1 gallon. The patient reports that he sleeps part of the night lying on a cot and part of the night sitting up in a chair. The patient has history of diabetes mellitus and on 2022 at [facility] had hemoglobin A1c measured at greater than 14. The patient admits that he does not consistently follow a diabetic diet. He has apparently missed appointments with a diabetic specialist.  He does take insulin. The patient denies history of anginal symptoms and denies history of MI or coronary intervention. He does have history of asthma. He is ambulatory and denies shortness of breath with ambulation. The patient's past medical history includes hypertension, hyperlipidemia, obesity, and prostate cancer for which he is receiving treatment at [facility]. The patient quit smoking in . Reported weight 270 pounds, height 5 feet 6 inches. PHYSICAL EXAMINATION:  GENERAL:  The patient is an alert man in no acute distress. VITAL SIGNS:  Blood pressure 112/71, pulse 94, respirations 18, temperature 97.5. HEAD AND NECK:  No jaundice.   LUNGS:  Clear bilaterally without rales, rhonchi, or wheezes. HEART:  Regular without murmur, gallop, or rub. NEURO:  The patient is alert and oriented. He moves all extremities. Facial movement is symmetrical.  Speech is normal.     WOUND EXAMINATION:  Examination of the right lower extremity did not reveal palpable dorsalis pedis or posterior tibial pulses. There was 3+ pitting edema in the right lower extremity going up to the groin. However, his Doppler pulses are excellent bilaterally - all 4 of them. Examination of the left lower extremity did not reveal palpable dorsalis pedis or posterior tibial pulses. There was 3+ pitting edema in the left lower extremity up to the level of the groin. There were multiple scattered ulcers with granulation and draining watery fluid on the left lower leg with total involved area of 0.1 x 0.1 x 0.1 cm. There was an ulcer of the left ankle 0.1 x 0.1 x 0.1 cm. The patient should continue taking his current diuretic dose which is Bumex. He should very significantly cut back on fluid intake. I would recommend drinking moderate amounts of fluid with meals and very little fluid between meals. I have send a message to his primary physician recommending an increase dosing on his diuretic, perhaps starting bumetanide. I recommended the patient lie down all night for sleeping. He should never sleep sitting up in a chair. He should also lie down and elevate legs (feet above heart) periodically during the day. Dressing ordered:   Apply 2 layer wrapa to reduce severe edema to right and left legs from base of toes to upper calf. The patient will follow up in the [address] [place] [address] in one week. FINAL DIAGNOSES:  Nonpressure ulcerations in right lower extremity with fat layer exposed, nonpressure ulceration in the left lower leg with fat layer exposed, bilateral leg edema, uncontrolled diabetes mellitus.          Electronically signed by Cherelle Reyes MD at 01/18/23 6425

## 2023-01-25 NOTE — DISCHARGE INSTRUCTIONS
Discharge Instructions [address], [address] [place] [address] [place], [id number]  Telephone: [phone]    NAME:  Jeromy Shearer  YOB: 1965  MEDICAL RECORD NUMBER:  [id number]  DATE:  1/25/2023  WOUND CARE ORDERS:  Bilateral lower legs :Cleanse with Soap and water ,    Apply 2 layers with Calamine  Pt./pcg/HH nurse to change (freq) once a week in clinic and as needed for compromise. F/U in 1 week. TREATMENT ORDERS:    Elevate leg(s) above the level of the heart when sitting. Avoid prolonged standing in one place. Do no get dressing/wrap wet. Follow Diet as prescribed:   [x] Diet as tolerated: [] Calorie Diabetic Diet: Low carb and no Sugar [x] No Added Salt:  [x] Increase Protein: [] Limit the amount of liquid you are drinking and avoid drinking in between meals           Return Appointment:  [x] Return Appointment: With DR Avila Shabazz  in  1 [facility])  [] Nurse Visit :  days  [] Ordered tests:    Electronically signed Dustin Ackerman RN on 1/25/2023 at 10:03 AM     [facility] Information: Should you experience any significant changes in your wound(s) or have questions about your wound care, please contact the [address] [place] [address] at [address] [place] [address] 8:00 am - 4:30. If you need help with your wound outside these hours and cannot wait until we are again available, contact your PCP or go to the hospital emergency room. PLEASE NOTE: IF YOU ARE UNABLE TO OBTAIN WOUND SUPPLIES, CONTINUE TO USE THE SUPPLIES YOU HAVE AVAILABLE UNTIL YOU ARE ABLE TO REACH US. IT IS MOST IMPORTANT TO KEEP THE WOUND COVERED AT ALL TIMES.      Physician Signature:_______________________    Date: ___________ Time:  ____________

## 2023-02-08 ENCOUNTER — HOSPITAL ENCOUNTER (OUTPATIENT)
Dept: WOUND CARE | Age: 58
Discharge: HOME | End: 2023-02-08
Payer: MEDICAID

## 2023-02-08 VITALS
DIASTOLIC BLOOD PRESSURE: 70 MMHG | RESPIRATION RATE: 18 BRPM | TEMPERATURE: 97.9 F | HEART RATE: 99 BPM | SYSTOLIC BLOOD PRESSURE: 145 MMHG

## 2023-02-08 DIAGNOSIS — I87.2 CHRONIC VENOUS INSUFFICIENCY: Primary | ICD-10-CM

## 2023-02-08 DIAGNOSIS — I87.8 CHRONIC VENOUS STASIS: ICD-10-CM

## 2023-02-08 PROCEDURE — 29581 APPL MULTLAYER CMPRN SYS LEG: CPT

## 2023-02-08 RX ORDER — BACITRACIN ZINC AND POLYMYXIN B SULFATE 500; 1000 [USP'U]/G; [USP'U]/G
OINTMENT TOPICAL ONCE
OUTPATIENT
Start: 2023-02-08 | End: 2023-02-08

## 2023-02-08 RX ORDER — SILVER SULFADIAZINE 10 G/1000G
CREAM TOPICAL ONCE
OUTPATIENT
Start: 2023-02-08 | End: 2023-02-08

## 2023-02-08 RX ORDER — LIDOCAINE HYDROCHLORIDE 20 MG/ML
JELLY TOPICAL ONCE
OUTPATIENT
Start: 2023-02-08 | End: 2023-02-08

## 2023-02-08 RX ORDER — GENTAMICIN SULFATE 1 MG/G
OINTMENT TOPICAL ONCE
OUTPATIENT
Start: 2023-02-08 | End: 2023-02-08

## 2023-02-08 RX ORDER — BACITRACIN 500 [USP'U]/G
OINTMENT TOPICAL ONCE
OUTPATIENT
Start: 2023-02-08 | End: 2023-02-08

## 2023-02-08 RX ORDER — CLOBETASOL PROPIONATE 0.5 MG/G
OINTMENT TOPICAL ONCE
OUTPATIENT
Start: 2023-02-08 | End: 2023-02-08

## 2023-02-08 RX ORDER — BETAMETHASONE DIPROPIONATE 0.5 MG/G
OINTMENT TOPICAL ONCE
OUTPATIENT
Start: 2023-02-08 | End: 2023-02-08

## 2023-02-08 RX ORDER — LIDOCAINE HYDROCHLORIDE 40 MG/ML
SOLUTION TOPICAL ONCE
OUTPATIENT
Start: 2023-02-08 | End: 2023-02-08

## 2023-02-08 RX ORDER — LIDOCAINE 40 MG/G
CREAM TOPICAL ONCE
OUTPATIENT
Start: 2023-02-08 | End: 2023-02-08

## 2023-02-08 RX ORDER — MUPIROCIN 20 MG/G
OINTMENT TOPICAL ONCE
OUTPATIENT
Start: 2023-02-08 | End: 2023-02-08

## 2023-02-08 RX ORDER — TRIAMCINOLONE ACETONIDE 1 MG/G
OINTMENT TOPICAL ONCE
OUTPATIENT
Start: 2023-02-08 | End: 2023-02-08

## 2023-02-08 RX ORDER — DOXYCYCLINE 100 MG/1
100 TABLET ORAL 2 TIMES DAILY
Qty: 14 TABLET | Refills: 1 | Status: SHIPPED | OUTPATIENT
Start: 2023-02-08

## 2023-02-08 NOTE — DISCHARGE INSTRUCTIONS
Discharge Instructions/Wound Orders  [address], [address] [place] [address], [id number]  Telephone: [phone]    NAME:  Alma Tavares  YOB: 1965  MEDICAL RECORD NUMBER:  [id number]  DATE:  02/08/23  Wound Care Orders:  Bilateral lower legs :Cleanse with Soap and water , apply primary dressing xeroform cover with secondary dressing   abd pad . Apply 2 layers with Calamine  Pt./pcg/HH nurse to change (freq) once a week in clinic and as needed for compromise. F/U in 1 week. Dietary:  [] Diet as tolerated: [] Diabetic Diet:Low carb and no Sugar   [] No Added Salt:[] Increase Protein:   [] Other:Limit the amount of liquid you are drinking and avoid drinking in between meals   Activity:  [] Activity as tolerated:     Return Appointment:  [] Return Appointment: With DR Noel Villa  in  1 [facility])  [] Ordered tests:   Electronically signed Darcel Pallas, RN on 2/8/2023 at 10:10 AM   [address] [place] [address] Information: Should you experience any significant changes in your wound(s) or have questions about your wound care, please contact the [address] [place] [address] at [address] [place] [address] 8:00 am - 4:30. If you need help with your wound outside these hours and cannot wait until we are again available, contact your PCP or go to the hospital emergency room. PLEASE NOTE: IF YOU ARE UNABLE TO OBTAIN WOUND SUPPLIES, CONTINUE TO USE THE SUPPLIES YOU HAVE AVAILABLE UNTIL YOU ARE ABLE TO REACH US. IT IS MOST IMPORTANT TO KEEP THE WOUND COVERED AT ALL TIMES.     Physician Signature:_______________________    Date: ___________ Time:  ____________

## 2023-02-08 NOTE — WOUND CARE
02/08/23 1011   Wound Leg lower Anterior; Left #4 01/18/23   Date First Assessed/Time First Assessed: 01/18/23 0911   Present on Hospital Admission: Yes  Wound Approximate Age at First Assessment (Weeks): 2 weeks  Primary Wound Type: Venous Ulcer  Location: Leg lower  Wound Location Orientation: Anterior; Left  . .. Dressing Status New dressing applied   Dressing/Treatment Xeroform;ABD pad  (2 layer with calamine)   Wound Leg lower Left   Date First Assessed/Time First Assessed: 01/05/23 0936   Wound Approximate Age at First Assessment (Weeks): 2 weeks  Primary Wound Type: Venous  Location: Leg lower  Wound Location Orientation: Left   Dressing Status New dressing applied   Dressing/Treatment Xeroform;ABD pad  (2 layer with calamine)     Multilayer Compression Wrap   (Not Unna) Below the Knee    NAME:  Claudette Shall OF BIRTH:  1965  MEDICAL RECORD NUMBER:  [id number]  DATE:  2/8/2023    Removed old Multilayer wrap if indicated and wash leg with mild soap/water. Applied moisturizing agent to dry skin as needed. Applied primary and secondary dressing as ordered. Applied multilayered dressing below the knee to right lower leg. Applied multilayered dressing below the knee to left lower leg. Instructed patient/caregiver not to remove dressing and to keep it clean and dry. Instructed patient/caregiver on complications to report to provider, such as pain, numbness in toes, heavy drainage, and slippage of dressing. Instructed patient on purpose of compression dressing and on activity and exercise recommendations.     Response to treatment: Well tolerated by patient       Electronically signed by Sabine Sifuentes RN on 2/8/2023 at 10:11 AM

## 2023-02-08 NOTE — WOUND CARE
02/08/23 0957   Right Leg Edema Point of Measurement   Leg circumference 47 cm   Ankle circumference 28.5 cm   Compression Therapy 2 layer compression wrap   Left Leg Edema Point of Measurement   Leg circumference 49 cm   Ankle circumference 28.4 cm   Compression Therapy 2 layer compression wrap   Wound Leg lower Anterior; Left #4 01/18/23   Date First Assessed/Time First Assessed: 01/18/23 0911   Present on Hospital Admission: Yes  Wound Approximate Age at First Assessment (Weeks): 2 weeks  Primary Wound Type: Venous Ulcer  Location: Leg lower  Wound Location Orientation: Anterior; Left  . .. Wound Image    Wound Etiology Venous   Dressing Status Old drainage noted   Cleansed Soap and water   Wound Length (cm) 0.1 cm   Wound Width (cm) 0.1 cm   Wound Depth (cm) 0.1 cm   Wound Surface Area (cm^2) 0.01 cm^2   Change in Wound Size % 99.29   Wound Volume (cm^3) 0.001 cm^3   Wound Healing % 99   Wound Assessment Pink/red   Drainage Amount Moderate   Drainage Description Serous   Wound Odor None   Ann-Wound/Incision Assessment Edematous   Edges Defined edges   Wound Thickness Description Full thickness   Wound Leg lower Left   Date First Assessed/Time First Assessed: 01/05/23 0936   Wound Approximate Age at First Assessment (Weeks): 2 weeks  Primary Wound Type: Venous  Location: Leg lower  Wound Location Orientation: Left   Wound Image    Wound Etiology Venous   Dressing Status Old drainage noted   Cleansed Soap and water   Wound Length (cm) 1 cm   Wound Width (cm) 2.6 cm   Wound Depth (cm) 0.1 cm   Wound Surface Area (cm^2) 2.6 cm^2   Change in Wound Size % 99.64   Wound Volume (cm^3) 0.26 cm^3   Wound Healing % 100   Wound Assessment Pink/red   Drainage Amount Moderate   Drainage Description Serous   Wound Odor None   Ann-Wound/Incision Assessment Edematous   Edges Flat/open edges   Wound Thickness Description Full thickness     Visit Vitals  BP (!) 145/70 (BP 1 Location: Left upper arm, BP Patient Position:  At rest)   Pulse 99   Temp 97.9 °F (36.6 °C)   Resp 18

## 2023-02-08 NOTE — PROGRESS NOTES
[facility]  PROGRESS NOTE     DOS: 23     Name:  Marleen Robbins  MR#:  [id number]  :  1965  ADMIT DATE (FOR THIS EPISODE):  2023     HISTORY OF PRESENT ILLNESS:  The patient is a 54-year-old man who is referred to the [address] [place] [address] regarding ulcerations on both lower legs. He had been seen previously in the fall of  for similar complaints. The patient was seen in the ER on 2022 and given doxycycline for what was felt to be lower extremity cellulitis. The patient reports a long history of significant leg swelling. His medications include 80 mg of furosemide daily which he says he does take and this does produce a diuresis. The patient reports drinking large quantities of fluid during the day, estimated to be over 1 gallon. The patient reports that he sleeps part of the night lying on a cot and part of the night sitting up in a chair. The patient has history of diabetes mellitus and on 2022 at [facility] had hemoglobin A1c measured at greater than 14. The patient admits that he does not consistently follow a diabetic diet. He has apparently missed appointments with a diabetic specialist.  He does take insulin. The patient denies history of anginal symptoms and denies history of MI or coronary intervention. He does have history of asthma. He is ambulatory and denies shortness of breath with ambulation. The patient's past medical history includes hypertension, hyperlipidemia, obesity, and metastatic prostate cancer for which he is receiving treatment at [facility]. The patient quit smoking in . 23: Patient missed an appointment, came in with very edematous leg and mild cellulitic appearance of proximal calf and a new lateral skin tear. Reported weight 270 pounds, height 5 feet 6 inches. PHYSICAL EXAMINATION:  GENERAL:  The patient is an alert man in no acute distress.   VITAL SIGNS:  Blood pressure 112/71, pulse 94, respirations 18, temperature 97.5. HEAD AND NECK:  No jaundice. LUNGS:  Clear bilaterally without rales, rhonchi, or wheezes. HEART:  Regular without murmur, gallop, or rub. NEURO:  The patient is alert and oriented. He moves all extremities. Facial movement is symmetrical.  Speech is normal.     WOUND EXAMINATION:  Examination of the right lower extremity did not reveal palpable dorsalis pedis or posterior tibial pulses. There was 3+ pitting edema in the right lower extremity going up to the groin. However, his Doppler pulses are excellent bilaterally - all 4 of them. Examination of the left lower extremity did not reveal palpable dorsalis pedis or posterior tibial pulses. There was 3+ pitting edema in the left lower extremity up to the level of the groin. There were multiple scattered ulcers with granulation and draining watery fluid on the left lower leg with total involved area of 0.1 x 0.1 x 0.1 cm. New lateral calf wound: 1.0 x 2.6 x 0.1 cm, clean. The patient should continue taking his current diuretic dose which is Bumex. He should very significantly cut back on fluid intake. I would recommend drinking moderate amounts of fluid with meals and very little fluid between meals. I have send a message to his primary physician recommending an increase dosing on his diuretic, perhaps starting bumetanide. I recommended the patient lie down all night for sleeping. He should never sleep sitting up in a chair. He should also lie down and elevate legs (feet above heart) periodically during the day. Dressing ordered:   Xeroform to wound. Apply 2 layer wrap to reduce severe edema to right and left legs from base of toes to upper calf. The patient will follow up in the [address] [place] [address] in one week.         FINAL DIAGNOSES:  Nonpressure ulcerations in right lower extremity with fat layer exposed, nonpressure ulceration in the left lower leg with fat layer exposed, bilateral leg edema, uncontrolled diabetes mellitus.          Electronically signed by Geno Styles MD at 01/18/23 4262

## 2023-02-22 ENCOUNTER — HOSPITAL ENCOUNTER (OUTPATIENT)
Dept: WOUND CARE | Age: 58
Discharge: HOME | End: 2023-02-22
Payer: MEDICAID

## 2023-02-22 VITALS
HEART RATE: 97 BPM | DIASTOLIC BLOOD PRESSURE: 69 MMHG | SYSTOLIC BLOOD PRESSURE: 139 MMHG | TEMPERATURE: 97.6 F | RESPIRATION RATE: 18 BRPM

## 2023-02-22 DIAGNOSIS — I87.2 CHRONIC VENOUS INSUFFICIENCY: Primary | ICD-10-CM

## 2023-02-22 DIAGNOSIS — I87.8 CHRONIC VENOUS STASIS: ICD-10-CM

## 2023-02-22 PROCEDURE — 99213 OFFICE O/P EST LOW 20 MIN: CPT

## 2023-02-22 RX ORDER — LIDOCAINE 40 MG/G
CREAM TOPICAL ONCE
OUTPATIENT
Start: 2023-02-22 | End: 2023-02-22

## 2023-02-22 RX ORDER — CLOBETASOL PROPIONATE 0.5 MG/G
OINTMENT TOPICAL ONCE
OUTPATIENT
Start: 2023-02-22 | End: 2023-02-22

## 2023-02-22 RX ORDER — LIDOCAINE HYDROCHLORIDE 20 MG/ML
JELLY TOPICAL ONCE
OUTPATIENT
Start: 2023-02-22 | End: 2023-02-22

## 2023-02-22 RX ORDER — GENTAMICIN SULFATE 1 MG/G
OINTMENT TOPICAL ONCE
OUTPATIENT
Start: 2023-02-22 | End: 2023-02-22

## 2023-02-22 RX ORDER — MUPIROCIN 20 MG/G
OINTMENT TOPICAL ONCE
OUTPATIENT
Start: 2023-02-22 | End: 2023-02-22

## 2023-02-22 RX ORDER — SILVER SULFADIAZINE 10 G/1000G
CREAM TOPICAL ONCE
OUTPATIENT
Start: 2023-02-22 | End: 2023-02-22

## 2023-02-22 RX ORDER — BACITRACIN 500 [USP'U]/G
OINTMENT TOPICAL ONCE
OUTPATIENT
Start: 2023-02-22 | End: 2023-02-22

## 2023-02-22 RX ORDER — BACITRACIN ZINC AND POLYMYXIN B SULFATE 500; 1000 [USP'U]/G; [USP'U]/G
OINTMENT TOPICAL ONCE
OUTPATIENT
Start: 2023-02-22 | End: 2023-02-22

## 2023-02-22 RX ORDER — LIDOCAINE HYDROCHLORIDE 40 MG/ML
SOLUTION TOPICAL ONCE
OUTPATIENT
Start: 2023-02-22 | End: 2023-02-22

## 2023-02-22 RX ORDER — BETAMETHASONE DIPROPIONATE 0.5 MG/G
OINTMENT TOPICAL ONCE
OUTPATIENT
Start: 2023-02-22 | End: 2023-02-22

## 2023-02-22 RX ORDER — TRIAMCINOLONE ACETONIDE 1 MG/G
OINTMENT TOPICAL ONCE
OUTPATIENT
Start: 2023-02-22 | End: 2023-02-22

## 2023-02-22 NOTE — PROGRESS NOTES
[facility]  PROGRESS NOTE     DOS: 23     Name:  Rosanne Benites  MR#:  [id number]  :  1965  ADMIT DATE (FOR THIS EPISODE):  2023     HISTORY OF PRESENT ILLNESS:  The patient is a 49-year-old man who is referred to the [address] [place] [address] regarding ulcerations on both lower legs. He had been seen previously in the fall of  for similar complaints. The patient was seen in the ER on 2022 and given doxycycline for what was felt to be lower extremity cellulitis. The patient reports a long history of significant leg swelling. His medications include 80 mg of furosemide daily which he says he does take and this does produce a diuresis. The patient reports drinking large quantities of fluid during the day, estimated to be over 1 gallon. The patient reports that he sleeps part of the night lying on a cot and part of the night sitting up in a chair. The patient has history of diabetes mellitus and on 2022 at [facility] had hemoglobin A1c measured at greater than 14. The patient admits that he does not consistently follow a diabetic diet. He has apparently missed appointments with a diabetic specialist.  He does take insulin. The patient denies history of anginal symptoms and denies history of MI or coronary intervention. He does have history of asthma. He is ambulatory and denies shortness of breath with ambulation. The patient's past medical history includes hypertension, hyperlipidemia, obesity, and metastatic prostate cancer for which he is receiving treatment at [facility] The patient quit smoking in . 23: Patient missed an appointment, came in with very edematous leg and mild cellulitic appearance of proximal calf and a new lateral skin tear. Reported weight 270 pounds, height 5 feet 6 inches. PHYSICAL EXAMINATION:  GENERAL:  The patient is an alert man in no acute distress.   VITAL SIGNS:  Blood pressure 112/71, pulse 94, respirations 18, temperature 97.5. HEAD AND NECK:  No jaundice. LUNGS:  Clear bilaterally without rales, rhonchi, or wheezes. HEART:  Regular without murmur, gallop, or rub. NEURO:  The patient is alert and oriented. He moves all extremities. Facial movement is symmetrical.  Speech is normal.     WOUND EXAMINATION:  Examination of the right lower extremity did not reveal palpable dorsalis pedis or posterior tibial pulses. There was 3+ pitting edema in the right lower extremity going up to the groin. However, his Doppler pulses are excellent bilaterally - all 4 of them. Examination of the left lower extremity did not reveal palpable dorsalis pedis or posterior tibial pulses. There was 3+ pitting edema in the left lower extremity up to the level of the groin. There were multiple scattered ulcers with granulation and draining watery fluid on the left lower leg with total involved area of Healed! .  New lateral calf wound: Healed! The patient should continue taking his current diuretic dose which is Bumex. He should very significantly cut back on fluid intake. I would recommend drinking moderate amounts of fluid with meals and very little fluid between meals. I have send a message to his primary physician recommending an increase dosing on his diuretic, perhaps starting bumetanide. I recommended the patient lie down all night for sleeping. He should never sleep sitting up in a chair. He should also lie down and elevate legs (feet above heart) periodically during the day. Dressing ordered:   None   The patient will follow up in the [address] [place] [address] as needed. Compression stockings placed.  Told him to get a  willie to assist him     FINAL DIAGNOSES:  Nonpressure ulcerations in right lower extremity with fat layer exposed, nonpressure ulceration in the left lower leg with fat layer exposed, bilateral leg edema, uncontrolled diabetes mellitus.          Electronically signed by Ayla Loza MD at 01/18/23 9390

## 2023-02-22 NOTE — WOUND CARE
02/22/23 0951   Right Leg Edema Point of Measurement   Leg circumference 48.5 cm   Ankle circumference 29 cm   Compression Therapy 2 layer compression wrap   Left Leg Edema Point of Measurement   Leg circumference 48 cm   Ankle circumference 32 cm   Compression Therapy 2 layer compression wrap   Wound Leg lower Left   Date First Assessed/Time First Assessed: 01/05/23 0936   Wound Approximate Age at First Assessment (Weeks): 2 weeks  Primary Wound Type: Venous  Location: Leg lower  Wound Location Orientation: Left   Wound Image    Wound Etiology Venous   Dressing Status Old drainage noted   Cleansed Soap and water   Wound Length (cm) 0 cm   Wound Width (cm) 0 cm   Wound Depth (cm) 0 cm   Wound Surface Area (cm^2) 0 cm^2   Change in Wound Size % 100   Wound Volume (cm^3) 0 cm^3   Wound Healing % 100   Wound Assessment Epithelialization   Drainage Amount Scant  (dried drainage)   Drainage Description Serous   Wound Odor None   Ann-Wound/Incision Assessment Edematous   Edges Flat/open edges   Wound Thickness Description Full thickness     Visit Vitals  /69 (BP 1 Location: Left upper arm, BP Patient Position: At rest)   Pulse 97   Temp 97.6 °F (36.4 °C)   Resp 18

## 2023-02-22 NOTE — DISCHARGE INSTRUCTIONS
Discharge Instructions/Wound Orders  [address], [address] [place] [address] [place], [id number]  Telephone: [phone]    NAME:  Concepcion Peters  YOB: 1965  MEDICAL RECORD NUMBER:  [id number]  DATE:  02/22/23  Wound Care Orders:  Bilateral lower legs :Cleanse with Soap and water . Apply Compression Stockings 15-20 mm Hg  from the time you wake up till the time you go to bed. Dietary:  [] Diet as tolerated: [] Diabetic Diet:Low carb and no Sugar   [] No Added Salt:[] Increase Protein:   [] Other:Limit the amount of liquid you are drinking and avoid drinking in between meals   Activity:  [] Activity as tolerated:     Return Appointment:  [] Return Appointment: With DR Mt Arias  in  no follow up   [] Ordered tests:   Electronically signed Stella Martinez RN on 2/22/2023 at 10:09 AM   [facility] Information: Should you experience any significant changes in your wound(s) or have questions about your wound care, please contact the [address] [place] [address] at [address] [place] [address] 8:00 am - 4:30. If you need help with your wound outside these hours and cannot wait until we are again available, contact your PCP or go to the hospital emergency room. PLEASE NOTE: IF YOU ARE UNABLE TO OBTAIN WOUND SUPPLIES, CONTINUE TO USE THE SUPPLIES YOU HAVE AVAILABLE UNTIL YOU ARE ABLE TO REACH US. IT IS MOST IMPORTANT TO KEEP THE WOUND COVERED AT ALL TIMES.     Physician Signature:_______________________    Date: ___________ Time:  ____________

## 2023-03-11 ENCOUNTER — APPOINTMENT (OUTPATIENT)
Dept: GENERAL RADIOLOGY | Age: 58
End: 2023-03-11
Attending: EMERGENCY MEDICINE
Payer: MEDICAID

## 2023-03-11 ENCOUNTER — HOSPITAL ENCOUNTER (INPATIENT)
Age: 58
LOS: 7 days | Discharge: HOME | End: 2023-03-18
Attending: EMERGENCY MEDICINE | Admitting: STUDENT IN AN ORGANIZED HEALTH CARE EDUCATION/TRAINING PROGRAM
Payer: MEDICAID

## 2023-03-11 ENCOUNTER — APPOINTMENT (OUTPATIENT)
Dept: ULTRASOUND IMAGING | Age: 58
End: 2023-03-11
Attending: EMERGENCY MEDICINE
Payer: MEDICAID

## 2023-03-11 DIAGNOSIS — I87.2 CHRONIC VENOUS INSUFFICIENCY: ICD-10-CM

## 2023-03-11 DIAGNOSIS — F32.A DEPRESSION, UNSPECIFIED DEPRESSION TYPE: ICD-10-CM

## 2023-03-11 DIAGNOSIS — E11.65 TYPE 2 DIABETES MELLITUS WITH HYPERGLYCEMIA, WITHOUT LONG-TERM CURRENT USE OF INSULIN (HCC): ICD-10-CM

## 2023-03-11 DIAGNOSIS — R60.0 BILATERAL LEG EDEMA: ICD-10-CM

## 2023-03-11 DIAGNOSIS — L03.119 CELLULITIS OF LOWER EXTREMITY, UNSPECIFIED LATERALITY: Primary | ICD-10-CM

## 2023-03-11 PROBLEM — L03.90 CELLULITIS: Status: ACTIVE | Noted: 2023-03-11

## 2023-03-11 LAB
ALBUMIN SERPL-MCNC: 3 G/DL (ref 3.5–5)
ALBUMIN/GLOB SERPL: 0.7 (ref 1.1–2.2)
ALP SERPL-CCNC: 101 U/L (ref 45–117)
ALT SERPL-CCNC: 14 U/L (ref 12–78)
ANION GAP SERPL CALC-SCNC: 5 MMOL/L (ref 5–15)
AST SERPL-CCNC: 8 U/L (ref 15–37)
BASOPHILS # BLD: 0 K/UL (ref 0–0.1)
BASOPHILS NFR BLD: 0 % (ref 0–1)
BILIRUB SERPL-MCNC: 0.3 MG/DL (ref 0.2–1)
BUN SERPL-MCNC: 14 MG/DL (ref 6–20)
BUN/CREAT SERPL: 18 (ref 12–20)
CALCIUM SERPL-MCNC: 8.8 MG/DL (ref 8.5–10.1)
CHLORIDE SERPL-SCNC: 102 MMOL/L (ref 97–108)
CO2 SERPL-SCNC: 28 MMOL/L (ref 21–32)
COMMENT, HOLDF: NORMAL
CREAT SERPL-MCNC: 0.79 MG/DL (ref 0.7–1.3)
DIFFERENTIAL METHOD BLD: ABNORMAL
EOSINOPHIL # BLD: 0.1 K/UL (ref 0–0.4)
EOSINOPHIL NFR BLD: 1 % (ref 0–7)
ERYTHROCYTE [DISTWIDTH] IN BLOOD BY AUTOMATED COUNT: 13.5 % (ref 11.5–14.5)
GLOBULIN SER CALC-MCNC: 4.6 G/DL (ref 2–4)
GLUCOSE BLD STRIP.AUTO-MCNC: 196 MG/DL (ref 65–117)
GLUCOSE BLD STRIP.AUTO-MCNC: 280 MG/DL (ref 65–117)
GLUCOSE SERPL-MCNC: 285 MG/DL (ref 65–100)
HCT VFR BLD AUTO: 34.7 % (ref 36.6–50.3)
HGB BLD-MCNC: 10.8 G/DL (ref 12.1–17)
IMM GRANULOCYTES # BLD AUTO: 0 K/UL (ref 0–0.04)
IMM GRANULOCYTES NFR BLD AUTO: 0 % (ref 0–0.5)
LACTATE BLD-SCNC: 0.88 MMOL/L (ref 0.4–2)
LYMPHOCYTES # BLD: 0.9 K/UL (ref 0.8–3.5)
LYMPHOCYTES NFR BLD: 20 % (ref 12–49)
MCH RBC QN AUTO: 26.2 PG (ref 26–34)
MCHC RBC AUTO-ENTMCNC: 31.1 G/DL (ref 30–36.5)
MCV RBC AUTO: 84.2 FL (ref 80–99)
MONOCYTES # BLD: 0.4 K/UL (ref 0–1)
MONOCYTES NFR BLD: 8 % (ref 5–13)
NEUTS SEG # BLD: 3.2 K/UL (ref 1.8–8)
NEUTS SEG NFR BLD: 71 % (ref 32–75)
NRBC # BLD: 0 K/UL (ref 0–0.01)
NRBC BLD-RTO: 0 PER 100 WBC
PLATELET # BLD AUTO: 217 K/UL (ref 150–400)
PMV BLD AUTO: 11.2 FL (ref 8.9–12.9)
POTASSIUM SERPL-SCNC: 4.9 MMOL/L (ref 3.5–5.1)
PROT SERPL-MCNC: 7.6 G/DL (ref 6.4–8.2)
RBC # BLD AUTO: 4.12 M/UL (ref 4.1–5.7)
SAMPLES BEING HELD,HOLD: NORMAL
SERVICE CMNT-IMP: ABNORMAL
SERVICE CMNT-IMP: ABNORMAL
SODIUM SERPL-SCNC: 135 MMOL/L (ref 136–145)
WBC # BLD AUTO: 4.6 K/UL (ref 4.1–11.1)

## 2023-03-11 PROCEDURE — 74011000250 HC RX REV CODE- 250: Performed by: STUDENT IN AN ORGANIZED HEALTH CARE EDUCATION/TRAINING PROGRAM

## 2023-03-11 PROCEDURE — 73630 X-RAY EXAM OF FOOT: CPT

## 2023-03-11 PROCEDURE — 80053 COMPREHEN METABOLIC PANEL: CPT

## 2023-03-11 PROCEDURE — 74011250637 HC RX REV CODE- 250/637: Performed by: STUDENT IN AN ORGANIZED HEALTH CARE EDUCATION/TRAINING PROGRAM

## 2023-03-11 PROCEDURE — 74011000258 HC RX REV CODE- 258: Performed by: STUDENT IN AN ORGANIZED HEALTH CARE EDUCATION/TRAINING PROGRAM

## 2023-03-11 PROCEDURE — 87040 BLOOD CULTURE FOR BACTERIA: CPT

## 2023-03-11 PROCEDURE — 74011636637 HC RX REV CODE- 636/637: Performed by: STUDENT IN AN ORGANIZED HEALTH CARE EDUCATION/TRAINING PROGRAM

## 2023-03-11 PROCEDURE — 85025 COMPLETE CBC W/AUTO DIFF WBC: CPT

## 2023-03-11 PROCEDURE — 83605 ASSAY OF LACTIC ACID: CPT

## 2023-03-11 PROCEDURE — 65270000029 HC RM PRIVATE

## 2023-03-11 PROCEDURE — 96374 THER/PROPH/DIAG INJ IV PUSH: CPT

## 2023-03-11 PROCEDURE — 93970 EXTREMITY STUDY: CPT

## 2023-03-11 PROCEDURE — 82962 GLUCOSE BLOOD TEST: CPT

## 2023-03-11 PROCEDURE — 73590 X-RAY EXAM OF LOWER LEG: CPT

## 2023-03-11 PROCEDURE — 74011250636 HC RX REV CODE- 250/636: Performed by: FAMILY MEDICINE

## 2023-03-11 PROCEDURE — 74011250636 HC RX REV CODE- 250/636: Performed by: EMERGENCY MEDICINE

## 2023-03-11 PROCEDURE — 96375 TX/PRO/DX INJ NEW DRUG ADDON: CPT

## 2023-03-11 PROCEDURE — 74011250636 HC RX REV CODE- 250/636: Performed by: STUDENT IN AN ORGANIZED HEALTH CARE EDUCATION/TRAINING PROGRAM

## 2023-03-11 PROCEDURE — 99285 EMERGENCY DEPT VISIT HI MDM: CPT

## 2023-03-11 PROCEDURE — 36415 COLL VENOUS BLD VENIPUNCTURE: CPT

## 2023-03-11 RX ORDER — INSULIN GLARGINE 100 [IU]/ML
25 INJECTION, SOLUTION SUBCUTANEOUS DAILY
Status: DISCONTINUED | OUTPATIENT
Start: 2023-03-12 | End: 2023-03-13

## 2023-03-11 RX ORDER — GABAPENTIN 100 MG/1
100 CAPSULE ORAL 3 TIMES DAILY
Status: DISCONTINUED | OUTPATIENT
Start: 2023-03-11 | End: 2023-03-18 | Stop reason: HOSPADM

## 2023-03-11 RX ORDER — HYDROCODONE BITARTRATE AND ACETAMINOPHEN 10; 325 MG/1; MG/1
1 TABLET ORAL
Status: DISCONTINUED | OUTPATIENT
Start: 2023-03-11 | End: 2023-03-18 | Stop reason: HOSPADM

## 2023-03-11 RX ORDER — LANOLIN ALCOHOL/MO/W.PET/CERES
3 CREAM (GRAM) TOPICAL
Status: DISCONTINUED | OUTPATIENT
Start: 2023-03-11 | End: 2023-03-18 | Stop reason: HOSPADM

## 2023-03-11 RX ORDER — ONDANSETRON 2 MG/ML
4 INJECTION INTRAMUSCULAR; INTRAVENOUS
Status: DISCONTINUED | OUTPATIENT
Start: 2023-03-11 | End: 2023-03-18 | Stop reason: HOSPADM

## 2023-03-11 RX ORDER — POLYETHYLENE GLYCOL 3350 17 G/17G
17 POWDER, FOR SOLUTION ORAL DAILY PRN
Status: DISCONTINUED | OUTPATIENT
Start: 2023-03-11 | End: 2023-03-18 | Stop reason: HOSPADM

## 2023-03-11 RX ORDER — ENOXAPARIN SODIUM 100 MG/ML
40 INJECTION SUBCUTANEOUS DAILY
Status: DISCONTINUED | OUTPATIENT
Start: 2023-03-12 | End: 2023-03-13

## 2023-03-11 RX ORDER — ONDANSETRON 4 MG/1
4 TABLET, ORALLY DISINTEGRATING ORAL
Status: DISCONTINUED | OUTPATIENT
Start: 2023-03-11 | End: 2023-03-18 | Stop reason: HOSPADM

## 2023-03-11 RX ORDER — SODIUM CHLORIDE 0.9 % (FLUSH) 0.9 %
5-40 SYRINGE (ML) INJECTION AS NEEDED
Status: DISCONTINUED | OUTPATIENT
Start: 2023-03-11 | End: 2023-03-18 | Stop reason: HOSPADM

## 2023-03-11 RX ORDER — MORPHINE SULFATE 2 MG/ML
1 INJECTION, SOLUTION INTRAMUSCULAR; INTRAVENOUS
Status: DISCONTINUED | OUTPATIENT
Start: 2023-03-11 | End: 2023-03-11

## 2023-03-11 RX ORDER — DEXTROSE MONOHYDRATE 100 MG/ML
0-250 INJECTION, SOLUTION INTRAVENOUS AS NEEDED
Status: DISCONTINUED | OUTPATIENT
Start: 2023-03-11 | End: 2023-03-13 | Stop reason: SDUPTHER

## 2023-03-11 RX ORDER — CLINDAMYCIN PHOSPHATE 600 MG/50ML
600 INJECTION, SOLUTION INTRAVENOUS
Status: COMPLETED | OUTPATIENT
Start: 2023-03-11 | End: 2023-03-12

## 2023-03-11 RX ORDER — ATORVASTATIN CALCIUM 40 MG/1
40 TABLET, FILM COATED ORAL DAILY
Status: DISCONTINUED | OUTPATIENT
Start: 2023-03-12 | End: 2023-03-18 | Stop reason: HOSPADM

## 2023-03-11 RX ORDER — ACETAMINOPHEN 325 MG/1
650 TABLET ORAL
Status: DISCONTINUED | OUTPATIENT
Start: 2023-03-11 | End: 2023-03-18 | Stop reason: HOSPADM

## 2023-03-11 RX ORDER — IBUPROFEN 200 MG
4 TABLET ORAL AS NEEDED
Status: DISCONTINUED | OUTPATIENT
Start: 2023-03-11 | End: 2023-03-13

## 2023-03-11 RX ORDER — BUMETANIDE 0.25 MG/ML
2 INJECTION INTRAMUSCULAR; INTRAVENOUS 2 TIMES DAILY
Status: DISCONTINUED | OUTPATIENT
Start: 2023-03-11 | End: 2023-03-18 | Stop reason: HOSPADM

## 2023-03-11 RX ORDER — ACETAMINOPHEN 650 MG/1
650 SUPPOSITORY RECTAL
Status: DISCONTINUED | OUTPATIENT
Start: 2023-03-11 | End: 2023-03-18 | Stop reason: HOSPADM

## 2023-03-11 RX ORDER — ONDANSETRON 2 MG/ML
4 INJECTION INTRAMUSCULAR; INTRAVENOUS
Status: COMPLETED | OUTPATIENT
Start: 2023-03-11 | End: 2023-03-11

## 2023-03-11 RX ORDER — INSULIN LISPRO 100 [IU]/ML
INJECTION, SOLUTION INTRAVENOUS; SUBCUTANEOUS
Status: DISCONTINUED | OUTPATIENT
Start: 2023-03-11 | End: 2023-03-13

## 2023-03-11 RX ORDER — MORPHINE SULFATE 4 MG/ML
4 INJECTION INTRAVENOUS
Status: DISCONTINUED | OUTPATIENT
Start: 2023-03-11 | End: 2023-03-18 | Stop reason: HOSPADM

## 2023-03-11 RX ORDER — SODIUM CHLORIDE 0.9 % (FLUSH) 0.9 %
5-40 SYRINGE (ML) INJECTION EVERY 8 HOURS
Status: DISCONTINUED | OUTPATIENT
Start: 2023-03-11 | End: 2023-03-18 | Stop reason: HOSPADM

## 2023-03-11 RX ORDER — MORPHINE SULFATE 2 MG/ML
2 INJECTION, SOLUTION INTRAMUSCULAR; INTRAVENOUS
Status: COMPLETED | OUTPATIENT
Start: 2023-03-11 | End: 2023-03-11

## 2023-03-11 RX ADMIN — MORPHINE SULFATE 2 MG: 2 INJECTION, SOLUTION INTRAMUSCULAR; INTRAVENOUS at 10:51

## 2023-03-11 RX ADMIN — HYDROCODONE BITARTRATE AND ACETAMINOPHEN 1 TABLET: 10; 325 TABLET ORAL at 18:19

## 2023-03-11 RX ADMIN — GABAPENTIN 100 MG: 100 CAPSULE ORAL at 21:41

## 2023-03-11 RX ADMIN — MEROPENEM 1 G: 1 INJECTION, POWDER, FOR SOLUTION INTRAVENOUS at 21:41

## 2023-03-11 RX ADMIN — GABAPENTIN 100 MG: 100 CAPSULE ORAL at 18:13

## 2023-03-11 RX ADMIN — VANCOMYCIN HYDROCHLORIDE 2500 MG: 10 INJECTION, POWDER, LYOPHILIZED, FOR SOLUTION INTRAVENOUS at 18:26

## 2023-03-11 RX ADMIN — MEROPENEM 1 G: 1 INJECTION, POWDER, FOR SOLUTION INTRAVENOUS at 14:02

## 2023-03-11 RX ADMIN — Medication 2 UNITS: at 18:13

## 2023-03-11 RX ADMIN — SODIUM CHLORIDE, PRESERVATIVE FREE 10 ML: 5 INJECTION INTRAVENOUS at 14:02

## 2023-03-11 RX ADMIN — VANCOMYCIN HYDROCHLORIDE 750 MG: 750 INJECTION, POWDER, LYOPHILIZED, FOR SOLUTION INTRAVENOUS at 21:42

## 2023-03-11 RX ADMIN — SODIUM CHLORIDE, PRESERVATIVE FREE 10 ML: 5 INJECTION INTRAVENOUS at 21:50

## 2023-03-11 RX ADMIN — ONDANSETRON 4 MG: 2 INJECTION INTRAMUSCULAR; INTRAVENOUS at 10:51

## 2023-03-11 RX ADMIN — BUMETANIDE 2 MG: 0.25 INJECTION INTRAMUSCULAR; INTRAVENOUS at 18:25

## 2023-03-11 RX ADMIN — CLINDAMYCIN PHOSPHATE 600 MG: 600 INJECTION, SOLUTION INTRAVENOUS at 11:57

## 2023-03-11 RX ADMIN — MORPHINE SULFATE 4 MG: 4 INJECTION INTRAVENOUS at 14:02

## 2023-03-11 NOTE — PROGRESS NOTES
Pharmacy Antimicrobial Kinetic Dosing    Indication for Antimicrobials: SSTI     Current Regimen of Each Antimicrobial:  Vancomycin 2.5 g X1, then 750 mg q 8 h; Start Date 3/11; Day # 1  Meropenem 1g q 8 H (3/11; day #1    Previous Antimicrobial Therapy:  Clindamycin 600 mg X1     Goal Level: AUC: 400-600 mg/hr/Liter/day    Date Dose & Interval Measured (mcg/mL) Predicted AUC/RICARDO                       Date & time of next level: 3/12 at 1300    Dosing calculator used: [email or web address] calculator    Significant Cultures:   3/11: Blood cx: pending    Conditions for Dosing Consideration: None    Labs:  Recent Labs     23  1022   CREA 0.79   BUN 14     Recent Labs     23  1022   WBC 4.6     Temp (24hrs), Av.1 °F (36.7 °C), Min:97.9 °F (36.6 °C), Max:98.2 °F (36.8 °C)    Creatinine Clearance (mL/min):   CrCl (Adjusted Body Weight): 125.9 mL/min   If actual weight < IBW: CrCl (Actual Body Weight) 176.9    Impression/Plan:   Vancomycin dosing for an estimated AUC of 478/15  Continue with the current dose of meropenem  Scr stable  Afebrile  WBC stable  Antimicrobial stop date 7 days for vancomycin      Pharmacy will follow daily and adjust medications as appropriate for renal function and/or serum levels.     Thank you,  Joanne Valerio, PHARMD

## 2023-03-11 NOTE — ED NOTES
eTRANSFER SBAR NOTE    IP UNIT CALLED NOTE IS READY: Yes Spoke to [address]    IF there are questions Call transferring nurse (your name) Aicha at phone # [phone]      SITUATION/BACKGROUND:    Patient is being transferred to [address], Room# [phone]    Patient's Chief Complaint on arrival to ED was wound check and is admitted for cellulitis. CODE STATUS: Full Code    VITAL SIGNS (MUST BE WITHIN 1 HOUR OF TRANSFER TO IP UNIT:    Visit Vitals  BP (!) 154/95   Pulse 94   Temp 97.9 °F (36.6 °C)   Resp 18   Ht 5' 6\" (1.676 m)   Wt 122.7 kg (270 lb 8.1 oz)   SpO2 96%   BMI 43.66 kg/m²         ISOLATION/PRECAUTIONS: No   ISOLATION TYPE: n/a    Called outstanding consults: Yes     Are there sign and held orders that need to be released? No   Are all STAT orders completed: Yes    STAT labs collected: Yes  REPEAT LACTIC ACID DUE? No  TIME DUE: n/a    Are there any titrating drips? No   If so what? N/a    The following personal items will be sent with the patient during transfer to the floor: All valuables:   ITEM:    ITEM:    ITEM:           ASSESSMENT:    CIWA Assessment: No  Last Score: n/a    NEURO:   NIH SCORE:        DIPTI SCREENING:          NEURO ASSESSMENT:        Is patient impulsive? No   Is patient oriented? Yes   Do they follow commands? Yes  Is the patient ambulatory? Yes Device need n/a    FALL RISK? No    Is the [place] 1 Assessment completed? Yes  INTERVENTIONS: n/a    INTEGUMENTARY:   IS THE PATIENT UNDRESSED? Yes  WOUNDS PRESENT? Yes On admit to ED Yes  ARE THE WOUNDS DOCUMENTED? Yes    RESPIRATORY:   Is patient on Oxygen? No    OXYGEN: Oxygen Therapy  O2 Device: None (Room air) (03/11/23 0930)    CARDIAC:   Is cardiac monitoring ordered? No  Last Rhythm: n/a  Patient to transfer with tele box on? No   Is patient using a LIFE VEST?  No     LINE ACCESS:   Peripheral IV 54/45/25 Left Basilic (Active)   Site Assessment Clean, dry, & intact 03/11/23 1025   Phlebitis Assessment 0 03/11/23 1025   Infiltration Assessment 0 23 1025   Dressing Status Clean, dry, & intact 23 1025   Dressing Type Transparent 23 1025   Hub Color/Line Status Green;Flushed 23 1025   Action Taken Blood drawn 23 1025        /GI:   CONTINENT BOWEL/BLADDER? Yes  URINARY OUTPUT: voiding   Written Order for Rosales Cath? No   CHRONIC OR ACUTE? N/a   If CHRONIC, is it 1days old, was it changed prior to specimen collection? N/a  WAS UA WITH REFLEX SENT TO LAB? N/a IF NO,  COLLECT AND SEND PRIOR TO TRANSPORT TO INPATIENT AREA    RESTRAINTS IN USE: No      IS DOCUMENTATION COMPLETE: n/a  Is there a current Order? N/a When does it ?  N/a    Additional details as Needed:

## 2023-03-11 NOTE — ED NOTES
Assumed care of pt at this time. Pt complaining of B/L leg swelling and weeping is noted. 1030: Xray at bedside. 1050: US attempted to [address] pt at this time. Pt states he needed to use restroom first. Will call [address] when pt is settled. Provided pt urinal at this time. 1105: [address] notified pt is settled, stated they will be over asap. 1136: US at bedside. 1230: Pt resting in ER stretcher. Pt remains on monitor x2 with call bell in reach.

## 2023-03-11 NOTE — ED PROVIDER NOTES
[facility] EMERGENCY DEPT  EMERGENCY DEPARTMENT ENCOUNTER       Pt Name: Dez Azevedo  MRN: [id number]  [place] 1965  Date of evaluation: 3/11/2023  Provider: Sarah Manuel MD   PCP: Duane Promise, MD  Note Started: 10:02 AM 3/11/23     CHIEF COMPLAINT       Chief Complaint   Patient presents with    Wound Check    Peripheral Edema     Has a lot of leg fluid weeping with the left lower leg worse than the right. He would like to have us bandage his legs back up. HISTORY OF PRESENT ILLNESS: 1 or more elements      History From: Patient, History limited by: none     Dez Has is a 62 y.o. male leg pain and drainage. Patient has a history of chronic venous stasis and ulcers. Was last seen by wound care February 22. He says in the last 2 weeks, he has developed increased drainage from the left leg greater than right. He also suffers with neuropathy, and has a 9 out of 10 pain in the left leg. He says that he takes gabapentin and Tylenol. Denies trauma, fever or chills. He denies chest pain, cough or shortness of breath. He has noted yellow drainage from his left leg. Please See MDM for Additional Details of the HPI/PMH  Nursing Notes were all reviewed and agreed with or any disagreements were addressed in the HPI. REVIEW OF SYSTEMS        Positives and Pertinent negatives as per HPI. PAST HISTORY     Past Medical History:  Past Medical History:   Diagnosis Date    Asthma     Chronic venous insufficiency     Diabetes ([address]     Lower leg edema     Prostate cancer ([address]     gets chemo at [facility]       Past Surgical History:  No past surgical history on file.     Family History:  Family History   Problem Relation Age of Onset    Stroke Mother         brain bleed    Stroke Father     Hypertension Father     Heart Attack Father     Hypertension Sister     Obesity Sister     Asthma Sister     Hypertension Sister     Hypertension Sister     No Known Problems Brother     No Known Problems Sister        Social History:  Social History     Tobacco Use    Smoking status: Former     Packs/day: 0.50     Years: 2.00     Pack years: 1.00     Types: Cigarettes     Quit date: 2013     Years since quitting: 10.1    Smokeless tobacco: Never    Tobacco comments:     quit > 8 years ago   Substance Use Topics    Alcohol use: Yes     Comment: 1-2 per day (wine or beer)    Drug use: No       Allergies:  No Known Allergies    CURRENT MEDICATIONS      Previous Medications    ATORVASTATIN (LIPITOR) 40 MG TABLET    Take 1 Tablet by mouth in the morning. For cholesterol    BLOOD-GLU METER,CONT-TRANSMIT MISC    1 Units by Not Applicable route two (2) times a day. BUMETANIDE (BUMEX) 2 MG TABLET    Take 1 Tablet by mouth daily. For fluid. Dc furosemide when starting this medicine. CHOLECALCIFEROL (VITAMIN D3) (50,000 UNITS /1250 MCG) CAPSULE    Take 1 Capsule by mouth every seven (7) days. Indications: low vitamin D levels    DOXYCYCLINE (ADOXA) 100 MG TABLET    Take 1 Tablet by mouth two (2) times a day. Indications: an infection of the skin and the tissue below the skin    FLASH GLUCOSE SCANNING READER (FREESTYLE NAHUN 14 DAY READER) MISC    1 Units by Does Not Apply route two (2) times a day. FLASH GLUCOSE SENSOR (FREESTYLE NAHUN 14 DAY SENSOR) KIT    1 Units by Does Not Apply route every fourteen (14) days. GABAPENTIN (NEURONTIN) 100 MG CAPSULE    TAKE 1 CAPSULE BY MOUTH THREE TIMES DAILY . DO NOT EXCEED 300MG  DAILY  FOR  LEG  PAIN    GLIMEPIRIDE (AMARYL) 4 MG TABLET    Take 1 Tablet by mouth every morning.  Indications: type 2 diabetes mellitus    GLUCOMETER,PRE-LOADED STRIPS (GLUCOSE METER, DISP & STRIPS)    Glucose meter, See Instructions, #: 1 EA, 0 Refill(s), Pharmacy: [id number]    IBUPROFEN (MOTRIN) 600 MG TABLET    TAKE 1 TABLET BY MOUTH EVERY 8 HOURS AS NEEDED FOR  LEG  PAIN    INSULIN GLARGINE (LANTUS) 100 UNIT/ML INJECTION    Take 25 Units by SubCUTAneous route in the morning. INSULIN SYRINGE-NEEDLE U-100 (BD INSULIN SYRINGE) 1 ML 25 GAUGE X 5/8\" SYRG    Insulin syringe 1 ml (100 units) 8mm needle, See Instructions, #: 100 EA, 11 Refill(s), Pharmacy: [id number]    LANCETS MISC    Lancets, See Instructions, #: 400 EA, 4 Refill(s), Pharmacy: [address] [facility]) 500 MG TG24 24 HOUR TABLET    Take 2 Tablets by mouth two (2) times a day. WHITE PETROLATUM-MINERAL OIL (EUCERIN) TOPICAL CREAM    Apply  to affected area as needed for Dry Skin. XTANDI 80 MG TAB    Take  by mouth two (2) times a day. SCREENINGS               No data recorded         PHYSICAL EXAM      ED Triage Vitals [03/11/23 0930]   ED Encounter Vitals Group      BP (!) 151/95      Pulse (Heart Rate) 94      Resp Rate 18      Temp 97.9 °F (36.6 °C)      Temp src       O2 Sat (%) 97 %      Weight 270 lb 8.1 oz      Height 5' 6\"        Physical Exam  Vitals and nursing note reviewed. Constitutional:       General: He is not in acute distress. Appearance: He is well-developed. HENT:      Head: Normocephalic and atraumatic. Cardiovascular:      Rate and Rhythm: Normal rate and regular rhythm. Heart sounds: Normal heart sounds. Comments: Pedal pulses obtained by Doppler  Pulmonary:      Effort: Pulmonary effort is normal.      Breath sounds: Normal breath sounds. Abdominal:      General: Bowel sounds are normal.      Palpations: Abdomen is soft. Musculoskeletal:      Cervical back: Normal range of motion. Right lower leg: Edema present. Left lower leg: Edema present. Comments: Evidence of yellow and orange drainage left calf greater than right calf, ulcers noted, tender to palpation   Skin:     General: Skin is warm and dry. Neurological:      General: No focal deficit present. Mental Status: He is alert and oriented to person, place, and time.       Coordination: Coordination normal.   Psychiatric: Mood and Affect: Mood normal.         Behavior: Behavior normal.        DIAGNOSTIC RESULTS   LABS:     Recent Results (from the past 12 hour(s))   CBC WITH AUTOMATED DIFF    Collection Time: 03/11/23 10:22 AM   Result Value Ref Range    WBC 4.6 4.1 - 11.1 K/uL    RBC 4.12 4.10 - 5.70 M/uL    HGB 10.8 (L) 12.1 - 17.0 g/dL    HCT 34.7 (L) 36.6 - 50.3 %    MCV 84.2 80.0 - 99.0 FL    MCH 26.2 26.0 - 34.0 PG    MCHC 31.1 30.0 - 36.5 g/dL    RDW 13.5 11.5 - 14.5 %    PLATELET 074 774 - 176 K/uL    MPV 11.2 8.9 - 12.9 FL    NRBC 0.0 0  WBC    ABSOLUTE NRBC 0.00 0.00 - 0.01 K/uL    NEUTROPHILS 71 32 - 75 %    LYMPHOCYTES 20 12 - 49 %    MONOCYTES 8 5 - 13 %    EOSINOPHILS 1 0 - 7 %    BASOPHILS 0 0 - 1 %    IMMATURE GRANULOCYTES 0 0.0 - 0.5 %    ABS. NEUTROPHILS 3.2 1.8 - 8.0 K/UL    ABS. LYMPHOCYTES 0.9 0.8 - 3.5 K/UL    ABS. MONOCYTES 0.4 0.0 - 1.0 K/UL    ABS. EOSINOPHILS 0.1 0.0 - 0.4 K/UL    ABS. BASOPHILS 0.0 0.0 - 0.1 K/UL    ABS. IMM. GRANS. 0.0 0.00 - 0.04 K/UL    DF AUTOMATED     METABOLIC PANEL, COMPREHENSIVE    Collection Time: 03/11/23 10:22 AM   Result Value Ref Range    Sodium 135 (L) 136 - 145 mmol/L    Potassium 4.9 3.5 - 5.1 mmol/L    Chloride 102 97 - 108 mmol/L    CO2 28 21 - 32 mmol/L    Anion gap 5 5 - 15 mmol/L    Glucose 285 (H) 65 - 100 mg/dL    BUN 14 6 - 20 MG/DL    Creatinine 0.79 0.70 - 1.30 MG/DL    BUN/Creatinine ratio 18 12 - 20      eGFR >60 >60 ml/min/1.73m2    Calcium 8.8 8.5 - 10.1 MG/DL    Bilirubin, total 0.3 0.2 - 1.0 MG/DL    ALT (SGPT) 14 12 - 78 U/L    AST (SGOT) 8 (L) 15 - 37 U/L    Alk.  phosphatase 101 45 - 117 U/L    Protein, total 7.6 6.4 - 8.2 g/dL    Albumin 3.0 (L) 3.5 - 5.0 g/dL    Globulin 4.6 (H) 2.0 - 4.0 g/dL    A-G Ratio 0.7 (L) 1.1 - 2.2     SAMPLES BEING HELD    Collection Time: 03/11/23 10:22 AM   Result Value Ref Range    SAMPLES BEING HELD DRK GRN     COMMENT        Add-on orders for these samples will be processed based on acceptable specimen integrity and analyte stability, which may vary by analyte. POC LACTIC ACID    Collection Time: 03/11/23 10:27 AM   Result Value Ref Range    Lactic Acid (POC) 0.88 0.40 - 2.00 mmol/L        EKG: If performed, independent interpretation documented below in the MDM section     RADIOLOGY:  Non-plain film images such as CT, Ultrasound and MRI are read by the radiologist. Plain radiographic images are visualized and preliminarily interpreted by the ED Provider with the findings documented in the MDM section. Interpretation per the Radiologist below, if available at the time of this note:     XR TIB/FIB LT    Result Date: 3/11/2023  INDICATION: Infection Exam: AP, lateral views of the left foot and tibia and fibula. FINDINGS: There is no acute fracture or dislocation. Visualized articulations are normal. Bones are well-mineralized. There is no plain radiographic evidence of osteomyelitis. The soft tissues are edematous. No radiodense foreign body is seen. No plain radiographic evidence of osteomyelitis. MR can be performed for further evaluation, as indicated. XR TIB/FIB RT    Result Date: 3/11/2023  Right tibia and fibula and right foot INDICATION: infection. HISTORY (per electronic medical record): The site of clinical concern is not provided. COMPARISON: None. FINDINGS: 2 views of the right tibia and fibula and 2 views of the right foot show diffuse soft tissue edema. No fracture or dislocation. No dedicated views of the right ankle were obtained. Soft tissue edema. XR FOOT LT MIN 3 V    Result Date: 3/11/2023  INDICATION: Infection Exam: AP, lateral views of the left foot and tibia and fibula. FINDINGS: There is no acute fracture or dislocation. Visualized articulations are normal. Bones are well-mineralized. There is no plain radiographic evidence of osteomyelitis. The soft tissues are edematous. No radiodense foreign body is seen. No plain radiographic evidence of osteomyelitis.  MR can be performed for further evaluation, as indicated. XR FOOT RT MIN 3 V    Result Date: 3/11/2023  Right tibia and fibula and right foot INDICATION: infection. HISTORY (per electronic medical record): The site of clinical concern is not provided. COMPARISON: None. FINDINGS: 2 views of the right tibia and fibula and 2 views of the right foot show diffuse soft tissue edema. No fracture or dislocation. No dedicated views of the right ankle were obtained. Soft tissue edema. PROCEDURES   Unless otherwise noted below, none  Procedures     CRITICAL CARE TIME   0    EMERGENCY DEPARTMENT COURSE and DIFFERENTIAL DIAGNOSIS/MDM   Vitals:    Vitals:    03/11/23 0930   BP: (!) 151/95   Pulse: 94   Resp: 18   Temp: 97.9 °F (36.6 °C)   SpO2: 97%   Weight: 122.7 kg (270 lb 8.1 oz)   Height: 5' 6\" (1.676 m)        Patient was given the following medications:  Medications   morphine injection 2 mg (has no administration in time range)   ondansetron (ZOFRAN) injection 4 mg (has no administration in time range)       Medical Decision Making  We will obtain lab work, ultrasound, x-rays    Amount and/or Complexity of Data Reviewed  Labs: ordered. Decision-making details documented in ED Course. Radiology: ordered. Decision-making details documented in ED Course. ECG/medicine tests: ordered. Risk  Prescription drug management. Decision regarding hospitalization. FINAL IMPRESSION     1. Cellulitis of lower extremity, unspecified laterality          DISPOSITION/PLAN   Basil Pappas's  results have been reviewed with him. He has been counseled regarding his diagnosis, treatment, and plan. He verbally conveys understanding and agreement of the signs, symptoms, diagnosis, treatment and prognosis and additionally agrees to follow up as discussed. He also agrees with the care-plan and conveys that all of his questions have been answered.   I have also provided discharge instructions for him that include: educational information regarding their diagnosis and treatment, and list of reasons why they would want to return to the ED prior to their follow-up appointment, should his condition change. CLINICAL IMPRESSION    Admit Note: Pt is being admitted by Dr. Belita Schaumann. The results of their tests and reason(s) for their admission have been discussed with pt and/or available family. They convey agreement and understanding for the need to be admitted and for the admission diagnosis. PATIENT REFERRED TO:  Follow-up Information    None           DISCHARGE MEDICATIONS:  Current Discharge Medication List            DISCONTINUED MEDICATIONS:  Current Discharge Medication List          I am the Primary Clinician of Record. China Carrillo MD (electronically signed)    (Please note that parts of this dictation were completed with voice recognition software. Quite often unanticipated grammatical, syntax, homophones, and other interpretive errors are inadvertently transcribed by the computer software. Please disregards these errors.  Please excuse any errors that have escaped final proofreading.)

## 2023-03-11 NOTE — H&P
Hospitalist Admission Note    NAME: Citlaly Murry   :  1965   MRN:  [id number]     Date/Time:  3/11/2023 1:41 PM    Patient PCP: Ashwini Chandler MD  ______________________________________________________________________  Given the patient's current clinical presentation, I have a high level of concern for decompensation if discharged from the emergency department. Complex decision making was performed, which includes reviewing the patient's available past medical records, laboratory results, and x-ray films. My assessment of this patient's clinical condition and my plan of care is as follows. Assessment / Plan:    Worsening bilateral lower extremity swelling:  Worsening chronic venous stasis ulcers:  -Doppler ultrasound ruled out deep vein thrombosis. -X-ray bilateral lower extremities showed soft tissue edema.  -We will start the patient on Bumex 2 mg twice daily IV. -We will start the patient on vancomycin and meropenem.  -Patient does not appear to be in sepsis. Lower extremity swelling clinically does not appear to be related to heart failure. Previous echocardiogram reviewed. No need to repeat echo.  -Wound care consulted. Diabetes mellitus type 2:  -Patient states that he is allergic to metformin.  -Hold glimepiride.  -Started the patient on Lantus 25 units daily and sliding scale insulin. Diabetic neuropathy:  -Started the patient on gabapentin. Hyponatremia:  -Mild. Continue to monitor. Prostate cancer:  -Outpatient follow-up. Severe obesity:  -BMI 73.55.  -Complicates overall care. -Will encourage weight loss.     Code Status: Full  Surrogate Decision Maker:    DVT Prophylaxis: Lovenox  GI Prophylaxis: not indicated    Baseline: Functional and independent       Subjective:   CHIEF COMPLAINT: LE swelling and wpunds    HISTORY OF PRESENT ILLNESS:     He is a 63-year-old gentleman with past medical history significant for diabetes mellitus type 2, prostate cancer, hyperlipidemia, chronic venous stasis presented to the hospital complaining of worsening lower extremity swelling, newly developing ulcers with purulent secretion for 2 weeks prior to admission. Patient denies chest pain, shortness of breath, cough, fever, sputum production, tingling, numbness, weakness, burning micturition, constipation, diarrhea. In the ER, vital signs significant for mildly elevated blood pressure. Labs suggestive of blood glucose 285, sodium 135 with no other significant abnormalities. Blood cultures ordered. Doppler ultrasound of the lower extremities showed no acute process. X-ray of bilateral lower extremity showed soft tissue edema with no radiographic evidence of osteomyelitis. We were asked to admit for work up and evaluation of the above problems. Past Medical History:   Diagnosis Date    Asthma     Chronic venous insufficiency     Diabetes ([facility] [address]     Lower leg edema     Prostate cancer ([facility] [address]     [place] [facility]        No past surgical history on file. Social History     Tobacco Use    Smoking status: Former     Packs/day: 0.50     Years: 2.00     Pack years: 1.00     Types: Cigarettes     Quit date: 2013     Years since quitting: 10.1    Smokeless tobacco: Never    Tobacco comments:     quit > 8 years ago   Substance Use Topics    Alcohol use: Yes     Comment: 1-2 per day (wine or beer)        Family History   Problem Relation Age of Onset    Stroke Mother         brain bleed    Stroke Father     Hypertension Father     Heart Attack Father     Hypertension Sister     Obesity Sister     Asthma Sister     Hypertension Sister     Hypertension Sister     No Known Problems Brother     No Known Problems Sister      No Known Allergies     Prior to Admission medications    Medication Sig Start Date End Date Taking?  Authorizing Provider   ibuprofen (MOTRIN) 600 mg tablet TAKE 1 TABLET BY MOUTH EVERY 8 HOURS AS NEEDED FOR  LEG  PAIN 1/11/23  Yes Adiel Barron, Efrain Pink MD   gabapentin (NEURONTIN) 100 mg capsule TAKE 1 CAPSULE BY MOUTH THREE TIMES DAILY . DO NOT EXCEED 300MG  DAILY  FOR  LEG  PAIN 1/11/23  Yes Erich Campuzano MD   bumetanide (BUMEX) 2 mg tablet Take 1 Tablet by mouth daily. For fluid. Dc furosemide when starting this medicine. 1/9/23  Yes Erich Campuzano MD   atorvastatin (LIPITOR) 40 mg tablet Take 1 Tablet by mouth in the morning. For cholesterol 7/20/22  Yes Erich Campuzano MD   Insulin Syringe-Needle U-100 (BD Insulin Syringe) 1 mL 25 gauge x 5/8\" syrg Insulin syringe 1 ml (100 units) 8mm needle, See Instructions, #: 100 EA, 11 Refill(s), Pharmacy: [id number] 7/20/22  Yes Erich Campuzano MD   insulin glargine (LANTUS) 100 unit/mL injection Take 25 Units by SubCUTAneous route in the morning. Patient taking differently: 30 Units by SubCUTAneous route daily. 7/20/22  Yes Erich Campuzano MD   Blood-Glu Meter,Cont-Transmit misc 1 Units by Not Applicable route two (2) times a day. 8/11/21  Yes Provider, Historical   glimepiride (AMARYL) 4 mg tablet Take 1 Tablet by mouth every morning. Indications: type 2 diabetes mellitus 11/5/21  Yes Erich Campuzano MD   cholecalciferol (VITAMIN D3) (50,000 UNITS /1250 MCG) capsule Take 1 Capsule by mouth every seven (7) days. Indications: low vitamin D levels 8/12/21  Yes Vero HERNADEZ NP   flash glucose scanning reader (FreeStyle Christian 14 Day [place]) misc 1 Units by Does Not Apply route two (2) times a day. 8/11/21  Yes Vero HERNADEZ NP   flash glucose sensor (FreeStyle Christian 14 Day Sensor) kit 1 Units by Does Not Apply route every fourteen (14) days.  8/11/21  Yes Vero HERNADEZ NP   glucometer,pre-loaded strips (GLUCOSE METER, DISP & STRIPS) Glucose meter, See Instructions, #: 1 EA, 0 Refill(s), Pharmacy: [id number] 2/24/21  Yes Provider, Historical   lancets misc Lancets, See Instructions, #: 400 EA, 4 Refill(s), Pharmacy: [id number] 2/24/21  Yes Provider, Historical   white petrolatum-mineral oiL (EUCERIN) topical cream Apply  to affected area as needed for Dry Skin. 5/23/22   Nissa Gar MD   Xtandi 80 mg tab Take  by mouth two (2) times a day. 7/28/21   Provider, Historical       REVIEW OF SYSTEMS:       Total of 12 systems reviewed as follows:       POSITIVE= underlined text  Negative = text not underlined  General:  fever, chills, sweats, generalized weakness, weight loss/gain,      loss of appetite   Eyes:    blurred vision, eye pain, loss of vision, double vision  ENT:    rhinorrhea, pharyngitis   Respiratory:   cough, sputum production, SOB, HOFF, wheezing, pleuritic pain   Cardiology:   chest pain, palpitations, orthopnea, PND, edema, syncope   Gastrointestinal:  abdominal pain , N/V, diarrhea, dysphagia, constipation, bleeding   Genitourinary:  frequency, urgency, dysuria, hematuria, incontinence   Muskuloskeletal :  arthralgia, myalgia, back pain  Hematology:  easy bruising, nose or gum bleeding, lymphadenopathy   Dermatological: rash, ulceration, pruritis, color change / jaundice  Endocrine:   hot flashes or polydipsia   Neurological:  headache, dizziness, confusion, focal weakness, paresthesia,     Speech difficulties, memory loss, gait difficulty  Psychological: Feelings of anxiety, depression, agitation    Bilateral lower extremity worsening swelling, newly formed ulceration on bilateral legs and significant pain. Objective:   VITALS:    Visit Vitals  BP (!) 154/95   Pulse 91   Temp 98.2 °F (36.8 °C)   Resp 18   Ht 5' 6\" (1.676 m)   Wt 122.7 kg (270 lb 8.1 oz)   SpO2 96%   BMI 43.66 kg/m²       PHYSICAL EXAM:    General:    Alert, cooperative, no distress, appears stated age. HEENT: Atraumatic, anicteric sclerae, pink conjunctivae     No oral ulcers, mucosa moist, throat clear, dentition fair  Neck:  Supple, symmetrical,  thyroid: non tender  Lungs:   Clear to auscultation bilaterally. No Wheezing or Rhonchi.  No rales.  Chest wall:  No tenderness  No Accessory muscle use. Heart:   Regular  rhythm,  No  murmur   No edema  Abdomen:   Soft, non-tender. Not distended. Bowel sounds normal  Extremities: 3+ edema of bilateral lower extremities with significant swelling and ulcerations. Skin:     Not pale. Not Jaundiced  No rashes   Psych:  Good insight. Not depressed. Not anxious or agitated. Neurologic: EOMs intact. No facial asymmetry. No aphasia or slurred speech. Symmetrical strength, Sensation grossly intact. Alert and oriented X 4. Care Plan discussed with:    Comments   Patient Y    Family      RN Y    Care Manager                    Consultant:      _______________________________________________________________________  Expected  Disposition:   Home with Family Y   HH/PT/OT/RN    SNF/LTC    KAMINI      I spent a total of 75 minutes on the day of the visit. This includes face to face time and non-face to face time preparing to see the patient such as review of tests, obtaining and/or reviewing separately obtained history, documenting clinical information in the electronic or other health record, independently interpreting results and communication results to the patient/family/caregiver, or care coordinator. Signed: Lashonda Samaniego MD    Procedures: see electronic medical records for all procedures/Xrays and details which were not copied into this note but were reviewed prior to creation of Plan.     LAB DATA REVIEWED:    Recent Results (from the past 24 hour(s))   CBC WITH AUTOMATED DIFF    Collection Time: 03/11/23 10:22 AM   Result Value Ref Range    WBC 4.6 4.1 - 11.1 K/uL    RBC 4.12 4.10 - 5.70 M/uL    HGB 10.8 (L) 12.1 - 17.0 g/dL    HCT 34.7 (L) 36.6 - 50.3 %    MCV 84.2 80.0 - 99.0 FL    MCH 26.2 26.0 - 34.0 PG    MCHC 31.1 30.0 - 36.5 g/dL    RDW 13.5 11.5 - 14.5 %    PLATELET 594 209 - 803 K/uL    MPV 11.2 8.9 - 12.9 FL    NRBC 0.0 0  WBC    ABSOLUTE NRBC 0.00 0.00 - 0.01 K/uL NEUTROPHILS 71 32 - 75 %    LYMPHOCYTES 20 12 - 49 %    MONOCYTES 8 5 - 13 %    EOSINOPHILS 1 0 - 7 %    BASOPHILS 0 0 - 1 %    IMMATURE GRANULOCYTES 0 0.0 - 0.5 %    ABS. NEUTROPHILS 3.2 1.8 - 8.0 K/UL    ABS. LYMPHOCYTES 0.9 0.8 - 3.5 K/UL    ABS. MONOCYTES 0.4 0.0 - 1.0 K/UL    ABS. EOSINOPHILS 0.1 0.0 - 0.4 K/UL    ABS. BASOPHILS 0.0 0.0 - 0.1 K/UL    ABS. IMM. GRANS. 0.0 0.00 - 0.04 K/UL    DF AUTOMATED     METABOLIC PANEL, COMPREHENSIVE    Collection Time: 03/11/23 10:22 AM   Result Value Ref Range    Sodium 135 (L) 136 - 145 mmol/L    Potassium 4.9 3.5 - 5.1 mmol/L    Chloride 102 97 - 108 mmol/L    CO2 28 21 - 32 mmol/L    Anion gap 5 5 - 15 mmol/L    Glucose 285 (H) 65 - 100 mg/dL    BUN 14 6 - 20 MG/DL    Creatinine 0.79 0.70 - 1.30 MG/DL    BUN/Creatinine ratio 18 12 - 20      eGFR >60 >60 ml/min/1.73m2    Calcium 8.8 8.5 - 10.1 MG/DL    Bilirubin, total 0.3 0.2 - 1.0 MG/DL    ALT (SGPT) 14 12 - 78 U/L    AST (SGOT) 8 (L) 15 - 37 U/L    Alk. phosphatase 101 45 - 117 U/L    Protein, total 7.6 6.4 - 8.2 g/dL    Albumin 3.0 (L) 3.5 - 5.0 g/dL    Globulin 4.6 (H) 2.0 - 4.0 g/dL    A-G Ratio 0.7 (L) 1.1 - 2.2     SAMPLES BEING HELD    Collection Time: 03/11/23 10:22 AM   Result Value Ref Range    SAMPLES BEING HELD DRK GRN     COMMENT        Add-on orders for these samples will be processed based on acceptable specimen integrity and analyte stability, which may vary by analyte.    POC LACTIC ACID    Collection Time: 03/11/23 10:27 AM   Result Value Ref Range    Lactic Acid (POC) 0.88 0.40 - 2.00 mmol/L

## 2023-03-12 LAB
ALBUMIN SERPL-MCNC: 2.5 G/DL (ref 3.5–5)
ALBUMIN/GLOB SERPL: 0.6 (ref 1.1–2.2)
ALP SERPL-CCNC: 89 U/L (ref 45–117)
ALT SERPL-CCNC: 13 U/L (ref 12–78)
ANION GAP SERPL CALC-SCNC: 2 MMOL/L (ref 5–15)
AST SERPL-CCNC: 10 U/L (ref 15–37)
BILIRUB SERPL-MCNC: 0.4 MG/DL (ref 0.2–1)
BUN SERPL-MCNC: 12 MG/DL (ref 6–20)
BUN/CREAT SERPL: 12 (ref 12–20)
CALCIUM SERPL-MCNC: 8.4 MG/DL (ref 8.5–10.1)
CHLORIDE SERPL-SCNC: 103 MMOL/L (ref 97–108)
CO2 SERPL-SCNC: 29 MMOL/L (ref 21–32)
CREAT SERPL-MCNC: 0.98 MG/DL (ref 0.7–1.3)
ERYTHROCYTE [DISTWIDTH] IN BLOOD BY AUTOMATED COUNT: 13.7 % (ref 11.5–14.5)
EST. AVERAGE GLUCOSE BLD GHB EST-MCNC: 349 MG/DL
GLOBULIN SER CALC-MCNC: 4.5 G/DL (ref 2–4)
GLUCOSE BLD STRIP.AUTO-MCNC: 189 MG/DL (ref 65–117)
GLUCOSE BLD STRIP.AUTO-MCNC: 231 MG/DL (ref 65–117)
GLUCOSE BLD STRIP.AUTO-MCNC: 240 MG/DL (ref 65–117)
GLUCOSE BLD STRIP.AUTO-MCNC: 257 MG/DL (ref 65–117)
GLUCOSE SERPL-MCNC: 276 MG/DL (ref 65–100)
HBA1C MFR BLD: 13.8 % (ref 4–5.6)
HCT VFR BLD AUTO: 34.5 % (ref 36.6–50.3)
HGB BLD-MCNC: 10.6 G/DL (ref 12.1–17)
MCH RBC QN AUTO: 26.1 PG (ref 26–34)
MCHC RBC AUTO-ENTMCNC: 30.7 G/DL (ref 30–36.5)
MCV RBC AUTO: 85 FL (ref 80–99)
NRBC # BLD: 0 K/UL (ref 0–0.01)
NRBC BLD-RTO: 0 PER 100 WBC
PLATELET # BLD AUTO: 191 K/UL (ref 150–400)
PMV BLD AUTO: 11.1 FL (ref 8.9–12.9)
POTASSIUM SERPL-SCNC: 4.8 MMOL/L (ref 3.5–5.1)
PROT SERPL-MCNC: 7 G/DL (ref 6.4–8.2)
RBC # BLD AUTO: 4.06 M/UL (ref 4.1–5.7)
SERVICE CMNT-IMP: ABNORMAL
SODIUM SERPL-SCNC: 134 MMOL/L (ref 136–145)
VANCOMYCIN SERPL-MCNC: 16.1 UG/ML
WBC # BLD AUTO: 4.8 K/UL (ref 4.1–11.1)

## 2023-03-12 PROCEDURE — 83036 HEMOGLOBIN GLYCOSYLATED A1C: CPT

## 2023-03-12 PROCEDURE — 74011636637 HC RX REV CODE- 636/637: Performed by: STUDENT IN AN ORGANIZED HEALTH CARE EDUCATION/TRAINING PROGRAM

## 2023-03-12 PROCEDURE — 85027 COMPLETE CBC AUTOMATED: CPT

## 2023-03-12 PROCEDURE — 65270000029 HC RM PRIVATE

## 2023-03-12 PROCEDURE — 82962 GLUCOSE BLOOD TEST: CPT

## 2023-03-12 PROCEDURE — 36415 COLL VENOUS BLD VENIPUNCTURE: CPT

## 2023-03-12 PROCEDURE — 74011000250 HC RX REV CODE- 250: Performed by: STUDENT IN AN ORGANIZED HEALTH CARE EDUCATION/TRAINING PROGRAM

## 2023-03-12 PROCEDURE — 74011000258 HC RX REV CODE- 258: Performed by: STUDENT IN AN ORGANIZED HEALTH CARE EDUCATION/TRAINING PROGRAM

## 2023-03-12 PROCEDURE — 74011250636 HC RX REV CODE- 250/636: Performed by: STUDENT IN AN ORGANIZED HEALTH CARE EDUCATION/TRAINING PROGRAM

## 2023-03-12 PROCEDURE — 80202 ASSAY OF VANCOMYCIN: CPT

## 2023-03-12 PROCEDURE — 80053 COMPREHEN METABOLIC PANEL: CPT

## 2023-03-12 PROCEDURE — 74011250636 HC RX REV CODE- 250/636: Performed by: FAMILY MEDICINE

## 2023-03-12 PROCEDURE — 74011250637 HC RX REV CODE- 250/637: Performed by: STUDENT IN AN ORGANIZED HEALTH CARE EDUCATION/TRAINING PROGRAM

## 2023-03-12 RX ADMIN — MEROPENEM 1 G: 1 INJECTION, POWDER, FOR SOLUTION INTRAVENOUS at 16:02

## 2023-03-12 RX ADMIN — BUMETANIDE 2 MG: 0.25 INJECTION INTRAMUSCULAR; INTRAVENOUS at 18:29

## 2023-03-12 RX ADMIN — GABAPENTIN 100 MG: 100 CAPSULE ORAL at 09:00

## 2023-03-12 RX ADMIN — VANCOMYCIN HYDROCHLORIDE 1000 MG: 1 INJECTION, POWDER, LYOPHILIZED, FOR SOLUTION INTRAVENOUS at 16:04

## 2023-03-12 RX ADMIN — MORPHINE SULFATE 4 MG: 4 INJECTION INTRAVENOUS at 21:59

## 2023-03-12 RX ADMIN — Medication 3 UNITS: at 09:00

## 2023-03-12 RX ADMIN — VANCOMYCIN HYDROCHLORIDE 750 MG: 750 INJECTION, POWDER, LYOPHILIZED, FOR SOLUTION INTRAVENOUS at 06:13

## 2023-03-12 RX ADMIN — ENOXAPARIN SODIUM 40 MG: 100 INJECTION SUBCUTANEOUS at 09:00

## 2023-03-12 RX ADMIN — MEROPENEM 1 G: 1 INJECTION, POWDER, FOR SOLUTION INTRAVENOUS at 06:14

## 2023-03-12 RX ADMIN — BUMETANIDE 2 MG: 0.25 INJECTION INTRAMUSCULAR; INTRAVENOUS at 09:00

## 2023-03-12 RX ADMIN — SODIUM CHLORIDE, PRESERVATIVE FREE 10 ML: 5 INJECTION INTRAVENOUS at 06:13

## 2023-03-12 RX ADMIN — MEROPENEM 1 G: 1 INJECTION, POWDER, FOR SOLUTION INTRAVENOUS at 21:59

## 2023-03-12 RX ADMIN — GABAPENTIN 100 MG: 100 CAPSULE ORAL at 18:29

## 2023-03-12 RX ADMIN — HYDROCODONE BITARTRATE AND ACETAMINOPHEN 1 TABLET: 10; 325 TABLET ORAL at 10:51

## 2023-03-12 RX ADMIN — GABAPENTIN 100 MG: 100 CAPSULE ORAL at 21:59

## 2023-03-12 RX ADMIN — Medication 5 UNITS: at 12:50

## 2023-03-12 RX ADMIN — ATORVASTATIN CALCIUM 40 MG: 40 TABLET, FILM COATED ORAL at 09:00

## 2023-03-12 RX ADMIN — Medication 2 UNITS: at 18:29

## 2023-03-12 RX ADMIN — INSULIN GLARGINE 25 UNITS: 100 INJECTION, SOLUTION SUBCUTANEOUS at 09:00

## 2023-03-12 RX ADMIN — SODIUM CHLORIDE, PRESERVATIVE FREE 10 ML: 5 INJECTION INTRAVENOUS at 21:59

## 2023-03-12 RX ADMIN — HYDROCODONE BITARTRATE AND ACETAMINOPHEN 1 TABLET: 10; 325 TABLET ORAL at 18:44

## 2023-03-12 RX ADMIN — SODIUM CHLORIDE, PRESERVATIVE FREE 10 ML: 5 INJECTION INTRAVENOUS at 16:05

## 2023-03-12 NOTE — PROGRESS NOTES
Spiritual Care Assessment/Progress Note  [facility]      NAME: Elsy Patterson      MRN: [id number]  AGE: 62 y.o.  SEX: male  Yazdanism Affiliation: Other   Language: English     3/12/2023     Total Time (in minutes): 47     Spiritual Assessment begun in [facility] 3 MED TELEMETRY through conversation with:         [x]Patient        [] Family    [] Friend(s)        Reason for Consult: Initial/Spiritual assessment, patient floor     Spiritual beliefs: (Please include comment if needed)     [x] Identifies with a jory tradition:         [] Supported by a jory community:            [] Claims no spiritual orientation:           [] Seeking spiritual identity:                [] Adheres to an individual form of spirituality:           [] Not able to assess:                           Identified resources for coping:      [] Prayer                               [] Music                  [] Guided Imagery     [] Family/friends                 [] Pet visits     [] Devotional reading                         [x] Unknown     [] Other:                                                Interventions offered during this visit: (See comments for more details)    Patient Interventions: Affirmation of emotions/emotional suffering, Coping skills reviewed/reinforced, Initial/Spiritual assessment, patient floor, Life review/legacy, Prayer (actual), Yazdanism beliefs/image of God discussed, Affirmation of jory, Catharsis/review of pertinent events in supportive environment, Iconic (affirming the presence of God/Higher Power)           Plan of Care:     [] Support spiritual and/or cultural needs    [] Support AMD and/or advance care planning process      [] Support grieving process   [] Coordinate Rites and/or Rituals    [] Coordination with community clergy   [] No spiritual needs identified at this time   [] Detailed Plan of Care below (See Comments)  [] Make referral to Music Therapy  [] Make referral to Pet Therapy [] Make referral to Addiction services  [] Make referral to [facility]  [] Make referral to Spiritual Care Partner  [] No future visits requested        [x] Contact Spiritual Care for further referrals     Comments:  Patient's nurse called to request for spiritual/emotional support for patient who seemed distressed.  offered supportive presence and active listening as patient shared his stressors and concerns about his medical condition. He stated repeatedly that he was lonely and no one cares about him. He has four sisters and lives with one of them, but everyone minds their own business. He has no relation with his only brother, they haven't spoken for years. His health is deteriorating and that makes him fear loosing his job. He would love to do fun thing and also go to to [employer], but he has no friends and no car. He stated he is always by himself and depressed and is getting tired with living.  affirmed thoughts and feelings, explored coping resources, spoke calm words to redirect patient towards hope, encourage talking with case manage to be connected with coping resources.  consulted with patient's nurse after the visit and she indicated she would consult physician for additional assistance. Advised nurse to contact [facility] for any further referrals.        Visited by: Josseline romero: [phone]

## 2023-03-12 NOTE — PROGRESS NOTES
Patient feeling very depressed and alone. Paged  to speak with patient. Patient needs are out of range for . Patient may need psych consult and spoke with him about this. Patient has no thoughts of harming himself. He states he is tired of life right now and feels as if he has nobody to talk to. Spoke with patient about his thoughts and he feels reassured, but still depressed. Will pass on to oncoming shifts about seeking further medical treatment for depression. Read  note.

## 2023-03-12 NOTE — PROGRESS NOTES
INTERNAL MEDICINE ATTENDING NOTE     Patient Name: Carroll Parker   : 1965   Admit: 3/11/2023    ASSESSMENT / PLAN    Cellulitis (3/11/2023), worsening chronic venous stasis ulcers: On empiric vanc and meropenem, d 2. Wound care. Worsening edema bilateral LE: Bumex. DM 2: Glimepiride held; lantus + SSI. DM neuropathy: Gabapentin started. Hyponatremia: Monitor BMP. Prostate Cancer: Continue outpatient follow up with urology. Morbid obesity  Full code. Noble Ha MD, FACP  Best contact is via Pager: [phone], or via hospital  at [phone]. Do NOT use Perfect Serve. SUBJECTIVE:   Mr. Carroll Parker is a patient of Hilton Maldonado MD and was seen by me today during rounds. At this time, he is resting comfortably. He has severe pain in legs. ROS otherwise unremarkable except as noted elsewhere. OBJECTIVE:   Blood pressure (!) 143/80, pulse 96, temperature 98.6 °F (37 °C), resp. rate 18, height 5' 6\" (1.676 m), weight 270 lb 8.1 oz (122.7 kg), SpO2 94 %. Gen: Patient is in no acute distress. Lungs: CTAB. Heart: RRR. Abd: S, NT, ND, BS present. Exremities: Warm.      Recent Results (from the past 12 hour(s))   GLUCOSE, POC    Collection Time: 23  9:40 PM   Result Value Ref Range    Glucose (POC) 280 (H) 65 - 117 mg/dL    Performed by Neema Chowdary (JHONNY RN)    CBC W/O DIFF    Collection Time: 23  5:59 AM   Result Value Ref Range    WBC 4.8 4.1 - 11.1 K/uL    RBC 4.06 (L) 4.10 - 5.70 M/uL    HGB 10.6 (L) 12.1 - 17.0 g/dL    HCT 34.5 (L) 36.6 - 50.3 %    MCV 85.0 80.0 - 99.0 FL    MCH 26.1 26.0 - 34.0 PG    MCHC 30.7 30.0 - 36.5 g/dL    RDW 13.7 11.5 - 14.5 %    PLATELET 751 600 - 538 K/uL    MPV 11.1 8.9 - 12.9 FL    NRBC 0.0 0  WBC    ABSOLUTE NRBC 0.00 0.00 - 6.01 K/uL   METABOLIC PANEL, COMPREHENSIVE    Collection Time: 23  5:59 AM   Result Value Ref Range    Sodium 134 (L) 136 - 145 mmol/L    Potassium 4.8 3.5 - 5.1 mmol/L    Chloride 103 97 - 108 mmol/L    CO2 29 21 - 32 mmol/L    Anion gap 2 (L) 5 - 15 mmol/L    Glucose 276 (H) 65 - 100 mg/dL    BUN 12 6 - 20 MG/DL    Creatinine 0.98 0.70 - 1.30 MG/DL    BUN/Creatinine ratio 12 12 - 20      eGFR >60 >60 ml/min/1.73m2    Calcium 8.4 (L) 8.5 - 10.1 MG/DL    Bilirubin, total 0.4 0.2 - 1.0 MG/DL    ALT (SGPT) 13 12 - 78 U/L    AST (SGOT) 10 (L) 15 - 37 U/L    Alk. phosphatase 89 45 - 117 U/L    Protein, total 7.0 6.4 - 8.2 g/dL    Albumin 2.5 (L) 3.5 - 5.0 g/dL    Globulin 4.5 (H) 2.0 - 4.0 g/dL    A-G Ratio 0.6 (L) 1.1 - 2.2     GLUCOSE, POC    Collection Time: 03/12/23  7:15 AM   Result Value Ref Range    Glucose (POC) 240 (H) 65 - 117 mg/dL    Performed by Tierra Varma MD, FACP  Best contact is via Pager: [phone], or via hospital  at [phone]. Do NOT use Perfect Serve.

## 2023-03-12 NOTE — PROGRESS NOTES
End of Shift Note    Bedside shift change report given to [address] [place] [address] (oncoming nurse) by Gavin Justin LPN (offgoing nurse). Report included the following information SBAR, Kardex, and MAR    Shift worked:  7 a to 7p     Shift summary and any significant changes:     Read previous note. Concerns for physician to address:  Rosa consult?      Zone phone for oncoming shift:   [phone]       Activity:  Activity Level: Up ad amanda  Number times ambulated in hallways past shift: 0  Number of times OOB to chair past shift: 5    Cardiac:   Cardiac Monitoring: No           Access:  Current line(s): PIV     Genitourinary:   Urinary status: voiding    Respiratory:   O2 Device: None (Room air)  Chronic home O2 use?: NO  Incentive spirometer at bedside: NO       GI:  Last Bowel Movement Date: 03/11/23  Current diet:  ADULT DIET Regular  Passing flatus: YES  Tolerating current diet: YES       Pain Management:   Patient states pain is manageable on current regimen: NO    Skin:  Wesley Score: 23  Interventions: increase time out of bed    Patient Safety:  Fall Score:    Interventions: gripper socks and pt to call before getting OOB       Length of Stay:  Expected LOS: - - -  [facility]

## 2023-03-12 NOTE — PROGRESS NOTES
Transition of Care Plan:  11%  RUR:  Disposition:Home  If SNF or IPR: Date FOC offered:  Date FOC received:  Date authorization started with reference number:  Date authorization received and expires:  Accepting facility:  Follow up appointments:  DME needed:n/a  Transportation at Discharge:will need a ride  [address] or means to access home:        IM Medicare Letter:2nd IM needed  Is patient a  and connected with the [facility]? If yes, was [employer] transfer form completed and [facility] notified? Caregiver Contact:sister, Lonzie Hamman [phone]  Discharge Caregiver contacted prior to discharge?    Care Conference needed?:

## 2023-03-12 NOTE — PROGRESS NOTES
Bedside shift change report given to [address] (oncoming nurse) by Manish Bryan LPN (offgoing nurse). Report included the following information SBAR, Kardex, and MAR.

## 2023-03-12 NOTE — PROGRESS NOTES
No acute event on shift. Pt alert and oriented x4, Reviewed plan of care. Fall /safety measures in place.

## 2023-03-12 NOTE — PROGRESS NOTES
Pharmacy Antimicrobial Kinetic Dosing    Indication for Antimicrobials: SSTI     Current Regimen of Each Antimicrobial:  Vancomycin 2.5 g X1, then 750 mg q 8 h; Start Date 3/11; Day # 2  Meropenem 1g q 8 H (3/11; day #2    Previous Antimicrobial Therapy:  Clindamycin 600 mg X1     Goal Level: AUC: 400-600 mg/hr/Liter/day    Date Dose & Interval Measured (mcg/mL) Predicted AUC/RICARDO   3/12 1314 2.5 gX1, then 750 mg q 8 H 16.1 393/10                 Date & time of next level: TBD    Dosing calculator used: [place] calcSagence    Significant Cultures:   3/11: Blood cx: pending    Conditions for Dosing Consideration: None    Labs:  Recent Labs     23  0559 23  1022   CREA 0.98 0.79   BUN 12 14     Recent Labs     23  0559 23  1022   WBC 4.8 4.6     Temp (24hrs), Av.1 °F (36.7 °C), Min:97.5 °F (36.4 °C), Max:98.6 °F (37 °C)    Creatinine Clearance (mL/min):   CrCl (Adjusted Body Weight): 101.5 mL/min   If actual weight < IBW: CrCl (Actual Body Weight) 142.6    Impression/Plan:   Vancomycin dose will be adjusted to 1000 mg q 8 hours for an estimated AUC of 524/13.7  Continue with the current dose of meropenem  Scr stable  Afebrile  WBC stable  Antimicrobial stop date 7 days for vancomycin      Pharmacy will follow daily and adjust medications as appropriate for renal function and/or serum levels.     Thank you,  Randy Forbes, PHARMD

## 2023-03-13 LAB
ALBUMIN SERPL-MCNC: 2.6 G/DL (ref 3.5–5)
ALBUMIN/GLOB SERPL: 0.7 (ref 1.1–2.2)
ALP SERPL-CCNC: 91 U/L (ref 45–117)
ALT SERPL-CCNC: 12 U/L (ref 12–78)
ANION GAP SERPL CALC-SCNC: 3 MMOL/L (ref 5–15)
AST SERPL-CCNC: 7 U/L (ref 15–37)
BILIRUB SERPL-MCNC: 0.2 MG/DL (ref 0.2–1)
BUN SERPL-MCNC: 16 MG/DL (ref 6–20)
BUN/CREAT SERPL: 16 (ref 12–20)
CALCIUM SERPL-MCNC: 8.4 MG/DL (ref 8.5–10.1)
CHLORIDE SERPL-SCNC: 102 MMOL/L (ref 97–108)
CO2 SERPL-SCNC: 30 MMOL/L (ref 21–32)
CREAT SERPL-MCNC: 0.98 MG/DL (ref 0.7–1.3)
ERYTHROCYTE [DISTWIDTH] IN BLOOD BY AUTOMATED COUNT: 13.4 % (ref 11.5–14.5)
GLOBULIN SER CALC-MCNC: 4 G/DL (ref 2–4)
GLUCOSE BLD STRIP.AUTO-MCNC: 171 MG/DL (ref 65–117)
GLUCOSE BLD STRIP.AUTO-MCNC: 266 MG/DL (ref 65–117)
GLUCOSE BLD STRIP.AUTO-MCNC: 291 MG/DL (ref 65–117)
GLUCOSE BLD STRIP.AUTO-MCNC: 354 MG/DL (ref 65–117)
GLUCOSE SERPL-MCNC: 313 MG/DL (ref 65–100)
HCT VFR BLD AUTO: 36.1 % (ref 36.6–50.3)
HGB BLD-MCNC: 11.1 G/DL (ref 12.1–17)
MCH RBC QN AUTO: 25.8 PG (ref 26–34)
MCHC RBC AUTO-ENTMCNC: 30.7 G/DL (ref 30–36.5)
MCV RBC AUTO: 83.8 FL (ref 80–99)
NRBC # BLD: 0 K/UL (ref 0–0.01)
NRBC BLD-RTO: 0 PER 100 WBC
PLATELET # BLD AUTO: 216 K/UL (ref 150–400)
PMV BLD AUTO: 10.8 FL (ref 8.9–12.9)
POTASSIUM SERPL-SCNC: 4.1 MMOL/L (ref 3.5–5.1)
PROT SERPL-MCNC: 6.6 G/DL (ref 6.4–8.2)
RBC # BLD AUTO: 4.31 M/UL (ref 4.1–5.7)
SERVICE CMNT-IMP: ABNORMAL
SODIUM SERPL-SCNC: 135 MMOL/L (ref 136–145)
WBC # BLD AUTO: 4 K/UL (ref 4.1–11.1)

## 2023-03-13 PROCEDURE — 99221 1ST HOSP IP/OBS SF/LOW 40: CPT | Performed by: CLINICAL NURSE SPECIALIST

## 2023-03-13 PROCEDURE — 36415 COLL VENOUS BLD VENIPUNCTURE: CPT

## 2023-03-13 PROCEDURE — 74011250636 HC RX REV CODE- 250/636: Performed by: FAMILY MEDICINE

## 2023-03-13 PROCEDURE — 74011000250 HC RX REV CODE- 250: Performed by: STUDENT IN AN ORGANIZED HEALTH CARE EDUCATION/TRAINING PROGRAM

## 2023-03-13 PROCEDURE — 85027 COMPLETE CBC AUTOMATED: CPT

## 2023-03-13 PROCEDURE — 74011636637 HC RX REV CODE- 636/637: Performed by: STUDENT IN AN ORGANIZED HEALTH CARE EDUCATION/TRAINING PROGRAM

## 2023-03-13 PROCEDURE — 65270000029 HC RM PRIVATE

## 2023-03-13 PROCEDURE — 99232 SBSQ HOSP IP/OBS MODERATE 35: CPT | Performed by: FAMILY MEDICINE

## 2023-03-13 PROCEDURE — 74011250636 HC RX REV CODE- 250/636: Performed by: STUDENT IN AN ORGANIZED HEALTH CARE EDUCATION/TRAINING PROGRAM

## 2023-03-13 PROCEDURE — 74011636637 HC RX REV CODE- 636/637: Performed by: CLINICAL NURSE SPECIALIST

## 2023-03-13 PROCEDURE — 74011250637 HC RX REV CODE- 250/637: Performed by: STUDENT IN AN ORGANIZED HEALTH CARE EDUCATION/TRAINING PROGRAM

## 2023-03-13 PROCEDURE — 74011000258 HC RX REV CODE- 258: Performed by: STUDENT IN AN ORGANIZED HEALTH CARE EDUCATION/TRAINING PROGRAM

## 2023-03-13 PROCEDURE — 80053 COMPREHEN METABOLIC PANEL: CPT

## 2023-03-13 PROCEDURE — 82962 GLUCOSE BLOOD TEST: CPT

## 2023-03-13 RX ORDER — IBUPROFEN 200 MG
4 TABLET ORAL AS NEEDED
Status: DISCONTINUED | OUTPATIENT
Start: 2023-03-13 | End: 2023-03-18 | Stop reason: HOSPADM

## 2023-03-13 RX ORDER — INSULIN GLARGINE 100 [IU]/ML
40 INJECTION, SOLUTION SUBCUTANEOUS DAILY
Status: DISCONTINUED | OUTPATIENT
Start: 2023-03-14 | End: 2023-03-14

## 2023-03-13 RX ORDER — INSULIN LISPRO 100 [IU]/ML
INJECTION, SOLUTION INTRAVENOUS; SUBCUTANEOUS
Status: DISCONTINUED | OUTPATIENT
Start: 2023-03-13 | End: 2023-03-18 | Stop reason: HOSPADM

## 2023-03-13 RX ORDER — GLIPIZIDE 5 MG/1
10 TABLET ORAL
Status: DISCONTINUED | OUTPATIENT
Start: 2023-03-14 | End: 2023-03-13

## 2023-03-13 RX ORDER — DEXTROSE MONOHYDRATE 100 MG/ML
0-250 INJECTION, SOLUTION INTRAVENOUS AS NEEDED
Status: DISCONTINUED | OUTPATIENT
Start: 2023-03-13 | End: 2023-03-18 | Stop reason: HOSPADM

## 2023-03-13 RX ORDER — ENOXAPARIN SODIUM 100 MG/ML
30 INJECTION SUBCUTANEOUS EVERY 12 HOURS
Status: DISCONTINUED | OUTPATIENT
Start: 2023-03-13 | End: 2023-03-18 | Stop reason: HOSPADM

## 2023-03-13 RX ORDER — INSULIN GLARGINE 100 [IU]/ML
15 INJECTION, SOLUTION SUBCUTANEOUS
Status: COMPLETED | OUTPATIENT
Start: 2023-03-13 | End: 2023-03-13

## 2023-03-13 RX ORDER — INSULIN LISPRO 100 [IU]/ML
10 INJECTION, SOLUTION INTRAVENOUS; SUBCUTANEOUS
Status: DISCONTINUED | OUTPATIENT
Start: 2023-03-13 | End: 2023-03-14

## 2023-03-13 RX ADMIN — VANCOMYCIN HYDROCHLORIDE 1000 MG: 1 INJECTION, POWDER, LYOPHILIZED, FOR SOLUTION INTRAVENOUS at 00:26

## 2023-03-13 RX ADMIN — GABAPENTIN 100 MG: 100 CAPSULE ORAL at 18:42

## 2023-03-13 RX ADMIN — Medication 3 UNITS: at 16:30

## 2023-03-13 RX ADMIN — MEROPENEM 1 G: 1 INJECTION, POWDER, FOR SOLUTION INTRAVENOUS at 22:12

## 2023-03-13 RX ADMIN — INSULIN GLARGINE 15 UNITS: 100 INJECTION, SOLUTION SUBCUTANEOUS at 18:37

## 2023-03-13 RX ADMIN — BUMETANIDE 2 MG: 0.25 INJECTION INTRAMUSCULAR; INTRAVENOUS at 18:38

## 2023-03-13 RX ADMIN — ENOXAPARIN SODIUM 30 MG: 100 INJECTION SUBCUTANEOUS at 22:13

## 2023-03-13 RX ADMIN — GABAPENTIN 100 MG: 100 CAPSULE ORAL at 10:43

## 2023-03-13 RX ADMIN — VANCOMYCIN HYDROCHLORIDE 1000 MG: 1 INJECTION, POWDER, LYOPHILIZED, FOR SOLUTION INTRAVENOUS at 20:26

## 2023-03-13 RX ADMIN — INSULIN GLARGINE 25 UNITS: 100 INJECTION, SOLUTION SUBCUTANEOUS at 10:44

## 2023-03-13 RX ADMIN — Medication 5 UNITS: at 22:13

## 2023-03-13 RX ADMIN — Medication 5 UNITS: at 12:08

## 2023-03-13 RX ADMIN — MEROPENEM 1 G: 1 INJECTION, POWDER, FOR SOLUTION INTRAVENOUS at 06:10

## 2023-03-13 RX ADMIN — ENOXAPARIN SODIUM 40 MG: 100 INJECTION SUBCUTANEOUS at 10:42

## 2023-03-13 RX ADMIN — HYDROCODONE BITARTRATE AND ACETAMINOPHEN 1 TABLET: 10; 325 TABLET ORAL at 20:26

## 2023-03-13 RX ADMIN — ATORVASTATIN CALCIUM 40 MG: 40 TABLET, FILM COATED ORAL at 10:43

## 2023-03-13 RX ADMIN — Medication 5 UNITS: at 10:43

## 2023-03-13 RX ADMIN — MORPHINE SULFATE 4 MG: 4 INJECTION INTRAVENOUS at 22:12

## 2023-03-13 RX ADMIN — VANCOMYCIN HYDROCHLORIDE 1000 MG: 1 INJECTION, POWDER, LYOPHILIZED, FOR SOLUTION INTRAVENOUS at 12:09

## 2023-03-13 RX ADMIN — MEROPENEM 1 G: 1 INJECTION, POWDER, FOR SOLUTION INTRAVENOUS at 15:36

## 2023-03-13 RX ADMIN — SODIUM CHLORIDE, PRESERVATIVE FREE 10 ML: 5 INJECTION INTRAVENOUS at 22:13

## 2023-03-13 RX ADMIN — Medication 10 UNITS: at 18:42

## 2023-03-13 RX ADMIN — GABAPENTIN 100 MG: 100 CAPSULE ORAL at 22:12

## 2023-03-13 RX ADMIN — SODIUM CHLORIDE, PRESERVATIVE FREE 10 ML: 5 INJECTION INTRAVENOUS at 18:45

## 2023-03-13 RX ADMIN — BUMETANIDE 2 MG: 0.25 INJECTION INTRAMUSCULAR; INTRAVENOUS at 10:43

## 2023-03-13 RX ADMIN — SODIUM CHLORIDE, PRESERVATIVE FREE 10 ML: 5 INJECTION INTRAVENOUS at 06:10

## 2023-03-13 NOTE — PROGRESS NOTES
Initial note: CM consult for assistance with social security disability application acknowledged. Consult/request falls outside of CM's scope of service. CM will inform pt of appropriate entity to facilitate request through.      KRYSTA Wolfe 24, [facility]

## 2023-03-13 NOTE — PROGRESS NOTES
's visit was a follow up in 3214. Patient was in bed and appeared comfortable. He picked up conversation from previous day, indicating he was doing better than previous day but still struggling with thought of how to make his living condition better. He shared that he talked to his physician and  and they are helping him connect with resources.  offered active listening and supportive presence, assisted patient find a  for his phone, which was almost dead. Calm words spoken to encourage focus on the present and self care. Patient seem encouraged by the visit and stated that talking with  was helpful. Please contact spiritual care for further referrals.       Visited by: Tobi romero: [phone])

## 2023-03-13 NOTE — DIABETES MGMT
[facility]  DIABETES MANAGEMENT CONSULT    Consulted by Provider for advanced nursing evaluation and care for inpatient blood glucose management. Evaluation and Action Plan   This 62year old AA male was admitted 3/11/23 from the ER with left lower leg ulceration and is being treated medically with antibiotics and diuretics. Bgs have been in the 200-300s since admission. Patient admits to not taking any diabetes medications. To promote healing, would use a basal/bolus corrective insulin strategy and change to a CHO-controlled mealplan and use insulin resistant dosing    Management Rationale Action Plan   Medication   Basal needs Using 0.3 units/kg/D  Lantus 40 units daily   Nutritional needs Using IR sensitivity based on weight & BMI Humalog 10 units with consumed meals    Stop glipizide   Corrective insulin Using IR sensitivity based on weight & BMI    Additional orders   Carb-controlled meals     Diabetes Discharge Plan   Medication  TBD   Referral  [x]        Outpatient diabetes education   Additional orders            Initial Presentation   Basil Brasher is a 62 y.o. male admitted from the ER 3/11/23 after noting increased pain & drainage from left leg ulceration. Denied trauma, fever or chills, chest pain, cough or dyspnea. Afebrile. Hypertensive  ER exam:  Musculoskeletal:      Cervical back: Normal range of motion. Right lower leg: Edema present. Left lower leg: Edema present. Comments: Evidence   LAB: Normal WBC. Low H&H. Hyperglycemic. Normal kidney & liver parameters. BG elevated. Lactic normal  Left leg & foot XR: No plain radiographic evidence of osteomyelitis    HX:   Past Medical History:   Diagnosis Date    Asthma     Chronic venous insufficiency     Diabetes (HCC)     Lower leg edema     Prostate cancer ([address]     gets chemo at [facility]      INITIAL DX:   Cellulitis [L03.90]     Current Treatment     TX: Diuresis. BP & pain meds. Abx. Insulin.  Clot prevention    Hospital Course   Clinical progress has been uncomplicated. Diabetes History   Patient was treated for \"cancer\" 5-6 years ago  Developed diabetes 3-4 years ago; treated with metformin but stopped it due to GI distress    Diabetes-related Medical History - Denies all complications of diabetes but peripheral neuropathy noted in EMR note    Diabetes Medication History - Is not taking any medication  Key Antihyperglycemic Medications               insulin glargine (LANTUS) 100 unit/mL injection (Taking) Take 25 Units by SubCUTAneous route in the morning. glimepiride (AMARYL) 4 mg tablet (Taking) Take 1 Tablet by mouth every morning. Indications: type 2 diabetes mellitus           Diabetes self-management practices:   Eating pattern - \"I eat whatever I want whenever I want. 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 [] Pneumonia: There is no immunization history for the selected administration types on file for this patient. [] Hepatitis: There is no immunization history for the selected administration types on file for this patient. Overall evaluation:    [x] Not achieving A1c target with drug therapy & self-care practices    Subjective   I've had diabetes 3-4 years.      Objective   Physical exam  General Obese male in no acute distress.  Conversant but irritated with interview  Neuro  Alert, oriented   Vital Signs Visit Vitals  /88   Pulse 95   Temp 97.7 °F (36.5 °C)   Resp 14   Ht 5' 6\" (1.676 m)   Wt 122.7 kg (270 lb 8.1 oz)   SpO2 96%   BMI 43.66 kg/m²     Laboratory  Recent Labs     03/13/23  0525 03/12/23  0559 03/11/23  1022   * 276* 285*   AGAP 3* 2* 5   WBC 4.0* 4.8 4.6   CREA 0.98 0.98 0.79   AST 7* 10* 8*   ALT 12 13 14     TSH normal    Factors impacting BG management  Factor Dose Comments   Nutrition:  Standard meals       Pain Scheduled Neurontin    Infection Merrem Q8 hrs X 20 doses  Vanc Q8 hrs     Other:   Kidney function  Liver function   Normal   Normal      Blood glucose pattern    Significant diabetes-related events over the past 24-72 hours  3/11/23 Admission   3/12/23 Began basal insulin @0.2 units/kg/D along with correction  3/13/23 Bgs remain in the 200-300s    Assessment and Nursing Intervention   Nursing Diagnosis Risk for unstable blood glucose pattern   Nursing Intervention Domain 5254 Decision-making Support   Nursing Interventions Examined current inpatient diabetes/blood glucose control   Explored factors facilitating and impeding inpatient management  Explored corrective strategies with patient and responsible inpatient provider   Informed patient of rational for insulin strategy while hospitalized     Billing Code(s)   [] 80317 IP subsequent hospital care - 50 minutes   [] 40004 IP subsequent hospital care - 35 minutes   [] [id number] [phone] IP subsequent hospital care - 25 minutes   [x] 14219 IP initial hospital care - 40 minutes     Before making these care recommendations, I personally reviewed the hospitalization record, including notes, laboratory & diagnostic data and current medications, and examined the patient at the bedside (circumstances permitting) before determining care. More than fifty (50) percent of the time was spent in patient counseling and/or care coordination.   Total minutes: R Mary 53, CNS  Diabetes Clinical Nurse Specialist  [facility]  Access via [address] [place] [address]

## 2023-03-13 NOTE — PROGRESS NOTES
Bedside and Verbal shift change report given to [address] [place] [address] Road (oncoming nurse) by Padmini De La Rosa RN (offgoing nurse). Report included the following information SBAR, Kardex, Intake/Output, MAR, and Recent Results.

## 2023-03-13 NOTE — PROGRESS NOTES
Problem: Falls - Risk of  Goal: *Absence of Falls  Description: Document Fish Tomas Fall Risk and appropriate interventions in the flowsheet. Outcome: Progressing Towards Goal  Note: Fall Risk Interventions:     Problem: Patient Education: Go to Patient Education Activity  Goal: Patient/Family Education  Outcome: Progressing Towards Goal     Problem: Diabetes Self-Management  Goal: *Using medications safely  Description: State effect of diabetes medications on diabetes; name diabetes medication taking, action and side effects. Outcome: Progressing Towards Goal  Goal: *Monitoring blood glucose, interpreting and using results  Description: Identify recommended blood glucose targets  and personal targets. Outcome: Progressing Towards Goal  Goal: *Prevention, detection, treatment of acute complications  Description: List symptoms of hyper- and hypoglycemia; describe how to treat low blood sugar and actions for lowering  high blood glucose level.   Outcome: Progressing Towards Goal

## 2023-03-13 NOTE — PROGRESS NOTES
General Daily Progress Note    Admit Date: 3/11/2023  Hospital day 3    Subjective:     Patient has no complaint of chest pain or dyspnea. Has had progressive swelling and drainage of both legs. ..   Medication side effects: none    Current Facility-Administered Medications   Medication Dose Route Frequency    vancomycin (VANCOCIN) 1,000 mg in 0.9% sodium chloride 250 mL (Btgm1Dmc)  1,000 mg IntraVENous Q8H    sodium chloride (NS) flush 5-40 mL  5-40 mL IntraVENous Q8H    sodium chloride (NS) flush 5-40 mL  5-40 mL IntraVENous PRN    acetaminophen (TYLENOL) tablet 650 mg  650 mg Oral Q6H PRN    Or    acetaminophen (TYLENOL) suppository 650 mg  650 mg Rectal Q6H PRN    polyethylene glycol (MIRALAX) packet 17 g  17 g Oral DAILY PRN    ondansetron (ZOFRAN ODT) tablet 4 mg  4 mg Oral Q8H PRN    Or    ondansetron (ZOFRAN) injection 4 mg  4 mg IntraVENous Q6H PRN    enoxaparin (LOVENOX) injection 40 mg  40 mg SubCUTAneous DAILY    HYDROcodone-acetaminophen (NORCO)  mg tablet 1 Tablet  1 Tablet Oral Q6H PRN    melatonin tablet 3 mg  3 mg Oral QHS PRN    morphine injection 4 mg  4 mg IntraVENous Q4H PRN    atorvastatin (LIPITOR) tablet 40 mg  40 mg Oral DAILY    gabapentin (NEURONTIN) capsule 100 mg  100 mg Oral TID    glucose chewable tablet 16 g  4 Tablet Oral PRN    glucagon (GLUCAGEN) injection 1 mg  1 mg IntraMUSCular PRN    dextrose 10% infusion 0-250 mL  0-250 mL IntraVENous PRN    insulin glargine (LANTUS) injection 25 Units  25 Units SubCUTAneous DAILY    insulin lispro (HUMALOG) injection   SubCUTAneous TIDAC    meropenem (MERREM) 1 g in 0.9% sodium chloride (MBP/ADV) 50 mL MBP  1 g IntraVENous Q8H    bumetanide (BUMEX) injection 2 mg  2 mg IntraVENous BID        Review of Systems  Pertinent items are noted in HPI. Objective:     Patient Vitals for the past 8 hrs:   BP Temp Pulse Resp SpO2   03/13/23 0804 127/88 97.7 °F (36.5 °C) 95 14 96 %     No intake/output data recorded.   03/11 1901 - 03/13 0700  In: -   Out: 3300 [Urine:3300]    Physical Exam: Visit Vitals  /88   Pulse 95   Temp 97.7 °F (36.5 °C)   Resp 14   Ht 5' 6\" (1.676 m)   Wt 270 lb 8.1 oz (122.7 kg)   SpO2 96%   BMI 43.66 kg/m²     Lungs: clear to auscultation bilaterally  Heart: regular rate and rhythm, S1, S2 normal, no murmur, click, rub or gallop  Abdomen: soft, non-tender. Bowel sounds normal. No masses,  no organomegaly  Extremities: edema 2-3+ bilaterally  Several areas of open wounds on anterior legs bilaterally. Not draining at present.  Some surrounding erythema      ECG: normal sinus rhythm     Data Review   Recent Results (from the past 24 hour(s))   GLUCOSE, POC    Collection Time: 03/12/23 11:40 AM   Result Value Ref Range    Glucose (POC) 257 (H) 65 - 117 mg/dL    Performed by Michelle Mcmahon PCT    VANCOMYCIN, RANDOM    Collection Time: 03/12/23  1:14 PM   Result Value Ref Range    Vancomycin, random 16.1 UG/ML   GLUCOSE, POC    Collection Time: 03/12/23  4:32 PM   Result Value Ref Range    Glucose (POC) 189 (H) 65 - 117 mg/dL    Performed by Rubi Ly PCT    GLUCOSE, POC    Collection Time: 03/12/23  7:58 PM   Result Value Ref Range    Glucose (POC) 231 (H) 65 - 117 mg/dL    Performed by Cassidy Hernandez \"Ally\"    CBC W/O DIFF    Collection Time: 03/13/23  6:38 AM   Result Value Ref Range    WBC 4.0 (L) 4.1 - 11.1 K/uL    RBC 4.31 4.10 - 5.70 M/uL    HGB 11.1 (L) 12.1 - 17.0 g/dL    HCT 36.1 (L) 36.6 - 50.3 %    MCV 83.8 80.0 - 99.0 FL    MCH 25.8 (L) 26.0 - 34.0 PG    MCHC 30.7 30.0 - 36.5 g/dL    RDW 13.4 11.5 - 14.5 %    PLATELET 991 783 - 099 K/uL    MPV 10.8 8.9 - 12.9 FL    NRBC 0.0 0  WBC    ABSOLUTE NRBC 0.00 0.00 - 8.26 K/uL   METABOLIC PANEL, COMPREHENSIVE    Collection Time: 03/13/23  6:38 AM   Result Value Ref Range    Sodium 135 (L) 136 - 145 mmol/L    Potassium 4.1 3.5 - 5.1 mmol/L    Chloride 102 97 - 108 mmol/L    CO2 30 21 - 32 mmol/L    Anion gap 3 (L) 5 - 15 mmol/L    Glucose 313 (H) 65 - 100 mg/dL BUN 16 6 - 20 MG/DL    Creatinine 0.98 0.70 - 1.30 MG/DL    BUN/Creatinine ratio 16 12 - 20      eGFR >60 >60 ml/min/1.73m2    Calcium 8.4 (L) 8.5 - 10.1 MG/DL    Bilirubin, total 0.2 0.2 - 1.0 MG/DL    ALT (SGPT) 12 12 - 78 U/L    AST (SGOT) 7 (L) 15 - 37 U/L    Alk. phosphatase 91 45 - 117 U/L    Protein, total 6.6 6.4 - 8.2 g/dL    Albumin 2.6 (L) 3.5 - 5.0 g/dL    Globulin 4.0 2.0 - 4.0 g/dL    A-G Ratio 0.7 (L) 1.1 - 2.2     GLUCOSE, POC    Collection Time: 03/13/23  8:06 AM   Result Value Ref Range    Glucose (POC) 266 (H) 65 - 117 mg/dL    Performed by [facility], POC    Collection Time: 03/13/23 11:13 AM   Result Value Ref Range    Glucose (POC) 291 (H) 65 - 117 mg/dL    Performed by Roselyn AMBROSE            Assessment:     Active Problems:    Cellulitis (3/11/2023)        Plan:     Cellulitis (3/11/2023), worsening chronic venous stasis ulcers: On empiric vanc and meropenem, d 2. Wound care. Worsening edema bilateral LE: Bumex. DM 2:; lantus + SSI. Resume glimipride  DM neuropathy: Gabapentin started. Hyponatremia: Monitor BMP. Prostate Cancer: Continue outpatient follow up with urology. Morbid obesity  Full code.

## 2023-03-14 ENCOUNTER — TELEPHONE (OUTPATIENT)
Dept: FAMILY MEDICINE CLINIC | Age: 58
End: 2023-03-14

## 2023-03-14 LAB
ALBUMIN SERPL-MCNC: 2.5 G/DL (ref 3.5–5)
ALBUMIN/GLOB SERPL: 0.5 (ref 1.1–2.2)
ALP SERPL-CCNC: 85 U/L (ref 45–117)
ALT SERPL-CCNC: 12 U/L (ref 12–78)
ANION GAP SERPL CALC-SCNC: 5 MMOL/L (ref 5–15)
AST SERPL-CCNC: 8 U/L (ref 15–37)
BILIRUB SERPL-MCNC: 0.3 MG/DL (ref 0.2–1)
BUN SERPL-MCNC: 17 MG/DL (ref 6–20)
BUN/CREAT SERPL: 20 (ref 12–20)
CALCIUM SERPL-MCNC: 8.6 MG/DL (ref 8.5–10.1)
CHLORIDE SERPL-SCNC: 104 MMOL/L (ref 97–108)
CO2 SERPL-SCNC: 30 MMOL/L (ref 21–32)
CREAT SERPL-MCNC: 0.86 MG/DL (ref 0.7–1.3)
ERYTHROCYTE [DISTWIDTH] IN BLOOD BY AUTOMATED COUNT: 13.4 % (ref 11.5–14.5)
GLOBULIN SER CALC-MCNC: 4.6 G/DL (ref 2–4)
GLUCOSE BLD STRIP.AUTO-MCNC: 141 MG/DL (ref 65–117)
GLUCOSE BLD STRIP.AUTO-MCNC: 150 MG/DL (ref 65–117)
GLUCOSE BLD STRIP.AUTO-MCNC: 170 MG/DL (ref 65–117)
GLUCOSE BLD STRIP.AUTO-MCNC: 182 MG/DL (ref 65–117)
GLUCOSE SERPL-MCNC: 158 MG/DL (ref 65–100)
HCT VFR BLD AUTO: 35.9 % (ref 36.6–50.3)
HGB BLD-MCNC: 11 G/DL (ref 12.1–17)
MCH RBC QN AUTO: 25.6 PG (ref 26–34)
MCHC RBC AUTO-ENTMCNC: 30.6 G/DL (ref 30–36.5)
MCV RBC AUTO: 83.5 FL (ref 80–99)
NRBC # BLD: 0 K/UL (ref 0–0.01)
NRBC BLD-RTO: 0 PER 100 WBC
PLATELET # BLD AUTO: 233 K/UL (ref 150–400)
PMV BLD AUTO: 10.8 FL (ref 8.9–12.9)
POTASSIUM SERPL-SCNC: 3.7 MMOL/L (ref 3.5–5.1)
PROT SERPL-MCNC: 7.1 G/DL (ref 6.4–8.2)
RBC # BLD AUTO: 4.3 M/UL (ref 4.1–5.7)
SERVICE CMNT-IMP: ABNORMAL
SODIUM SERPL-SCNC: 139 MMOL/L (ref 136–145)
VANCOMYCIN SERPL-MCNC: 16 UG/ML
WBC # BLD AUTO: 4.8 K/UL (ref 4.1–11.1)

## 2023-03-14 PROCEDURE — 65270000029 HC RM PRIVATE

## 2023-03-14 PROCEDURE — 99232 SBSQ HOSP IP/OBS MODERATE 35: CPT | Performed by: CLINICAL NURSE SPECIALIST

## 2023-03-14 PROCEDURE — 85027 COMPLETE CBC AUTOMATED: CPT

## 2023-03-14 PROCEDURE — 74011000258 HC RX REV CODE- 258: Performed by: STUDENT IN AN ORGANIZED HEALTH CARE EDUCATION/TRAINING PROGRAM

## 2023-03-14 PROCEDURE — 74011636637 HC RX REV CODE- 636/637: Performed by: CLINICAL NURSE SPECIALIST

## 2023-03-14 PROCEDURE — 74011250636 HC RX REV CODE- 250/636: Performed by: FAMILY MEDICINE

## 2023-03-14 PROCEDURE — 74011636637 HC RX REV CODE- 636/637: Performed by: FAMILY MEDICINE

## 2023-03-14 PROCEDURE — 74011000250 HC RX REV CODE- 250: Performed by: STUDENT IN AN ORGANIZED HEALTH CARE EDUCATION/TRAINING PROGRAM

## 2023-03-14 PROCEDURE — 99232 SBSQ HOSP IP/OBS MODERATE 35: CPT | Performed by: FAMILY MEDICINE

## 2023-03-14 PROCEDURE — 82962 GLUCOSE BLOOD TEST: CPT

## 2023-03-14 PROCEDURE — 36415 COLL VENOUS BLD VENIPUNCTURE: CPT

## 2023-03-14 PROCEDURE — 74011250636 HC RX REV CODE- 250/636: Performed by: STUDENT IN AN ORGANIZED HEALTH CARE EDUCATION/TRAINING PROGRAM

## 2023-03-14 PROCEDURE — 74011250637 HC RX REV CODE- 250/637: Performed by: STUDENT IN AN ORGANIZED HEALTH CARE EDUCATION/TRAINING PROGRAM

## 2023-03-14 PROCEDURE — 80202 ASSAY OF VANCOMYCIN: CPT

## 2023-03-14 PROCEDURE — 80053 COMPREHEN METABOLIC PANEL: CPT

## 2023-03-14 RX ORDER — INSULIN GLARGINE 100 [IU]/ML
45 INJECTION, SOLUTION SUBCUTANEOUS DAILY
Status: DISCONTINUED | OUTPATIENT
Start: 2023-03-14 | End: 2023-03-18 | Stop reason: HOSPADM

## 2023-03-14 RX ORDER — INSULIN LISPRO 100 [IU]/ML
12 INJECTION, SOLUTION INTRAVENOUS; SUBCUTANEOUS
Status: DISCONTINUED | OUTPATIENT
Start: 2023-03-14 | End: 2023-03-18 | Stop reason: HOSPADM

## 2023-03-14 RX ADMIN — MEROPENEM 1 G: 1 INJECTION, POWDER, FOR SOLUTION INTRAVENOUS at 18:06

## 2023-03-14 RX ADMIN — VANCOMYCIN HYDROCHLORIDE 1000 MG: 1 INJECTION, POWDER, LYOPHILIZED, FOR SOLUTION INTRAVENOUS at 22:13

## 2023-03-14 RX ADMIN — Medication 12 UNITS: at 13:31

## 2023-03-14 RX ADMIN — SODIUM CHLORIDE, PRESERVATIVE FREE 10 ML: 5 INJECTION INTRAVENOUS at 14:00

## 2023-03-14 RX ADMIN — ACETAMINOPHEN 325MG 650 MG: 325 TABLET ORAL at 02:59

## 2023-03-14 RX ADMIN — GABAPENTIN 100 MG: 100 CAPSULE ORAL at 09:21

## 2023-03-14 RX ADMIN — HYDROCODONE BITARTRATE AND ACETAMINOPHEN 1 TABLET: 10; 325 TABLET ORAL at 10:15

## 2023-03-14 RX ADMIN — Medication 3 UNITS: at 16:30

## 2023-03-14 RX ADMIN — VANCOMYCIN HYDROCHLORIDE 1000 MG: 1 INJECTION, POWDER, LYOPHILIZED, FOR SOLUTION INTRAVENOUS at 13:31

## 2023-03-14 RX ADMIN — Medication 12 UNITS: at 09:21

## 2023-03-14 RX ADMIN — SODIUM CHLORIDE, PRESERVATIVE FREE 10 ML: 5 INJECTION INTRAVENOUS at 22:14

## 2023-03-14 RX ADMIN — ENOXAPARIN SODIUM 30 MG: 100 INJECTION SUBCUTANEOUS at 22:14

## 2023-03-14 RX ADMIN — MEROPENEM 1 G: 1 INJECTION, POWDER, FOR SOLUTION INTRAVENOUS at 05:52

## 2023-03-14 RX ADMIN — Medication 3 UNITS: at 11:30

## 2023-03-14 RX ADMIN — BUMETANIDE 2 MG: 0.25 INJECTION INTRAMUSCULAR; INTRAVENOUS at 18:06

## 2023-03-14 RX ADMIN — BUMETANIDE 2 MG: 0.25 INJECTION INTRAMUSCULAR; INTRAVENOUS at 09:21

## 2023-03-14 RX ADMIN — GABAPENTIN 100 MG: 100 CAPSULE ORAL at 22:13

## 2023-03-14 RX ADMIN — INSULIN GLARGINE 45 UNITS: 100 INJECTION, SOLUTION SUBCUTANEOUS at 09:21

## 2023-03-14 RX ADMIN — MELATONIN 3 MG: at 22:20

## 2023-03-14 RX ADMIN — ATORVASTATIN CALCIUM 40 MG: 40 TABLET, FILM COATED ORAL at 09:21

## 2023-03-14 RX ADMIN — GABAPENTIN 100 MG: 100 CAPSULE ORAL at 18:07

## 2023-03-14 RX ADMIN — HYDROCODONE BITARTRATE AND ACETAMINOPHEN 1 TABLET: 10; 325 TABLET ORAL at 22:20

## 2023-03-14 RX ADMIN — Medication 3 UNITS: at 09:22

## 2023-03-14 RX ADMIN — HYDROCODONE BITARTRATE AND ACETAMINOPHEN 1 TABLET: 10; 325 TABLET ORAL at 02:59

## 2023-03-14 RX ADMIN — VANCOMYCIN HYDROCHLORIDE 1000 MG: 1 INJECTION, POWDER, LYOPHILIZED, FOR SOLUTION INTRAVENOUS at 04:25

## 2023-03-14 RX ADMIN — Medication 12 UNITS: at 18:06

## 2023-03-14 RX ADMIN — Medication 3 UNITS: at 22:13

## 2023-03-14 RX ADMIN — SODIUM CHLORIDE, PRESERVATIVE FREE 10 ML: 5 INJECTION INTRAVENOUS at 05:52

## 2023-03-14 RX ADMIN — ENOXAPARIN SODIUM 30 MG: 100 INJECTION SUBCUTANEOUS at 10:15

## 2023-03-14 NOTE — WOUND CARE
Wound Care consult for the lower leg wounds:  Chart reviewed and patient assessed. Wound care nurse has taken care of this patient in the past and referred him to the outpatient wound center where he had care for several months. His wounds healed. Pt. Has issues with lower leg edema due to venous stasis. 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 Pt. Stated that he has to work. I suggested a few changes that he can make to establish more breaks where he can elevate his legs, even for short periods of time. Pt. Jacqueline Rasp himself here to [address] on Saturday because he was \"scared\" that he was getting an infection when there were blisters on the left lower leg and it popped and had yellow fluid coming out of it. This was most likely just serum from the lower leg fluid. There was no description of \"pus\". Assessment: Both lower legs have healing wounds. No s/sx of severe infection. There is edema but there are good pulses and patient moves the feet well. Pt.'s A1C is 13. 8.  this is a barrier to wound healing. Detailed wound descriptions below with photos of both legs.      WOUND POA CONDITIONS    Wound Pretibial Right vascular sores on anterior and left lower leg 03/11/23 (Active)   Wound Image   03/14/23 1349   Wound Etiology Venous 03/14/23 1349   Dressing Status New dressing applied 03/14/23 1349   Cleansed Soap and water 03/14/23 1349   Dressing/Treatment Xeroform;ABD pad;Roll gauze 03/14/23 1349   Offloading for Diabetic Foot Ulcers Offloading ordered 03/14/23 1349   Dressing Change Due 03/15/23 03/14/23 1349   Wound Assessment Pink/red 03/14/23 1349   Drainage Amount None 03/14/23 1349   Wound Odor None 03/14/23 1349   Ann-Wound/Incision Assessment Intact 03/14/23 1349   Edges Undefined edges 03/14/23 1349   Wound Thickness Description Partial thickness 03/14/23 1349       Wound Pretibial Left lower anterior leg and lower extrior leg multiple ulcers 03/11/23 (Active)   Wound Image:  Open wound from popped blister. Red wound bed with very little drainage now that he is elevating the legs. 03/14/23 1349   Wound Etiology Venous 03/14/23 1349   Dressing Status New dressing applied 03/14/23 1349   Cleansed Soap and water 03/14/23 1349   Dressing/Treatment Xeroform;ABD pad;Roll gauze 03/14/23 1349   Offloading for Diabetic Foot Ulcers Offloading ordered 03/14/23 1349   Dressing Change Due 03/15/23 03/14/23 1349   Wound Length (cm) 4 cm 03/14/23 1349   Wound Width (cm) 8 cm 03/14/23 1349   Wound Depth (cm) 0.1 cm 03/14/23 1349   Wound Surface Area (cm^2) 32 cm^2 03/14/23 1349   Wound Volume (cm^3) 3.2 cm^3 03/14/23 1349   Wound Assessment Pink/red 03/14/23 1349   Drainage Amount Scant 03/14/23 1349   Drainage Description Serosanguinous 03/14/23 1349   Wound Odor None 03/14/23 1349   Ann-Wound/Incision Assessment Maceration 03/14/23 1349   Edges Defined edges 03/14/23 1349   Wound Thickness Description Partial thickness 03/14/23 1349       Treatment Recommendation:  Patient needs to elevate the legs more often throughout each day - above the level of the heart for 10 - 20 minutes at a time. Wound care will be with moist but not wet dressings - recommend xeroform gauze, ABD pads and roll gauze (dorothy). Recommend that he re-establish care at the [address] [place] [address] to heal these wounds. He will need compression hose at some point when the wounds are healed. Wound care orders to be done while he is here:  Daily - remove the old dressings and document to the type and amount of drainage on the dressings. Cleanse the lower legs and feet with DynaHex soap and water, wipe with wet wash cloth. Allow the skin to dry and then apply the daily moisturizer (purple top) to the skin. Apply Xeroform gauze x 2 to the left leg and one on the right leg. Cover with ABD pads and wrap with the small roll Dorothy (6 inch tight roll).    Tape and date each dressing each day. Document the wound care in the wound flowsheet. FLOAT the heels.    Julio Atwood RN, BSN, [employer]

## 2023-03-14 NOTE — DIABETES MGMT
[facility]  DIABETES MANAGEMENT CONSULT    Consulted by Provider for advanced nursing evaluation and care for inpatient blood glucose management. Evaluation and Action Plan   This 62year old AA male was admitted 3/11/23 from the ER with left lower leg ulceration and is being treated medically with antibiotics and diuretics. Bgs were in the 200-300s since admission. Patient admits to not taking any diabetes medications. To promote healing, began a basal/bolus corrective insulin strategy yesterday before adding mealtime insulin coverage as well. This has moved BG pattern into the mid 100s. Will likely need more mealtime insulin at discharge as he eats larger starch portions at home. Management Rationale Action Plan   Medication   Basal needs Using 0.3 units/kg/D  Agree with increase to Lantus 45 units daily   Nutritional needs Using IR sensitivity based on weight & BMI Agree with increase to Humalog 12 units with consumed meals     Corrective insulin Using IR sensitivity based on weight & BMI    Additional orders   Carb-controlled meals     Diabetes Discharge Plan   Medication  TBD   Referral  [x]        Outpatient diabetes education   Additional orders            Initial Presentation   Christian Ortiz is a 62 y.o. male admitted from the ER 3/11/23 after noting increased pain & drainage from left leg ulceration. Denied trauma, fever or chills, chest pain, cough or dyspnea. Afebrile. Hypertensive  ER exam:  Musculoskeletal:      Cervical back: Normal range of motion. Right lower leg: Edema present. Left lower leg: Edema present. Comments: Evidence   LAB: Normal WBC. Low H&H. Hyperglycemic. Normal kidney & liver parameters. BG elevated.  Lactic normal  Left leg & foot XR: No plain radiographic evidence of osteomyelitis    HX:   Past Medical History:   Diagnosis Date    Asthma     Chronic venous insufficiency     Diabetes (HCC)     Lower leg edema     Prostate cancer ([address]     gets chemo at [facility]      INITIAL DX:   Cellulitis [L03.90]     Current Treatment     TX: Diuresis. Lipitor. BP & pain meds. Abx. Insulin. Clot prevention    Hospital Course   Clinical progress has been uncomplicated. 3/14/23 Wound care addressing wounds: Both lower legs have healing wounds. No s/sx of severe infection. There is edema but there are good pulses and patient moves the feet well    Diabetes History   Patient was treated for \"cancer\" 5-6 years ago  Developed diabetes 3-4 years ago; treated with metformin but stopped it due to GI distress    Diabetes-related Medical History - Denies all complications of diabetes but peripheral neuropathy noted in EMR note    Diabetes Medication History - Is not taking any medication  Key Antihyperglycemic Medications               insulin glargine (LANTUS) 100 unit/mL injection (Taking) Take 25 Units by SubCUTAneous route in the morning. glimepiride (AMARYL) 4 mg tablet (Taking) Take 1 Tablet by mouth every morning. Indications: type 2 diabetes mellitus           Diabetes self-management practices:   Eating pattern - \"I eat whatever I want whenever I want. 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 [] Pneumonia: There is no immunization history for the selected administration types on file for this patient. [] Hepatitis: There is no immunization history for the selected administration types on file for this patient.   Overall evaluation:    [x] Not achieving A1c target with drug therapy & self-care practices    Subjective   I do eat baked foods but have a sweet tooth too.     Objective   Physical exam  General Obese male in no acute distress. Conversant and cooperative  Neuro  Alert, oriented   Vital Signs Visit Vitals  /81   Pulse 85   Temp 97.3 °F (36.3 °C)   Resp 17   Ht 5' 6\" (1.676 m)   Wt 122.7 kg (270 lb 8.1 oz)   SpO2 91%   BMI 43.66 kg/m²     Laboratory  Recent Labs     03/14/23  0246 03/13/23  0638 03/12/23  0559   * 313* 276*   AGAP 5 3* 2*   WBC 4.8 4.0* 4.8   CREA 0.86 0.98 0.98   AST 8* 7* 10*   ALT 12 12 13     TSH normal    Factors impacting BG management  Factor Dose Comments   Nutrition:  Standard meals      Has complaints about food   Pain Scheduled Neurontin    Infection Merrem Q8 hrs X 20 doses  Vanc Q8 hrs     Other:   Kidney function  Liver function   Normal   Normal      Blood glucose pattern    Significant diabetes-related events over the past 24-72 hours  3/11/23 Admission   3/12/23 Began basal insulin @0.2 units/kg/D along with correction  3/13/23 Bgs remain in the 200-300s  3/14/23 3am .  Dosing advanced     Assessment and Nursing Intervention   Nursing Diagnosis Risk for unstable blood glucose pattern   Nursing Intervention Domain 9344 Decision-making Support   Nursing Interventions Examined current inpatient diabetes/blood glucose control   Explored factors facilitating and impeding inpatient management  Explored corrective strategies with patient and responsible inpatient provider   Informed patient of rational for insulin strategy while hospitalized     Billing Code(s)   [] 96573 IP subsequent hospital care - 50 minutes   [] 46672 IP subsequent hospital care - 35 minutes   [x] 07585 IP subsequent hospital care - 25 minutes   [] 0312 [id number] IP initial hospital care - 40 minutes     Before making these care recommendations, I personally reviewed the hospitalization record, including notes, laboratory & diagnostic data and current medications, and examined the patient at the bedside (circumstances permitting) before determining care. More than fifty (50) percent of the time was spent in patient counseling and/or care coordination.   Total minutes: [address], CNS  Diabetes Clinical Nurse Specialist  [facility]  Access via [address] [place] [address]

## 2023-03-14 NOTE — TELEPHONE ENCOUNTER
Patient mother Marcelino Ramos would like a call from [address] regarding her son health she can be reached @ [phone]

## 2023-03-14 NOTE — PROGRESS NOTES
Transition of Care Plan:    RUR: 10    LOS: 5  GLOS: 3.2    ZEHRA: 3/17    Disposition: Plan Home with FAM with Outpatient [address]  6:17 PM   CM talked to Pt and he stated that Dr. Maribel Anderson indicated probably DC 3/17. He is planning on resuming with OP Wound Care     If SNF or IPR: Date FOC offered:  Date FOC received:  Date authorization started with reference number:  Date authorization received and expires:  Accepting facility:    Follow up appointments: PCP: Maribel Anderson  DME needed: tbd  Transportation at Discharge: will need a roundtrip ride  Wolm Richa or means to access home:  yes      IM Medicare Letter: n/a Medicaid  Is patient a  and connected with the [facility]?              no  If yes, was [employer] transfer form completed and [facility] notified? Caregiver Contact:Mom: Domitila Mcgill: [phone]  Discharge Caregiver contacted prior to discharge? Pt is alert and oriented and he is updating family. Care Conference needed?:        no    Reason for Admission:  Pt was admitted 3/11 d/t Worsening B LE Swelling. Worsening chronic venous stasis ulcers. DMT2                     RUR Score:          10           Plan for utilizing home health:      n/a    PCP: First and Last name:  Romario Mckeon MD     Name of Practice:    Are you a current patient: Yes/No: yes   Approximate date of last visit:  6 months   Can you participate in a virtual visit with your PCP: yes                    Current Advanced Directive/Advance Care Plan: Full Code      Healthcare Decision Maker:              Primary Decision MakeSheron Milligan - [phone]                  Eval:   Pt is alert and oriented. Lives with sister in one story home with [address]. No DME  No HH or rehab experience. Works at [employer]   Does not drive uses [email or web address] and friend for transportation. I  W ADLs. [facility]: [facility] [address]. CM will continue to monitor discharge plan.      Care Management Interventions  PCP Verified by CM: Yes  Palliative Care Criteria Met (RRAT>21 & CHF Dx)?: No  Mode of Transport at Discharge:  Other (see comment)  Transition of Care Consult (CM Consult): Discharge Planning  MyChart Signup: Yes  Discharge Durable Medical Equipment: No  Physical Therapy Consult: No  Occupational Therapy Consult: No  Speech Therapy Consult: No  Support Systems: Parent(s)  Confirm Follow Up Transport: Other (see comment)  [address] Provided?: No  Discharge Location  Patient Expects to be Discharged to[de-identified] [facility] [address], [place]

## 2023-03-15 LAB
ALBUMIN SERPL-MCNC: 2.5 G/DL (ref 3.5–5)
ALBUMIN/GLOB SERPL: 0.5 (ref 1.1–2.2)
ALP SERPL-CCNC: 78 U/L (ref 45–117)
ALT SERPL-CCNC: 14 U/L (ref 12–78)
ANION GAP SERPL CALC-SCNC: 5 MMOL/L (ref 5–15)
AST SERPL-CCNC: 12 U/L (ref 15–37)
BILIRUB SERPL-MCNC: 0.2 MG/DL (ref 0.2–1)
BUN SERPL-MCNC: 18 MG/DL (ref 6–20)
BUN/CREAT SERPL: 19 (ref 12–20)
CALCIUM SERPL-MCNC: 8.5 MG/DL (ref 8.5–10.1)
CHLORIDE SERPL-SCNC: 101 MMOL/L (ref 97–108)
CO2 SERPL-SCNC: 31 MMOL/L (ref 21–32)
CREAT SERPL-MCNC: 0.94 MG/DL (ref 0.7–1.3)
ERYTHROCYTE [DISTWIDTH] IN BLOOD BY AUTOMATED COUNT: 13.5 % (ref 11.5–14.5)
GLOBULIN SER CALC-MCNC: 4.6 G/DL (ref 2–4)
GLUCOSE BLD STRIP.AUTO-MCNC: 133 MG/DL (ref 65–117)
GLUCOSE BLD STRIP.AUTO-MCNC: 189 MG/DL (ref 65–117)
GLUCOSE BLD STRIP.AUTO-MCNC: 196 MG/DL (ref 65–117)
GLUCOSE BLD STRIP.AUTO-MCNC: 249 MG/DL (ref 65–117)
GLUCOSE SERPL-MCNC: 241 MG/DL (ref 65–100)
HCT VFR BLD AUTO: 35.4 % (ref 36.6–50.3)
HGB BLD-MCNC: 11.1 G/DL (ref 12.1–17)
MCH RBC QN AUTO: 26.2 PG (ref 26–34)
MCHC RBC AUTO-ENTMCNC: 31.4 G/DL (ref 30–36.5)
MCV RBC AUTO: 83.7 FL (ref 80–99)
NRBC # BLD: 0 K/UL (ref 0–0.01)
NRBC BLD-RTO: 0 PER 100 WBC
PLATELET # BLD AUTO: 227 K/UL (ref 150–400)
PMV BLD AUTO: 10.8 FL (ref 8.9–12.9)
POTASSIUM SERPL-SCNC: 3.9 MMOL/L (ref 3.5–5.1)
PROT SERPL-MCNC: 7.1 G/DL (ref 6.4–8.2)
RBC # BLD AUTO: 4.23 M/UL (ref 4.1–5.7)
SERVICE CMNT-IMP: ABNORMAL
SODIUM SERPL-SCNC: 137 MMOL/L (ref 136–145)
WBC # BLD AUTO: 4.2 K/UL (ref 4.1–11.1)

## 2023-03-15 PROCEDURE — 74011636637 HC RX REV CODE- 636/637: Performed by: FAMILY MEDICINE

## 2023-03-15 PROCEDURE — 99232 SBSQ HOSP IP/OBS MODERATE 35: CPT | Performed by: FAMILY MEDICINE

## 2023-03-15 PROCEDURE — 82962 GLUCOSE BLOOD TEST: CPT

## 2023-03-15 PROCEDURE — 74011636637 HC RX REV CODE- 636/637: Performed by: CLINICAL NURSE SPECIALIST

## 2023-03-15 PROCEDURE — 85027 COMPLETE CBC AUTOMATED: CPT

## 2023-03-15 PROCEDURE — 80053 COMPREHEN METABOLIC PANEL: CPT

## 2023-03-15 PROCEDURE — 74011000250 HC RX REV CODE- 250: Performed by: STUDENT IN AN ORGANIZED HEALTH CARE EDUCATION/TRAINING PROGRAM

## 2023-03-15 PROCEDURE — 74011250636 HC RX REV CODE- 250/636: Performed by: FAMILY MEDICINE

## 2023-03-15 PROCEDURE — 74011250637 HC RX REV CODE- 250/637: Performed by: STUDENT IN AN ORGANIZED HEALTH CARE EDUCATION/TRAINING PROGRAM

## 2023-03-15 PROCEDURE — 74011000258 HC RX REV CODE- 258: Performed by: STUDENT IN AN ORGANIZED HEALTH CARE EDUCATION/TRAINING PROGRAM

## 2023-03-15 PROCEDURE — 36415 COLL VENOUS BLD VENIPUNCTURE: CPT

## 2023-03-15 PROCEDURE — 74011250636 HC RX REV CODE- 250/636: Performed by: STUDENT IN AN ORGANIZED HEALTH CARE EDUCATION/TRAINING PROGRAM

## 2023-03-15 PROCEDURE — 65270000029 HC RM PRIVATE

## 2023-03-15 RX ADMIN — Medication 4 UNITS: at 12:16

## 2023-03-15 RX ADMIN — MEROPENEM 1 G: 1 INJECTION, POWDER, FOR SOLUTION INTRAVENOUS at 20:13

## 2023-03-15 RX ADMIN — SODIUM CHLORIDE, PRESERVATIVE FREE 10 ML: 5 INJECTION INTRAVENOUS at 10:20

## 2023-03-15 RX ADMIN — ENOXAPARIN SODIUM 30 MG: 100 INJECTION SUBCUTANEOUS at 23:00

## 2023-03-15 RX ADMIN — BUMETANIDE 2 MG: 0.25 INJECTION INTRAMUSCULAR; INTRAVENOUS at 10:20

## 2023-03-15 RX ADMIN — GABAPENTIN 100 MG: 100 CAPSULE ORAL at 10:20

## 2023-03-15 RX ADMIN — VANCOMYCIN HYDROCHLORIDE 1000 MG: 1 INJECTION, POWDER, LYOPHILIZED, FOR SOLUTION INTRAVENOUS at 13:14

## 2023-03-15 RX ADMIN — Medication 3 UNITS: at 10:21

## 2023-03-15 RX ADMIN — MEROPENEM 1 G: 1 INJECTION, POWDER, FOR SOLUTION INTRAVENOUS at 10:20

## 2023-03-15 RX ADMIN — SODIUM CHLORIDE, PRESERVATIVE FREE 10 ML: 5 INJECTION INTRAVENOUS at 21:56

## 2023-03-15 RX ADMIN — GABAPENTIN 100 MG: 100 CAPSULE ORAL at 17:32

## 2023-03-15 RX ADMIN — VANCOMYCIN HYDROCHLORIDE 1000 MG: 1 INJECTION, POWDER, LYOPHILIZED, FOR SOLUTION INTRAVENOUS at 21:49

## 2023-03-15 RX ADMIN — INSULIN GLARGINE 45 UNITS: 100 INJECTION, SOLUTION SUBCUTANEOUS at 10:19

## 2023-03-15 RX ADMIN — ENOXAPARIN SODIUM 30 MG: 100 INJECTION SUBCUTANEOUS at 12:15

## 2023-03-15 RX ADMIN — Medication 12 UNITS: at 12:18

## 2023-03-15 RX ADMIN — HYDROCODONE BITARTRATE AND ACETAMINOPHEN 1 TABLET: 10; 325 TABLET ORAL at 21:48

## 2023-03-15 RX ADMIN — ATORVASTATIN CALCIUM 40 MG: 40 TABLET, FILM COATED ORAL at 10:20

## 2023-03-15 RX ADMIN — Medication 12 UNITS: at 17:33

## 2023-03-15 RX ADMIN — BUMETANIDE 2 MG: 0.25 INJECTION INTRAMUSCULAR; INTRAVENOUS at 17:32

## 2023-03-15 RX ADMIN — GABAPENTIN 100 MG: 100 CAPSULE ORAL at 21:48

## 2023-03-15 RX ADMIN — SODIUM CHLORIDE, PRESERVATIVE FREE 10 ML: 5 INJECTION INTRAVENOUS at 13:38

## 2023-03-15 RX ADMIN — Medication 12 UNITS: at 10:19

## 2023-03-15 RX ADMIN — HYDROCODONE BITARTRATE AND ACETAMINOPHEN 1 TABLET: 10; 325 TABLET ORAL at 10:44

## 2023-03-15 RX ADMIN — MEROPENEM 1 G: 1 INJECTION, POWDER, FOR SOLUTION INTRAVENOUS at 03:00

## 2023-03-15 NOTE — PROGRESS NOTES
General Daily Progress Note    Admit Date: 3/11/2023  Hospital day 5    Subjective:     Patient has no complaint of fever, chills. He has been taking lantus 40 units daily as an outpatient, with amaryl 4 mg daily. He is on medicaid and it sounds like all his meds would be covered.  ..   Medication side effects: none    Current Facility-Administered Medications   Medication Dose Route Frequency    [START ON 3/16/2023] Vancomycin Level  1 Each Other ONCE    insulin glargine (LANTUS) injection 45 Units  45 Units SubCUTAneous DAILY    insulin lispro (HUMALOG) injection 12 Units  12 Units SubCUTAneous TIDAC    glucose chewable tablet 16 g  4 Tablet Oral PRN    glucagon (GLUCAGEN) injection 1 mg  1 mg IntraMUSCular PRN    dextrose 10% infusion 0-250 mL  0-250 mL IntraVENous PRN    insulin lispro (HUMALOG) injection   SubCUTAneous AC&HS    enoxaparin (LOVENOX) injection 30 mg  30 mg SubCUTAneous Q12H    vancomycin (VANCOCIN) 1,000 mg in 0.9% sodium chloride 250 mL ([id number])  1,000 mg IntraVENous Q8H    sodium chloride (NS) flush 5-40 mL  5-40 mL IntraVENous Q8H    sodium chloride (NS) flush 5-40 mL  5-40 mL IntraVENous PRN    acetaminophen (TYLENOL) tablet 650 mg  650 mg Oral Q6H PRN    Or    acetaminophen (TYLENOL) suppository 650 mg  650 mg Rectal Q6H PRN    polyethylene glycol (MIRALAX) packet 17 g  17 g Oral DAILY PRN    ondansetron (ZOFRAN ODT) tablet 4 mg  4 mg Oral Q8H PRN    Or    ondansetron (ZOFRAN) injection 4 mg  4 mg IntraVENous Q6H PRN    HYDROcodone-acetaminophen (NORCO)  mg tablet 1 Tablet  1 Tablet Oral Q6H PRN    melatonin tablet 3 mg  3 mg Oral QHS PRN    morphine injection 4 mg  4 mg IntraVENous Q4H PRN    atorvastatin (LIPITOR) tablet 40 mg  40 mg Oral DAILY    gabapentin (NEURONTIN) capsule 100 mg  100 mg Oral TID    meropenem (MERREM) 1 g in 0.9% sodium chloride (MBP/ADV) 50 mL MBP  1 g IntraVENous Q8H    bumetanide (BUMEX) injection 2 mg  2 mg IntraVENous BID        Review of Systems  Pertinent items are noted in HPI. Objective:     Patient Vitals for the past 8 hrs:   BP Temp Pulse Resp SpO2   03/15/23 1440 (!) 134/90 97.5 °F (36.4 °C) 86 13 98 %     No intake/output data recorded. 03/13 1901 - 03/15 0700  In: -   Out: 800 [Urine:800]    Physical Exam: Visit Vitals  BP (!) 134/90 (BP 1 Location: Right lower arm, BP Patient Position: Supine)   Pulse 86   Temp 97.5 °F (36.4 °C)   Resp 13   Ht 5' 6\" (1.676 m)   Wt 270 lb 8.1 oz (122.7 kg)   SpO2 98%   BMI 43.66 kg/m²     Lungs: clear to auscultation bilaterally  Heart: regular rate and rhythm, S1, S2 normal, no murmur, click, rub or gallop  Abdomen: soft, non-tender. Bowel sounds normal. No masses,  no organomegaly  Extremities: edema , 2+ bilaterally      ECG: normal sinus rhythm     Data Review   Recent Results (from the past 24 hour(s))   GLUCOSE, POC    Collection Time: 03/14/23  9:19 PM   Result Value Ref Range    Glucose (POC) 182 (H) 65 - 117 mg/dL    Performed by Isaura AMBROSE    CBC W/O DIFF    Collection Time: 03/15/23  5:13 AM   Result Value Ref Range    WBC 4.2 4.1 - 11.1 K/uL    RBC 4.23 4. 10 - 5.70 M/uL    HGB 11.1 (L) 12.1 - 17.0 g/dL    HCT 35.4 (L) 36.6 - 50.3 %    MCV 83.7 80.0 - 99.0 FL    MCH 26.2 26.0 - 34.0 PG    MCHC 31.4 30.0 - 36.5 g/dL    RDW 13.5 11.5 - 14.5 %    PLATELET 429 571 - 927 K/uL    MPV 10.8 8.9 - 12.9 FL    NRBC 0.0 0  WBC    ABSOLUTE NRBC 0.00 0.00 - 0.55 K/uL   METABOLIC PANEL, COMPREHENSIVE    Collection Time: 03/15/23  5:13 AM   Result Value Ref Range    Sodium 137 136 - 145 mmol/L    Potassium 3.9 3.5 - 5.1 mmol/L    Chloride 101 97 - 108 mmol/L    CO2 31 21 - 32 mmol/L    Anion gap 5 5 - 15 mmol/L    Glucose 241 (H) 65 - 100 mg/dL    BUN 18 6 - 20 MG/DL    Creatinine 0.94 0.70 - 1.30 MG/DL    BUN/Creatinine ratio 19 12 - 20      eGFR >60 >60 ml/min/1.73m2    Calcium 8.5 8.5 - 10.1 MG/DL    Bilirubin, total 0.2 0.2 - 1.0 MG/DL    ALT (SGPT) 14 12 - 78 U/L    AST (SGOT) 12 (L) 15 - 37 U/L    Alk. phosphatase 78 45 - 117 U/L    Protein, total 7.1 6.4 - 8.2 g/dL    Albumin 2.5 (L) 3.5 - 5.0 g/dL    Globulin 4.6 (H) 2.0 - 4.0 g/dL    A-G Ratio 0.5 (L) 1.1 - 2.2     GLUCOSE, POC    Collection Time: 03/15/23  6:34 AM   Result Value Ref Range    Glucose (POC) 189 (H) 65 - 117 mg/dL    Performed by Trae Farmer CON    GLUCOSE, POC    Collection Time: 03/15/23 11:09 AM   Result Value Ref Range    Glucose (POC) 249 (H) 65 - 117 mg/dL    Performed by Jose Muniz PCT    GLUCOSE, POC    Collection Time: 03/15/23  3:58 PM   Result Value Ref Range    Glucose (POC) 133 (H) 65 - 117 mg/dL    Performed by Annemarie Odonnell PCT            Assessment:     Active Problems:    Cellulitis (3/11/2023)        Plan:     Cellulitis (3/11/2023), worsening chronic venous stasis ulcers: On empiric vanc and meropenem, d 2. Wound care. Appreciate Ms. Neri help. Will send pt back to wound care at discharge. Worsening edema bilateral LE: Bumex, leg elevation. Needs compression stockings as an outpatient. DM 2:; lantus + SSI. increase lantus dose and humalog dose. DM neuropathy: Gabapentin started. Hyponatremia: Monitor BMP. Prostate Cancer: Continue outpatient follow up with urology. Morbid obesity  Full code  Diabetes. Contineu Lantus and preprandial Humalog- readings much better today. Pt. Is on medicaid and says that his meds are covered . Will start glp1 at discharge- Victoza is usually on most medicaid formularys.  Will continue lantus

## 2023-03-15 NOTE — PROGRESS NOTES
End of Shift Note    Bedside shift change report given to Richy Contreras (oncoming nurse) by Creta Schlatter, RN (offgoing nurse).   Report included the following information SBAR, Kardex, and MAR    Shift worked:  7P     Shift summary and any significant changes:     No     Concerns for physician to address:  N/A     Zone phone for oncoming shift:   [phone]       Activity:  Activity Level: Up ad amanda  Number times ambulated in hallways past shift: 0  Number of times OOB to chair past shift: 0    Cardiac:   Cardiac Monitoring: No           Access:  Current line(s): PIV     Genitourinary:   Urinary status: voiding    Respiratory:   O2 Device: None (Room air)  Chronic home O2 use?: NO  Incentive spirometer at bedside: YES       GI:  Last Bowel Movement Date: 03/11/23  Current diet:  ADULT DIET Regular  DIET ONE TIME MESSAGE  DIET ONE TIME MESSAGE  Passing flatus: YES  Tolerating current diet: YES       Pain Management:   Patient states pain is manageable on current regimen: YES    Skin:  Wesley Score: 23  Interventions: increase time out of bed    Patient Safety:  Fall Score:    Interventions: bed/chair alarm, assistive device (walker, cane, etc), gripper socks, pt to call before getting OOB, and stay with me (per policy)       Length of Stay:  Expected LOS: 3d 4h  Actual LOS: 4      Creta Schlatter, RN

## 2023-03-15 NOTE — PROGRESS NOTES
Problem: Falls - Risk of  Goal: *Absence of Falls  Description: Document Deneen Batres Fall Risk and appropriate interventions in the flowsheet. Outcome: Progressing Towards Goal  Note: Fall Risk Interventions:                                Problem: Patient Education: Go to Patient Education Activity  Goal: Patient/Family Education  Outcome: Progressing Towards Goal     Problem: Diabetes Self-Management  Goal: *Disease process and treatment process  Description: Define diabetes and identify own type of diabetes; list 3 options for treating diabetes. Outcome: Progressing Towards Goal  Goal: *Incorporating nutritional management into lifestyle  Description: Describe effect of type, amount and timing of food on blood glucose; list 3 methods for planning meals. Outcome: Progressing Towards Goal  Goal: *Incorporating physical activity into lifestyle  Description: State effect of exercise on blood glucose levels. Outcome: Progressing Towards Goal  Goal: *Developing strategies to promote health/change behavior  Description: Define the ABC's of diabetes; identify appropriate screenings, schedule and personal plan for screenings. Outcome: Progressing Towards Goal  Goal: *Using medications safely  Description: State effect of diabetes medications on diabetes; name diabetes medication taking, action and side effects. Outcome: Progressing Towards Goal  Goal: *Monitoring blood glucose, interpreting and using results  Description: Identify recommended blood glucose targets  and personal targets. Outcome: Progressing Towards Goal  Goal: *Prevention, detection, treatment of acute complications  Description: List symptoms of hyper- and hypoglycemia; describe how to treat low blood sugar and actions for lowering  high blood glucose level.   Outcome: Progressing Towards Goal  Goal: *Prevention, detection and treatment of chronic complications  Description: Define the natural course of diabetes and describe the relationship of blood glucose levels to long term complications of diabetes.   Outcome: Progressing Towards Goal  Goal: *Developing strategies to address psychosocial issues  Description: Describe feelings about living with diabetes; identify support needed and support network  Outcome: Progressing Towards Goal  Goal: *Insulin pump training  Outcome: Progressing Towards Goal  Goal: *Sick day guidelines  Outcome: Progressing Towards Goal  Goal: *Patient Specific Goal (EDIT GOAL, INSERT TEXT)  Outcome: Progressing Towards Goal     Problem: Patient Education: Go to Patient Education Activity  Goal: Patient/Family Education  Outcome: Progressing Towards Goal

## 2023-03-16 LAB
ALBUMIN SERPL-MCNC: 2.6 G/DL (ref 3.5–5)
ALBUMIN/GLOB SERPL: 0.6 (ref 1.1–2.2)
ALP SERPL-CCNC: 71 U/L (ref 45–117)
ALT SERPL-CCNC: 14 U/L (ref 12–78)
ANION GAP SERPL CALC-SCNC: 2 MMOL/L (ref 5–15)
AST SERPL-CCNC: 10 U/L (ref 15–37)
BILIRUB SERPL-MCNC: 0.3 MG/DL (ref 0.2–1)
BUN SERPL-MCNC: 15 MG/DL (ref 6–20)
BUN/CREAT SERPL: 18 (ref 12–20)
CALCIUM SERPL-MCNC: 8.7 MG/DL (ref 8.5–10.1)
CHLORIDE SERPL-SCNC: 101 MMOL/L (ref 97–108)
CO2 SERPL-SCNC: 31 MMOL/L (ref 21–32)
CREAT SERPL-MCNC: 0.84 MG/DL (ref 0.7–1.3)
ERYTHROCYTE [DISTWIDTH] IN BLOOD BY AUTOMATED COUNT: 13.2 % (ref 11.5–14.5)
GLOBULIN SER CALC-MCNC: 4.5 G/DL (ref 2–4)
GLUCOSE BLD STRIP.AUTO-MCNC: 118 MG/DL (ref 65–117)
GLUCOSE BLD STRIP.AUTO-MCNC: 141 MG/DL (ref 65–117)
GLUCOSE BLD STRIP.AUTO-MCNC: 197 MG/DL (ref 65–117)
GLUCOSE BLD STRIP.AUTO-MCNC: 259 MG/DL (ref 65–117)
GLUCOSE SERPL-MCNC: 195 MG/DL (ref 65–100)
HCT VFR BLD AUTO: 36.4 % (ref 36.6–50.3)
HGB BLD-MCNC: 11.3 G/DL (ref 12.1–17)
MCH RBC QN AUTO: 26.1 PG (ref 26–34)
MCHC RBC AUTO-ENTMCNC: 31 G/DL (ref 30–36.5)
MCV RBC AUTO: 84.1 FL (ref 80–99)
NRBC # BLD: 0 K/UL (ref 0–0.01)
NRBC BLD-RTO: 0 PER 100 WBC
PLATELET # BLD AUTO: 229 K/UL (ref 150–400)
PMV BLD AUTO: 10.9 FL (ref 8.9–12.9)
POTASSIUM SERPL-SCNC: 3.9 MMOL/L (ref 3.5–5.1)
PROT SERPL-MCNC: 7.1 G/DL (ref 6.4–8.2)
RBC # BLD AUTO: 4.33 M/UL (ref 4.1–5.7)
SERVICE CMNT-IMP: ABNORMAL
SODIUM SERPL-SCNC: 134 MMOL/L (ref 136–145)
VANCOMYCIN SERPL-MCNC: 20.2 UG/ML
WBC # BLD AUTO: 4.3 K/UL (ref 4.1–11.1)

## 2023-03-16 PROCEDURE — 74011250637 HC RX REV CODE- 250/637: Performed by: STUDENT IN AN ORGANIZED HEALTH CARE EDUCATION/TRAINING PROGRAM

## 2023-03-16 PROCEDURE — 74011250636 HC RX REV CODE- 250/636: Performed by: FAMILY MEDICINE

## 2023-03-16 PROCEDURE — 80202 ASSAY OF VANCOMYCIN: CPT

## 2023-03-16 PROCEDURE — 74011250636 HC RX REV CODE- 250/636: Performed by: STUDENT IN AN ORGANIZED HEALTH CARE EDUCATION/TRAINING PROGRAM

## 2023-03-16 PROCEDURE — 74011000258 HC RX REV CODE- 258: Performed by: STUDENT IN AN ORGANIZED HEALTH CARE EDUCATION/TRAINING PROGRAM

## 2023-03-16 PROCEDURE — 65270000029 HC RM PRIVATE

## 2023-03-16 PROCEDURE — 36415 COLL VENOUS BLD VENIPUNCTURE: CPT

## 2023-03-16 PROCEDURE — 80053 COMPREHEN METABOLIC PANEL: CPT

## 2023-03-16 PROCEDURE — 74011636637 HC RX REV CODE- 636/637: Performed by: CLINICAL NURSE SPECIALIST

## 2023-03-16 PROCEDURE — 85027 COMPLETE CBC AUTOMATED: CPT

## 2023-03-16 PROCEDURE — 99232 SBSQ HOSP IP/OBS MODERATE 35: CPT | Performed by: FAMILY MEDICINE

## 2023-03-16 PROCEDURE — 74011636637 HC RX REV CODE- 636/637: Performed by: FAMILY MEDICINE

## 2023-03-16 PROCEDURE — 82962 GLUCOSE BLOOD TEST: CPT

## 2023-03-16 PROCEDURE — 74011000250 HC RX REV CODE- 250: Performed by: STUDENT IN AN ORGANIZED HEALTH CARE EDUCATION/TRAINING PROGRAM

## 2023-03-16 RX ADMIN — SODIUM CHLORIDE, PRESERVATIVE FREE 10 ML: 5 INJECTION INTRAVENOUS at 15:37

## 2023-03-16 RX ADMIN — HYDROCODONE BITARTRATE AND ACETAMINOPHEN 1 TABLET: 10; 325 TABLET ORAL at 21:29

## 2023-03-16 RX ADMIN — BUMETANIDE 2 MG: 0.25 INJECTION INTRAMUSCULAR; INTRAVENOUS at 09:17

## 2023-03-16 RX ADMIN — Medication 12 UNITS: at 09:18

## 2023-03-16 RX ADMIN — VANCOMYCIN HYDROCHLORIDE 1000 MG: 1 INJECTION, POWDER, LYOPHILIZED, FOR SOLUTION INTRAVENOUS at 21:29

## 2023-03-16 RX ADMIN — BUMETANIDE 2 MG: 0.25 INJECTION INTRAMUSCULAR; INTRAVENOUS at 17:58

## 2023-03-16 RX ADMIN — GABAPENTIN 100 MG: 100 CAPSULE ORAL at 17:58

## 2023-03-16 RX ADMIN — HYDROCODONE BITARTRATE AND ACETAMINOPHEN 1 TABLET: 10; 325 TABLET ORAL at 05:38

## 2023-03-16 RX ADMIN — GABAPENTIN 100 MG: 100 CAPSULE ORAL at 09:17

## 2023-03-16 RX ADMIN — Medication 12 UNITS: at 12:47

## 2023-03-16 RX ADMIN — Medication 12 UNITS: at 17:58

## 2023-03-16 RX ADMIN — Medication 3 UNITS: at 17:59

## 2023-03-16 RX ADMIN — SODIUM CHLORIDE, PRESERVATIVE FREE 10 ML: 5 INJECTION INTRAVENOUS at 07:00

## 2023-03-16 RX ADMIN — ENOXAPARIN SODIUM 30 MG: 100 INJECTION SUBCUTANEOUS at 12:47

## 2023-03-16 RX ADMIN — ATORVASTATIN CALCIUM 40 MG: 40 TABLET, FILM COATED ORAL at 09:17

## 2023-03-16 RX ADMIN — ENOXAPARIN SODIUM 30 MG: 100 INJECTION SUBCUTANEOUS at 23:28

## 2023-03-16 RX ADMIN — SODIUM CHLORIDE, PRESERVATIVE FREE 10 ML: 5 INJECTION INTRAVENOUS at 22:10

## 2023-03-16 RX ADMIN — MEROPENEM 1 G: 1 INJECTION, POWDER, FOR SOLUTION INTRAVENOUS at 12:49

## 2023-03-16 RX ADMIN — VANCOMYCIN HYDROCHLORIDE 1000 MG: 1 INJECTION, POWDER, LYOPHILIZED, FOR SOLUTION INTRAVENOUS at 12:48

## 2023-03-16 RX ADMIN — Medication 3 UNITS: at 09:19

## 2023-03-16 RX ADMIN — Medication 7 UNITS: at 12:48

## 2023-03-16 RX ADMIN — INSULIN GLARGINE 45 UNITS: 100 INJECTION, SOLUTION SUBCUTANEOUS at 09:17

## 2023-03-16 RX ADMIN — GABAPENTIN 100 MG: 100 CAPSULE ORAL at 21:29

## 2023-03-16 RX ADMIN — VANCOMYCIN HYDROCHLORIDE 1000 MG: 1 INJECTION, POWDER, LYOPHILIZED, FOR SOLUTION INTRAVENOUS at 05:34

## 2023-03-16 RX ADMIN — MEROPENEM 1 G: 1 INJECTION, POWDER, FOR SOLUTION INTRAVENOUS at 03:53

## 2023-03-16 RX ADMIN — MEROPENEM 1 G: 1 INJECTION, POWDER, FOR SOLUTION INTRAVENOUS at 21:30

## 2023-03-16 NOTE — PROGRESS NOTES
General Daily Progress Note    Admit Date: 3/11/2023  Hospital day 6    Subjective:     Patient has no complaint of leg pain, fever, chills. ..   Medication side effects: none    Current Facility-Administered Medications   Medication Dose Route Frequency    insulin glargine (LANTUS) injection 45 Units  45 Units SubCUTAneous DAILY    insulin lispro (HUMALOG) injection 12 Units  12 Units SubCUTAneous TIDAC    glucose chewable tablet 16 g  4 Tablet Oral PRN    glucagon (GLUCAGEN) injection 1 mg  1 mg IntraMUSCular PRN    dextrose 10% infusion 0-250 mL  0-250 mL IntraVENous PRN    insulin lispro (HUMALOG) injection   SubCUTAneous AC&HS    enoxaparin (LOVENOX) injection 30 mg  30 mg SubCUTAneous Q12H    vancomycin (VANCOCIN) 1,000 mg in 0.9% sodium chloride 250 mL ([id number])  1,000 mg IntraVENous Q8H    sodium chloride (NS) flush 5-40 mL  5-40 mL IntraVENous Q8H    sodium chloride (NS) flush 5-40 mL  5-40 mL IntraVENous PRN    acetaminophen (TYLENOL) tablet 650 mg  650 mg Oral Q6H PRN    Or    acetaminophen (TYLENOL) suppository 650 mg  650 mg Rectal Q6H PRN    polyethylene glycol (MIRALAX) packet 17 g  17 g Oral DAILY PRN    ondansetron (ZOFRAN ODT) tablet 4 mg  4 mg Oral Q8H PRN    Or    ondansetron (ZOFRAN) injection 4 mg  4 mg IntraVENous Q6H PRN    HYDROcodone-acetaminophen (NORCO)  mg tablet 1 Tablet  1 Tablet Oral Q6H PRN    melatonin tablet 3 mg  3 mg Oral QHS PRN    morphine injection 4 mg  4 mg IntraVENous Q4H PRN    atorvastatin (LIPITOR) tablet 40 mg  40 mg Oral DAILY    gabapentin (NEURONTIN) capsule 100 mg  100 mg Oral TID    meropenem (MERREM) 1 g in 0.9% sodium chloride (MBP/ADV) 50 mL MBP  1 g IntraVENous Q8H    bumetanide (BUMEX) injection 2 mg  2 mg IntraVENous BID        Review of Systems  Pertinent items are noted in HPI. Objective:     Patient Vitals for the past 8 hrs:   BP Temp Pulse Resp SpO2   03/16/23 0752 (!) 142/87 97.7 °F (36.5 °C) 90 19 97 %     No intake/output data recorded.   No intake/output data recorded. Physical Exam: Visit Vitals  BP (!) 142/87   Pulse 90   Temp 97.7 °F (36.5 °C)   Resp 19   Ht 5' 6\" (1.676 m)   Wt 270 lb 8.1 oz (122.7 kg)   SpO2 97%   BMI 43.66 kg/m²     Lungs: clear to auscultation bilaterally  Heart: regular rate and rhythm, S1, S2 normal, no murmur, click, rub or gallop  Abdomen: soft, non-tender. Bowel sounds normal. No masses,  no organomegaly  Extremities: edema , 2+ bilaterally. Ulcers are healing up on anterior leg.        ECG: normal sinus rhythm     Data Review   Recent Results (from the past 24 hour(s))   GLUCOSE, POC    Collection Time: 03/15/23 11:09 AM   Result Value Ref Range    Glucose (POC) 249 (H) 65 - 117 mg/dL    Performed by Stefanie Plasencia PCT    GLUCOSE, POC    Collection Time: 03/15/23  3:58 PM   Result Value Ref Range    Glucose (POC) 133 (H) 65 - 117 mg/dL    Performed by Kirstin Hitchcock PCT    GLUCOSE, POC    Collection Time: 03/15/23 10:31 PM   Result Value Ref Range    Glucose (POC) 196 (H) 65 - 117 mg/dL    Performed by Kirstin Hitchcock PCT    CBC W/O DIFF    Collection Time: 03/16/23  1:53 AM   Result Value Ref Range    WBC 4.3 4.1 - 11.1 K/uL    RBC 4.33 4.10 - 5.70 M/uL    HGB 11.3 (L) 12.1 - 17.0 g/dL    HCT 36.4 (L) 36.6 - 50.3 %    MCV 84.1 80.0 - 99.0 FL    MCH 26.1 26.0 - 34.0 PG    MCHC 31.0 30.0 - 36.5 g/dL    RDW 13.2 11.5 - 14.5 %    PLATELET 895 772 - 621 K/uL    MPV 10.9 8.9 - 12.9 FL    NRBC 0.0 0  WBC    ABSOLUTE NRBC 0.00 0.00 - 6.68 K/uL   METABOLIC PANEL, COMPREHENSIVE    Collection Time: 03/16/23  1:53 AM   Result Value Ref Range    Sodium 134 (L) 136 - 145 mmol/L    Potassium 3.9 3.5 - 5.1 mmol/L    Chloride 101 97 - 108 mmol/L    CO2 31 21 - 32 mmol/L    Anion gap 2 (L) 5 - 15 mmol/L    Glucose 195 (H) 65 - 100 mg/dL    BUN 15 6 - 20 MG/DL    Creatinine 0.84 0.70 - 1.30 MG/DL    BUN/Creatinine ratio 18 12 - 20      eGFR >60 >60 ml/min/1.73m2    Calcium 8.7 8.5 - 10.1 MG/DL    Bilirubin, total 0.3 0.2 - 1.0 MG/DL ALT (SGPT) 14 12 - 78 U/L    AST (SGOT) 10 (L) 15 - 37 U/L    Alk. phosphatase 71 45 - 117 U/L    Protein, total 7.1 6.4 - 8.2 g/dL    Albumin 2.6 (L) 3.5 - 5.0 g/dL    Globulin 4.5 (H) 2.0 - 4.0 g/dL    A-G Ratio 0.6 (L) 1.1 - 2.2     VANCOMYCIN, RANDOM    Collection Time: 03/16/23  1:53 AM   Result Value Ref Range    Vancomycin, random 20.2 UG/ML   GLUCOSE, POC    Collection Time: 03/16/23  8:46 AM   Result Value Ref Range    Glucose (POC) 197 (H) 65 - 117 mg/dL    Performed by Joe Ventura PCT            Assessment:     Active Problems:    Cellulitis (3/11/2023)        Plan:     Cellulitis (3/11/2023), worsening chronic venous stasis ulcers: On empiric vanc and meropenem, d 2. Wound care. Appreciate Ms. Neri help. Will send pt back to wound care at discharge. Worsening edema bilateral LE: Bumex, leg elevation. Needs compression stockings as an outpatient. DM 2:; lantus + SSI. increase lantus dose and humalog dose. DM neuropathy: Gabapentin started. Hyponatremia: Monitor BMP. Prostate Cancer: Continue outpatient follow up with urology. Morbid obesity  Full code  Diabetes. Contineu Lantus and preprandial Humalog- readings much better today. Pt. Is on medicaid and says that his meds are covered . Will start glp1 at discharge- Victoza is usually on most medicaid formularys.  Will continue lantus

## 2023-03-16 NOTE — DIABETES MGMT
[address] [place] [address]  DIABETES MANAGEMENT CONSULT    Consulted by Provider for advanced nursing evaluation and care for inpatient blood glucose management. Evaluation and Action Plan   This 62year old AA male was admitted 3/11/23 from the ER with left lower leg ulceration and is being treated medically with antibiotics and diuretics. Bgs were in the 200-300s since admission. Patient admits to not taking any diabetes medications. To promote healing, began a basal/bolus corrective insulin strategy yesterday before adding mealtime insulin coverage as well. This has moved BG pattern into the mid 100s. Will likely need more mealtime insulin at discharge as he eats larger starch portions at home. BG's have been elevated in the morning x2 days on the current basal dosing. Consider an increase to (0.45 xkg). Management Rationale Action Plan   Medication   Basal needs Using 0.3 units/kg/D  Consider increase to 55 units Lantus daily   Nutritional needs Using IR sensitivity based on weight & BMI  Continue 12 units Humalog with consumed meals     Corrective insulin Using IR sensitivity based on weight & BMI    Additional orders   Carb-controlled meals     Diabetes Discharge Plan   Medication  TBD   Referral  [x]        Outpatient diabetes education   Additional orders            Initial Presentation   Juanito Alexis is a 62 y.o. male admitted from the ER 3/11/23 after noting increased pain & drainage from left leg ulceration. Denied trauma, fever or chills, chest pain, cough or dyspnea. Afebrile. Hypertensive  ER exam:  Musculoskeletal:      Cervical back: Normal range of motion. Right lower leg: Edema present. Left lower leg: Edema present. Comments: Evidence   LAB: Normal WBC. Low H&H. Hyperglycemic. Normal kidney & liver parameters. BG elevated.  Lactic normal  Left leg & foot XR: No plain radiographic evidence of osteomyelitis    HX:   Past Medical History:   Diagnosis Date    Asthma     Chronic venous insufficiency     Diabetes ([facility] [address]     Lower leg edema     Prostate cancer ([facility] [address]     gets chemo at [facility] [place]      INITIAL DX:   Cellulitis [L03.90]     Current Treatment     TX: Diuresis. Lipitor. BP & pain meds. Abx. Insulin. Clot prevention    Hospital Course   Clinical progress has been uncomplicated. 3/14/23 Wound care addressing wounds: Both lower legs have healing wounds. No s/sx of severe infection. There is edema but there are good pulses and patient moves the feet well    Diabetes History   Patient was treated for \"cancer\" 5-6 years ago  Developed diabetes 3-4 years ago; treated with metformin but stopped it due to GI distress    Diabetes-related Medical History - Denies all complications of diabetes but peripheral neuropathy noted in EMR note    Diabetes Medication History - Is not taking any medication  Key Antihyperglycemic Medications               insulin glargine (LANTUS) 100 unit/mL injection (Taking) Take 25 Units by SubCUTAneous route in the morning. glimepiride (AMARYL) 4 mg tablet (Taking) Take 1 Tablet by mouth every morning. Indications: type 2 diabetes mellitus           Diabetes self-management practices:   Eating pattern - \"I eat whatever I want whenever I want. 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 [] Pneumonia: There is no immunization history for the selected administration types on file for this patient. [] Hepatitis: There is no immunization history for the selected administration types on file for this patient.   Overall evaluation:    [x] Not achieving A1c target with drug therapy & self-care practices    Subjective   I understand that I have to check my blood sugars from now on      Objective   Physical exam  General Obese male in no acute distress. Conversant and cooperative  Neuro  Alert, oriented   Vital Signs Visit Vitals  BP (!) 142/87   Pulse 90   Temp 97.7 °F (36.5 °C)   Resp 19   Ht 5' 6\" (1.676 m)   Wt 122.7 kg (270 lb 8.1 oz)   SpO2 97%   BMI 43.66 kg/m²     Laboratory  Recent Labs     03/16/23  0153 03/15/23  0513 03/14/23  0246   * 241* 158*   AGAP 2* 5 5   WBC 4.3 4.2 4.8   CREA 0.84 0.94 0.86   AST 10* 12* 8*   ALT 14 14 12     TSH normal    Factors impacting BG management  Factor Dose Comments   Nutrition:  Standard meals      Has complaints about food   Pain PRN acetaminophen  PRN Norco  PRN morphine    Infection Merrem Q8 hrs X 20 doses  Vanc Q8 hrs     Other:   Kidney function  Liver function   Normal   Normal      Blood glucose pattern    Significant diabetes-related events over the past 24-72 hours  3/11/23 Admission   3/12/23 Began basal insulin @0.2 units/kg/D along with correction  3/13/23 Bgs remain in the 200-300s  3/14/23 3am . Dosing advanced   3/16/2023  BG's elevated in the morning x 2.      Assessment and Nursing Intervention   Nursing Diagnosis Risk for unstable blood glucose pattern   Nursing Intervention Domain 8574 Decision-making Support   Nursing Interventions Examined current inpatient diabetes/blood glucose control   Explored factors facilitating and impeding inpatient management  Explored corrective strategies with patient and responsible inpatient provider   Informed patient of rational for insulin strategy while hospitalized         Fredi Fernandez CNS  Diabetes Clinical Nurse Specialist  [facility]  Access via [address] [place] [address]

## 2023-03-17 LAB
BACTERIA SPEC CULT: NORMAL
GLUCOSE BLD STRIP.AUTO-MCNC: 117 MG/DL (ref 65–117)
GLUCOSE BLD STRIP.AUTO-MCNC: 123 MG/DL (ref 65–117)
GLUCOSE BLD STRIP.AUTO-MCNC: 200 MG/DL (ref 65–117)
GLUCOSE BLD STRIP.AUTO-MCNC: 217 MG/DL (ref 65–117)
GLUCOSE BLD STRIP.AUTO-MCNC: 92 MG/DL (ref 65–117)
SERVICE CMNT-IMP: ABNORMAL
SERVICE CMNT-IMP: NORMAL

## 2023-03-17 PROCEDURE — 82962 GLUCOSE BLOOD TEST: CPT

## 2023-03-17 PROCEDURE — 74011636637 HC RX REV CODE- 636/637: Performed by: CLINICAL NURSE SPECIALIST

## 2023-03-17 PROCEDURE — 74011636637 HC RX REV CODE- 636/637: Performed by: FAMILY MEDICINE

## 2023-03-17 PROCEDURE — 74011250636 HC RX REV CODE- 250/636: Performed by: STUDENT IN AN ORGANIZED HEALTH CARE EDUCATION/TRAINING PROGRAM

## 2023-03-17 PROCEDURE — 74011000258 HC RX REV CODE- 258: Performed by: STUDENT IN AN ORGANIZED HEALTH CARE EDUCATION/TRAINING PROGRAM

## 2023-03-17 PROCEDURE — 74011000250 HC RX REV CODE- 250: Performed by: STUDENT IN AN ORGANIZED HEALTH CARE EDUCATION/TRAINING PROGRAM

## 2023-03-17 PROCEDURE — 99232 SBSQ HOSP IP/OBS MODERATE 35: CPT | Performed by: FAMILY MEDICINE

## 2023-03-17 PROCEDURE — 65270000029 HC RM PRIVATE

## 2023-03-17 PROCEDURE — 74011250637 HC RX REV CODE- 250/637: Performed by: STUDENT IN AN ORGANIZED HEALTH CARE EDUCATION/TRAINING PROGRAM

## 2023-03-17 PROCEDURE — 74011250636 HC RX REV CODE- 250/636: Performed by: FAMILY MEDICINE

## 2023-03-17 RX ADMIN — VANCOMYCIN HYDROCHLORIDE 1000 MG: 1 INJECTION, POWDER, LYOPHILIZED, FOR SOLUTION INTRAVENOUS at 22:25

## 2023-03-17 RX ADMIN — MEROPENEM 1 G: 1 INJECTION, POWDER, FOR SOLUTION INTRAVENOUS at 09:39

## 2023-03-17 RX ADMIN — INSULIN GLARGINE 45 UNITS: 100 INJECTION, SOLUTION SUBCUTANEOUS at 09:38

## 2023-03-17 RX ADMIN — SODIUM CHLORIDE, PRESERVATIVE FREE 10 ML: 5 INJECTION INTRAVENOUS at 17:40

## 2023-03-17 RX ADMIN — GABAPENTIN 100 MG: 100 CAPSULE ORAL at 09:40

## 2023-03-17 RX ADMIN — GABAPENTIN 100 MG: 100 CAPSULE ORAL at 22:25

## 2023-03-17 RX ADMIN — Medication 4 UNITS: at 12:50

## 2023-03-17 RX ADMIN — Medication 4 UNITS: at 09:47

## 2023-03-17 RX ADMIN — ATORVASTATIN CALCIUM 40 MG: 40 TABLET, FILM COATED ORAL at 09:40

## 2023-03-17 RX ADMIN — BUMETANIDE 2 MG: 0.25 INJECTION INTRAMUSCULAR; INTRAVENOUS at 17:39

## 2023-03-17 RX ADMIN — VANCOMYCIN HYDROCHLORIDE 1000 MG: 1 INJECTION, POWDER, LYOPHILIZED, FOR SOLUTION INTRAVENOUS at 12:50

## 2023-03-17 RX ADMIN — MEROPENEM 1 G: 1 INJECTION, POWDER, FOR SOLUTION INTRAVENOUS at 05:38

## 2023-03-17 RX ADMIN — GABAPENTIN 100 MG: 100 CAPSULE ORAL at 17:39

## 2023-03-17 RX ADMIN — VANCOMYCIN HYDROCHLORIDE 1000 MG: 1 INJECTION, POWDER, LYOPHILIZED, FOR SOLUTION INTRAVENOUS at 05:38

## 2023-03-17 RX ADMIN — Medication 12 UNITS: at 17:39

## 2023-03-17 RX ADMIN — Medication 12 UNITS: at 12:50

## 2023-03-17 RX ADMIN — SODIUM CHLORIDE, PRESERVATIVE FREE 10 ML: 5 INJECTION INTRAVENOUS at 05:38

## 2023-03-17 RX ADMIN — SODIUM CHLORIDE, PRESERVATIVE FREE 10 ML: 5 INJECTION INTRAVENOUS at 22:25

## 2023-03-17 RX ADMIN — BUMETANIDE 2 MG: 0.25 INJECTION INTRAMUSCULAR; INTRAVENOUS at 09:40

## 2023-03-17 RX ADMIN — MEROPENEM 1 G: 1 INJECTION, POWDER, FOR SOLUTION INTRAVENOUS at 17:38

## 2023-03-17 RX ADMIN — ENOXAPARIN SODIUM 30 MG: 100 INJECTION SUBCUTANEOUS at 12:50

## 2023-03-17 RX ADMIN — Medication 12 UNITS: at 09:46

## 2023-03-17 RX ADMIN — ENOXAPARIN SODIUM 30 MG: 100 INJECTION SUBCUTANEOUS at 22:25

## 2023-03-17 NOTE — PROGRESS NOTES
Problem: Falls - Risk of  Goal: *Absence of Falls  Description: Document Maya [place] Fall Risk and appropriate interventions in the flowsheet. Outcome: Progressing Towards Goal  Note: Fall Risk Interventions:                                Problem: Patient Education: Go to Patient Education Activity  Goal: Patient/Family Education  Outcome: Progressing Towards Goal     Problem: Diabetes Self-Management  Goal: *Disease process and treatment process  Description: Define diabetes and identify own type of diabetes; list 3 options for treating diabetes. Outcome: Progressing Towards Goal  Goal: *Incorporating nutritional management into lifestyle  Description: Describe effect of type, amount and timing of food on blood glucose; list 3 methods for planning meals. Outcome: Progressing Towards Goal  Goal: *Incorporating physical activity into lifestyle  Description: State effect of exercise on blood glucose levels. Outcome: Progressing Towards Goal  Goal: *Developing strategies to promote health/change behavior  Description: Define the ABC's of diabetes; identify appropriate screenings, schedule and personal plan for screenings. Outcome: Progressing Towards Goal  Goal: *Using medications safely  Description: State effect of diabetes medications on diabetes; name diabetes medication taking, action and side effects. Outcome: Progressing Towards Goal  Goal: *Monitoring blood glucose, interpreting and using results  Description: Identify recommended blood glucose targets  and personal targets. Outcome: Progressing Towards Goal  Goal: *Prevention, detection, treatment of acute complications  Description: List symptoms of hyper- and hypoglycemia; describe how to treat low blood sugar and actions for lowering  high blood glucose level.   Outcome: Progressing Towards Goal  Goal: *Prevention, detection and treatment of chronic complications  Description: Define the natural course of diabetes and describe the relationship of blood glucose levels to long term complications of diabetes.   Outcome: Progressing Towards Goal  Goal: *Developing strategies to address psychosocial issues  Description: Describe feelings about living with diabetes; identify support needed and support network  Outcome: Progressing Towards Goal  Goal: *Insulin pump training  Outcome: Progressing Towards Goal  Goal: *Sick day guidelines  Outcome: Progressing Towards Goal  Goal: *Patient Specific Goal (EDIT GOAL, INSERT TEXT)  Outcome: Progressing Towards Goal     Problem: Patient Education: Go to Patient Education Activity  Goal: Patient/Family Education  Outcome: Progressing Towards Goal

## 2023-03-17 NOTE — PROGRESS NOTES
General Daily Progress Note    Admit Date: 3/11/2023  Hospital day 7    Subjective:     Patient has no complaint of fever, chills, leg pain. ..   Medication side effects: none    Current Facility-Administered Medications   Medication Dose Route Frequency    insulin glargine (LANTUS) injection 45 Units  45 Units SubCUTAneous DAILY    insulin lispro (HUMALOG) injection 12 Units  12 Units SubCUTAneous TIDAC    glucose chewable tablet 16 g  4 Tablet Oral PRN    glucagon (GLUCAGEN) injection 1 mg  1 mg IntraMUSCular PRN    dextrose 10% infusion 0-250 mL  0-250 mL IntraVENous PRN    insulin lispro (HUMALOG) injection   SubCUTAneous AC&HS    enoxaparin (LOVENOX) injection 30 mg  30 mg SubCUTAneous Q12H    vancomycin (VANCOCIN) 1,000 mg in 0.9% sodium chloride 250 mL ([id number])  1,000 mg IntraVENous Q8H    sodium chloride (NS) flush 5-40 mL  5-40 mL IntraVENous Q8H    sodium chloride (NS) flush 5-40 mL  5-40 mL IntraVENous PRN    acetaminophen (TYLENOL) tablet 650 mg  650 mg Oral Q6H PRN    Or    acetaminophen (TYLENOL) suppository 650 mg  650 mg Rectal Q6H PRN    polyethylene glycol (MIRALAX) packet 17 g  17 g Oral DAILY PRN    ondansetron (ZOFRAN ODT) tablet 4 mg  4 mg Oral Q8H PRN    Or    ondansetron (ZOFRAN) injection 4 mg  4 mg IntraVENous Q6H PRN    HYDROcodone-acetaminophen (NORCO)  mg tablet 1 Tablet  1 Tablet Oral Q6H PRN    melatonin tablet 3 mg  3 mg Oral QHS PRN    morphine injection 4 mg  4 mg IntraVENous Q4H PRN    atorvastatin (LIPITOR) tablet 40 mg  40 mg Oral DAILY    gabapentin (NEURONTIN) capsule 100 mg  100 mg Oral TID    meropenem (MERREM) 1 g in 0.9% sodium chloride (MBP/ADV) 50 mL MBP  1 g IntraVENous Q8H    bumetanide (BUMEX) injection 2 mg  2 mg IntraVENous BID        Review of Systems  Pertinent items are noted in HPI. Objective:     No data found. No intake/output data recorded. No intake/output data recorded.     Physical Exam: Visit Vitals  /78 (BP 1 Location: Right upper arm, BP Patient Position: Supine)   Pulse 100   Temp 98.1 °F (36.7 °C)   Resp 18   Ht 5' 6\" (1.676 m)   Wt 270 lb 8.1 oz (122.7 kg)   SpO2 98%   BMI 43.66 kg/m²     Lungs: clear to auscultation bilaterally  Heart: regular rate and rhythm, S1, S2 normal, no murmur, click, rub or gallop  Abdomen: soft, non-tender. Bowel sounds normal. No masses,  no organomegaly  Extremities: edema , 1+ edema bilaterally      ECG: normal sinus rhythm     Data Review   Recent Results (from the past 24 hour(s))   GLUCOSE, POC    Collection Time: 03/16/23  8:46 AM   Result Value Ref Range    Glucose (POC) 197 (H) 65 - 117 mg/dL    Performed by Rosemarie Lord PCT    GLUCOSE, POC    Collection Time: 03/16/23 10:58 AM   Result Value Ref Range    Glucose (POC) 259 (H) 65 - 117 mg/dL    Performed by Rosemarie Lord PCT    GLUCOSE, POC    Collection Time: 03/16/23  4:19 PM   Result Value Ref Range    Glucose (POC) 141 (H) 65 - 117 mg/dL    Performed by Naya Ray PCT    GLUCOSE, POC    Collection Time: 03/16/23  8:58 PM   Result Value Ref Range    Glucose (POC) 118 (H) 65 - 117 mg/dL    Performed by Jessica Gilbert PCT            Assessment:     Active Problems:    Cellulitis (3/11/2023)        Plan:     Cellulitis (3/11/2023), worsening chronic venous stasis ulcers: On empiric vanc and meropenem, d 2. Wound care. Appreciate Ms. Neri help. Will send pt back to wound care at discharge. Worsening edema bilateral LE: Bumex, leg elevation. Needs compression stockings as an outpatient. DM 2:; lantus + SSI. increase lantus dose and humalog dose. DM neuropathy: Gabapentin started. Hyponatremia: Monitor BMP. Prostate Cancer: Continue outpatient follow up with urology. Morbid obesity  Full code  Diabetes. Contineu Lantus and preprandial Humalog- readings much better today. Pt. Is on medicaid and says that his meds are covered . Will start glp1 at discharge- Victoza is usually on most medicaid formularys.  Will continue lantus  Disposition- if stable- plan to dc home tomorrow.

## 2023-03-18 VITALS
RESPIRATION RATE: 16 BRPM | HEART RATE: 79 BPM | HEIGHT: 66 IN | DIASTOLIC BLOOD PRESSURE: 94 MMHG | BODY MASS INDEX: 43.47 KG/M2 | OXYGEN SATURATION: 100 % | TEMPERATURE: 97.7 F | SYSTOLIC BLOOD PRESSURE: 137 MMHG | WEIGHT: 270.5 LBS

## 2023-03-18 LAB
GLUCOSE BLD STRIP.AUTO-MCNC: 120 MG/DL (ref 65–117)
GLUCOSE BLD STRIP.AUTO-MCNC: 124 MG/DL (ref 65–117)
GLUCOSE BLD STRIP.AUTO-MCNC: 180 MG/DL (ref 65–117)
SERVICE CMNT-IMP: ABNORMAL

## 2023-03-18 PROCEDURE — 99239 HOSP IP/OBS DSCHRG MGMT >30: CPT | Performed by: FAMILY MEDICINE

## 2023-03-18 PROCEDURE — 74011000258 HC RX REV CODE- 258: Performed by: STUDENT IN AN ORGANIZED HEALTH CARE EDUCATION/TRAINING PROGRAM

## 2023-03-18 PROCEDURE — 74011636637 HC RX REV CODE- 636/637: Performed by: CLINICAL NURSE SPECIALIST

## 2023-03-18 PROCEDURE — 74011250636 HC RX REV CODE- 250/636: Performed by: FAMILY MEDICINE

## 2023-03-18 PROCEDURE — 74011000250 HC RX REV CODE- 250: Performed by: STUDENT IN AN ORGANIZED HEALTH CARE EDUCATION/TRAINING PROGRAM

## 2023-03-18 PROCEDURE — 74011250636 HC RX REV CODE- 250/636: Performed by: STUDENT IN AN ORGANIZED HEALTH CARE EDUCATION/TRAINING PROGRAM

## 2023-03-18 PROCEDURE — 74011636637 HC RX REV CODE- 636/637: Performed by: FAMILY MEDICINE

## 2023-03-18 PROCEDURE — 74011250637 HC RX REV CODE- 250/637: Performed by: STUDENT IN AN ORGANIZED HEALTH CARE EDUCATION/TRAINING PROGRAM

## 2023-03-18 PROCEDURE — 82962 GLUCOSE BLOOD TEST: CPT

## 2023-03-18 RX ORDER — INSULIN GLARGINE 100 [IU]/ML
INJECTION, SOLUTION SUBCUTANEOUS
Qty: 15 ML | Refills: 12 | Status: SHIPPED | OUTPATIENT
Start: 2023-03-18

## 2023-03-18 RX ADMIN — GABAPENTIN 100 MG: 100 CAPSULE ORAL at 17:06

## 2023-03-18 RX ADMIN — Medication 12 UNITS: at 08:13

## 2023-03-18 RX ADMIN — MEROPENEM 1 G: 1 INJECTION, POWDER, FOR SOLUTION INTRAVENOUS at 02:18

## 2023-03-18 RX ADMIN — BUMETANIDE 2 MG: 0.25 INJECTION INTRAMUSCULAR; INTRAVENOUS at 09:47

## 2023-03-18 RX ADMIN — GABAPENTIN 100 MG: 100 CAPSULE ORAL at 09:47

## 2023-03-18 RX ADMIN — Medication 3 UNITS: at 12:23

## 2023-03-18 RX ADMIN — INSULIN GLARGINE 45 UNITS: 100 INJECTION, SOLUTION SUBCUTANEOUS at 09:00

## 2023-03-18 RX ADMIN — Medication 12 UNITS: at 12:22

## 2023-03-18 RX ADMIN — VANCOMYCIN HYDROCHLORIDE 1000 MG: 1 INJECTION, POWDER, LYOPHILIZED, FOR SOLUTION INTRAVENOUS at 12:45

## 2023-03-18 RX ADMIN — Medication 12 UNITS: at 17:02

## 2023-03-18 RX ADMIN — SODIUM CHLORIDE, PRESERVATIVE FREE 10 ML: 5 INJECTION INTRAVENOUS at 14:18

## 2023-03-18 RX ADMIN — SODIUM CHLORIDE, PRESERVATIVE FREE 10 ML: 5 INJECTION INTRAVENOUS at 05:45

## 2023-03-18 RX ADMIN — MEROPENEM 1 G: 1 INJECTION, POWDER, FOR SOLUTION INTRAVENOUS at 09:48

## 2023-03-18 RX ADMIN — VANCOMYCIN HYDROCHLORIDE 1000 MG: 1 INJECTION, POWDER, LYOPHILIZED, FOR SOLUTION INTRAVENOUS at 05:44

## 2023-03-18 RX ADMIN — ATORVASTATIN CALCIUM 40 MG: 40 TABLET, FILM COATED ORAL at 09:47

## 2023-03-18 RX ADMIN — ENOXAPARIN SODIUM 30 MG: 100 INJECTION SUBCUTANEOUS at 12:22

## 2023-03-18 NOTE — PROGRESS NOTES
General Daily Progress Note    Admit Date: 3/11/2023  Hospital day 6    Subjective:     Patient has no complaint of fever. Chills. Legs are feeling better. ..   Medication side effects: none    Current Facility-Administered Medications   Medication Dose Route Frequency    insulin glargine (LANTUS) injection 45 Units  45 Units SubCUTAneous DAILY    insulin lispro (HUMALOG) injection 12 Units  12 Units SubCUTAneous TIDAC    glucose chewable tablet 16 g  4 Tablet Oral PRN    glucagon (GLUCAGEN) injection 1 mg  1 mg IntraMUSCular PRN    dextrose 10% infusion 0-250 mL  0-250 mL IntraVENous PRN    insulin lispro (HUMALOG) injection   SubCUTAneous AC&HS    enoxaparin (LOVENOX) injection 30 mg  30 mg SubCUTAneous Q12H    vancomycin (VANCOCIN) 1,000 mg in 0.9% sodium chloride 250 mL ([id number])  1,000 mg IntraVENous Q8H    sodium chloride (NS) flush 5-40 mL  5-40 mL IntraVENous Q8H    sodium chloride (NS) flush 5-40 mL  5-40 mL IntraVENous PRN    acetaminophen (TYLENOL) tablet 650 mg  650 mg Oral Q6H PRN    Or    acetaminophen (TYLENOL) suppository 650 mg  650 mg Rectal Q6H PRN    polyethylene glycol (MIRALAX) packet 17 g  17 g Oral DAILY PRN    ondansetron (ZOFRAN ODT) tablet 4 mg  4 mg Oral Q8H PRN    Or    ondansetron (ZOFRAN) injection 4 mg  4 mg IntraVENous Q6H PRN    HYDROcodone-acetaminophen (NORCO)  mg tablet 1 Tablet  1 Tablet Oral Q6H PRN    melatonin tablet 3 mg  3 mg Oral QHS PRN    morphine injection 4 mg  4 mg IntraVENous Q4H PRN    atorvastatin (LIPITOR) tablet 40 mg  40 mg Oral DAILY    gabapentin (NEURONTIN) capsule 100 mg  100 mg Oral TID    meropenem (MERREM) 1 g in 0.9% sodium chloride (MBP/ADV) 50 mL MBP  1 g IntraVENous Q8H    bumetanide (BUMEX) injection 2 mg  2 mg IntraVENous BID        Review of Systems  Pertinent items are noted in HPI.     Objective:     Patient Vitals for the past 8 hrs:   BP Temp Pulse Resp SpO2   03/18/23 0816 (!) 137/94 97.7 °F (36.5 °C) 79 16 100 %     No intake/output data recorded. No intake/output data recorded. Physical Exam: Visit Vitals  BP (!) 137/94   Pulse 79   Temp 97.7 °F (36.5 °C)   Resp 16   Ht 5' 6\" (1.676 m)   Wt 270 lb 8.1 oz (122.7 kg)   SpO2 100%   BMI 43.66 kg/m²     Lungs: clear to auscultation bilaterally  Heart: regular rate and rhythm, S1, S2 normal, no murmur, click, rub or gallop  Abdomen: soft, non-tender. Bowel sounds normal. No masses,  no organomegaly  Extremities: edema , 2+ bilaterally. Ulcers have healed in. ECG: normal sinus rhythm     Data Review   Recent Results (from the past 24 hour(s))   GLUCOSE, POC    Collection Time: 03/17/23  9:37 AM   Result Value Ref Range    Glucose (POC) 217 (H) 65 - 117 mg/dL    Performed by [employer] ANDREE    GLUCOSE, POC    Collection Time: 03/17/23 11:46 AM   Result Value Ref Range    Glucose (POC) 200 (H) 65 - 117 mg/dL    Performed by Roscoe Payton    GLUCOSE, POC    Collection Time: 03/17/23  5:09 PM   Result Value Ref Range    Glucose (POC) 92 65 - 117 mg/dL    Performed by Lula Haynes PCT    GLUCOSE, POC    Collection Time: 03/17/23  9:19 PM   Result Value Ref Range    Glucose (POC) 117 65 - 117 mg/dL    Performed by Harvey AMBROSE            Assessment:     Active Problems:    Cellulitis (3/11/2023)        Plan:     Cellulitis (3/11/2023), worsening chronic venous stasis ulcers: On empiric vanc and meropenem, d 2. Wound care. Appreciate Ms. Neri help. Will send pt back to wound care at discharge. Worsening edema bilateral LE: Bumex, leg elevation. Needs compression stockings as an outpatient. DM 2:; lantus + SSI. increase lantus dose and humalog dose. DM neuropathy: Gabapentin started. Hyponatremia: Monitor BMP. Prostate Cancer: Continue outpatient follow up with urology. Morbid obesity  Full code  Diabetes. Contineu Lantus and preprandial Humalog- readings much better today. Pt. Is on medicaid and says that his meds are covered .  Will start glp1 at discharge- Victoza is usually on most medicaid formularys. Will continue lantus  Disposition- if stable- dc hoje today. Will need followup in wound care clinic. Also to follow up with me next week.    Over 30 minutes of time spent in dicscharge care this am.

## 2023-03-18 NOTE — PROGRESS NOTES
Problem: Falls - Risk of  Goal: *Absence of Falls  Description: Document Chelsea Grewal Fall Risk and appropriate interventions in the flowsheet. Outcome: Resolved/Met  Note: Fall Risk Interventions:                                Problem: Patient Education: Go to Patient Education Activity  Goal: Patient/Family Education  Outcome: Resolved/Met     Problem: Diabetes Self-Management  Goal: *Disease process and treatment process  Description: Define diabetes and identify own type of diabetes; list 3 options for treating diabetes. Outcome: Resolved/Met  Goal: *Incorporating nutritional management into lifestyle  Description: Describe effect of type, amount and timing of food on blood glucose; list 3 methods for planning meals. Outcome: Resolved/Met  Goal: *Incorporating physical activity into lifestyle  Description: State effect of exercise on blood glucose levels. Outcome: Resolved/Met  Goal: *Developing strategies to promote health/change behavior  Description: Define the ABC's of diabetes; identify appropriate screenings, schedule and personal plan for screenings. Outcome: Resolved/Met  Goal: *Using medications safely  Description: State effect of diabetes medications on diabetes; name diabetes medication taking, action and side effects. Outcome: Resolved/Met  Goal: *Monitoring blood glucose, interpreting and using results  Description: Identify recommended blood glucose targets  and personal targets. Outcome: Resolved/Met  Goal: *Prevention, detection, treatment of acute complications  Description: List symptoms of hyper- and hypoglycemia; describe how to treat low blood sugar and actions for lowering  high blood glucose level. Outcome: Resolved/Met  Goal: *Prevention, detection and treatment of chronic complications  Description: Define the natural course of diabetes and describe the relationship of blood glucose levels to long term complications of diabetes.   Outcome: Resolved/Met  Goal: *Developing strategies to address psychosocial issues  Description: Describe feelings about living with diabetes; identify support needed and support network  Outcome: Resolved/Met  Goal: *Insulin pump training  Outcome: Resolved/Met  Goal: *Sick day guidelines  Outcome: Resolved/Met  Goal: *Patient Specific Goal (EDIT GOAL, INSERT TEXT)  Outcome: Resolved/Met     Problem: Patient Education: Go to Patient Education Activity  Goal: Patient/Family Education  Outcome: Resolved/Met

## 2023-03-18 NOTE — PROGRESS NOTES
CM received call from floor nurse regarding pts d/c. Pt will return home with family support and will resume services at wound clinic-op.   Pt has family that will transport pt home at the time of d/c.    Gilmar Guzmán, KRYSTA, [address]  '

## 2023-03-19 NOTE — DISCHARGE SUMMARY
[address] [place] [address] SUMMARY    Name:  Lydia Lawson  MR#:  [id number]  :  1965  ACCOUNT #:  [de-identified]  ADMIT DATE:  2023  DISCHARGE DATE:  2023      Please note that over 30 minutes of time was spent on the day of discharge on direct patient care. FINAL DIAGNOSES:  1. Cellulitis of bilateral lower extremities. 2.  Chronic venous insufficiency of bilateral lower extremities. 3.  Diabetes. 4.  Diabetic neuropathy. 5.  Hyponatremia. 6.  History of metastatic prostate cancer. 7.  Morbid obesity. DISCHARGE MEDICATIONS:  1. Lantus insulin 45 units daily. 2.  Victoza 1.2 mg subcutaneous daily. 3.  Motrin 600 mg every 8 hours as needed. 4.  Gabapentin 100 mg t.i.d.  5.  Bumex 2 mg one daily. 6.  Atorvastatin 40 mg daily. 7.  Vitamin D 50,000 units daily. 8.  Xtandi 80 mg b.i.d. FOLLOWUP:  Follow up with me in my office next week. The patient also to follow up with [address] [place] [address]. HISTORY OF PRESENT ILLNESS:  The patient is a 59-year-old -American male with history of diabetes, metastatic prostate cancer, hyperlipidemia, chronic venous insufficiency, who came to the hospital for worsening lower extremity swelling, newly developing ulcers on both legs with purulent secretions two weeks prior to admission. He denied chest pain, shortness of breath, cough, fever, chills, numbness, weakness, dysuria. Emergency room blood pressure was mildly elevated. Blood sugar was 285. Sodium 135. Blood cultures were ordered. Doppler ultrasound of lower extremities showed no acute DVT. X-rays of bilateral lower extremities showed soft tissue edema with no radiographic evidence of osteomyelitis. PAST MEDICAL HISTORY:  Asthma, chronic venous insufficiency, diabetes, prostate cancer being followed at [facility]. HABITS:  Smoking, the patient quit in . Alcohol use is 1-2 drinks per day. MEDICATIONS ON ADMISSION:  1.   Ibuprofen 600 mg every 8 hours. 2.  Gabapentin 100 mg t.i.d.  3.  Bumex 2 mg one daily. 4.  Atorvastatin 40 mg daily. 5.  Lantus 25 units daily. 6.  Vitamin D 50,000 units weekly. 7.  *** at home. REVIEW OF SYSTEMS:  Please see HPI. PHYSICAL EXAMINATION ON ADMISSION:  VITAL SIGNS:  Blood pressure 154/95, pulse 91, temperature 98.2, respiratory rate 18, height 5 feet 8, weight 270. GENERAL:  Alert, cooperative. NECK:  Supple, symmetrical.  Thyroid nontender. LUNGS:  Clear to auscultation. No wheeze or rhonchi. CARDIOVASCULAR:  Regular rhythm and rate. No murmurs, thrills, rubs or gallop. No edema. ABDOMEN:  Soft, nontender, nondistended. EXTREMITIES:  3+ edema bilateral lower extremities with multiple ulcerations on the anterior tibia of both lower extremities. NEUROLOGIC:  Exam was nonfocal.    HOSPITAL COURSE:  The patient was admitted. He was started on vancomycin and meropenem for cellulitis. Laboratory data was significant for white count of 4500, hemoglobin 10.6. Platelets 090,644. Sodium was 134, sugar was 278. BUN 12, creatinine 0.98. Albumin of 2.5. Normal liver enzymes. The patient was seen in consultation by Wound Care. His legs are kept elevated. The ulcerations healed up *** Lasix 40 mg daily, which seem to help the edema. Dressings were changed daily. The ulcers had healed over by the time of discharge. There was no more drainage. His A1c on admission was 13.6. He had also been on Amaryl as well as Lantus, but he stated that they could no longer get that at the drug store. He was treated with 45 units of Lantus while in hospital and sugars came down under the 100 range. His ulcers has healed up. He is to resume care at the [address] [place] [address]. He was also strongly advised to get some compression stockings and wear these regularly. He is stable and discharged home on 03/18/2023. Follow up with me in my office in one week.         Candelario Chambers MD      [id number]  D: 03/19/2023 6:09  T:  03/19/2023 16:58  JOB #:  [id number]

## 2023-03-20 DIAGNOSIS — E11.42 DIABETIC POLYNEUROPATHY ASSOCIATED WITH TYPE 2 DIABETES MELLITUS (HCC): ICD-10-CM

## 2023-03-20 RX ORDER — SYRINGE AND NEEDLE,INSULIN,1ML 25GX1"
SYRINGE, EMPTY DISPOSABLE MISCELLANEOUS
Qty: 100 EACH | Refills: 1 | Status: SHIPPED | OUTPATIENT
Start: 2023-03-20

## 2023-03-20 RX ORDER — BUMETANIDE 2 MG/1
2 TABLET ORAL DAILY
Qty: 30 TABLET | Refills: 5 | Status: SHIPPED | OUTPATIENT
Start: 2023-03-20

## 2023-03-20 RX ORDER — GABAPENTIN 100 MG/1
CAPSULE ORAL
Qty: 90 CAPSULE | Refills: 0 | Status: SHIPPED | OUTPATIENT
Start: 2023-03-20

## 2023-03-20 RX ORDER — IBUPROFEN 600 MG/1
TABLET ORAL
Qty: 90 TABLET | Refills: 0 | Status: SHIPPED | OUTPATIENT
Start: 2023-03-20

## 2023-03-21 DIAGNOSIS — Z79.4 TYPE 2 DIABETES MELLITUS WITH OTHER CIRCULATORY COMPLICATION, WITH LONG-TERM CURRENT USE OF INSULIN (HCC): ICD-10-CM

## 2023-03-21 DIAGNOSIS — E11.59 TYPE 2 DIABETES MELLITUS WITH OTHER CIRCULATORY COMPLICATION, WITH LONG-TERM CURRENT USE OF INSULIN (HCC): ICD-10-CM

## 2023-03-21 DIAGNOSIS — E11.42 DIABETIC POLYNEUROPATHY ASSOCIATED WITH TYPE 2 DIABETES MELLITUS (HCC): Primary | ICD-10-CM

## 2023-03-21 RX ORDER — PEN NEEDLE, DIABETIC 31 GX3/16"
1.2 NEEDLE, DISPOSABLE MISCELLANEOUS DAILY
Qty: 90 PEN NEEDLE | Refills: 5 | Status: SHIPPED | OUTPATIENT
Start: 2023-03-21 | End: 2023-06-19

## 2023-03-21 RX ORDER — PEN NEEDLE, DIABETIC 30 GX3/16"
NEEDLE, DISPOSABLE MISCELLANEOUS
Qty: 100 PEN NEEDLE | Refills: 5 | Status: CANCELLED | OUTPATIENT
Start: 2023-03-21

## 2023-03-21 NOTE — TELEPHONE ENCOUNTER
Mali (pharmacist) with [employer] would like to get clarification on liraglutide (VICTOZA) 0.6 mg/0.1 mL (18 mg/3 mL) she can be reached @ [phone]

## 2023-03-28 NOTE — PROGRESS NOTES
End of Shift Note    Bedside shift change report given to Gala Alford (oncoming nurse) by Keri Ricketts (offgoing nurse). Report included the following information SBAR, Kardex and MAR    Shift worked:  Dayshift     Shift summary and any significant changes:     Patient tolerated care well throughout shift. Hourly rounding completed. Medications given and education provided regarding all meds. Patient worked with PT/OT and was up to the chair. Complaints of pain treated with PRN meds (see MAR). Concerns for physician to address:       Zone phone for oncoming shift:          Activity:  Activity Level: Up with Assistance  Number times ambulated in hallways past shift: 0  Number of times OOB to chair past shift: 1    Cardiac:   Cardiac Monitoring: No           Access:   Current line(s): PIV     Genitourinary:   Urinary status: voiding    Respiratory:   O2 Device: Room air  Chronic home O2 use?: NO  Incentive spirometer at bedside: YES     GI:     Current diet:  DIET DIABETIC CONSISTENT CARB Regular  DIET NUTRITIONAL SUPPLEMENTS Breakfast, Dinner; Glucerna Shake  Passing flatus: YES  Tolerating current diet: YES       Pain Management:   Patient states pain is manageable on current regimen: YES    Skin:  Wesley Score: 20  Interventions: float heels, increase time out of bed and PT/OT consult    Patient Safety:  Fall Score:  Total Score: 2  Interventions: bed/chair alarm, gripper socks and pt to call before getting OOB  High Fall Risk: Yes    Length of Stay:  Expected LOS: 3d 4h  Actual LOS: 3      Keri Ricketts 1.2

## 2023-05-06 ENCOUNTER — HOSPITAL ENCOUNTER (EMERGENCY)
Facility: HOSPITAL | Age: 58
Discharge: HOME | End: 2023-05-06
Attending: EMERGENCY MEDICINE
Payer: MEDICAID

## 2023-05-06 ENCOUNTER — APPOINTMENT (OUTPATIENT)
Facility: HOSPITAL | Age: 58
End: 2023-05-06
Payer: MEDICAID

## 2023-05-06 VITALS
HEART RATE: 96 BPM | HEIGHT: 66 IN | WEIGHT: 283.95 LBS | TEMPERATURE: 98.4 F | BODY MASS INDEX: 45.63 KG/M2 | RESPIRATION RATE: 18 BRPM | DIASTOLIC BLOOD PRESSURE: 82 MMHG | OXYGEN SATURATION: 98 % | SYSTOLIC BLOOD PRESSURE: 134 MMHG

## 2023-05-06 DIAGNOSIS — N50.89 SCROTAL SWELLING: ICD-10-CM

## 2023-05-06 DIAGNOSIS — R60.0 BILATERAL LEG EDEMA: Primary | ICD-10-CM

## 2023-05-06 LAB
ALBUMIN SERPL-MCNC: 3.1 G/DL (ref 3.5–5)
ALBUMIN/GLOB SERPL: 0.6 (ref 1.1–2.2)
ALP SERPL-CCNC: 96 U/L (ref 45–117)
ALT SERPL-CCNC: 14 U/L (ref 12–78)
ANION GAP SERPL CALC-SCNC: 3 MMOL/L (ref 5–15)
APPEARANCE UR: CLEAR
AST SERPL-CCNC: 12 U/L (ref 15–37)
BACTERIA URNS QL MICRO: NEGATIVE /HPF
BASOPHILS # BLD: 0 K/UL (ref 0–0.1)
BASOPHILS NFR BLD: 0 % (ref 0–1)
BILIRUB SERPL-MCNC: 0.5 MG/DL (ref 0.2–1)
BILIRUB UR QL: NEGATIVE
BUN SERPL-MCNC: 18 MG/DL (ref 6–20)
BUN/CREAT SERPL: 19 (ref 12–20)
CALCIUM SERPL-MCNC: 8.6 MG/DL (ref 8.5–10.1)
CHLORIDE SERPL-SCNC: 108 MMOL/L (ref 97–108)
CO2 SERPL-SCNC: 24 MMOL/L (ref 21–32)
COLOR UR: ABNORMAL
CREAT SERPL-MCNC: 0.97 MG/DL (ref 0.7–1.3)
DIFFERENTIAL METHOD BLD: ABNORMAL
EOSINOPHIL # BLD: 0.1 K/UL (ref 0–0.4)
EOSINOPHIL NFR BLD: 1 % (ref 0–7)
EPITH CASTS URNS QL MICRO: ABNORMAL /LPF
ERYTHROCYTE [DISTWIDTH] IN BLOOD BY AUTOMATED COUNT: 15.4 % (ref 11.5–14.5)
GLOBULIN SER CALC-MCNC: 4.9 G/DL (ref 2–4)
GLUCOSE SERPL-MCNC: 181 MG/DL (ref 65–100)
GLUCOSE UR STRIP.AUTO-MCNC: >1000 MG/DL
HCT VFR BLD AUTO: 36.7 % (ref 36.6–50.3)
HGB BLD-MCNC: 11.2 G/DL (ref 12.1–17)
HGB UR QL STRIP: NEGATIVE
HYALINE CASTS URNS QL MICRO: ABNORMAL /LPF (ref 0–2)
IMM GRANULOCYTES # BLD AUTO: 0 K/UL (ref 0–0.04)
IMM GRANULOCYTES NFR BLD AUTO: 0 % (ref 0–0.5)
KETONES UR QL STRIP.AUTO: NEGATIVE MG/DL
LEUKOCYTE ESTERASE UR QL STRIP.AUTO: NEGATIVE
LYMPHOCYTES # BLD: 0.8 K/UL (ref 0.8–3.5)
LYMPHOCYTES NFR BLD: 16 % (ref 12–49)
MCH RBC QN AUTO: 25.6 PG (ref 26–34)
MCHC RBC AUTO-ENTMCNC: 30.5 G/DL (ref 30–36.5)
MCV RBC AUTO: 83.8 FL (ref 80–99)
MONOCYTES # BLD: 0.3 K/UL (ref 0–1)
MONOCYTES NFR BLD: 6 % (ref 5–13)
NEUTS SEG # BLD: 3.8 K/UL (ref 1.8–8)
NEUTS SEG NFR BLD: 77 % (ref 32–75)
NITRITE UR QL STRIP.AUTO: NEGATIVE
NRBC # BLD: 0 K/UL (ref 0–0.01)
NRBC BLD-RTO: 0 PER 100 WBC
PH UR STRIP: 5 (ref 5–8)
PLATELET # BLD AUTO: 225 K/UL (ref 150–400)
PMV BLD AUTO: 11.2 FL (ref 8.9–12.9)
POTASSIUM SERPL-SCNC: 4.1 MMOL/L (ref 3.5–5.1)
PROT SERPL-MCNC: 8 G/DL (ref 6.4–8.2)
PROT UR STRIP-MCNC: 30 MG/DL
RBC # BLD AUTO: 4.38 M/UL (ref 4.1–5.7)
RBC #/AREA URNS HPF: ABNORMAL /HPF (ref 0–5)
SODIUM SERPL-SCNC: 135 MMOL/L (ref 136–145)
SP GR UR REFRACTOMETRY: 1.02
URINE CULTURE IF INDICATED: ABNORMAL
UROBILINOGEN UR QL STRIP.AUTO: 0.2 EU/DL (ref 0.2–1)
WBC # BLD AUTO: 5.1 K/UL (ref 4.1–11.1)
WBC URNS QL MICRO: ABNORMAL /HPF (ref 0–4)

## 2023-05-06 PROCEDURE — 80053 COMPREHEN METABOLIC PANEL: CPT

## 2023-05-06 PROCEDURE — 96374 THER/PROPH/DIAG INJ IV PUSH: CPT

## 2023-05-06 PROCEDURE — 81001 URINALYSIS AUTO W/SCOPE: CPT

## 2023-05-06 PROCEDURE — 36415 COLL VENOUS BLD VENIPUNCTURE: CPT

## 2023-05-06 PROCEDURE — 76882 US LMTD JT/FCL EVL NVASC XTR: CPT

## 2023-05-06 PROCEDURE — 76870 US EXAM SCROTUM: CPT

## 2023-05-06 PROCEDURE — 85025 COMPLETE CBC W/AUTO DIFF WBC: CPT

## 2023-05-06 PROCEDURE — 99284 EMERGENCY DEPT VISIT MOD MDM: CPT

## 2023-05-06 PROCEDURE — 6360000002 HC RX W HCPCS: Performed by: EMERGENCY MEDICINE

## 2023-05-06 RX ORDER — FUROSEMIDE 10 MG/ML
80 INJECTION INTRAMUSCULAR; INTRAVENOUS ONCE
Status: COMPLETED | OUTPATIENT
Start: 2023-05-06 | End: 2023-05-06

## 2023-05-06 RX ORDER — CEPHALEXIN 500 MG/1
500 CAPSULE ORAL 4 TIMES DAILY
Qty: 28 CAPSULE | Refills: 0 | Status: SHIPPED | OUTPATIENT
Start: 2023-05-06 | End: 2023-05-13

## 2023-05-06 RX ADMIN — FUROSEMIDE 80 MG: 10 INJECTION, SOLUTION INTRAMUSCULAR; INTRAVENOUS at 15:17

## 2023-05-06 ASSESSMENT — PAIN SCALES - GENERAL: PAINLEVEL_OUTOF10: 8

## 2023-05-06 NOTE — DISCHARGE INSTRUCTIONS
You were evaluated in the emergency department for leg and scrotal swelling. Your examination was reassuring as was your work-up including blood work and ultrasound. It will be important for you to follow-up with your primary care physician in 2-3 days. Please increase your Lasix to 80mg daily. If you develop worsening symptoms such as worsening leg pain, swelling, redness, or any fevers, please return to the emergency department immediately.

## 2023-05-06 NOTE — ED PROVIDER NOTES
[facility] EMERGENCY DEPT  EMERGENCY DEPARTMENT ENCOUNTER       Pt Name: Louie Thomas  MRN: [id number]  [place] 1965  Date of evaluation: 5/6/2023  Provider: Alok Constantino MD   PCP: Melvin Sunshine MD  Note Started: 9:55 AM 5/6/23     CHIEF COMPLAINT       Chief Complaint   Patient presents with    Testicle Swelling     Edema \"my balls have blown up\" with fluid and swelling like the last time        HISTORY OF PRESENT ILLNESS: 1 or more elements      History From: ***, History limited by: ***No limitations     Louie Thomas is a 62 y.o. male who presents with chief complaint of ***     Nursing Notes were all reviewed and agreed with or any disagreements were addressed in the HPI. REVIEW OF SYSTEMS        Positives and Pertinent negatives as per HPI. PAST HISTORY     Past Medical History:  Past Medical History:   Diagnosis Date    Asthma     Chronic venous insufficiency     Diabetes ([facility]     Lower leg edema     Prostate cancer ([facility]     gets chemo at [facility]       Past Surgical History:  No past surgical history on file.     Family History:  Family History   Problem Relation Age of Onset    No Known Problems Sister     No Known Problems Brother     Hypertension Sister     Hypertension Sister     Asthma Sister     Hypertension Father     Hypertension Sister     Heart Attack Father     Stroke Mother         brain bleed    Stroke Father     Obesity Sister        Social History:  Social History     Tobacco Use    Smoking status: Former     Packs/day: 0.50     Types: Cigarettes     Quit date: 1/1/2013     Years since quitting: 10.3    Smokeless tobacco: Never   Substance Use Topics    Alcohol use: Yes    Drug use: No       Allergies:  Not on File    CURRENT MEDICATIONS      Previous Medications    No medications on file       SCREENINGS               No data recorded         PHYSICAL EXAM      ED Triage Vitals [05/06/23 0933]   BP Temp Temp Source Pulse Respirations SpO2 Height Weight - Scale

## 2023-05-06 NOTE — ED NOTES
Wound care performed by this nurse and LPN. Bilateral legs wrapped with benigno gauze and coban. Discharge medications reviewed with the patient and appropriate educational materials and side effects teaching were provided.        Ryann Coulter RN  05/06/23 0100

## 2023-05-13 ENCOUNTER — APPOINTMENT (OUTPATIENT)
Facility: HOSPITAL | Age: 58
DRG: 342 | End: 2023-05-13
Payer: MEDICAID

## 2023-05-13 ENCOUNTER — HOSPITAL ENCOUNTER (INPATIENT)
Facility: HOSPITAL | Age: 58
LOS: 9 days | Discharge: NURSING, REHAB OR LONG-TERM CARE | DRG: 342 | End: 2023-05-23
Attending: EMERGENCY MEDICINE | Admitting: STUDENT IN AN ORGANIZED HEALTH CARE EDUCATION/TRAINING PROGRAM
Payer: MEDICAID

## 2023-05-13 DIAGNOSIS — R60.0 BILATERAL LEG EDEMA: ICD-10-CM

## 2023-05-13 DIAGNOSIS — L97.912 NON-PRESSURE CHRONIC ULCER OF RIGHT LOWER LEG WITH FAT LAYER EXPOSED (HCC): ICD-10-CM

## 2023-05-13 DIAGNOSIS — R60.0 LEG EDEMA: ICD-10-CM

## 2023-05-13 DIAGNOSIS — S82.002A CLOSED NONDISPLACED FRACTURE OF LEFT PATELLA, UNSPECIFIED FRACTURE MORPHOLOGY, INITIAL ENCOUNTER: Primary | ICD-10-CM

## 2023-05-13 LAB
ALBUMIN SERPL-MCNC: 3 G/DL (ref 3.5–5)
ALBUMIN/GLOB SERPL: 0.6 (ref 1.1–2.2)
ALP SERPL-CCNC: 89 U/L (ref 45–117)
ALT SERPL-CCNC: 17 U/L (ref 12–78)
ANION GAP SERPL CALC-SCNC: 5 MMOL/L (ref 5–15)
AST SERPL-CCNC: 14 U/L (ref 15–37)
BASOPHILS # BLD: 0 K/UL (ref 0–0.1)
BASOPHILS NFR BLD: 0 % (ref 0–1)
BILIRUB SERPL-MCNC: 0.7 MG/DL (ref 0.2–1)
BUN SERPL-MCNC: 15 MG/DL (ref 6–20)
BUN/CREAT SERPL: 17 (ref 12–20)
CALCIUM SERPL-MCNC: 8.3 MG/DL (ref 8.5–10.1)
CHLORIDE SERPL-SCNC: 107 MMOL/L (ref 97–108)
CO2 SERPL-SCNC: 26 MMOL/L (ref 21–32)
CREAT SERPL-MCNC: 0.9 MG/DL (ref 0.7–1.3)
DIFFERENTIAL METHOD BLD: ABNORMAL
EOSINOPHIL # BLD: 0.1 K/UL (ref 0–0.4)
EOSINOPHIL NFR BLD: 1 % (ref 0–7)
ERYTHROCYTE [DISTWIDTH] IN BLOOD BY AUTOMATED COUNT: 15.4 % (ref 11.5–14.5)
GLOBULIN SER CALC-MCNC: 4.9 G/DL (ref 2–4)
GLUCOSE SERPL-MCNC: 169 MG/DL (ref 65–100)
HCT VFR BLD AUTO: 36.5 % (ref 36.6–50.3)
HGB BLD-MCNC: 11.3 G/DL (ref 12.1–17)
IMM GRANULOCYTES # BLD AUTO: 0 K/UL (ref 0–0.04)
IMM GRANULOCYTES NFR BLD AUTO: 0 % (ref 0–0.5)
LYMPHOCYTES # BLD: 1 K/UL (ref 0.8–3.5)
LYMPHOCYTES NFR BLD: 20 % (ref 12–49)
MCH RBC QN AUTO: 26 PG (ref 26–34)
MCHC RBC AUTO-ENTMCNC: 31 G/DL (ref 30–36.5)
MCV RBC AUTO: 84.1 FL (ref 80–99)
MONOCYTES # BLD: 0.5 K/UL (ref 0–1)
MONOCYTES NFR BLD: 11 % (ref 5–13)
NEUTS SEG # BLD: 3.3 K/UL (ref 1.8–8)
NEUTS SEG NFR BLD: 68 % (ref 32–75)
NRBC # BLD: 0 K/UL (ref 0–0.01)
NRBC BLD-RTO: 0 PER 100 WBC
NT PRO BNP: 40 PG/ML
PLATELET # BLD AUTO: 210 K/UL (ref 150–400)
PMV BLD AUTO: 11.1 FL (ref 8.9–12.9)
POTASSIUM SERPL-SCNC: 4.3 MMOL/L (ref 3.5–5.1)
PROT SERPL-MCNC: 7.9 G/DL (ref 6.4–8.2)
RBC # BLD AUTO: 4.34 M/UL (ref 4.1–5.7)
SODIUM SERPL-SCNC: 138 MMOL/L (ref 136–145)
TROPONIN I SERPL HS-MCNC: 8 NG/L (ref 0–76)
WBC # BLD AUTO: 4.9 K/UL (ref 4.1–11.1)

## 2023-05-13 PROCEDURE — 80053 COMPREHEN METABOLIC PANEL: CPT

## 2023-05-13 PROCEDURE — 84484 ASSAY OF TROPONIN QUANT: CPT

## 2023-05-13 PROCEDURE — 73562 X-RAY EXAM OF KNEE 3: CPT

## 2023-05-13 PROCEDURE — 36415 COLL VENOUS BLD VENIPUNCTURE: CPT

## 2023-05-13 PROCEDURE — 93005 ELECTROCARDIOGRAM TRACING: CPT | Performed by: STUDENT IN AN ORGANIZED HEALTH CARE EDUCATION/TRAINING PROGRAM

## 2023-05-13 PROCEDURE — 83880 ASSAY OF NATRIURETIC PEPTIDE: CPT

## 2023-05-13 PROCEDURE — 99285 EMERGENCY DEPT VISIT HI MDM: CPT

## 2023-05-13 PROCEDURE — 85025 COMPLETE CBC W/AUTO DIFF WBC: CPT

## 2023-05-13 PROCEDURE — 87040 BLOOD CULTURE FOR BACTERIA: CPT

## 2023-05-13 PROCEDURE — 71045 X-RAY EXAM CHEST 1 VIEW: CPT

## 2023-05-13 ASSESSMENT — PAIN - FUNCTIONAL ASSESSMENT: PAIN_FUNCTIONAL_ASSESSMENT: 0-10

## 2023-05-13 ASSESSMENT — PAIN SCALES - GENERAL: PAINLEVEL_OUTOF10: 10

## 2023-05-14 PROBLEM — R60.0 LOWER EXTREMITY EDEMA: Status: ACTIVE | Noted: 2023-05-14

## 2023-05-14 LAB
ANION GAP SERPL CALC-SCNC: 7 MMOL/L (ref 5–15)
BASOPHILS # BLD: 0 K/UL (ref 0–0.1)
BASOPHILS NFR BLD: 0 % (ref 0–1)
BUN SERPL-MCNC: 16 MG/DL (ref 6–20)
BUN/CREAT SERPL: 18 (ref 12–20)
CALCIUM SERPL-MCNC: 8.3 MG/DL (ref 8.5–10.1)
CHLORIDE SERPL-SCNC: 107 MMOL/L (ref 97–108)
CO2 SERPL-SCNC: 26 MMOL/L (ref 21–32)
CREAT SERPL-MCNC: 0.89 MG/DL (ref 0.7–1.3)
DIFFERENTIAL METHOD BLD: ABNORMAL
EKG ATRIAL RATE: 93 BPM
EKG DIAGNOSIS: NORMAL
EKG P AXIS: 54 DEGREES
EKG P-R INTERVAL: 210 MS
EKG Q-T INTERVAL: 356 MS
EKG QRS DURATION: 78 MS
EKG QTC CALCULATION (BAZETT): 442 MS
EKG R AXIS: -11 DEGREES
EKG T AXIS: 42 DEGREES
EKG VENTRICULAR RATE: 93 BPM
EOSINOPHIL # BLD: 0.2 K/UL (ref 0–0.4)
EOSINOPHIL NFR BLD: 3 % (ref 0–7)
ERYTHROCYTE [DISTWIDTH] IN BLOOD BY AUTOMATED COUNT: 15.7 % (ref 11.5–14.5)
GLUCOSE BLD STRIP.AUTO-MCNC: 165 MG/DL (ref 65–117)
GLUCOSE BLD STRIP.AUTO-MCNC: 177 MG/DL (ref 65–117)
GLUCOSE BLD STRIP.AUTO-MCNC: 203 MG/DL (ref 65–117)
GLUCOSE BLD STRIP.AUTO-MCNC: 212 MG/DL (ref 65–117)
GLUCOSE BLD STRIP.AUTO-MCNC: 217 MG/DL (ref 65–117)
GLUCOSE SERPL-MCNC: 194 MG/DL (ref 65–100)
HCT VFR BLD AUTO: 34.6 % (ref 36.6–50.3)
HGB BLD-MCNC: 10.7 G/DL (ref 12.1–17)
IMM GRANULOCYTES # BLD AUTO: 0 K/UL (ref 0–0.04)
IMM GRANULOCYTES NFR BLD AUTO: 0 % (ref 0–0.5)
LACTATE BLD-SCNC: 1.28 MMOL/L (ref 0.4–2)
LACTATE BLD-SCNC: 2.8 MMOL/L (ref 0.4–2)
LYMPHOCYTES # BLD: 0.8 K/UL (ref 0.8–3.5)
LYMPHOCYTES NFR BLD: 16 % (ref 12–49)
MCH RBC QN AUTO: 26.3 PG (ref 26–34)
MCHC RBC AUTO-ENTMCNC: 30.9 G/DL (ref 30–36.5)
MCV RBC AUTO: 85 FL (ref 80–99)
MONOCYTES # BLD: 0.5 K/UL (ref 0–1)
MONOCYTES NFR BLD: 10 % (ref 5–13)
NEUTS SEG # BLD: 3.6 K/UL (ref 1.8–8)
NEUTS SEG NFR BLD: 71 % (ref 32–75)
NRBC # BLD: 0 K/UL (ref 0–0.01)
NRBC BLD-RTO: 0 PER 100 WBC
PLATELET # BLD AUTO: 187 K/UL (ref 150–400)
PMV BLD AUTO: 11.1 FL (ref 8.9–12.9)
POTASSIUM SERPL-SCNC: 3.7 MMOL/L (ref 3.5–5.1)
RBC # BLD AUTO: 4.07 M/UL (ref 4.1–5.7)
SERVICE CMNT-IMP: ABNORMAL
SODIUM SERPL-SCNC: 140 MMOL/L (ref 136–145)
WBC # BLD AUTO: 5.1 K/UL (ref 4.1–11.1)

## 2023-05-14 PROCEDURE — 2580000003 HC RX 258: Performed by: INTERNAL MEDICINE

## 2023-05-14 PROCEDURE — 82962 GLUCOSE BLOOD TEST: CPT

## 2023-05-14 PROCEDURE — 1100000003 HC PRIVATE W/ TELEMETRY

## 2023-05-14 PROCEDURE — 83605 ASSAY OF LACTIC ACID: CPT

## 2023-05-14 PROCEDURE — 6370000000 HC RX 637 (ALT 250 FOR IP): Performed by: STUDENT IN AN ORGANIZED HEALTH CARE EDUCATION/TRAINING PROGRAM

## 2023-05-14 PROCEDURE — 6360000002 HC RX W HCPCS: Performed by: INTERNAL MEDICINE

## 2023-05-14 PROCEDURE — 85025 COMPLETE CBC W/AUTO DIFF WBC: CPT

## 2023-05-14 PROCEDURE — 6360000002 HC RX W HCPCS: Performed by: PHYSICIAN ASSISTANT

## 2023-05-14 PROCEDURE — 80048 BASIC METABOLIC PNL TOTAL CA: CPT

## 2023-05-14 PROCEDURE — 6360000002 HC RX W HCPCS: Performed by: STUDENT IN AN ORGANIZED HEALTH CARE EDUCATION/TRAINING PROGRAM

## 2023-05-14 PROCEDURE — 2580000003 HC RX 258: Performed by: STUDENT IN AN ORGANIZED HEALTH CARE EDUCATION/TRAINING PROGRAM

## 2023-05-14 PROCEDURE — 2500000003 HC RX 250 WO HCPCS: Performed by: STUDENT IN AN ORGANIZED HEALTH CARE EDUCATION/TRAINING PROGRAM

## 2023-05-14 RX ORDER — ACETAMINOPHEN 325 MG/1
650 TABLET ORAL EVERY 6 HOURS PRN
Status: DISCONTINUED | OUTPATIENT
Start: 2023-05-14 | End: 2023-05-23 | Stop reason: HOSPADM

## 2023-05-14 RX ORDER — ATORVASTATIN CALCIUM 40 MG/1
40 TABLET, FILM COATED ORAL DAILY
Status: DISCONTINUED | OUTPATIENT
Start: 2023-05-14 | End: 2023-05-23 | Stop reason: HOSPADM

## 2023-05-14 RX ORDER — BUMETANIDE 1 MG/1
2 TABLET ORAL DAILY
Status: ON HOLD | COMMUNITY
End: 2023-05-23 | Stop reason: SDUPTHER

## 2023-05-14 RX ORDER — ONDANSETRON 4 MG/1
4 TABLET, ORALLY DISINTEGRATING ORAL EVERY 8 HOURS PRN
Status: DISCONTINUED | OUTPATIENT
Start: 2023-05-14 | End: 2023-05-23 | Stop reason: HOSPADM

## 2023-05-14 RX ORDER — DEXTROSE MONOHYDRATE 100 MG/ML
INJECTION, SOLUTION INTRAVENOUS CONTINUOUS PRN
Status: DISCONTINUED | OUTPATIENT
Start: 2023-05-14 | End: 2023-05-23 | Stop reason: HOSPADM

## 2023-05-14 RX ORDER — SODIUM CHLORIDE 9 MG/ML
INJECTION, SOLUTION INTRAVENOUS PRN
Status: DISCONTINUED | OUTPATIENT
Start: 2023-05-14 | End: 2023-05-22

## 2023-05-14 RX ORDER — BUMETANIDE 0.25 MG/ML
4 INJECTION INTRAMUSCULAR; INTRAVENOUS 2 TIMES DAILY
Status: DISCONTINUED | OUTPATIENT
Start: 2023-05-14 | End: 2023-05-16

## 2023-05-14 RX ORDER — BUMETANIDE 0.25 MG/ML
4 INJECTION INTRAMUSCULAR; INTRAVENOUS ONCE
Status: COMPLETED | OUTPATIENT
Start: 2023-05-14 | End: 2023-05-14

## 2023-05-14 RX ORDER — SODIUM CHLORIDE 0.9 % (FLUSH) 0.9 %
5-40 SYRINGE (ML) INJECTION EVERY 12 HOURS SCHEDULED
Status: DISCONTINUED | OUTPATIENT
Start: 2023-05-14 | End: 2023-05-22

## 2023-05-14 RX ORDER — ATORVASTATIN CALCIUM 40 MG/1
40 TABLET, FILM COATED ORAL DAILY
COMMUNITY

## 2023-05-14 RX ORDER — INSULIN LISPRO 100 [IU]/ML
0-8 INJECTION, SOLUTION INTRAVENOUS; SUBCUTANEOUS
Status: DISCONTINUED | OUTPATIENT
Start: 2023-05-14 | End: 2023-05-23 | Stop reason: HOSPADM

## 2023-05-14 RX ORDER — FENTANYL CITRATE 50 UG/ML
100 INJECTION, SOLUTION INTRAMUSCULAR; INTRAVENOUS ONCE
Status: COMPLETED | OUTPATIENT
Start: 2023-05-14 | End: 2023-05-14

## 2023-05-14 RX ORDER — INSULIN GLARGINE 100 [IU]/ML
45 INJECTION, SOLUTION SUBCUTANEOUS NIGHTLY
Status: ON HOLD | COMMUNITY
End: 2023-05-23 | Stop reason: HOSPADM

## 2023-05-14 RX ORDER — MORPHINE SULFATE 2 MG/ML
4 INJECTION, SOLUTION INTRAMUSCULAR; INTRAVENOUS
Status: DISCONTINUED | OUTPATIENT
Start: 2023-05-14 | End: 2023-05-14

## 2023-05-14 RX ORDER — 0.9 % SODIUM CHLORIDE 0.9 %
500 INTRAVENOUS SOLUTION INTRAVENOUS ONCE
Status: COMPLETED | OUTPATIENT
Start: 2023-05-14 | End: 2023-05-14

## 2023-05-14 RX ORDER — GABAPENTIN 100 MG/1
100 CAPSULE ORAL 3 TIMES DAILY
Status: DISCONTINUED | OUTPATIENT
Start: 2023-05-14 | End: 2023-05-23 | Stop reason: HOSPADM

## 2023-05-14 RX ORDER — BUMETANIDE 0.25 MG/ML
2 INJECTION INTRAMUSCULAR; INTRAVENOUS ONCE
Status: DISCONTINUED | OUTPATIENT
Start: 2023-05-14 | End: 2023-05-14

## 2023-05-14 RX ORDER — OXYCODONE HYDROCHLORIDE 5 MG/1
5 TABLET ORAL EVERY 4 HOURS PRN
Status: DISCONTINUED | OUTPATIENT
Start: 2023-05-14 | End: 2023-05-23 | Stop reason: HOSPADM

## 2023-05-14 RX ORDER — GABAPENTIN 100 MG/1
100 CAPSULE ORAL 3 TIMES DAILY
COMMUNITY

## 2023-05-14 RX ORDER — ENZALUTAMIDE 80 MG/1
80 TABLET ORAL 2 TIMES DAILY
COMMUNITY

## 2023-05-14 RX ORDER — WATER / MINERAL OIL / WHITE PETROLATUM 16 OZ
CREAM TOPICAL PRN
Status: ON HOLD | COMMUNITY
End: 2023-05-23 | Stop reason: HOSPADM

## 2023-05-14 RX ORDER — ONDANSETRON 2 MG/ML
4 INJECTION INTRAMUSCULAR; INTRAVENOUS EVERY 6 HOURS PRN
Status: DISCONTINUED | OUTPATIENT
Start: 2023-05-14 | End: 2023-05-23 | Stop reason: HOSPADM

## 2023-05-14 RX ORDER — SODIUM CHLORIDE 0.9 % (FLUSH) 0.9 %
5-40 SYRINGE (ML) INJECTION PRN
Status: DISCONTINUED | OUTPATIENT
Start: 2023-05-14 | End: 2023-05-22

## 2023-05-14 RX ORDER — ACETAMINOPHEN 650 MG/1
650 SUPPOSITORY RECTAL EVERY 6 HOURS PRN
Status: DISCONTINUED | OUTPATIENT
Start: 2023-05-14 | End: 2023-05-23 | Stop reason: HOSPADM

## 2023-05-14 RX ORDER — POLYETHYLENE GLYCOL 3350 17 G/17G
17 POWDER, FOR SOLUTION ORAL DAILY PRN
Status: DISCONTINUED | OUTPATIENT
Start: 2023-05-14 | End: 2023-05-23 | Stop reason: HOSPADM

## 2023-05-14 RX ORDER — INSULIN GLARGINE 100 [IU]/ML
30 INJECTION, SOLUTION SUBCUTANEOUS NIGHTLY
Status: DISCONTINUED | OUTPATIENT
Start: 2023-05-14 | End: 2023-05-23 | Stop reason: HOSPADM

## 2023-05-14 RX ORDER — INSULIN LISPRO 100 [IU]/ML
0-4 INJECTION, SOLUTION INTRAVENOUS; SUBCUTANEOUS NIGHTLY
Status: DISCONTINUED | OUTPATIENT
Start: 2023-05-14 | End: 2023-05-23 | Stop reason: HOSPADM

## 2023-05-14 RX ADMIN — GABAPENTIN 100 MG: 100 CAPSULE ORAL at 21:08

## 2023-05-14 RX ADMIN — MORPHINE SULFATE 4 MG: 2 INJECTION, SOLUTION INTRAMUSCULAR; INTRAVENOUS at 01:00

## 2023-05-14 RX ADMIN — FENTANYL CITRATE 100 MCG: 50 INJECTION, SOLUTION INTRAMUSCULAR; INTRAVENOUS at 02:22

## 2023-05-14 RX ADMIN — PIPERACILLIN AND TAZOBACTAM 4500 MG: 4; .5 INJECTION, POWDER, LYOPHILIZED, FOR SOLUTION INTRAVENOUS at 08:31

## 2023-05-14 RX ADMIN — INSULIN GLARGINE 30 UNITS: 100 INJECTION, SOLUTION SUBCUTANEOUS at 21:08

## 2023-05-14 RX ADMIN — ATORVASTATIN CALCIUM 40 MG: 40 TABLET, FILM COATED ORAL at 08:32

## 2023-05-14 RX ADMIN — Medication 1250 MG: at 17:19

## 2023-05-14 RX ADMIN — INSULIN GLARGINE 30 UNITS: 100 INJECTION, SOLUTION SUBCUTANEOUS at 02:21

## 2023-05-14 RX ADMIN — GABAPENTIN 100 MG: 100 CAPSULE ORAL at 15:08

## 2023-05-14 RX ADMIN — PIPERACILLIN AND TAZOBACTAM 4500 MG: 4; .5 INJECTION, POWDER, LYOPHILIZED, FOR SOLUTION INTRAVENOUS at 01:00

## 2023-05-14 RX ADMIN — Medication 2500 MG: at 04:36

## 2023-05-14 RX ADMIN — Medication 2 UNITS: at 13:09

## 2023-05-14 RX ADMIN — BUMETANIDE 4 MG: 0.25 INJECTION INTRAMUSCULAR; INTRAVENOUS at 08:32

## 2023-05-14 RX ADMIN — SODIUM CHLORIDE 500 ML: 9 INJECTION, SOLUTION INTRAVENOUS at 09:41

## 2023-05-14 RX ADMIN — PIPERACILLIN AND TAZOBACTAM 4500 MG: 4; .5 INJECTION, POWDER, LYOPHILIZED, FOR SOLUTION INTRAVENOUS at 15:45

## 2023-05-14 RX ADMIN — GABAPENTIN 100 MG: 100 CAPSULE ORAL at 08:32

## 2023-05-14 RX ADMIN — BUMETANIDE 4 MG: 0.25 INJECTION INTRAMUSCULAR; INTRAVENOUS at 21:08

## 2023-05-14 RX ADMIN — SODIUM CHLORIDE, PRESERVATIVE FREE 10 ML: 5 INJECTION INTRAVENOUS at 21:09

## 2023-05-14 RX ADMIN — SODIUM CHLORIDE, PRESERVATIVE FREE 10 ML: 5 INJECTION INTRAVENOUS at 08:34

## 2023-05-14 RX ADMIN — BUMETANIDE 4 MG: 0.25 INJECTION INTRAMUSCULAR; INTRAVENOUS at 00:59

## 2023-05-14 ASSESSMENT — PAIN - FUNCTIONAL ASSESSMENT: PAIN_FUNCTIONAL_ASSESSMENT: 0-10

## 2023-05-14 ASSESSMENT — PAIN SCALES - GENERAL
PAINLEVEL_OUTOF10: 4
PAINLEVEL_OUTOF10: 9

## 2023-05-14 ASSESSMENT — LIFESTYLE VARIABLES
HOW OFTEN DO YOU HAVE A DRINK CONTAINING ALCOHOL: NEVER
HOW MANY STANDARD DRINKS CONTAINING ALCOHOL DO YOU HAVE ON A TYPICAL DAY: PATIENT DOES NOT DRINK

## 2023-05-14 ASSESSMENT — PAIN DESCRIPTION - ORIENTATION: ORIENTATION: LEFT;RIGHT

## 2023-05-14 ASSESSMENT — PAIN DESCRIPTION - DESCRIPTORS: DESCRIPTORS: DISCOMFORT

## 2023-05-14 ASSESSMENT — PAIN DESCRIPTION - LOCATION: LOCATION: LEG;KNEE

## 2023-05-15 PROBLEM — S82.002A CLOSED NONDISPLACED FRACTURE OF LEFT PATELLA: Status: ACTIVE | Noted: 2023-05-15

## 2023-05-15 LAB
GLUCOSE BLD STRIP.AUTO-MCNC: 158 MG/DL (ref 65–117)
GLUCOSE BLD STRIP.AUTO-MCNC: 178 MG/DL (ref 65–117)
GLUCOSE BLD STRIP.AUTO-MCNC: 179 MG/DL (ref 65–117)
GLUCOSE BLD STRIP.AUTO-MCNC: 250 MG/DL (ref 65–117)
SERVICE CMNT-IMP: ABNORMAL

## 2023-05-15 PROCEDURE — 99232 SBSQ HOSP IP/OBS MODERATE 35: CPT | Performed by: FAMILY MEDICINE

## 2023-05-15 PROCEDURE — 6360000002 HC RX W HCPCS: Performed by: INTERNAL MEDICINE

## 2023-05-15 PROCEDURE — 6370000000 HC RX 637 (ALT 250 FOR IP): Performed by: STUDENT IN AN ORGANIZED HEALTH CARE EDUCATION/TRAINING PROGRAM

## 2023-05-15 PROCEDURE — 2500000003 HC RX 250 WO HCPCS: Performed by: STUDENT IN AN ORGANIZED HEALTH CARE EDUCATION/TRAINING PROGRAM

## 2023-05-15 PROCEDURE — 2580000003 HC RX 258: Performed by: INTERNAL MEDICINE

## 2023-05-15 PROCEDURE — 1100000000 HC RM PRIVATE

## 2023-05-15 PROCEDURE — 82962 GLUCOSE BLOOD TEST: CPT

## 2023-05-15 PROCEDURE — 2580000003 HC RX 258: Performed by: STUDENT IN AN ORGANIZED HEALTH CARE EDUCATION/TRAINING PROGRAM

## 2023-05-15 RX ADMIN — Medication 1250 MG: at 05:09

## 2023-05-15 RX ADMIN — BUMETANIDE 4 MG: 0.25 INJECTION INTRAMUSCULAR; INTRAVENOUS at 22:00

## 2023-05-15 RX ADMIN — GABAPENTIN 100 MG: 100 CAPSULE ORAL at 22:00

## 2023-05-15 RX ADMIN — OXYCODONE 5 MG: 5 TABLET ORAL at 09:25

## 2023-05-15 RX ADMIN — GABAPENTIN 100 MG: 100 CAPSULE ORAL at 09:29

## 2023-05-15 RX ADMIN — GABAPENTIN 100 MG: 100 CAPSULE ORAL at 14:26

## 2023-05-15 RX ADMIN — ACETAMINOPHEN 650 MG: 325 TABLET ORAL at 02:21

## 2023-05-15 RX ADMIN — Medication 1250 MG: at 17:30

## 2023-05-15 RX ADMIN — OXYCODONE 5 MG: 5 TABLET ORAL at 19:01

## 2023-05-15 RX ADMIN — PIPERACILLIN AND TAZOBACTAM 4500 MG: 4; .5 INJECTION, POWDER, LYOPHILIZED, FOR SOLUTION INTRAVENOUS at 00:55

## 2023-05-15 RX ADMIN — SODIUM CHLORIDE, PRESERVATIVE FREE 10 ML: 5 INJECTION INTRAVENOUS at 09:35

## 2023-05-15 RX ADMIN — PIPERACILLIN AND TAZOBACTAM 4500 MG: 4; .5 INJECTION, POWDER, LYOPHILIZED, FOR SOLUTION INTRAVENOUS at 16:30

## 2023-05-15 RX ADMIN — ATORVASTATIN CALCIUM 40 MG: 40 TABLET, FILM COATED ORAL at 09:29

## 2023-05-15 RX ADMIN — BUMETANIDE 4 MG: 0.25 INJECTION INTRAMUSCULAR; INTRAVENOUS at 09:29

## 2023-05-15 RX ADMIN — SODIUM CHLORIDE, PRESERVATIVE FREE 10 ML: 5 INJECTION INTRAVENOUS at 22:01

## 2023-05-15 RX ADMIN — OXYCODONE 5 MG: 5 TABLET ORAL at 05:08

## 2023-05-15 RX ADMIN — OXYCODONE 5 MG: 5 TABLET ORAL at 23:00

## 2023-05-15 RX ADMIN — INSULIN GLARGINE 30 UNITS: 100 INJECTION, SOLUTION SUBCUTANEOUS at 22:00

## 2023-05-15 RX ADMIN — Medication 4 UNITS: at 12:50

## 2023-05-15 RX ADMIN — PIPERACILLIN AND TAZOBACTAM 4500 MG: 4; .5 INJECTION, POWDER, LYOPHILIZED, FOR SOLUTION INTRAVENOUS at 08:00

## 2023-05-15 ASSESSMENT — PAIN SCALES - GENERAL
PAINLEVEL_OUTOF10: 6
PAINLEVEL_OUTOF10: 2
PAINLEVEL_OUTOF10: 8
PAINLEVEL_OUTOF10: 4
PAINLEVEL_OUTOF10: 3
PAINLEVEL_OUTOF10: 8
PAINLEVEL_OUTOF10: 3

## 2023-05-15 ASSESSMENT — PAIN - FUNCTIONAL ASSESSMENT: PAIN_FUNCTIONAL_ASSESSMENT: ACTIVITIES ARE NOT PREVENTED

## 2023-05-15 ASSESSMENT — PAIN DESCRIPTION - ORIENTATION
ORIENTATION: ANTERIOR;RIGHT;LEFT
ORIENTATION: ANTERIOR;RIGHT;LEFT
ORIENTATION: LEFT;RIGHT
ORIENTATION: ANTERIOR;RIGHT;LEFT
ORIENTATION: ANTERIOR;RIGHT;LEFT
ORIENTATION: LEFT;RIGHT
ORIENTATION: RIGHT;LEFT

## 2023-05-15 ASSESSMENT — PAIN DESCRIPTION - LOCATION
LOCATION: KNEE
LOCATION: LEG
LOCATION: KNEE

## 2023-05-15 ASSESSMENT — PAIN DESCRIPTION - DESCRIPTORS
DESCRIPTORS: ACHING;THROBBING
DESCRIPTORS: ACHING
DESCRIPTORS: ACHING;THROBBING

## 2023-05-15 ASSESSMENT — PAIN SCALES - WONG BAKER: WONGBAKER_NUMERICALRESPONSE: 2

## 2023-05-16 PROBLEM — E87.6 HYPOKALEMIA: Status: ACTIVE | Noted: 2023-05-16

## 2023-05-16 LAB
ANION GAP SERPL CALC-SCNC: 5 MMOL/L (ref 5–15)
BUN SERPL-MCNC: 16 MG/DL (ref 6–20)
BUN/CREAT SERPL: 16 (ref 12–20)
CALCIUM SERPL-MCNC: 8 MG/DL (ref 8.5–10.1)
CHLORIDE SERPL-SCNC: 104 MMOL/L (ref 97–108)
CO2 SERPL-SCNC: 30 MMOL/L (ref 21–32)
CREAT SERPL-MCNC: 1 MG/DL (ref 0.7–1.3)
GLUCOSE BLD STRIP.AUTO-MCNC: 152 MG/DL (ref 65–117)
GLUCOSE BLD STRIP.AUTO-MCNC: 153 MG/DL (ref 65–117)
GLUCOSE BLD STRIP.AUTO-MCNC: 157 MG/DL (ref 65–117)
GLUCOSE BLD STRIP.AUTO-MCNC: 172 MG/DL (ref 65–117)
GLUCOSE BLD STRIP.AUTO-MCNC: 179 MG/DL (ref 65–117)
GLUCOSE SERPL-MCNC: 212 MG/DL (ref 65–100)
POTASSIUM SERPL-SCNC: 3 MMOL/L (ref 3.5–5.1)
SERVICE CMNT-IMP: ABNORMAL
SODIUM SERPL-SCNC: 139 MMOL/L (ref 136–145)
VANCOMYCIN SERPL-MCNC: 12.2 UG/ML

## 2023-05-16 PROCEDURE — 99232 SBSQ HOSP IP/OBS MODERATE 35: CPT | Performed by: FAMILY MEDICINE

## 2023-05-16 PROCEDURE — 6360000002 HC RX W HCPCS: Performed by: FAMILY MEDICINE

## 2023-05-16 PROCEDURE — 2500000003 HC RX 250 WO HCPCS: Performed by: FAMILY MEDICINE

## 2023-05-16 PROCEDURE — 97167 OT EVAL HIGH COMPLEX 60 MIN: CPT

## 2023-05-16 PROCEDURE — 97535 SELF CARE MNGMENT TRAINING: CPT

## 2023-05-16 PROCEDURE — 6370000000 HC RX 637 (ALT 250 FOR IP): Performed by: FAMILY MEDICINE

## 2023-05-16 PROCEDURE — 2580000003 HC RX 258: Performed by: STUDENT IN AN ORGANIZED HEALTH CARE EDUCATION/TRAINING PROGRAM

## 2023-05-16 PROCEDURE — 36415 COLL VENOUS BLD VENIPUNCTURE: CPT

## 2023-05-16 PROCEDURE — 6360000002 HC RX W HCPCS: Performed by: INTERNAL MEDICINE

## 2023-05-16 PROCEDURE — 1100000000 HC RM PRIVATE

## 2023-05-16 PROCEDURE — 2580000003 HC RX 258: Performed by: FAMILY MEDICINE

## 2023-05-16 PROCEDURE — 82962 GLUCOSE BLOOD TEST: CPT

## 2023-05-16 PROCEDURE — 80048 BASIC METABOLIC PNL TOTAL CA: CPT

## 2023-05-16 PROCEDURE — 2580000003 HC RX 258: Performed by: INTERNAL MEDICINE

## 2023-05-16 PROCEDURE — 6370000000 HC RX 637 (ALT 250 FOR IP): Performed by: STUDENT IN AN ORGANIZED HEALTH CARE EDUCATION/TRAINING PROGRAM

## 2023-05-16 PROCEDURE — 97530 THERAPEUTIC ACTIVITIES: CPT

## 2023-05-16 PROCEDURE — 80202 ASSAY OF VANCOMYCIN: CPT

## 2023-05-16 PROCEDURE — 97161 PT EVAL LOW COMPLEX 20 MIN: CPT

## 2023-05-16 RX ORDER — LANOLIN ALCOHOL/MO/W.PET/CERES
CREAM (GRAM) TOPICAL 2 TIMES DAILY
Status: DISCONTINUED | OUTPATIENT
Start: 2023-05-16 | End: 2023-05-18

## 2023-05-16 RX ORDER — POTASSIUM CHLORIDE 20 MEQ/1
20 TABLET, EXTENDED RELEASE ORAL 2 TIMES DAILY WITH MEALS
Status: DISCONTINUED | OUTPATIENT
Start: 2023-05-16 | End: 2023-05-23 | Stop reason: HOSPADM

## 2023-05-16 RX ORDER — BUMETANIDE 0.25 MG/ML
2 INJECTION INTRAMUSCULAR; INTRAVENOUS 2 TIMES DAILY
Status: DISCONTINUED | OUTPATIENT
Start: 2023-05-16 | End: 2023-05-23 | Stop reason: HOSPADM

## 2023-05-16 RX ORDER — NAPROXEN 250 MG/1
500 TABLET ORAL 2 TIMES DAILY WITH MEALS
Status: DISCONTINUED | OUTPATIENT
Start: 2023-05-16 | End: 2023-05-23 | Stop reason: HOSPADM

## 2023-05-16 RX ADMIN — VANCOMYCIN HYDROCHLORIDE 2000 MG: 10 INJECTION, POWDER, LYOPHILIZED, FOR SOLUTION INTRAVENOUS at 17:12

## 2023-05-16 RX ADMIN — POTASSIUM CHLORIDE 20 MEQ: 20 TABLET, EXTENDED RELEASE ORAL at 08:38

## 2023-05-16 RX ADMIN — GABAPENTIN 100 MG: 100 CAPSULE ORAL at 21:43

## 2023-05-16 RX ADMIN — GABAPENTIN 100 MG: 100 CAPSULE ORAL at 13:33

## 2023-05-16 RX ADMIN — POTASSIUM CHLORIDE 20 MEQ: 20 TABLET, EXTENDED RELEASE ORAL at 17:11

## 2023-05-16 RX ADMIN — Medication 1250 MG: at 04:45

## 2023-05-16 RX ADMIN — BUMETANIDE 2 MG: 0.25 INJECTION INTRAMUSCULAR; INTRAVENOUS at 08:38

## 2023-05-16 RX ADMIN — SODIUM CHLORIDE, PRESERVATIVE FREE 10 ML: 5 INJECTION INTRAVENOUS at 21:45

## 2023-05-16 RX ADMIN — BUMETANIDE 2 MG: 0.25 INJECTION INTRAMUSCULAR; INTRAVENOUS at 21:43

## 2023-05-16 RX ADMIN — PIPERACILLIN AND TAZOBACTAM 4500 MG: 4; .5 INJECTION, POWDER, LYOPHILIZED, FOR SOLUTION INTRAVENOUS at 00:16

## 2023-05-16 RX ADMIN — GABAPENTIN 100 MG: 100 CAPSULE ORAL at 08:38

## 2023-05-16 RX ADMIN — PIPERACILLIN AND TAZOBACTAM 4500 MG: 4; .5 INJECTION, POWDER, LYOPHILIZED, FOR SOLUTION INTRAVENOUS at 08:39

## 2023-05-16 RX ADMIN — NAPROXEN 500 MG: 250 TABLET ORAL at 09:42

## 2023-05-16 RX ADMIN — NAPROXEN 500 MG: 250 TABLET ORAL at 17:11

## 2023-05-16 RX ADMIN — PIPERACILLIN AND TAZOBACTAM 4500 MG: 4; .5 INJECTION, POWDER, LYOPHILIZED, FOR SOLUTION INTRAVENOUS at 15:54

## 2023-05-16 RX ADMIN — Medication: at 08:40

## 2023-05-16 RX ADMIN — OXYCODONE 5 MG: 5 TABLET ORAL at 11:09

## 2023-05-16 RX ADMIN — INSULIN GLARGINE 30 UNITS: 100 INJECTION, SOLUTION SUBCUTANEOUS at 21:43

## 2023-05-16 RX ADMIN — ATORVASTATIN CALCIUM 40 MG: 40 TABLET, FILM COATED ORAL at 08:38

## 2023-05-16 RX ADMIN — SODIUM CHLORIDE, PRESERVATIVE FREE 10 ML: 5 INJECTION INTRAVENOUS at 08:41

## 2023-05-16 RX ADMIN — Medication: at 21:51

## 2023-05-17 ENCOUNTER — APPOINTMENT (OUTPATIENT)
Facility: HOSPITAL | Age: 58
DRG: 342 | End: 2023-05-17
Payer: MEDICAID

## 2023-05-17 LAB
ANION GAP SERPL CALC-SCNC: 3 MMOL/L (ref 5–15)
BUN SERPL-MCNC: 16 MG/DL (ref 6–20)
BUN/CREAT SERPL: 21 (ref 12–20)
CALCIUM SERPL-MCNC: 8.6 MG/DL (ref 8.5–10.1)
CHLORIDE SERPL-SCNC: 101 MMOL/L (ref 97–108)
CO2 SERPL-SCNC: 32 MMOL/L (ref 21–32)
CREAT SERPL-MCNC: 0.77 MG/DL (ref 0.7–1.3)
ECHO BSA: 2.47 M2
GLUCOSE BLD STRIP.AUTO-MCNC: 165 MG/DL (ref 65–117)
GLUCOSE BLD STRIP.AUTO-MCNC: 179 MG/DL (ref 65–117)
GLUCOSE BLD STRIP.AUTO-MCNC: 184 MG/DL (ref 65–117)
GLUCOSE BLD STRIP.AUTO-MCNC: 223 MG/DL (ref 65–117)
GLUCOSE SERPL-MCNC: 154 MG/DL (ref 65–100)
POTASSIUM SERPL-SCNC: 3.1 MMOL/L (ref 3.5–5.1)
SERVICE CMNT-IMP: ABNORMAL
SODIUM SERPL-SCNC: 136 MMOL/L (ref 136–145)
VAS LEFT DORSALIS PEDIS BP: >240 MMHG
VAS LEFT PTA BP: >240 MMHG
VAS LEFT TBI: 1.05
VAS LEFT TOE PRESSURE: 163 MMHG
VAS RIGHT ARM BP: 155 MMHG
VAS RIGHT DORSALIS PEDIS BP: >240 MMHG
VAS RIGHT PTA BP: >240 MMHG
VAS RIGHT TBI: 1
VAS RIGHT TOE PRESSURE: 155 MMHG

## 2023-05-17 PROCEDURE — 99232 SBSQ HOSP IP/OBS MODERATE 35: CPT | Performed by: FAMILY MEDICINE

## 2023-05-17 PROCEDURE — 2580000003 HC RX 258: Performed by: FAMILY MEDICINE

## 2023-05-17 PROCEDURE — 82962 GLUCOSE BLOOD TEST: CPT

## 2023-05-17 PROCEDURE — 97530 THERAPEUTIC ACTIVITIES: CPT

## 2023-05-17 PROCEDURE — 2580000003 HC RX 258: Performed by: INTERNAL MEDICINE

## 2023-05-17 PROCEDURE — 36415 COLL VENOUS BLD VENIPUNCTURE: CPT

## 2023-05-17 PROCEDURE — 2580000003 HC RX 258: Performed by: STUDENT IN AN ORGANIZED HEALTH CARE EDUCATION/TRAINING PROGRAM

## 2023-05-17 PROCEDURE — 2500000003 HC RX 250 WO HCPCS: Performed by: FAMILY MEDICINE

## 2023-05-17 PROCEDURE — 6370000000 HC RX 637 (ALT 250 FOR IP): Performed by: STUDENT IN AN ORGANIZED HEALTH CARE EDUCATION/TRAINING PROGRAM

## 2023-05-17 PROCEDURE — 80048 BASIC METABOLIC PNL TOTAL CA: CPT

## 2023-05-17 PROCEDURE — 93922 UPR/L XTREMITY ART 2 LEVELS: CPT

## 2023-05-17 PROCEDURE — 6370000000 HC RX 637 (ALT 250 FOR IP): Performed by: FAMILY MEDICINE

## 2023-05-17 PROCEDURE — 6360000002 HC RX W HCPCS: Performed by: FAMILY MEDICINE

## 2023-05-17 PROCEDURE — 6360000002 HC RX W HCPCS: Performed by: INTERNAL MEDICINE

## 2023-05-17 PROCEDURE — 1100000000 HC RM PRIVATE

## 2023-05-17 RX ADMIN — OXYCODONE 5 MG: 5 TABLET ORAL at 09:23

## 2023-05-17 RX ADMIN — PIPERACILLIN AND TAZOBACTAM 4500 MG: 4; .5 INJECTION, POWDER, LYOPHILIZED, FOR SOLUTION INTRAVENOUS at 01:00

## 2023-05-17 RX ADMIN — Medication: at 21:30

## 2023-05-17 RX ADMIN — NAPROXEN 500 MG: 250 TABLET ORAL at 08:13

## 2023-05-17 RX ADMIN — POTASSIUM CHLORIDE 20 MEQ: 20 TABLET, EXTENDED RELEASE ORAL at 08:14

## 2023-05-17 RX ADMIN — POTASSIUM CHLORIDE 20 MEQ: 20 TABLET, EXTENDED RELEASE ORAL at 16:26

## 2023-05-17 RX ADMIN — OXYCODONE 5 MG: 5 TABLET ORAL at 18:18

## 2023-05-17 RX ADMIN — SODIUM CHLORIDE, PRESERVATIVE FREE 10 ML: 5 INJECTION INTRAVENOUS at 21:26

## 2023-05-17 RX ADMIN — ATORVASTATIN CALCIUM 40 MG: 40 TABLET, FILM COATED ORAL at 09:23

## 2023-05-17 RX ADMIN — SODIUM CHLORIDE, PRESERVATIVE FREE 10 ML: 5 INJECTION INTRAVENOUS at 08:23

## 2023-05-17 RX ADMIN — PIPERACILLIN AND TAZOBACTAM 4500 MG: 4; .5 INJECTION, POWDER, LYOPHILIZED, FOR SOLUTION INTRAVENOUS at 16:26

## 2023-05-17 RX ADMIN — GABAPENTIN 100 MG: 100 CAPSULE ORAL at 21:23

## 2023-05-17 RX ADMIN — GABAPENTIN 100 MG: 100 CAPSULE ORAL at 14:23

## 2023-05-17 RX ADMIN — INSULIN GLARGINE 30 UNITS: 100 INJECTION, SOLUTION SUBCUTANEOUS at 21:24

## 2023-05-17 RX ADMIN — GABAPENTIN 100 MG: 100 CAPSULE ORAL at 09:22

## 2023-05-17 RX ADMIN — Medication: at 11:17

## 2023-05-17 RX ADMIN — BUMETANIDE 2 MG: 0.25 INJECTION INTRAMUSCULAR; INTRAVENOUS at 21:23

## 2023-05-17 RX ADMIN — Medication 2 UNITS: at 08:14

## 2023-05-17 RX ADMIN — VANCOMYCIN HYDROCHLORIDE 2000 MG: 10 INJECTION, POWDER, LYOPHILIZED, FOR SOLUTION INTRAVENOUS at 11:16

## 2023-05-17 RX ADMIN — PIPERACILLIN AND TAZOBACTAM 4500 MG: 4; .5 INJECTION, POWDER, LYOPHILIZED, FOR SOLUTION INTRAVENOUS at 08:19

## 2023-05-17 RX ADMIN — NAPROXEN 500 MG: 250 TABLET ORAL at 16:26

## 2023-05-17 RX ADMIN — BUMETANIDE 2 MG: 0.25 INJECTION INTRAMUSCULAR; INTRAVENOUS at 09:23

## 2023-05-17 ASSESSMENT — PAIN DESCRIPTION - LOCATION
LOCATION: KNEE

## 2023-05-17 ASSESSMENT — PAIN DESCRIPTION - DESCRIPTORS
DESCRIPTORS: ACHING
DESCRIPTORS: THROBBING

## 2023-05-17 ASSESSMENT — PAIN SCALES - GENERAL
PAINLEVEL_OUTOF10: 7
PAINLEVEL_OUTOF10: 8
PAINLEVEL_OUTOF10: 5
PAINLEVEL_OUTOF10: 9
PAINLEVEL_OUTOF10: 9

## 2023-05-17 ASSESSMENT — PAIN DESCRIPTION - ORIENTATION
ORIENTATION: RIGHT
ORIENTATION: RIGHT;LEFT

## 2023-05-17 ASSESSMENT — PAIN - FUNCTIONAL ASSESSMENT: PAIN_FUNCTIONAL_ASSESSMENT: PREVENTS OR INTERFERES SOME ACTIVE ACTIVITIES AND ADLS

## 2023-05-18 LAB
ANION GAP SERPL CALC-SCNC: 6 MMOL/L (ref 5–15)
BUN SERPL-MCNC: 18 MG/DL (ref 6–20)
BUN/CREAT SERPL: 19 (ref 12–20)
CALCIUM SERPL-MCNC: 8.1 MG/DL (ref 8.5–10.1)
CHLORIDE SERPL-SCNC: 104 MMOL/L (ref 97–108)
CO2 SERPL-SCNC: 29 MMOL/L (ref 21–32)
CREAT SERPL-MCNC: 0.97 MG/DL (ref 0.7–1.3)
GLUCOSE BLD STRIP.AUTO-MCNC: 155 MG/DL (ref 65–117)
GLUCOSE BLD STRIP.AUTO-MCNC: 158 MG/DL (ref 65–117)
GLUCOSE BLD STRIP.AUTO-MCNC: 164 MG/DL (ref 65–117)
GLUCOSE BLD STRIP.AUTO-MCNC: 176 MG/DL (ref 65–117)
GLUCOSE BLD STRIP.AUTO-MCNC: 195 MG/DL (ref 65–117)
GLUCOSE SERPL-MCNC: 152 MG/DL (ref 65–100)
POTASSIUM SERPL-SCNC: 3.3 MMOL/L (ref 3.5–5.1)
SERVICE CMNT-IMP: ABNORMAL
SODIUM SERPL-SCNC: 139 MMOL/L (ref 136–145)
VANCOMYCIN SERPL-MCNC: 12.6 UG/ML

## 2023-05-18 PROCEDURE — 97530 THERAPEUTIC ACTIVITIES: CPT

## 2023-05-18 PROCEDURE — 1100000000 HC RM PRIVATE

## 2023-05-18 PROCEDURE — 2580000003 HC RX 258: Performed by: FAMILY MEDICINE

## 2023-05-18 PROCEDURE — 2500000003 HC RX 250 WO HCPCS: Performed by: FAMILY MEDICINE

## 2023-05-18 PROCEDURE — 80202 ASSAY OF VANCOMYCIN: CPT

## 2023-05-18 PROCEDURE — 6360000002 HC RX W HCPCS: Performed by: FAMILY MEDICINE

## 2023-05-18 PROCEDURE — 6370000000 HC RX 637 (ALT 250 FOR IP): Performed by: STUDENT IN AN ORGANIZED HEALTH CARE EDUCATION/TRAINING PROGRAM

## 2023-05-18 PROCEDURE — 99232 SBSQ HOSP IP/OBS MODERATE 35: CPT | Performed by: FAMILY MEDICINE

## 2023-05-18 PROCEDURE — 2580000003 HC RX 258: Performed by: STUDENT IN AN ORGANIZED HEALTH CARE EDUCATION/TRAINING PROGRAM

## 2023-05-18 PROCEDURE — 36415 COLL VENOUS BLD VENIPUNCTURE: CPT

## 2023-05-18 PROCEDURE — 82962 GLUCOSE BLOOD TEST: CPT

## 2023-05-18 PROCEDURE — 80048 BASIC METABOLIC PNL TOTAL CA: CPT

## 2023-05-18 PROCEDURE — 2580000003 HC RX 258: Performed by: INTERNAL MEDICINE

## 2023-05-18 PROCEDURE — 6370000000 HC RX 637 (ALT 250 FOR IP): Performed by: FAMILY MEDICINE

## 2023-05-18 PROCEDURE — 97535 SELF CARE MNGMENT TRAINING: CPT

## 2023-05-18 PROCEDURE — 6360000002 HC RX W HCPCS: Performed by: INTERNAL MEDICINE

## 2023-05-18 RX ORDER — MINERAL OIL/PETROLATUM,WHITE
CREAM (GRAM) TOPICAL 2 TIMES DAILY
Status: DISCONTINUED | OUTPATIENT
Start: 2023-05-18 | End: 2023-05-23 | Stop reason: HOSPADM

## 2023-05-18 RX ADMIN — POTASSIUM CHLORIDE 20 MEQ: 20 TABLET, EXTENDED RELEASE ORAL at 08:59

## 2023-05-18 RX ADMIN — NAPROXEN 500 MG: 250 TABLET ORAL at 18:09

## 2023-05-18 RX ADMIN — NAPROXEN 500 MG: 250 TABLET ORAL at 08:59

## 2023-05-18 RX ADMIN — GABAPENTIN 100 MG: 100 CAPSULE ORAL at 08:59

## 2023-05-18 RX ADMIN — BUMETANIDE 2 MG: 0.25 INJECTION INTRAMUSCULAR; INTRAVENOUS at 22:45

## 2023-05-18 RX ADMIN — OXYCODONE 5 MG: 5 TABLET ORAL at 05:56

## 2023-05-18 RX ADMIN — ATORVASTATIN CALCIUM 40 MG: 40 TABLET, FILM COATED ORAL at 08:59

## 2023-05-18 RX ADMIN — PIPERACILLIN AND TAZOBACTAM 4500 MG: 4; .5 INJECTION, POWDER, LYOPHILIZED, FOR SOLUTION INTRAVENOUS at 09:28

## 2023-05-18 RX ADMIN — SODIUM CHLORIDE, PRESERVATIVE FREE 10 ML: 5 INJECTION INTRAVENOUS at 22:46

## 2023-05-18 RX ADMIN — SODIUM CHLORIDE, PRESERVATIVE FREE 10 ML: 5 INJECTION INTRAVENOUS at 08:59

## 2023-05-18 RX ADMIN — BUMETANIDE 2 MG: 0.25 INJECTION INTRAMUSCULAR; INTRAVENOUS at 09:13

## 2023-05-18 RX ADMIN — PIPERACILLIN AND TAZOBACTAM 4500 MG: 4; .5 INJECTION, POWDER, LYOPHILIZED, FOR SOLUTION INTRAVENOUS at 18:10

## 2023-05-18 RX ADMIN — POTASSIUM CHLORIDE 20 MEQ: 20 TABLET, EXTENDED RELEASE ORAL at 18:12

## 2023-05-18 RX ADMIN — VANCOMYCIN HYDROCHLORIDE 2000 MG: 10 INJECTION, POWDER, LYOPHILIZED, FOR SOLUTION INTRAVENOUS at 05:00

## 2023-05-18 RX ADMIN — GABAPENTIN 100 MG: 100 CAPSULE ORAL at 22:42

## 2023-05-18 RX ADMIN — Medication: at 09:11

## 2023-05-18 RX ADMIN — OXYCODONE 5 MG: 5 TABLET ORAL at 22:42

## 2023-05-18 RX ADMIN — VANCOMYCIN HYDROCHLORIDE 1500 MG: 10 INJECTION, POWDER, LYOPHILIZED, FOR SOLUTION INTRAVENOUS at 18:11

## 2023-05-18 RX ADMIN — GABAPENTIN 100 MG: 100 CAPSULE ORAL at 13:05

## 2023-05-18 RX ADMIN — SKIN PROTECTANT: 33 OINTMENT TOPICAL at 22:46

## 2023-05-18 RX ADMIN — ACETAMINOPHEN 650 MG: 325 TABLET ORAL at 08:59

## 2023-05-18 RX ADMIN — PIPERACILLIN AND TAZOBACTAM 4500 MG: 4; .5 INJECTION, POWDER, LYOPHILIZED, FOR SOLUTION INTRAVENOUS at 00:50

## 2023-05-18 RX ADMIN — INSULIN GLARGINE 30 UNITS: 100 INJECTION, SOLUTION SUBCUTANEOUS at 22:42

## 2023-05-18 ASSESSMENT — PAIN SCALES - WONG BAKER
WONGBAKER_NUMERICALRESPONSE: 2
WONGBAKER_NUMERICALRESPONSE: 2

## 2023-05-18 ASSESSMENT — PAIN DESCRIPTION - DESCRIPTORS: DESCRIPTORS: ACHING

## 2023-05-18 ASSESSMENT — PAIN DESCRIPTION - ORIENTATION
ORIENTATION: RIGHT
ORIENTATION: LEFT;RIGHT
ORIENTATION: RIGHT;LEFT

## 2023-05-18 ASSESSMENT — PAIN - FUNCTIONAL ASSESSMENT: PAIN_FUNCTIONAL_ASSESSMENT: PREVENTS OR INTERFERES SOME ACTIVE ACTIVITIES AND ADLS

## 2023-05-18 ASSESSMENT — PAIN SCALES - GENERAL
PAINLEVEL_OUTOF10: 8
PAINLEVEL_OUTOF10: 6
PAINLEVEL_OUTOF10: 9
PAINLEVEL_OUTOF10: 2

## 2023-05-18 ASSESSMENT — PAIN DESCRIPTION - LOCATION
LOCATION: KNEE
LOCATION: LEG
LOCATION: KNEE

## 2023-05-19 PROBLEM — I10 ESSENTIAL HYPERTENSION: Status: ACTIVE | Noted: 2021-08-11

## 2023-05-19 PROBLEM — E11.9 TYPE 2 DIABETES MELLITUS (HCC): Status: ACTIVE | Noted: 2017-01-01

## 2023-05-19 LAB
GLUCOSE BLD STRIP.AUTO-MCNC: 118 MG/DL (ref 65–117)
GLUCOSE BLD STRIP.AUTO-MCNC: 129 MG/DL (ref 65–117)
GLUCOSE BLD STRIP.AUTO-MCNC: 140 MG/DL (ref 65–117)
GLUCOSE BLD STRIP.AUTO-MCNC: 153 MG/DL (ref 65–117)
SERVICE CMNT-IMP: ABNORMAL

## 2023-05-19 PROCEDURE — 6360000002 HC RX W HCPCS: Performed by: FAMILY MEDICINE

## 2023-05-19 PROCEDURE — 2500000003 HC RX 250 WO HCPCS: Performed by: FAMILY MEDICINE

## 2023-05-19 PROCEDURE — 6370000000 HC RX 637 (ALT 250 FOR IP): Performed by: STUDENT IN AN ORGANIZED HEALTH CARE EDUCATION/TRAINING PROGRAM

## 2023-05-19 PROCEDURE — 6370000000 HC RX 637 (ALT 250 FOR IP): Performed by: FAMILY MEDICINE

## 2023-05-19 PROCEDURE — 99232 SBSQ HOSP IP/OBS MODERATE 35: CPT | Performed by: FAMILY MEDICINE

## 2023-05-19 PROCEDURE — 2580000003 HC RX 258: Performed by: FAMILY MEDICINE

## 2023-05-19 PROCEDURE — 6360000002 HC RX W HCPCS: Performed by: INTERNAL MEDICINE

## 2023-05-19 PROCEDURE — 82962 GLUCOSE BLOOD TEST: CPT

## 2023-05-19 PROCEDURE — 2580000003 HC RX 258: Performed by: STUDENT IN AN ORGANIZED HEALTH CARE EDUCATION/TRAINING PROGRAM

## 2023-05-19 PROCEDURE — 2580000003 HC RX 258: Performed by: INTERNAL MEDICINE

## 2023-05-19 PROCEDURE — 1100000000 HC RM PRIVATE

## 2023-05-19 PROCEDURE — 97530 THERAPEUTIC ACTIVITIES: CPT

## 2023-05-19 RX ADMIN — POTASSIUM CHLORIDE 20 MEQ: 20 TABLET, EXTENDED RELEASE ORAL at 09:16

## 2023-05-19 RX ADMIN — BUMETANIDE 2 MG: 0.25 INJECTION INTRAMUSCULAR; INTRAVENOUS at 09:30

## 2023-05-19 RX ADMIN — OXYCODONE 5 MG: 5 TABLET ORAL at 06:16

## 2023-05-19 RX ADMIN — GABAPENTIN 100 MG: 100 CAPSULE ORAL at 21:12

## 2023-05-19 RX ADMIN — GABAPENTIN 100 MG: 100 CAPSULE ORAL at 09:16

## 2023-05-19 RX ADMIN — ATORVASTATIN CALCIUM 40 MG: 40 TABLET, FILM COATED ORAL at 09:16

## 2023-05-19 RX ADMIN — PIPERACILLIN AND TAZOBACTAM 4500 MG: 4; .5 INJECTION, POWDER, LYOPHILIZED, FOR SOLUTION INTRAVENOUS at 15:59

## 2023-05-19 RX ADMIN — OXYCODONE 5 MG: 5 TABLET ORAL at 21:12

## 2023-05-19 RX ADMIN — BUMETANIDE 2 MG: 0.25 INJECTION INTRAMUSCULAR; INTRAVENOUS at 21:12

## 2023-05-19 RX ADMIN — NAPROXEN 500 MG: 250 TABLET ORAL at 17:48

## 2023-05-19 RX ADMIN — SODIUM CHLORIDE, PRESERVATIVE FREE 10 ML: 5 INJECTION INTRAVENOUS at 14:01

## 2023-05-19 RX ADMIN — VANCOMYCIN HYDROCHLORIDE 1500 MG: 10 INJECTION, POWDER, LYOPHILIZED, FOR SOLUTION INTRAVENOUS at 17:55

## 2023-05-19 RX ADMIN — NAPROXEN 500 MG: 250 TABLET ORAL at 09:16

## 2023-05-19 RX ADMIN — GABAPENTIN 100 MG: 100 CAPSULE ORAL at 14:00

## 2023-05-19 RX ADMIN — PIPERACILLIN AND TAZOBACTAM 4500 MG: 4; .5 INJECTION, POWDER, LYOPHILIZED, FOR SOLUTION INTRAVENOUS at 09:15

## 2023-05-19 RX ADMIN — POTASSIUM CHLORIDE 20 MEQ: 20 TABLET, EXTENDED RELEASE ORAL at 17:48

## 2023-05-19 RX ADMIN — VANCOMYCIN HYDROCHLORIDE 1500 MG: 10 INJECTION, POWDER, LYOPHILIZED, FOR SOLUTION INTRAVENOUS at 06:17

## 2023-05-19 RX ADMIN — SKIN PROTECTANT: 33 OINTMENT TOPICAL at 21:19

## 2023-05-19 RX ADMIN — OXYCODONE 5 MG: 5 TABLET ORAL at 15:55

## 2023-05-19 RX ADMIN — SODIUM CHLORIDE, PRESERVATIVE FREE 10 ML: 5 INJECTION INTRAVENOUS at 21:50

## 2023-05-19 RX ADMIN — INSULIN GLARGINE 30 UNITS: 100 INJECTION, SOLUTION SUBCUTANEOUS at 21:12

## 2023-05-19 ASSESSMENT — PAIN DESCRIPTION - LOCATION
LOCATION: KNEE

## 2023-05-19 ASSESSMENT — PAIN SCALES - GENERAL
PAINLEVEL_OUTOF10: 5
PAINLEVEL_OUTOF10: 8
PAINLEVEL_OUTOF10: 2

## 2023-05-19 ASSESSMENT — PAIN - FUNCTIONAL ASSESSMENT: PAIN_FUNCTIONAL_ASSESSMENT: ACTIVITIES ARE NOT PREVENTED

## 2023-05-19 ASSESSMENT — PAIN DESCRIPTION - ORIENTATION
ORIENTATION: LEFT
ORIENTATION: RIGHT;LEFT
ORIENTATION: RIGHT;LEFT

## 2023-05-19 ASSESSMENT — PAIN DESCRIPTION - DESCRIPTORS
DESCRIPTORS: ACHING;BURNING
DESCRIPTORS: ACHING

## 2023-05-20 LAB
BACTERIA SPEC CULT: NORMAL
GLUCOSE BLD STRIP.AUTO-MCNC: 140 MG/DL (ref 65–117)
GLUCOSE BLD STRIP.AUTO-MCNC: 143 MG/DL (ref 65–117)
GLUCOSE BLD STRIP.AUTO-MCNC: 150 MG/DL (ref 65–117)
GLUCOSE BLD STRIP.AUTO-MCNC: 153 MG/DL (ref 65–117)
SERVICE CMNT-IMP: ABNORMAL
SERVICE CMNT-IMP: NORMAL
VANCOMYCIN SERPL-MCNC: 18.6 UG/ML

## 2023-05-20 PROCEDURE — 6370000000 HC RX 637 (ALT 250 FOR IP): Performed by: FAMILY MEDICINE

## 2023-05-20 PROCEDURE — 80202 ASSAY OF VANCOMYCIN: CPT

## 2023-05-20 PROCEDURE — 2580000003 HC RX 258: Performed by: FAMILY MEDICINE

## 2023-05-20 PROCEDURE — 82962 GLUCOSE BLOOD TEST: CPT

## 2023-05-20 PROCEDURE — 99233 SBSQ HOSP IP/OBS HIGH 50: CPT | Performed by: INTERNAL MEDICINE

## 2023-05-20 PROCEDURE — 6360000002 HC RX W HCPCS: Performed by: INTERNAL MEDICINE

## 2023-05-20 PROCEDURE — 2580000003 HC RX 258: Performed by: STUDENT IN AN ORGANIZED HEALTH CARE EDUCATION/TRAINING PROGRAM

## 2023-05-20 PROCEDURE — 6370000000 HC RX 637 (ALT 250 FOR IP): Performed by: STUDENT IN AN ORGANIZED HEALTH CARE EDUCATION/TRAINING PROGRAM

## 2023-05-20 PROCEDURE — 2500000003 HC RX 250 WO HCPCS: Performed by: FAMILY MEDICINE

## 2023-05-20 PROCEDURE — 2580000003 HC RX 258: Performed by: INTERNAL MEDICINE

## 2023-05-20 PROCEDURE — 1100000000 HC RM PRIVATE

## 2023-05-20 PROCEDURE — 36415 COLL VENOUS BLD VENIPUNCTURE: CPT

## 2023-05-20 PROCEDURE — 6360000002 HC RX W HCPCS: Performed by: FAMILY MEDICINE

## 2023-05-20 RX ADMIN — SODIUM CHLORIDE, PRESERVATIVE FREE 10 ML: 5 INJECTION INTRAVENOUS at 09:02

## 2023-05-20 RX ADMIN — SODIUM CHLORIDE, PRESERVATIVE FREE 10 ML: 5 INJECTION INTRAVENOUS at 21:43

## 2023-05-20 RX ADMIN — VANCOMYCIN HYDROCHLORIDE 1500 MG: 10 INJECTION, POWDER, LYOPHILIZED, FOR SOLUTION INTRAVENOUS at 17:51

## 2023-05-20 RX ADMIN — GABAPENTIN 100 MG: 100 CAPSULE ORAL at 21:37

## 2023-05-20 RX ADMIN — GABAPENTIN 100 MG: 100 CAPSULE ORAL at 15:21

## 2023-05-20 RX ADMIN — GABAPENTIN 100 MG: 100 CAPSULE ORAL at 08:27

## 2023-05-20 RX ADMIN — NAPROXEN 500 MG: 250 TABLET ORAL at 16:20

## 2023-05-20 RX ADMIN — VANCOMYCIN HYDROCHLORIDE 1500 MG: 10 INJECTION, POWDER, LYOPHILIZED, FOR SOLUTION INTRAVENOUS at 05:29

## 2023-05-20 RX ADMIN — PIPERACILLIN AND TAZOBACTAM 4500 MG: 4; .5 INJECTION, POWDER, LYOPHILIZED, FOR SOLUTION INTRAVENOUS at 01:02

## 2023-05-20 RX ADMIN — ATORVASTATIN CALCIUM 40 MG: 40 TABLET, FILM COATED ORAL at 08:25

## 2023-05-20 RX ADMIN — BUMETANIDE 2 MG: 0.25 INJECTION INTRAMUSCULAR; INTRAVENOUS at 21:39

## 2023-05-20 RX ADMIN — PIPERACILLIN AND TAZOBACTAM 4500 MG: 4; .5 INJECTION, POWDER, LYOPHILIZED, FOR SOLUTION INTRAVENOUS at 16:20

## 2023-05-20 RX ADMIN — POTASSIUM CHLORIDE 20 MEQ: 20 TABLET, EXTENDED RELEASE ORAL at 08:24

## 2023-05-20 RX ADMIN — INSULIN GLARGINE 30 UNITS: 100 INJECTION, SOLUTION SUBCUTANEOUS at 21:38

## 2023-05-20 RX ADMIN — POTASSIUM CHLORIDE 20 MEQ: 20 TABLET, EXTENDED RELEASE ORAL at 16:20

## 2023-05-20 RX ADMIN — PIPERACILLIN AND TAZOBACTAM 4500 MG: 4; .5 INJECTION, POWDER, LYOPHILIZED, FOR SOLUTION INTRAVENOUS at 08:23

## 2023-05-20 RX ADMIN — SKIN PROTECTANT: 33 OINTMENT TOPICAL at 21:41

## 2023-05-20 RX ADMIN — BUMETANIDE 2 MG: 0.25 INJECTION INTRAMUSCULAR; INTRAVENOUS at 08:36

## 2023-05-20 RX ADMIN — OXYCODONE 5 MG: 5 TABLET ORAL at 21:37

## 2023-05-20 RX ADMIN — NAPROXEN 500 MG: 250 TABLET ORAL at 08:24

## 2023-05-20 ASSESSMENT — PAIN DESCRIPTION - LOCATION
LOCATION: LEG
LOCATION: KNEE

## 2023-05-20 ASSESSMENT — PAIN DESCRIPTION - DESCRIPTORS
DESCRIPTORS: ACHING
DESCRIPTORS: ACHING

## 2023-05-20 ASSESSMENT — PAIN SCALES - GENERAL
PAINLEVEL_OUTOF10: 0
PAINLEVEL_OUTOF10: 8

## 2023-05-20 ASSESSMENT — PAIN DESCRIPTION - ORIENTATION
ORIENTATION: RIGHT;LEFT
ORIENTATION: LEFT

## 2023-05-21 LAB
GLUCOSE BLD STRIP.AUTO-MCNC: 119 MG/DL (ref 65–117)
GLUCOSE BLD STRIP.AUTO-MCNC: 119 MG/DL (ref 65–117)
GLUCOSE BLD STRIP.AUTO-MCNC: 127 MG/DL (ref 65–117)
GLUCOSE BLD STRIP.AUTO-MCNC: 170 MG/DL (ref 65–117)
SERVICE CMNT-IMP: ABNORMAL

## 2023-05-21 PROCEDURE — 1100000000 HC RM PRIVATE

## 2023-05-21 PROCEDURE — 2580000003 HC RX 258: Performed by: STUDENT IN AN ORGANIZED HEALTH CARE EDUCATION/TRAINING PROGRAM

## 2023-05-21 PROCEDURE — 82962 GLUCOSE BLOOD TEST: CPT

## 2023-05-21 PROCEDURE — 6360000002 HC RX W HCPCS: Performed by: FAMILY MEDICINE

## 2023-05-21 PROCEDURE — 2500000003 HC RX 250 WO HCPCS: Performed by: FAMILY MEDICINE

## 2023-05-21 PROCEDURE — 6370000000 HC RX 637 (ALT 250 FOR IP): Performed by: STUDENT IN AN ORGANIZED HEALTH CARE EDUCATION/TRAINING PROGRAM

## 2023-05-21 PROCEDURE — 99233 SBSQ HOSP IP/OBS HIGH 50: CPT | Performed by: INTERNAL MEDICINE

## 2023-05-21 PROCEDURE — 6360000002 HC RX W HCPCS: Performed by: INTERNAL MEDICINE

## 2023-05-21 PROCEDURE — 6370000000 HC RX 637 (ALT 250 FOR IP): Performed by: FAMILY MEDICINE

## 2023-05-21 PROCEDURE — 2580000003 HC RX 258: Performed by: FAMILY MEDICINE

## 2023-05-21 PROCEDURE — 2580000003 HC RX 258: Performed by: INTERNAL MEDICINE

## 2023-05-21 RX ADMIN — ATORVASTATIN CALCIUM 40 MG: 40 TABLET, FILM COATED ORAL at 09:46

## 2023-05-21 RX ADMIN — PIPERACILLIN AND TAZOBACTAM 4500 MG: 4; .5 INJECTION, POWDER, LYOPHILIZED, FOR SOLUTION INTRAVENOUS at 02:37

## 2023-05-21 RX ADMIN — POTASSIUM CHLORIDE 20 MEQ: 20 TABLET, EXTENDED RELEASE ORAL at 09:45

## 2023-05-21 RX ADMIN — SKIN PROTECTANT: 33 OINTMENT TOPICAL at 22:04

## 2023-05-21 RX ADMIN — GABAPENTIN 100 MG: 100 CAPSULE ORAL at 21:56

## 2023-05-21 RX ADMIN — SODIUM CHLORIDE, PRESERVATIVE FREE 10 ML: 5 INJECTION INTRAVENOUS at 22:04

## 2023-05-21 RX ADMIN — VANCOMYCIN HYDROCHLORIDE 1500 MG: 10 INJECTION, POWDER, LYOPHILIZED, FOR SOLUTION INTRAVENOUS at 09:58

## 2023-05-21 RX ADMIN — NAPROXEN 500 MG: 250 TABLET ORAL at 18:35

## 2023-05-21 RX ADMIN — GABAPENTIN 100 MG: 100 CAPSULE ORAL at 14:27

## 2023-05-21 RX ADMIN — INSULIN GLARGINE 30 UNITS: 100 INJECTION, SOLUTION SUBCUTANEOUS at 21:56

## 2023-05-21 RX ADMIN — NAPROXEN 500 MG: 250 TABLET ORAL at 09:43

## 2023-05-21 RX ADMIN — PIPERACILLIN AND TAZOBACTAM 3375 MG: 3; .375 INJECTION, POWDER, LYOPHILIZED, FOR SOLUTION INTRAVENOUS at 18:36

## 2023-05-21 RX ADMIN — POTASSIUM CHLORIDE 20 MEQ: 20 TABLET, EXTENDED RELEASE ORAL at 18:36

## 2023-05-21 RX ADMIN — GABAPENTIN 100 MG: 100 CAPSULE ORAL at 09:45

## 2023-05-21 RX ADMIN — BUMETANIDE 2 MG: 0.25 INJECTION INTRAMUSCULAR; INTRAVENOUS at 21:57

## 2023-05-21 RX ADMIN — BUMETANIDE 2 MG: 0.25 INJECTION INTRAMUSCULAR; INTRAVENOUS at 09:59

## 2023-05-21 RX ADMIN — POLYETHYLENE GLYCOL 3350 17 G: 17 POWDER, FOR SOLUTION ORAL at 09:53

## 2023-05-21 RX ADMIN — PIPERACILLIN AND TAZOBACTAM 3375 MG: 3; .375 INJECTION, POWDER, LYOPHILIZED, FOR SOLUTION INTRAVENOUS at 12:56

## 2023-05-21 ASSESSMENT — PAIN DESCRIPTION - DESCRIPTORS: DESCRIPTORS: ACHING

## 2023-05-21 ASSESSMENT — PAIN DESCRIPTION - ORIENTATION
ORIENTATION: RIGHT;LEFT
ORIENTATION: RIGHT;LEFT

## 2023-05-21 ASSESSMENT — PAIN SCALES - GENERAL
PAINLEVEL_OUTOF10: 5
PAINLEVEL_OUTOF10: 6

## 2023-05-21 ASSESSMENT — PAIN DESCRIPTION - LOCATION
LOCATION: KNEE
LOCATION: LEG

## 2023-05-22 LAB
ANION GAP SERPL CALC-SCNC: 3 MMOL/L (ref 5–15)
BASOPHILS # BLD: 0 K/UL (ref 0–0.1)
BASOPHILS NFR BLD: 0 % (ref 0–1)
BUN SERPL-MCNC: 24 MG/DL (ref 6–20)
BUN/CREAT SERPL: 22 (ref 12–20)
CALCIUM SERPL-MCNC: 9.1 MG/DL (ref 8.5–10.1)
CHLORIDE SERPL-SCNC: 102 MMOL/L (ref 97–108)
CO2 SERPL-SCNC: 31 MMOL/L (ref 21–32)
CREAT SERPL-MCNC: 1.11 MG/DL (ref 0.7–1.3)
DATE LAST DOSE: ABNORMAL
DIFFERENTIAL METHOD BLD: ABNORMAL
DOSE AMOUNT: ABNORMAL UNITS
DOSE DATE/TIME: ABNORMAL
EOSINOPHIL # BLD: 0.2 K/UL (ref 0–0.4)
EOSINOPHIL NFR BLD: 4 % (ref 0–7)
ERYTHROCYTE [DISTWIDTH] IN BLOOD BY AUTOMATED COUNT: 14.8 % (ref 11.5–14.5)
EST. AVERAGE GLUCOSE BLD GHB EST-MCNC: 249 MG/DL
GLUCOSE BLD STRIP.AUTO-MCNC: 128 MG/DL (ref 65–117)
GLUCOSE BLD STRIP.AUTO-MCNC: 177 MG/DL (ref 65–117)
GLUCOSE BLD STRIP.AUTO-MCNC: 199 MG/DL (ref 65–117)
GLUCOSE BLD STRIP.AUTO-MCNC: 99 MG/DL (ref 65–117)
GLUCOSE SERPL-MCNC: 121 MG/DL (ref 65–100)
HBA1C MFR BLD: 10.3 % (ref 4–5.6)
HCT VFR BLD AUTO: 39 % (ref 36.6–50.3)
HGB BLD-MCNC: 11.7 G/DL (ref 12.1–17)
IMM GRANULOCYTES # BLD AUTO: 0 K/UL (ref 0–0.04)
IMM GRANULOCYTES NFR BLD AUTO: 0 % (ref 0–0.5)
LYMPHOCYTES # BLD: 1.1 K/UL (ref 0.8–3.5)
LYMPHOCYTES NFR BLD: 25 % (ref 12–49)
MCH RBC QN AUTO: 25.4 PG (ref 26–34)
MCHC RBC AUTO-ENTMCNC: 30 G/DL (ref 30–36.5)
MCV RBC AUTO: 84.6 FL (ref 80–99)
MONOCYTES # BLD: 0.5 K/UL (ref 0–1)
MONOCYTES NFR BLD: 11 % (ref 5–13)
NEUTS SEG # BLD: 2.8 K/UL (ref 1.8–8)
NEUTS SEG NFR BLD: 60 % (ref 32–75)
NRBC # BLD: 0 K/UL (ref 0–0.01)
NRBC BLD-RTO: 0 PER 100 WBC
PLATELET # BLD AUTO: 200 K/UL (ref 150–400)
PMV BLD AUTO: 10.6 FL (ref 8.9–12.9)
POTASSIUM SERPL-SCNC: 3.8 MMOL/L (ref 3.5–5.1)
RBC # BLD AUTO: 4.61 M/UL (ref 4.1–5.7)
SERVICE CMNT-IMP: ABNORMAL
SERVICE CMNT-IMP: NORMAL
SODIUM SERPL-SCNC: 136 MMOL/L (ref 136–145)
VANCOMYCIN TROUGH SERPL-MCNC: 12.2 UG/ML (ref 5–10)
WBC # BLD AUTO: 4.6 K/UL (ref 4.1–11.1)

## 2023-05-22 PROCEDURE — 2500000003 HC RX 250 WO HCPCS: Performed by: FAMILY MEDICINE

## 2023-05-22 PROCEDURE — 2580000003 HC RX 258: Performed by: FAMILY MEDICINE

## 2023-05-22 PROCEDURE — 82962 GLUCOSE BLOOD TEST: CPT

## 2023-05-22 PROCEDURE — 6370000000 HC RX 637 (ALT 250 FOR IP): Performed by: STUDENT IN AN ORGANIZED HEALTH CARE EDUCATION/TRAINING PROGRAM

## 2023-05-22 PROCEDURE — 99232 SBSQ HOSP IP/OBS MODERATE 35: CPT | Performed by: FAMILY MEDICINE

## 2023-05-22 PROCEDURE — 36415 COLL VENOUS BLD VENIPUNCTURE: CPT

## 2023-05-22 PROCEDURE — 6360000002 HC RX W HCPCS: Performed by: FAMILY MEDICINE

## 2023-05-22 PROCEDURE — 1100000000 HC RM PRIVATE

## 2023-05-22 PROCEDURE — 83036 HEMOGLOBIN GLYCOSYLATED A1C: CPT

## 2023-05-22 PROCEDURE — 85025 COMPLETE CBC W/AUTO DIFF WBC: CPT

## 2023-05-22 PROCEDURE — 6370000000 HC RX 637 (ALT 250 FOR IP): Performed by: FAMILY MEDICINE

## 2023-05-22 PROCEDURE — 80202 ASSAY OF VANCOMYCIN: CPT

## 2023-05-22 PROCEDURE — 80048 BASIC METABOLIC PNL TOTAL CA: CPT

## 2023-05-22 RX ADMIN — POTASSIUM CHLORIDE 20 MEQ: 20 TABLET, EXTENDED RELEASE ORAL at 18:13

## 2023-05-22 RX ADMIN — POLYETHYLENE GLYCOL 3350 17 G: 17 POWDER, FOR SOLUTION ORAL at 09:56

## 2023-05-22 RX ADMIN — GABAPENTIN 100 MG: 100 CAPSULE ORAL at 08:51

## 2023-05-22 RX ADMIN — BUMETANIDE 2 MG: 0.25 INJECTION INTRAMUSCULAR; INTRAVENOUS at 09:54

## 2023-05-22 RX ADMIN — OXYCODONE 5 MG: 5 TABLET ORAL at 22:30

## 2023-05-22 RX ADMIN — GABAPENTIN 100 MG: 100 CAPSULE ORAL at 21:30

## 2023-05-22 RX ADMIN — NAPROXEN 500 MG: 250 TABLET ORAL at 18:13

## 2023-05-22 RX ADMIN — NAPROXEN 500 MG: 250 TABLET ORAL at 08:51

## 2023-05-22 RX ADMIN — ATORVASTATIN CALCIUM 40 MG: 40 TABLET, FILM COATED ORAL at 08:51

## 2023-05-22 RX ADMIN — VANCOMYCIN HYDROCHLORIDE 1500 MG: 10 INJECTION, POWDER, LYOPHILIZED, FOR SOLUTION INTRAVENOUS at 05:36

## 2023-05-22 RX ADMIN — INSULIN GLARGINE 30 UNITS: 100 INJECTION, SOLUTION SUBCUTANEOUS at 21:30

## 2023-05-22 RX ADMIN — POTASSIUM CHLORIDE 20 MEQ: 20 TABLET, EXTENDED RELEASE ORAL at 08:51

## 2023-05-22 RX ADMIN — PIPERACILLIN AND TAZOBACTAM 3375 MG: 3; .375 INJECTION, POWDER, LYOPHILIZED, FOR SOLUTION INTRAVENOUS at 02:32

## 2023-05-22 RX ADMIN — BUMETANIDE 2 MG: 0.25 INJECTION INTRAMUSCULAR; INTRAVENOUS at 22:09

## 2023-05-22 RX ADMIN — GABAPENTIN 100 MG: 100 CAPSULE ORAL at 14:45

## 2023-05-22 ASSESSMENT — PAIN DESCRIPTION - LOCATION
LOCATION: LEG
LOCATION: LEG

## 2023-05-22 ASSESSMENT — PAIN DESCRIPTION - DESCRIPTORS
DESCRIPTORS: ACHING

## 2023-05-22 ASSESSMENT — PAIN DESCRIPTION - ORIENTATION
ORIENTATION: LEFT;RIGHT
ORIENTATION: RIGHT;LEFT
ORIENTATION: RIGHT;LEFT

## 2023-05-22 ASSESSMENT — PAIN SCALES - GENERAL
PAINLEVEL_OUTOF10: 0
PAINLEVEL_OUTOF10: 0
PAINLEVEL_OUTOF10: 8
PAINLEVEL_OUTOF10: 5
PAINLEVEL_OUTOF10: 2

## 2023-05-22 ASSESSMENT — PAIN - FUNCTIONAL ASSESSMENT: PAIN_FUNCTIONAL_ASSESSMENT: PREVENTS OR INTERFERES SOME ACTIVE ACTIVITIES AND ADLS

## 2023-05-22 NOTE — PROGRESS NOTES
Responded to request for spiritual care consult on Med [employer]. This was a follow up visit as  had visited with him last week. He appeared to be in much better spirits than last week. Provided supportive listening as he shared that his sister has been checking on him everyday. His co-worker has also called him. Affirmed expressions of support from family and friends. Mr. Obi Galvan shared his hopes of returning to work or of finding something else. He is also looking into what temporary resources might be available to him. Offered assurance of prayer.    available upon referral by staff or by patient/family request.      GERSON Fine, [facility], Staff Chaplain BELL KOHLER [facility] Paging Service  [phone])

## 2023-05-22 NOTE — PROGRESS NOTES
Bedside shift change report given to CAMERON Holloway (oncoming nurse) by Kiya Abrams LPN (offgoing nurse). Report included the following information Index, Adult Overview, Intake/Output, and MAR.

## 2023-05-22 NOTE — PROGRESS NOTES
Comprehensive Nutrition Assessment    Type and Reason for Visit:  Initial, RD Nutrition Re-Screen/LOS    Nutrition Recommendations/Plan:   5 carb choice/LÓPEZ diet     Malnutrition Assessment:  Malnutrition Status:  No malnutrition (05/22/23 1218)      Nutrition Assessment:    Patient medically noted for acute on chronic bilateral lower extremity edema and left tibial plateau fracture. PMH HTN, DM and prostate cancer. Chart reviewed for length of stay. Patient stable for discharge pending insurance authorization and bed placement. He reports a good appetite and eating well; doesn't always like the food but using menu/room service as needed. Increased carb allowance in diet to better meet estimated kcal needs; BG under control. No nutrition questions or concerns reported. Encouraged intake of meals. Nutrition Related Findings:    BG -[phone]   BM 5/15   Atorvastatin, bumex, Lantus, Humalog, KCl   Wound Type: Venous Stasis       Current Nutrition Intake & Therapies:    Average Meal Intake: %  Average Supplements Intake: None Ordered  ADULT DIET; Regular; 5 carb choices (75 gm/meal); No Added Salt (3-4 gm)  DIET ONE TIME MESSAGE; Anthropometric Measures:  Height: 167.6 cm (5' 5.98\")  Ideal Body Weight (IBW): 142 lbs (65 kg)       Current Body Weight: 277 lb 12.5 oz (126 kg),   IBW. Current BMI (kg/m2): 44.9                          BMI Categories: Obese Class 3 (BMI 40.0 or greater)    Estimated Daily Nutrient Needs:  Energy Requirements Based On: Formula  Weight Used for Energy Requirements: Current  Energy (kcal/day): 2130 kcals (BMR x 1. 3AF -500kcals)  Weight Used for Protein Requirements: Current  Protein (g/day): 101-126g (0.8-1.0 g/kg bw)  Method Used for Fluid Requirements: 1 ml/kcal  Fluid (ml/day): 2100 mL    Nutrition Diagnosis:   No nutrition diagnosis at this time       Nutrition Interventions:   Food and/or Nutrient Delivery: Modify Current Diet  Nutrition Education/Counseling:

## 2023-05-22 NOTE — PROGRESS NOTES
Admit Date: 5/13/2023  Hospital day several    Subjective:     Patient has no complaint of fever, chills. Continues to have some r sided knee pain, maing transfers difficult . L kne is actually less painful than the right. Also having some gas and bloating. ..   Medication side effects: none    Scheduled Meds:   piperacillin-tazobactam  3,375 mg IntraVENous Q8H    rx placeholder   Other RX Placeholder    vancomycin  1,500 mg IntraVENous Q12H    dermacerin   Topical BID    potassium chloride  20 mEq Oral BID WC    bumetanide  2 mg IntraVENous BID    naproxen  500 mg Oral BID WC    atorvastatin  40 mg Oral Daily    Enzalutamide  80 mg Oral BID    gabapentin  100 mg Oral TID    insulin glargine  30 Units SubCUTAneous Nightly    insulin lispro  0-8 Units SubCUTAneous TID WC    insulin lispro  0-4 Units SubCUTAneous Nightly    sodium chloride flush  5-40 mL IntraVENous 2 times per day     Continuous Infusions:   dextrose      sodium chloride       PRN Meds:acetaminophen, oxyCODONE, glucose, dextrose bolus **OR** dextrose bolus, glucagon (rDNA), dextrose, sodium chloride flush, sodium chloride, ondansetron **OR** ondansetron, polyethylene glycol, acetaminophen **OR** acetaminophen    Review of Systems  Pertinent items are noted in HPI. Objective:     No data found. I/O last 3 completed shifts:  In: -   Out: 2550 [Urine:2550]  No intake/output data recorded. /86   Pulse 82   Temp 98.2 °F (36.8 °C) (Oral)   Resp 17   Ht 1.676 m (5' 5.98\")   Wt 126 kg (277 lb 12.8 oz)   SpO2 98%   BMI 44.86 kg/m²   Lungs: clear to auscultation bilaterally  Heart: regular rate and rhythm, S1, S2 normal, no murmur, click, rub or gallop  Abdomen: soft, non-tender; bowel sounds normal; no masses,  no organomegaly  Extremities:  2+ edema bilaterally  R knee- effusion still present. Rom better than last week. ECG: normal sinus rhythm, no blocks or conduction defects, no ischemic changes.    Data ReviewCBC:   Lab Results

## 2023-05-22 NOTE — PROGRESS NOTES
0700: Per Dr Shiela Das, IV antibiotics are to be discontinued. OK for patient to not have IV access.

## 2023-05-23 VITALS
TEMPERATURE: 97.9 F | HEIGHT: 66 IN | HEART RATE: 82 BPM | DIASTOLIC BLOOD PRESSURE: 87 MMHG | RESPIRATION RATE: 16 BRPM | WEIGHT: 277.8 LBS | OXYGEN SATURATION: 98 % | SYSTOLIC BLOOD PRESSURE: 143 MMHG | BODY MASS INDEX: 44.65 KG/M2

## 2023-05-23 LAB
GLUCOSE BLD STRIP.AUTO-MCNC: 139 MG/DL (ref 65–117)
GLUCOSE BLD STRIP.AUTO-MCNC: 142 MG/DL (ref 65–117)
GLUCOSE BLD STRIP.AUTO-MCNC: 217 MG/DL (ref 65–117)
SERVICE CMNT-IMP: ABNORMAL

## 2023-05-23 PROCEDURE — 99239 HOSP IP/OBS DSCHRG MGMT >30: CPT | Performed by: FAMILY MEDICINE

## 2023-05-23 PROCEDURE — 82962 GLUCOSE BLOOD TEST: CPT

## 2023-05-23 PROCEDURE — 6370000000 HC RX 637 (ALT 250 FOR IP): Performed by: FAMILY MEDICINE

## 2023-05-23 PROCEDURE — 2500000003 HC RX 250 WO HCPCS: Performed by: FAMILY MEDICINE

## 2023-05-23 PROCEDURE — 6370000000 HC RX 637 (ALT 250 FOR IP): Performed by: STUDENT IN AN ORGANIZED HEALTH CARE EDUCATION/TRAINING PROGRAM

## 2023-05-23 RX ORDER — BUMETANIDE 1 MG/1
4 TABLET ORAL DAILY
Qty: 30 TABLET | Refills: 3 | Status: SHIPPED
Start: 2023-05-23

## 2023-05-23 RX ORDER — MINERAL OIL/PETROLATUM,WHITE
CREAM (GRAM) TOPICAL
Qty: 454 G | Refills: 0 | Status: SHIPPED
Start: 2023-05-23

## 2023-05-23 RX ORDER — INSULIN GLARGINE 100 [IU]/ML
30 INJECTION, SOLUTION SUBCUTANEOUS NIGHTLY
Qty: 10 ML | Refills: 3 | Status: SHIPPED
Start: 2023-05-23

## 2023-05-23 RX ORDER — POTASSIUM CHLORIDE 20 MEQ/1
20 TABLET, EXTENDED RELEASE ORAL 2 TIMES DAILY WITH MEALS
Qty: 60 TABLET | Refills: 3 | Status: SHIPPED
Start: 2023-05-23

## 2023-05-23 RX ORDER — NAPROXEN 500 MG/1
500 TABLET ORAL 2 TIMES DAILY WITH MEALS
Qty: 60 TABLET | Refills: 3 | Status: SHIPPED
Start: 2023-05-23

## 2023-05-23 RX ADMIN — POTASSIUM CHLORIDE 20 MEQ: 20 TABLET, EXTENDED RELEASE ORAL at 17:04

## 2023-05-23 RX ADMIN — POTASSIUM CHLORIDE 20 MEQ: 20 TABLET, EXTENDED RELEASE ORAL at 08:36

## 2023-05-23 RX ADMIN — Medication 2 UNITS: at 12:49

## 2023-05-23 RX ADMIN — NAPROXEN 500 MG: 250 TABLET ORAL at 17:04

## 2023-05-23 RX ADMIN — BUMETANIDE 2 MG: 0.25 INJECTION INTRAMUSCULAR; INTRAVENOUS at 08:38

## 2023-05-23 RX ADMIN — NAPROXEN 500 MG: 250 TABLET ORAL at 08:37

## 2023-05-23 RX ADMIN — GABAPENTIN 100 MG: 100 CAPSULE ORAL at 14:34

## 2023-05-23 RX ADMIN — GABAPENTIN 100 MG: 100 CAPSULE ORAL at 08:37

## 2023-05-23 RX ADMIN — ATORVASTATIN CALCIUM 40 MG: 40 TABLET, FILM COATED ORAL at 08:37

## 2023-05-23 ASSESSMENT — PAIN DESCRIPTION - DESCRIPTORS
DESCRIPTORS: ACHING
DESCRIPTORS: ACHING

## 2023-05-23 ASSESSMENT — PAIN DESCRIPTION - ORIENTATION
ORIENTATION: LEFT
ORIENTATION: LEFT

## 2023-05-23 ASSESSMENT — PAIN DESCRIPTION - LOCATION
LOCATION: KNEE
LOCATION: KNEE

## 2023-05-23 ASSESSMENT — PAIN SCALES - GENERAL
PAINLEVEL_OUTOF10: 7
PAINLEVEL_OUTOF10: 7

## 2023-05-23 NOTE — PROGRESS NOTES
Doing well. Stable for transfer to [facility] this am. I have signed an Emtala Form and left it in the 3218 box. Have completed med reconciliation form. Will dictate discharge summary later today.

## 2023-05-23 NOTE — PROGRESS NOTES
1222: TRANSFER - OUT REPORT:    Verbal report given to Hesham Presley RN on Justine Mejia  being transferred to [facility] for routine progression of patient care       Report consisted of patient's Situation, Background, Assessment and   Recommendations(SBAR). Information from the following report(s) Nurse Handoff Report, ED Encounter Summary, ED SBAR, [facility], and Recent Results was reviewed with the receiving nurse. [place] Assessment: No data recorded  Lines:   Peripheral IV 05/22/23 Distal;Left;Posterior Wrist (Active)   Site Assessment Clean, dry & intact 05/23/23 0745   Line Status Flushed 05/23/23 0745   Line Care Ports disinfected 05/23/23 0745   Phlebitis Assessment No symptoms 05/23/23 0745   Infiltration Assessment 0 05/23/23 0745   Alcohol Cap Used Yes 05/23/23 0745   Dressing Status Clean, dry & intact 05/23/23 0745   Dressing Type Transparent 05/23/23 0745        Opportunity for questions and clarification was provided. Patient transported with:  Patient's medications from home         1616: Leticia called and RN updated of new  time of 6PM with Hospital to Home transportation. 1826: [facility] arrived to transport patient and report given. IV removed. Patient belonging sent with patient and [facility]. Patient supplied chemotherapy drug Enzalutamide TABS 80 mg placed in patient's red bag and transported with patient and [facility]. 1848: Leticia called and informed RN of patient's departure.

## 2023-05-23 NOTE — CARE COORDINATION
Transition of Care Plan:     RUR: 14%  Prior Level of Functioning: independent, lives with sister     Disposition: IPR- [facility]  Room: pending  Call Report to: pending  Admitting physician: pending    If SNF or IPR: Date FOC offered: 5/17/23  Date [address] received: 5/17/23  Accepting facility: [facility] [address]  Date authorization started with reference number: will need Tano Gaines, initiating today   Date authorization received and expires: Follow up appointments: PCP, specialists as indicated   DME needed: TBD pending acceptance to rehab   Transportation at discharge: medical transport   IM/IMM Medicare/ letter given: N/A  Is patient a  and connected with VA? No              If yes, was [employer] transfer form completed and VA notified? Caregiver Contact: Brock Rose (sister) [phone]  Discharge Caregiver contacted prior to discharge? yes  Care Conference needed? No  Barriers to discharge: placement, ins auth    Updated Note: 1644pm    CM updated Crystal that physician has not returned the call to d/c plan. Will plan for tomorrow- if bed available per [place].    Updated Note :1553pm    CM has attempted to contact Dr. Shiela Das to update on d/c plan to [facility] today and has been unsuccessful. CM called the office with not success. CM reached out to [address] [place] [address] at [facility] to update on delay with d/c today. CM discussed update with pt on d/c . Pt verbalized understanding. Updated Note :  14:10  Cm received a call from [facility] with [facility] that they have insurance auth and they can accept pt today. CM called Dr. Shiela Das to update on d/c plan for today. LM for return phone call. [address] [facility]  Phone: [phone]       Initial Note 10:59  Chart reviewed. CM continuing to follow for discharge planning. Insurance authorization remains pending at this time.  Awaiting approval and bed availability at [facility]
sister, Ta Holt, awaiting return call. Pt cleared for d/c from CM standpoint. Informed assigned RN.     Jael Mcpherson, MSW   Care Manager, [facility]

## 2023-05-23 NOTE — PLAN OF CARE
Problem: Safety - Adult  Goal: Free from fall injury  Outcome: Progressing     Problem: Skin/Tissue Integrity  Goal: Absence of new skin breakdown  Description: 1. Monitor for areas of redness and/or skin breakdown  2. Assess vascular access sites hourly  3. Every 4-6 hours minimum:  Change oxygen saturation probe site  4. Every 4-6 hours:  If on nasal continuous positive airway pressure, respiratory therapy assess nares and determine need for appliance change or resting period.   Outcome: Progressing     Problem: Discharge Planning  Goal: Discharge to home or other facility with appropriate resources  Outcome: Progressing     Problem: Pain  Goal: Verbalizes/displays adequate comfort level or baseline comfort level  Outcome: Progressing     Problem: Chronic Conditions and Co-morbidities  Goal: Patient's chronic conditions and co-morbidity symptoms are monitored and maintained or improved  Outcome: Progressing     Problem: ABCDS Injury Assessment  Goal: Absence of physical injury  Outcome: Progressing

## 2023-05-23 NOTE — PROGRESS NOTES
Pharmacist Discharge Medication Reconciliation      Significant PMH:   Past Medical History:   Diagnosis Date    Asthma     Chronic venous insufficiency     Diabetes ([facility]     Lower leg edema     Prostate cancer ([facility] [address] [facility]     gets chemo at [facility]       Admitting Diagnoses: Leg edema [R60.0]  Lower extremity edema [R60.0]  Closed nondisplaced fracture of left patella, unspecified fracture morphology, initial encounter [S82.002A]    Allergies: Patient has no known allergies. Admission date: 5/13/2023  9:52 PM    Current Discharge Medication List        START taking these medications    Details   naproxen (NAPROSYN) 500 MG tablet Take 1 tablet by mouth 2 times daily (with meals)  Qty: 60 tablet, Refills: 3  Start date: 5/23/2023      potassium chloride (KLOR-CON M) 20 MEQ extended release tablet Take 1 tablet by mouth 2 times daily (with meals)  Qty: 60 tablet, Refills: 3  Start date: 5/23/2023           CONTINUE these medications which have CHANGED    Details   insulin glargine (LANTUS) 100 UNIT/ML injection vial Inject 30 Units into the skin nightly  Qty: 10 mL, Refills: 3  Start date: 5/23/2023      dermacerin (EUCERIN) CREA cream Apply to scaly skin on extremities bid  Qty: 454 g, Refills: 0  Start date: 5/23/2023      bumetanide (BUMEX) 1 MG tablet Take 4 tablets by mouth daily  Qty: 30 tablet, Refills: 3  Start date: 5/23/2023           CONTINUE these medications which have NOT CHANGED    Details   Liraglutide (VICTOZA) 18 MG/3ML SOPN SC injection Inject 1.2 mg into the skin daily      gabapentin (NEURONTIN) 100 MG capsule Take 1 capsule by mouth 3 times daily.  Max Daily Amount: 300 mg      atorvastatin (LIPITOR) 40 MG tablet Take 1 tablet by mouth daily      Enzalutamide (XTANDI) 80 MG TABS Take 80 mg by mouth in the morning and at bedtime           STOP taking these medications       cephALEXin (KEFLEX) 500 MG capsule Comments:   Reason for Stopping:              The patient's chart, MAR and AVS were reviewed by

## 2023-05-24 NOTE — ADT AUTH CERT
5/19-5/22/23 by Dileep Oneill RN       Review Status Review Entered   In Primary 5/22/2023 1555       Created By   Dileep Oneill RN      Criteria Review    5/19/23  bumetanide (BUMEX) injection 2 mg  Dose: 2 mg  Freq: 2 TIMES DAILY Route: IV  piperacillin-tazobactam (ZOSYN) 4,500 mg in sodium chloride 0.9 % 100 mL IVPB (mini-bag)  Dose: 4,500 mg  Freq: EVERY 8 HOURS Route: IV  vancomycin (VANCOCIN) 1500 mg in sodium chloride 0.9% 500 mL IVPB  Dose: 1,500 mg  Freq: EVERY 12 HOURS Route: IV  Temp-97.7  RR 18  Pulse 96  /82  100%RA     Attending  Patient has no complaint of fever, chills, dyspnea. R knee feeling somewhat better. L knee feels better with brace on it. Extremities: edema . 2+ bilaterally  stable to go to rehab from my standpoint       Case Management  Chart reviewed. CM continuing to follow for discharge planning. Pt has been accepted to [facility] [address] [place] [address] for IPR placement pending insurance approval.     Physical Therapy  ASSESSMENT:  Patient continues to benefit from skilled PT services and is progressing towards goals. Pt was received supine in bed, and was agreeable to PT. Pt performed supine to sit with SBA. Time spent sitting EOB for setup of session and adjusting L LE knee brace. Pt performed sit to to stand for three repetitions with mod Ax2. 3rd person holding L LE for NWB status, and RW. Pt stood ~10seconds after each transition to standing, cues for upright posture and to squeeze R LE for stability. 5/20/23  bumetanide (BUMEX) injection 2 mg  Dose: 2 mg  Freq: 2 TIMES DAILY Route: IV  piperacillin-tazobactam (ZOSYN) 4,500 mg in sodium chloride 0.9 % 100 mL IVPB (mini-bag)  Dose: 4,500 mg  Freq: EVERY 8 HOURS Route: IV  vancomycin (VANCOCIN) 1500 mg in sodium chloride 0.9% 500 mL IVPB  Dose: 1,500 mg  Freq: EVERY 12 HOURS Route: IV  Temp 97.9  RR 18  Pulse 84  /82  96% RA     Attending  This AM he continues to feel better daily since admission.  He has no Cardiac or

## 2023-05-25 RX ORDER — IBUPROFEN 600 MG/1
TABLET ORAL
COMMUNITY
Start: 2023-03-20

## 2023-05-25 RX ORDER — BUMETANIDE 2 MG/1
2 TABLET ORAL DAILY
COMMUNITY
Start: 2023-03-20

## 2023-05-25 RX ORDER — LANCETS 30 GAUGE
EACH MISCELLANEOUS
COMMUNITY
Start: 2021-02-24

## 2023-05-25 RX ORDER — ENZALUTAMIDE 80 MG/1
TABLET ORAL 2 TIMES DAILY
COMMUNITY
Start: 2021-07-28

## 2023-05-25 RX ORDER — ATORVASTATIN CALCIUM 40 MG/1
40 TABLET, FILM COATED ORAL DAILY
COMMUNITY
Start: 2022-07-20

## 2023-05-25 RX ORDER — GABAPENTIN 100 MG/1
CAPSULE ORAL
COMMUNITY
Start: 2023-03-20

## 2023-05-25 RX ORDER — INSULIN GLARGINE 100 [IU]/ML
INJECTION, SOLUTION SUBCUTANEOUS
COMMUNITY
Start: 2023-03-18

## 2023-05-30 ENCOUNTER — TELEPHONE (OUTPATIENT)
Age: 58
End: 2023-05-30

## 2023-05-30 NOTE — TELEPHONE ENCOUNTER
----- Message from Franky Amezquitas Henrry sent at 5/30/2023  1:37 PM EDT -----  Subject: Message to Provider    QUESTIONS  Information for Provider? Son Hobbs with [facility] would   like a return call re? patient's discharge scheduled for June 1, 2023. Son Hobbs can be reached @ [phone].  ---------------------------------------------------------------------------  --------------  Nisa Garcia INFO  [phone]; OK to leave message on voicemail  ---------------------------------------------------------------------------  --------------  SCRIPT ANSWERS  Relationship to Patient? Covered Entity  Covered Entity Type? Hospital?  Representative Name?  Son Hobbs

## 2023-08-08 DIAGNOSIS — E11.42 DIABETIC POLYNEUROPATHY ASSOCIATED WITH TYPE 2 DIABETES MELLITUS (HCC): Primary | ICD-10-CM

## 2023-08-08 RX ORDER — SYRINGE AND NEEDLE,INSULIN,1ML 25GX1"
1 SYRINGE, EMPTY DISPOSABLE MISCELLANEOUS DAILY
Qty: 100 EACH | Refills: 0 | Status: SHIPPED | OUTPATIENT
Start: 2023-08-08 | End: 2023-08-09 | Stop reason: SDUPTHER

## 2023-08-08 RX ORDER — GABAPENTIN 100 MG/1
100 CAPSULE ORAL 3 TIMES DAILY
Qty: 90 CAPSULE | Refills: 0 | Status: SHIPPED | OUTPATIENT
Start: 2023-08-08 | End: 2023-09-07

## 2023-08-08 NOTE — TELEPHONE ENCOUNTER
Last appointment: 7/20/22  Next appointment: 8/17/23  Previous refill encounter(s): 3/20/23 #90, 3/20/23 needles #100 with 1 refill    Requested Prescriptions     Pending Prescriptions Disp Refills    gabapentin (NEURONTIN) 100 MG capsule 90 capsule 0     Sig: Take 1 capsule by mouth 3 times daily for 30 days. Max Daily Amount: 300 mg    Insulin Syringe-Needle U-100 (B-D INSULIN SYRINGE 1CC/25G) 25G X 5/8\" 1 ML MISC 100 each 0     Sig: Inject 1 each into the skin daily         For Pharmacy Admin Tracking Only    Program: Medication Refill  CPA in place:    Recommendation Provided To:    Intervention Detail: New Rx: 2, reason: Patient Preference  Intervention Accepted By:   Barby Guevara Closed?:    Time Spent (min): 5

## 2023-08-08 NOTE — TELEPHONE ENCOUNTER
----- Message from Cathleen Crowley sent at 8/8/2023  1:24 PM EDT -----  Subject: Refill Request    QUESTIONS  Name of Medication? gabapentin (NEURONTIN) 100 MG capsule  Patient-reported dosage and instructions? twice a day   How many days do you have left? 0  Preferred Pharmacy? [id number]  Pharmacy phone number (if available)? [phone]  ---------------------------------------------------------------------------  --------------,  Name of Medication? Other - needles to administer insulin  Patient-reported dosage and instructions? one on Tuesdays and one every   evening   How many days do you have left? 0  Preferred Pharmacy? [id number]  Pharmacy phone number (if available)? [phone]  Additional Information for Provider? please call sister Disha Saba  ---------------------------------------------------------------------------  --------------  Gaye BRAY  What is the best way for the office to contact you? OK to leave message on   voicemail  Preferred Call Back Phone Number? [phone]  ---------------------------------------------------------------------------  --------------  SCRIPT ANSWERS  Relationship to Patient? Other/Third Party  Representative Name? sister Disha Saba  Is the representative on the Communication Release of Information (FIDEL)   form in Epic?  Yes

## 2023-08-09 RX ORDER — SYRINGE AND NEEDLE,INSULIN,1ML 25GX1"
1 SYRINGE, EMPTY DISPOSABLE MISCELLANEOUS DAILY
Qty: 100 EACH | Refills: 5 | Status: SHIPPED | OUTPATIENT
Start: 2023-08-09

## 2023-08-13 ENCOUNTER — HOSPITAL ENCOUNTER (EMERGENCY)
Facility: HOSPITAL | Age: 58
Discharge: HOME | End: 2023-08-14
Attending: EMERGENCY MEDICINE
Payer: MEDICAID

## 2023-08-13 DIAGNOSIS — G89.3 PAIN DUE TO MALIGNANT NEOPLASM METASTATIC TO BONE (HCC): ICD-10-CM

## 2023-08-13 DIAGNOSIS — C79.51 PAIN DUE TO MALIGNANT NEOPLASM METASTATIC TO BONE (HCC): ICD-10-CM

## 2023-08-13 DIAGNOSIS — C79.51 PROSTATE CANCER METASTATIC TO BONE (HCC): Primary | ICD-10-CM

## 2023-08-13 DIAGNOSIS — C61 PROSTATE CANCER METASTATIC TO BONE (HCC): Primary | ICD-10-CM

## 2023-08-13 PROCEDURE — 99285 EMERGENCY DEPT VISIT HI MDM: CPT

## 2023-08-13 PROCEDURE — 96374 THER/PROPH/DIAG INJ IV PUSH: CPT

## 2023-08-13 PROCEDURE — 96375 TX/PRO/DX INJ NEW DRUG ADDON: CPT

## 2023-08-13 ASSESSMENT — PAIN DESCRIPTION - DESCRIPTORS: DESCRIPTORS: ACHING

## 2023-08-13 ASSESSMENT — PAIN DESCRIPTION - LOCATION: LOCATION: BACK

## 2023-08-13 ASSESSMENT — PAIN DESCRIPTION - ORIENTATION: ORIENTATION: LOWER

## 2023-08-13 ASSESSMENT — PAIN SCALES - GENERAL: PAINLEVEL_OUTOF10: 10

## 2023-08-13 ASSESSMENT — PAIN - FUNCTIONAL ASSESSMENT: PAIN_FUNCTIONAL_ASSESSMENT: 0-10

## 2023-08-14 ENCOUNTER — APPOINTMENT (OUTPATIENT)
Facility: HOSPITAL | Age: 58
End: 2023-08-14
Payer: MEDICAID

## 2023-08-14 VITALS
BODY MASS INDEX: 44.52 KG/M2 | HEART RATE: 103 BPM | WEIGHT: 277 LBS | HEIGHT: 66 IN | RESPIRATION RATE: 18 BRPM | TEMPERATURE: 98.1 F | SYSTOLIC BLOOD PRESSURE: 146 MMHG | DIASTOLIC BLOOD PRESSURE: 78 MMHG | OXYGEN SATURATION: 96 %

## 2023-08-14 LAB
ALBUMIN SERPL-MCNC: 3.6 G/DL (ref 3.5–5)
ALBUMIN/GLOB SERPL: 0.8 (ref 1.1–2.2)
ALP SERPL-CCNC: 88 U/L (ref 45–117)
ALT SERPL-CCNC: 15 U/L (ref 12–78)
ANION GAP SERPL CALC-SCNC: 5 MMOL/L (ref 5–15)
AST SERPL-CCNC: 10 U/L (ref 15–37)
BASOPHILS # BLD: 0 K/UL (ref 0–0.1)
BASOPHILS NFR BLD: 0 % (ref 0–1)
BILIRUB SERPL-MCNC: 0.3 MG/DL (ref 0.2–1)
BUN SERPL-MCNC: 20 MG/DL (ref 6–20)
BUN/CREAT SERPL: 16 (ref 12–20)
CALCIUM SERPL-MCNC: 9.6 MG/DL (ref 8.5–10.1)
CHLORIDE SERPL-SCNC: 103 MMOL/L (ref 97–108)
CO2 SERPL-SCNC: 26 MMOL/L (ref 21–32)
COMMENT:: NORMAL
CREAT SERPL-MCNC: 1.24 MG/DL (ref 0.7–1.3)
CRP SERPL-MCNC: <0.29 MG/DL (ref 0–0.6)
DIFFERENTIAL METHOD BLD: ABNORMAL
EKG ATRIAL RATE: 98 BPM
EKG DIAGNOSIS: NORMAL
EKG P AXIS: 57 DEGREES
EKG P-R INTERVAL: 192 MS
EKG Q-T INTERVAL: 354 MS
EKG QRS DURATION: 74 MS
EKG QTC CALCULATION (BAZETT): 451 MS
EKG R AXIS: -11 DEGREES
EKG T AXIS: 53 DEGREES
EKG VENTRICULAR RATE: 98 BPM
EOSINOPHIL # BLD: 0.1 K/UL (ref 0–0.4)
EOSINOPHIL NFR BLD: 1 % (ref 0–7)
ERYTHROCYTE [DISTWIDTH] IN BLOOD BY AUTOMATED COUNT: 14.8 % (ref 11.5–14.5)
ERYTHROCYTE [SEDIMENTATION RATE] IN BLOOD: 43 MM/HR (ref 0–20)
GLOBULIN SER CALC-MCNC: 4.7 G/DL (ref 2–4)
GLUCOSE SERPL-MCNC: 295 MG/DL (ref 65–100)
HCT VFR BLD AUTO: 38 % (ref 36.6–50.3)
HGB BLD-MCNC: 11.8 G/DL (ref 12.1–17)
IMM GRANULOCYTES # BLD AUTO: 0 K/UL (ref 0–0.04)
IMM GRANULOCYTES NFR BLD AUTO: 0 % (ref 0–0.5)
LIPASE SERPL-CCNC: 246 U/L (ref 73–393)
LYMPHOCYTES # BLD: 0.8 K/UL (ref 0.8–3.5)
LYMPHOCYTES NFR BLD: 14 % (ref 12–49)
MCH RBC QN AUTO: 26 PG (ref 26–34)
MCHC RBC AUTO-ENTMCNC: 31.1 G/DL (ref 30–36.5)
MCV RBC AUTO: 83.7 FL (ref 80–99)
MONOCYTES # BLD: 0.4 K/UL (ref 0–1)
MONOCYTES NFR BLD: 6 % (ref 5–13)
NEUTS SEG # BLD: 4.9 K/UL (ref 1.8–8)
NEUTS SEG NFR BLD: 79 % (ref 32–75)
NRBC # BLD: 0 K/UL (ref 0–0.01)
NRBC BLD-RTO: 0 PER 100 WBC
PLATELET # BLD AUTO: 206 K/UL (ref 150–400)
PMV BLD AUTO: 12.1 FL (ref 8.9–12.9)
POTASSIUM SERPL-SCNC: 4.2 MMOL/L (ref 3.5–5.1)
PROT SERPL-MCNC: 8.3 G/DL (ref 6.4–8.2)
RBC # BLD AUTO: 4.54 M/UL (ref 4.1–5.7)
SODIUM SERPL-SCNC: 134 MMOL/L (ref 136–145)
SPECIMEN HOLD: NORMAL
TROPONIN I SERPL HS-MCNC: 6 NG/L (ref 0–76)
WBC # BLD AUTO: 6.2 K/UL (ref 4.1–11.1)

## 2023-08-14 PROCEDURE — 96375 TX/PRO/DX INJ NEW DRUG ADDON: CPT

## 2023-08-14 PROCEDURE — 93005 ELECTROCARDIOGRAM TRACING: CPT | Performed by: EMERGENCY MEDICINE

## 2023-08-14 PROCEDURE — 36415 COLL VENOUS BLD VENIPUNCTURE: CPT

## 2023-08-14 PROCEDURE — 83690 ASSAY OF LIPASE: CPT

## 2023-08-14 PROCEDURE — 85025 COMPLETE CBC W/AUTO DIFF WBC: CPT

## 2023-08-14 PROCEDURE — 6370000000 HC RX 637 (ALT 250 FOR IP): Performed by: EMERGENCY MEDICINE

## 2023-08-14 PROCEDURE — 86140 C-REACTIVE PROTEIN: CPT

## 2023-08-14 PROCEDURE — 6360000002 HC RX W HCPCS: Performed by: EMERGENCY MEDICINE

## 2023-08-14 PROCEDURE — 84484 ASSAY OF TROPONIN QUANT: CPT

## 2023-08-14 PROCEDURE — 96374 THER/PROPH/DIAG INJ IV PUSH: CPT

## 2023-08-14 PROCEDURE — 6360000004 HC RX CONTRAST MEDICATION

## 2023-08-14 PROCEDURE — 85652 RBC SED RATE AUTOMATED: CPT

## 2023-08-14 PROCEDURE — 74177 CT ABD & PELVIS W/CONTRAST: CPT

## 2023-08-14 PROCEDURE — 80053 COMPREHEN METABOLIC PANEL: CPT

## 2023-08-14 RX ORDER — KETOROLAC TROMETHAMINE 30 MG/ML
15 INJECTION, SOLUTION INTRAMUSCULAR; INTRAVENOUS ONCE
Status: COMPLETED | OUTPATIENT
Start: 2023-08-14 | End: 2023-08-14

## 2023-08-14 RX ORDER — OXYCODONE HYDROCHLORIDE 5 MG/1
5 TABLET ORAL ONCE
Status: COMPLETED | OUTPATIENT
Start: 2023-08-14 | End: 2023-08-14

## 2023-08-14 RX ORDER — MORPHINE SULFATE 4 MG/ML
4 INJECTION, SOLUTION INTRAMUSCULAR; INTRAVENOUS ONCE
Status: COMPLETED | OUTPATIENT
Start: 2023-08-14 | End: 2023-08-14

## 2023-08-14 RX ORDER — ONDANSETRON 2 MG/ML
4 INJECTION INTRAMUSCULAR; INTRAVENOUS EVERY 6 HOURS PRN
Status: DISCONTINUED | OUTPATIENT
Start: 2023-08-14 | End: 2023-08-14 | Stop reason: HOSPADM

## 2023-08-14 RX ORDER — IBUPROFEN 800 MG/1
800 TABLET ORAL 2 TIMES DAILY PRN
Qty: 60 TABLET | Refills: 0 | Status: SHIPPED | OUTPATIENT
Start: 2023-08-14

## 2023-08-14 RX ORDER — OXYCODONE HYDROCHLORIDE 10 MG/1
10 TABLET ORAL EVERY 6 HOURS PRN
Qty: 15 TABLET | Refills: 0 | Status: SHIPPED | OUTPATIENT
Start: 2023-08-14 | End: 2023-09-13

## 2023-08-14 RX ADMIN — MORPHINE SULFATE 4 MG: 4 INJECTION, SOLUTION INTRAMUSCULAR; INTRAVENOUS at 00:52

## 2023-08-14 RX ADMIN — OXYCODONE HYDROCHLORIDE 5 MG: 5 TABLET ORAL at 04:07

## 2023-08-14 RX ADMIN — KETOROLAC TROMETHAMINE 15 MG: 30 INJECTION, SOLUTION INTRAMUSCULAR; INTRAVENOUS at 04:05

## 2023-08-14 RX ADMIN — IOPAMIDOL 100 ML: 755 INJECTION, SOLUTION INTRAVENOUS at 02:12

## 2023-08-14 ASSESSMENT — PAIN SCALES - GENERAL
PAINLEVEL_OUTOF10: 9
PAINLEVEL_OUTOF10: 10

## 2023-08-14 NOTE — ED NOTES
Pt's brother-in-law (last name Zoë Iverson) phoned and was given an update by this RN (with Pt's permission). All questions answered.       Christie Landrum  08/14/23 0111

## 2023-08-14 NOTE — ED PROVIDER NOTES
[facility] EMERGENCY DEPT  EMERGENCY DEPARTMENT ENCOUNTER       Pt Name: Aparna Martinez  MRN: [id number]  [address] [facility] 1965  Date of evaluation: 8/13/2023  Provider: Brigette Avila DO   PCP: Ruma العلي MD  Note Started: 12:20 AM EDT 8/14/23     CHIEF COMPLAINT       Chief Complaint   Patient presents with    Back Pain     Pt arrived to ED from home via EMS , reports lower back x 2 days. HISTORY OF PRESENT ILLNESS: 1 or more elements      History From: Patient, History limited by: none     Aparna Martinez is a 62 y.o. male presenting the emergency department complaining of back pain, abdominal pain. Patient has a history of prostate cancer. He follows at [facility]. Patient denies any falls or injury. Denies any fever. Denies any saddle anesthesia, urinary incontinence or retention. No radicular symptoms. Rates his pain as severe       Please See MDM for Additional Details of the HPI/PMH  Nursing Notes were all reviewed and agreed with or any disagreements were addressed in the HPI. REVIEW OF SYSTEMS        Positives and Pertinent negatives as per HPI. PAST HISTORY     Past Medical History:  Past Medical History:   Diagnosis Date    Asthma     Chronic venous insufficiency     Diabetes ([address])     Lower leg edema     Prostate cancer ([address])     gets chemo at [facility]       Past Surgical History:  No past surgical history on file.     Family History:  Family History   Problem Relation Age of Onset    No Known Problems Sister     No Known Problems Brother     Hypertension Sister     Hypertension Sister     Asthma Sister     Hypertension Father     Hypertension Sister     Heart Attack Father     Stroke Mother         brain bleed    Stroke Father     Obesity Sister        Social History:  Social History     Tobacco Use    Smoking status: Former     Packs/day: 0.50     Types: Cigarettes     Quit date: 1/1/2013     Years since quitting: 10.6    Smokeless tobacco: Never   Substance Use Topics    Alcohol

## 2023-08-18 ENCOUNTER — TELEPHONE (OUTPATIENT)
Age: 58
End: 2023-08-18

## 2023-08-18 NOTE — TELEPHONE ENCOUNTER
[address]  Staff  Subject: Appointment Request     Reason for Call: Established Patient Appointment needed: Routine Existing   Condition Follow Up (Diabetes)     QUESTIONS     Reason for appointment request? Available appointments did not meet   patient need       Additional Information for Provider? Pt had an appt yesterday 8/18/23. His   transportation cancelled on him. He is very upset because he needs to see   Dr. Destinee STEPHEN. He is asking if he can get an earlier appt than first   available of 10/11/23. Please call pt to advise. He is also asking if he   could speak to Dr. Claus Bo about insulin pens.  Please   call pt.   ---------------------------------------------------------------------------   --------------   Tasha Kennedy [facility]   [phone]; OK to leave message on voicemail

## 2023-09-13 ENCOUNTER — OFFICE VISIT (OUTPATIENT)
Age: 58
End: 2023-09-13
Payer: MEDICAID

## 2023-09-13 VITALS
SYSTOLIC BLOOD PRESSURE: 117 MMHG | TEMPERATURE: 97.8 F | WEIGHT: 256.8 LBS | DIASTOLIC BLOOD PRESSURE: 70 MMHG | HEIGHT: 66 IN | RESPIRATION RATE: 16 BRPM | HEART RATE: 88 BPM | OXYGEN SATURATION: 96 % | BODY MASS INDEX: 41.27 KG/M2

## 2023-09-13 DIAGNOSIS — E11.9 TYPE 2 DIABETES MELLITUS WITHOUT COMPLICATION, WITH LONG-TERM CURRENT USE OF INSULIN (HCC): ICD-10-CM

## 2023-09-13 DIAGNOSIS — I87.2 CHRONIC VENOUS INSUFFICIENCY: ICD-10-CM

## 2023-09-13 DIAGNOSIS — G62.9 NEUROPATHY: Primary | ICD-10-CM

## 2023-09-13 DIAGNOSIS — Z79.4 TYPE 2 DIABETES MELLITUS WITHOUT COMPLICATION, WITH LONG-TERM CURRENT USE OF INSULIN (HCC): ICD-10-CM

## 2023-09-13 LAB
GLUCOSE, POC: 159 MG/DL
HBA1C MFR BLD: 9.1 %

## 2023-09-13 PROCEDURE — 83036 HEMOGLOBIN GLYCOSYLATED A1C: CPT | Performed by: FAMILY MEDICINE

## 2023-09-13 PROCEDURE — 99213 OFFICE O/P EST LOW 20 MIN: CPT | Performed by: FAMILY MEDICINE

## 2023-09-13 PROCEDURE — 82962 GLUCOSE BLOOD TEST: CPT | Performed by: FAMILY MEDICINE

## 2023-09-13 RX ORDER — ATORVASTATIN CALCIUM 40 MG/1
40 TABLET, FILM COATED ORAL DAILY
Qty: 90 TABLET | Refills: 3 | Status: SHIPPED | OUTPATIENT
Start: 2023-09-13

## 2023-09-13 RX ORDER — MAGNESIUM OXIDE 400 MG/1
TABLET ORAL
COMMUNITY
Start: 2023-07-22 | End: 2023-09-13 | Stop reason: SDUPTHER

## 2023-09-13 RX ORDER — IBUPROFEN 800 MG/1
800 TABLET ORAL 2 TIMES DAILY PRN
Qty: 60 TABLET | Refills: 0 | Status: SHIPPED | OUTPATIENT
Start: 2023-09-13

## 2023-09-13 RX ORDER — INSULIN GLARGINE 100 [IU]/ML
INJECTION, SOLUTION SUBCUTANEOUS
Qty: 10 ML | Refills: 5 | Status: SHIPPED | OUTPATIENT
Start: 2023-09-13

## 2023-09-13 RX ORDER — LANOLIN ALCOHOL/MO/W.PET/CERES
CREAM (GRAM) TOPICAL
Qty: 90 TABLET | Refills: 5 | Status: SHIPPED | OUTPATIENT
Start: 2023-09-13

## 2023-09-13 RX ORDER — METFORMIN HYDROCHLORIDE 500 MG/1
TABLET, EXTENDED RELEASE ORAL
Qty: 180 TABLET | Refills: 3 | Status: SHIPPED | OUTPATIENT
Start: 2023-09-13

## 2023-09-13 RX ORDER — MAGNESIUM OXIDE 400 MG/1
TABLET ORAL
Qty: 90 TABLET | Refills: 2 | Status: SHIPPED | OUTPATIENT
Start: 2023-09-13

## 2023-09-13 RX ORDER — GABAPENTIN 100 MG/1
CAPSULE ORAL
Qty: 90 CAPSULE | Refills: 5 | Status: SHIPPED | OUTPATIENT
Start: 2023-09-13 | End: 2023-12-12

## 2023-09-13 RX ORDER — DULAGLUTIDE 1.5 MG/.5ML
INJECTION, SOLUTION SUBCUTANEOUS
Qty: 5 ADJUSTABLE DOSE PRE-FILLED PEN SYRINGE | Refills: 5 | Status: SHIPPED | OUTPATIENT
Start: 2023-09-13

## 2023-09-13 RX ORDER — METFORMIN HYDROCHLORIDE 500 MG/1
TABLET, EXTENDED RELEASE ORAL
COMMUNITY
End: 2023-09-13 | Stop reason: SDUPTHER

## 2023-09-13 RX ORDER — LANOLIN ALCOHOL/MO/W.PET/CERES
CREAM (GRAM) TOPICAL
COMMUNITY
Start: 2023-07-22 | End: 2023-09-13 | Stop reason: SDUPTHER

## 2023-09-13 RX ORDER — BUMETANIDE 2 MG/1
2 TABLET ORAL DAILY
Qty: 90 TABLET | Refills: 3 | Status: SHIPPED | OUTPATIENT
Start: 2023-09-13

## 2023-09-13 RX ORDER — DULAGLUTIDE 1.5 MG/.5ML
INJECTION, SOLUTION SUBCUTANEOUS
COMMUNITY
Start: 2023-07-22 | End: 2023-09-13 | Stop reason: SDUPTHER

## 2023-09-13 SDOH — ECONOMIC STABILITY: FOOD INSECURITY: WITHIN THE PAST 12 MONTHS, THE FOOD YOU BOUGHT JUST DIDN'T LAST AND YOU DIDN'T HAVE MONEY TO GET MORE.: NEVER TRUE

## 2023-09-13 SDOH — ECONOMIC STABILITY: FOOD INSECURITY: WITHIN THE PAST 12 MONTHS, YOU WORRIED THAT YOUR FOOD WOULD RUN OUT BEFORE YOU GOT MONEY TO BUY MORE.: NEVER TRUE

## 2023-09-13 SDOH — ECONOMIC STABILITY: INCOME INSECURITY: HOW HARD IS IT FOR YOU TO PAY FOR THE VERY BASICS LIKE FOOD, HOUSING, MEDICAL CARE, AND HEATING?: NOT VERY HARD

## 2023-09-13 SDOH — ECONOMIC STABILITY: HOUSING INSECURITY
IN THE LAST 12 MONTHS, WAS THERE A TIME WHEN YOU DID NOT HAVE A STEADY PLACE TO SLEEP OR SLEPT IN A SHELTER (INCLUDING NOW)?: NO

## 2023-09-13 ASSESSMENT — PATIENT HEALTH QUESTIONNAIRE - PHQ9
1. LITTLE INTEREST OR PLEASURE IN DOING THINGS: 0
2. FEELING DOWN, DEPRESSED OR HOPELESS: 0
SUM OF ALL RESPONSES TO PHQ QUESTIONS 1-9: 0
SUM OF ALL RESPONSES TO PHQ9 QUESTIONS 1 & 2: 0

## 2023-09-13 NOTE — PROGRESS NOTES
Chief Complaint   Patient presents with    Hypertension    Diabetes    Cholesterol Problem    Follow-up         1. \"Have you been to the ER, urgent care clinic since your last visit? Hospitalized since your last visit? \" 8/13/23-[facility]    2. \"Have you seen or consulted any other health care providers outside of the [address] [place] [address] since your last visit? \" 8/28/23-[facility]     3. For patients aged 43-73: Has the patient had a colonoscopy / FIT/ Cologuard? NO      If the patient is female:    4. For patients aged 43-66: Has the patient had a mammogram within the past 2 years? N/A      5. For patients aged 21-65: Has the patient had a pap smear?  N/A      Health Maintenance Due   Topic Date Due    Hepatitis B vaccine (1 of 3 - 3-dose series) Never done    Pneumococcal 0-64 years Vaccine (1 - PCV) Never done    Diabetic foot exam  Never done    HIV screen  Never done    Diabetic Alb to Cr ratio (uACR) test  Never done    Diabetic retinal exam  Never done    DTaP/Tdap/Td vaccine (1 - Tdap) Never done    Shingles vaccine (1 of 2) Never done    Colorectal Cancer Screen  Never done    COVID-19 Vaccine (3 - Pfizer risk series) 04/10/2021    Lipids  08/11/2022    Prostate Specific Antigen (PSA) Screening or Monitoring  08/11/2022    Depression Screen  05/23/2023    Flu vaccine (1) Never done    A1C test (Diabetic or Prediabetic)  08/22/2023

## 2023-09-13 NOTE — PROGRESS NOTES
Arlene Garces (:  1965) is a 62 y.o. male,Established patient, here for evaluation of the following chief complaint(s):  Hypertension, Diabetes, Cholesterol Problem, and Follow-up         ASSESSMENT/PLAN:  1. Neuropathy  -     gabapentin (NEURONTIN) 100 MG capsule; TAKE 1 CAPSULE BY MOUTH THREE TIMES DAILY . DO NOT EXCEED 300MG  DAILY  FOR  LEG  PAIN, Disp-90 capsule, R-5Normal  2. Type 2 diabetes mellitus without complication, with long-term current use of insulin (HCC)  -     AMB POC HEMOGLOBIN A1C; Future  -     AMB POC GLUCOSE BLOOD, BY GLUCOSE MONITORING DEVICE; Future  -     insulin glargine (LANTUS) 100 UNIT/ML injection vial; 20 units sc daily  Indications: type 2 diabetes mellitus, Disp-10 mL, R-5Please give pt needles alsoNormal  3. Chronic venous insufficiency      Return in about 4 months (around 2024). Subjective   SUBJECTIVE/OBJECTIVE:  HPI Pt. Comes in for blood pressure, cholesterol, and diabetes check. Still living with sister. 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 No complaints of chest pain, shortness of breath, TIAs, claudication. Says that swelling in legs is better. Has applied for disability, is currently waiting to hear from them. Review of Systems       Objective   Physical Exam  Cardiovascular:      Rate and Rhythm: Normal rate and regular rhythm. Heart sounds: Normal heart sounds. No murmur heard. Pulmonary:      Effort: Pulmonary effort is normal.      Breath sounds: Normal breath sounds. Abdominal:      General: Abdomen is flat. Bowel sounds are normal.      Palpations: Abdomen is soft. Musculoskeletal:      Right lower leg: No edema. Left lower leg: No edema. Comments: 2+ edema of legs, but no open or draining sores. An electronic signature was used to authenticate this note.     --Lizzette Parker MD

## 2023-12-04 ENCOUNTER — TELEPHONE (OUTPATIENT)
Age: 58
End: 2023-12-04

## 2023-12-04 ENCOUNTER — NURSE TRIAGE (OUTPATIENT)
Dept: OTHER | Facility: CLINIC | Age: 58
End: 2023-12-04

## 2023-12-04 NOTE — TELEPHONE ENCOUNTER
Location of patient: [facility] with [employer]. Subjective: Caller states \"I have leg swelling\"     Current Symptoms: Bilateral leg swelling that is getting better. No redness or warm to touch. Had developed a blister on back of R leg when swelling was worse that is now open. At times, L hand and fingers are painful. Is a cancer patient. Temperature: denies fever     What has been tried: Prednisone    LMP: NA Pregnant: NA    Recommended disposition: See in Office Today    Care advice provided, patient verbalizes understanding; denies any other questions or concerns; instructed to call back for any new or worsening symptoms. Writer provided warm transfer to Darryl [facility] at [facility] for second level triage as patient cannot come in until next week due to transportation issues. Attention Provider: Thank you for allowing me to participate in the care of your patient. The patient was connected to triage in response to information provided to the ECC/PSC. Please do not respond through this encounter as the response is not directed to a shared pool.     Reason for Disposition   Looks like a boil, infected sore, deep ulcer, or other infected rash (spreading redness, pus)    Protocols used: Leg Swelling and Edema-ADULT-OH

## 2023-12-04 NOTE — TELEPHONE ENCOUNTER
Subjective: Caller states \"I have leg swelling\"      Current Symptoms: Bilateral leg swelling that is getting better.  No redness or warm to touch.  Had a blister on back of R leg when swelling was worse.  At times, L hand and fingers are painful.  Is a cancer patient.  Please give him a call @ [phone]

## 2023-12-04 NOTE — TELEPHONE ENCOUNTER
Spoke with patient who stated that he only wanted to schedule an appointment to be seen since it was a while. Appointment made for 12/26/2023 at 10 am.

## 2023-12-05 NOTE — TELEPHONE ENCOUNTER
Patient would like to know can he get an antibiotic for blisters on his legs he can be reached @ [phone]

## 2023-12-26 ENCOUNTER — OFFICE VISIT (OUTPATIENT)
Age: 58
End: 2023-12-26
Payer: MEDICAID

## 2023-12-26 VITALS
BODY MASS INDEX: 41.82 KG/M2 | DIASTOLIC BLOOD PRESSURE: 85 MMHG | HEIGHT: 66 IN | OXYGEN SATURATION: 100 % | SYSTOLIC BLOOD PRESSURE: 144 MMHG | TEMPERATURE: 98.5 F | WEIGHT: 260.2 LBS | RESPIRATION RATE: 18 BRPM | HEART RATE: 91 BPM

## 2023-12-26 DIAGNOSIS — L03.119 CELLULITIS OF LOWER LEG: ICD-10-CM

## 2023-12-26 DIAGNOSIS — I87.2 CHRONIC VENOUS INSUFFICIENCY: ICD-10-CM

## 2023-12-26 DIAGNOSIS — L97.909 VENOUS ULCER (HCC): ICD-10-CM

## 2023-12-26 DIAGNOSIS — M79.605 PAIN IN BOTH LOWER EXTREMITIES: ICD-10-CM

## 2023-12-26 DIAGNOSIS — I83.009 VENOUS ULCER (HCC): ICD-10-CM

## 2023-12-26 DIAGNOSIS — M79.604 PAIN IN BOTH LOWER EXTREMITIES: ICD-10-CM

## 2023-12-26 DIAGNOSIS — Z79.4 TYPE 2 DIABETES MELLITUS WITHOUT COMPLICATION, WITH LONG-TERM CURRENT USE OF INSULIN (HCC): Primary | ICD-10-CM

## 2023-12-26 DIAGNOSIS — E11.9 TYPE 2 DIABETES MELLITUS WITHOUT COMPLICATION, WITH LONG-TERM CURRENT USE OF INSULIN (HCC): Primary | ICD-10-CM

## 2023-12-26 PROCEDURE — 99213 OFFICE O/P EST LOW 20 MIN: CPT | Performed by: FAMILY MEDICINE

## 2023-12-26 PROCEDURE — 3079F DIAST BP 80-89 MM HG: CPT | Performed by: FAMILY MEDICINE

## 2023-12-26 PROCEDURE — 3046F HEMOGLOBIN A1C LEVEL >9.0%: CPT | Performed by: FAMILY MEDICINE

## 2023-12-26 PROCEDURE — 3077F SYST BP >= 140 MM HG: CPT | Performed by: FAMILY MEDICINE

## 2023-12-26 RX ORDER — IBUPROFEN 800 MG/1
800 TABLET ORAL 2 TIMES DAILY PRN
Qty: 60 TABLET | Refills: 0 | Status: SHIPPED | OUTPATIENT
Start: 2023-12-26

## 2023-12-26 RX ORDER — AMOXICILLIN AND CLAVULANATE POTASSIUM 875; 125 MG/1; MG/1
1 TABLET, FILM COATED ORAL 2 TIMES DAILY
Qty: 14 TABLET | Refills: 0 | Status: SHIPPED | OUTPATIENT
Start: 2023-12-26 | End: 2024-01-02

## 2023-12-26 RX ORDER — METFORMIN HYDROCHLORIDE 500 MG/1
TABLET, EXTENDED RELEASE ORAL
Qty: 180 TABLET | Refills: 3 | Status: SHIPPED | OUTPATIENT
Start: 2023-12-26

## 2023-12-26 NOTE — PROGRESS NOTES
Chief Complaint   Patient presents with    Follow-up       1. \"Have you been to the ER, urgent care clinic since your last visit? Hospitalized since your last visit? \" No    2. \"Have you seen or consulted any other health care providers outside of the [address] [place] [address] since your last visit? \" no     3. For patients aged 43-73: Has the patient had a colonoscopy / FIT/ Cologuard?  Yes        Health Maintenance Due   Topic Date Due    Hepatitis B vaccine (1 of 3 - 3-dose series) Never done    Pneumococcal 0-64 years Vaccine (1 - PCV) Never done    Diabetic foot exam  Never done    HIV screen  Never done    Diabetic Alb to Cr ratio (uACR) test  Never done    Diabetic retinal exam  Never done    DTaP/Tdap/Td vaccine (1 - Tdap) Never done    Shingles vaccine (1 of 2) Never done    Colorectal Cancer Screen  Never done    COVID-19 Vaccine (3 - Pfizer risk series) 04/10/2021    Lipids  08/11/2022    Prostate Specific Antigen (PSA) Screening or Monitoring  08/11/2022    A1C test (Diabetic or Prediabetic)  12/13/2023
surrounding erythema. An electronic signature was used to authenticate this note.     --Lazarus Mealy, MD

## 2023-12-27 LAB
ALBUMIN SERPL-MCNC: 3.7 G/DL (ref 3.8–4.9)
ALBUMIN/GLOB SERPL: 1.3 {RATIO} (ref 1.2–2.2)
ALP SERPL-CCNC: 78 IU/L (ref 44–121)
ALT SERPL-CCNC: 13 IU/L (ref 0–44)
AST SERPL-CCNC: 12 IU/L (ref 0–40)
BASOPHILS # BLD AUTO: 0 X10E3/UL (ref 0–0.2)
BASOPHILS NFR BLD AUTO: 1 %
BILIRUB SERPL-MCNC: 0.2 MG/DL (ref 0–1.2)
BUN SERPL-MCNC: 11 MG/DL (ref 6–24)
BUN/CREAT SERPL: 14 (ref 9–20)
CALCIUM SERPL-MCNC: 8.8 MG/DL (ref 8.7–10.2)
CHLORIDE SERPL-SCNC: 104 MMOL/L (ref 96–106)
CO2 SERPL-SCNC: 25 MMOL/L (ref 20–29)
CREAT SERPL-MCNC: 0.77 MG/DL (ref 0.76–1.27)
EGFRCR SERPLBLD CKD-EPI 2021: 104 ML/MIN/1.73
EOSINOPHIL # BLD AUTO: 0.1 X10E3/UL (ref 0–0.4)
EOSINOPHIL NFR BLD AUTO: 1 %
ERYTHROCYTE [DISTWIDTH] IN BLOOD BY AUTOMATED COUNT: 13.2 % (ref 11.6–15.4)
EST. AVERAGE GLUCOSE BLD GHB EST-MCNC: 200 MG/DL
GLOBULIN SER CALC-MCNC: 2.8 G/DL (ref 1.5–4.5)
GLUCOSE SERPL-MCNC: 194 MG/DL (ref 70–99)
HBA1C MFR BLD: 8.6 % (ref 4.8–5.6)
HCT VFR BLD AUTO: 33.7 % (ref 37.5–51)
HGB BLD-MCNC: 10.6 G/DL (ref 13–17.7)
IMM GRANULOCYTES # BLD AUTO: 0 X10E3/UL (ref 0–0.1)
IMM GRANULOCYTES NFR BLD AUTO: 1 %
LYMPHOCYTES # BLD AUTO: 0.8 X10E3/UL (ref 0.7–3.1)
LYMPHOCYTES NFR BLD AUTO: 21 %
MCH RBC QN AUTO: 26.9 PG (ref 26.6–33)
MCHC RBC AUTO-ENTMCNC: 31.5 G/DL (ref 31.5–35.7)
MCV RBC AUTO: 86 FL (ref 79–97)
MONOCYTES # BLD AUTO: 0.2 X10E3/UL (ref 0.1–0.9)
MONOCYTES NFR BLD AUTO: 5 %
NEUTROPHILS # BLD AUTO: 2.8 X10E3/UL (ref 1.4–7)
NEUTROPHILS NFR BLD AUTO: 71 %
PLATELET # BLD AUTO: 181 X10E3/UL (ref 150–450)
POTASSIUM SERPL-SCNC: 4.3 MMOL/L (ref 3.5–5.2)
PROT SERPL-MCNC: 6.5 G/DL (ref 6–8.5)
RBC # BLD AUTO: 3.94 X10E6/UL (ref 4.14–5.8)
SODIUM SERPL-SCNC: 141 MMOL/L (ref 134–144)
WBC # BLD AUTO: 3.9 X10E3/UL (ref 3.4–10.8)

## 2024-01-03 RX ORDER — DULAGLUTIDE 3 MG/.5ML
3 INJECTION, SOLUTION SUBCUTANEOUS WEEKLY
Qty: 4 ADJUSTABLE DOSE PRE-FILLED PEN SYRINGE | Refills: 12 | Status: SHIPPED | OUTPATIENT
Start: 2024-01-03

## 2024-03-22 DIAGNOSIS — G62.9 NEUROPATHY: ICD-10-CM

## 2024-03-22 RX ORDER — GABAPENTIN 100 MG/1
CAPSULE ORAL
Qty: 270 CAPSULE | Refills: 1 | Status: SHIPPED | OUTPATIENT
Start: 2024-03-22 | End: 2024-09-18

## 2024-03-22 NOTE — TELEPHONE ENCOUNTER
Last appointment: 12/26/24  Next appointment: 3/26/24  Previous refill encounter(s): 9/13/23 30 d/s with 5 refills    Requested Prescriptions     Pending Prescriptions Disp Refills    gabapentin (NEURONTIN) 100 MG capsule [Pharmacy Med Name: GABAPENTIN 100 MG CAPSULE] 270 capsule 1     Sig: TAKE ONE CAPSULE BY MOUTH 3 TIMES A DAY FOR LEG PAIN **NTE:300/DAY**         For Pharmacy Admin Tracking Only    Program: Medication Refill  CPA in place:    Recommendation Provided To:   Intervention Detail: New Rx: 1, reason: Patient Preference  Intervention Accepted By:   Gap Closed?:    Time Spent (min): 5

## 2024-03-22 NOTE — TELEPHONE ENCOUNTER
Patient requesting  [facility] because he is out of medication. Patient can be reached at [phone].

## 2024-03-26 ENCOUNTER — OFFICE VISIT (OUTPATIENT)
Age: 59
End: 2024-03-26
Payer: MEDICAID

## 2024-03-26 DIAGNOSIS — Z79.4 TYPE 2 DIABETES MELLITUS WITHOUT COMPLICATION, WITH LONG-TERM CURRENT USE OF INSULIN (HCC): Primary | ICD-10-CM

## 2024-03-26 DIAGNOSIS — E11.9 TYPE 2 DIABETES MELLITUS WITHOUT COMPLICATION, WITH LONG-TERM CURRENT USE OF INSULIN (HCC): Primary | ICD-10-CM

## 2024-03-26 LAB
GLUCOSE, POC: 166 MG/DL
HBA1C MFR BLD: 8.8 %

## 2024-03-26 PROCEDURE — 99213 OFFICE O/P EST LOW 20 MIN: CPT | Performed by: FAMILY MEDICINE

## 2024-03-26 PROCEDURE — 82962 GLUCOSE BLOOD TEST: CPT | Performed by: FAMILY MEDICINE

## 2024-03-26 PROCEDURE — PBSHW AMB POC HEMOGLOBIN A1C: Performed by: FAMILY MEDICINE

## 2024-03-26 PROCEDURE — 83036 HEMOGLOBIN GLYCOSYLATED A1C: CPT | Performed by: FAMILY MEDICINE

## 2024-03-26 PROCEDURE — PBSHW AMB POC GLUCOSE BLOOD, BY GLUCOSE MONITORING DEVICE: Performed by: FAMILY MEDICINE

## 2024-03-26 RX ORDER — PREDNISONE 5 MG/1
5 TABLET ORAL DAILY
COMMUNITY
Start: 2024-02-06

## 2024-03-26 RX ORDER — INSULIN GLARGINE 100 [IU]/ML
INJECTION, SOLUTION SUBCUTANEOUS
Qty: 10 ML | Refills: 5 | Status: SHIPPED | OUTPATIENT
Start: 2024-03-26

## 2024-03-26 RX ORDER — DULAGLUTIDE 3 MG/.5ML
3 INJECTION, SOLUTION SUBCUTANEOUS WEEKLY
Qty: 4 ADJUSTABLE DOSE PRE-FILLED PEN SYRINGE | Refills: 12 | Status: SHIPPED | OUTPATIENT
Start: 2024-03-26 | End: 2024-03-26 | Stop reason: ALTCHOICE

## 2024-03-26 ASSESSMENT — PATIENT HEALTH QUESTIONNAIRE - PHQ9
1. LITTLE INTEREST OR PLEASURE IN DOING THINGS: NOT AT ALL
2. FEELING DOWN, DEPRESSED OR HOPELESS: NOT AT ALL
SUM OF ALL RESPONSES TO PHQ9 QUESTIONS 1 & 2: 0
SUM OF ALL RESPONSES TO PHQ QUESTIONS 1-9: 0

## 2024-03-26 NOTE — PROGRESS NOTES
Chief Complaint   Patient presents with    Cholesterol Problem    Hypertension    Diabetes    Follow-up Chronic Condition     Patient requesting pain medicine .  Patient states Ibuprofen on 600 or 800 mg helps.  Has gabapentin refilled on 3/22/24- awaiting delivery from pharmacy.      1. \"Have you been to the ER, urgent care clinic since your last visit?  Hospitalized since your last visit?\" no    2. \"Have you seen or consulted any other health care providers outside of the [facility] since your last visit?\" no     3. For patients aged 45-75: Has the patient had a colonoscopy / FIT/ Cologuard? NO      If the patient is female:    4. For patients aged 40-74: Has the patient had a mammogram within the past 2 years? N/A      5. For patients aged 21-65: Has the patient had a pap smear? N/A      Health Maintenance Due   Topic Date Due    Hepatitis B vaccine (1 of 3 - 3-dose series) Never done    Pneumococcal 0-64 years Vaccine (1 of 2 - PCV) Never done    Diabetic foot exam  Never done    HIV screen  Never done    Diabetic Alb to Cr ratio (uACR) test  Never done    Diabetic retinal exam  Never done    DTaP/Tdap/Td vaccine (1 - Tdap) Never done    Shingles vaccine (1 of 2) Never done    Colorectal Cancer Screen  Never done    COVID-19 Vaccine (3 - Pfizer risk series) 04/10/2021    Lipids  08/11/2022    Prostate Specific Antigen (PSA) Screening or Monitoring  08/11/2022

## 2024-03-26 NOTE — PROGRESS NOTES
Sachin Vyas (:  1965) is a 59 y.o. male,Established patient, here for evaluation of the following chief complaint(s):  Cholesterol Problem, Hypertension, Diabetes, and Follow-up Chronic Condition         ASSESSMENT/PLAN:  1. Type 2 diabetes mellitus without complication, with long-term current use of insulin ([facility])  -     AMB POC GLUCOSE BLOOD, BY GLUCOSE MONITORING DEVICE  -     AMB POC HEMOGLOBIN A1C  -     insulin glargine (LANTUS) 100 UNIT/ML injection vial; 30 units sc daily  Indications: type 2 diabetes mellitus, Disp-10 mL, R-5Please give pt needles alsoNormal  -     [facility] - Natanael Forrest MD, Ophthalmology, [place]      Return in about 3 months (around 2024).         Subjective   SUBJECTIVE/OBJECTIVE:  HPI In for diabetes check. No complaints of chest pain, shortness of breath, TIAs, claudication or edema.  Takes pain meds for back and leg pain. Has been taking gabapentin for pain, also takes some tylenol prn. Currently taking lantus 20 units daily and metformin 2000 mg daily. Not living with his sister. Staying with a lady. [place] [facility] [place]. Sees his oncologist on Monday. Psa level around 250 on review of [facility] lab.     Review of Systems       Objective   Physical Exam  Cardiovascular:      Rate and Rhythm: Normal rate and regular rhythm.      Heart sounds: Normal heart sounds. No murmur heard.  Pulmonary:      Effort: Pulmonary effort is normal.      Breath sounds: Normal breath sounds.   Abdominal:      General: Abdomen is flat. Bowel sounds are normal.      Palpations: Abdomen is soft.   Musculoskeletal:      Right lower leg: No edema.      Left lower leg: No edema.                An electronic signature was used to authenticate this note.    --Reece Monk MD

## 2024-04-17 DIAGNOSIS — M79.604 PAIN IN BOTH LOWER EXTREMITIES: ICD-10-CM

## 2024-04-17 DIAGNOSIS — M79.605 PAIN IN BOTH LOWER EXTREMITIES: ICD-10-CM

## 2024-04-17 RX ORDER — IBUPROFEN 800 MG/1
TABLET ORAL
Qty: 60 TABLET | Refills: 3 | Status: SHIPPED | OUTPATIENT
Start: 2024-04-17

## 2024-04-18 ENCOUNTER — TELEPHONE (OUTPATIENT)
Age: 59
End: 2024-04-18

## 2024-04-18 NOTE — TELEPHONE ENCOUNTER
----- Message from Sadia Bolton sent at 4/18/2024 11:20 AM EDT -----  Subject: Message to Provider    QUESTIONS  Information for Provider? patient would like Dr. Monk nurse to call him   back today have a few question would not say  ---------------------------------------------------------------------------  --------------  CALL BACK INFO  [phone]; OK to leave message on voicemail  ---------------------------------------------------------------------------  --------------  SCRIPT ANSWERS  Relationship to Patient? Self

## 2024-04-22 ENCOUNTER — CLINICAL DOCUMENTATION (OUTPATIENT)
Age: 59
End: 2024-04-22

## 2024-04-22 NOTE — PROGRESS NOTES
RETURNED CALL AND SPOKE WITH PATIENT; HE REQUESTED AN APPOINTMENT BECAUSE HE WOULD LIKE SOMETHING STRONGER FOR HIS LEG PAIN.  OFFERED AN APPOINTMENT THIS FRIDAY, BUT PATIENT STATED HE WOULD NEED TO LOOK AT HIS SCHEDULE ONCE HE GOT HOME.  HE STATED HE WOULD CALL BACK AND LET US KNOW IF HE COULD MAKE THAT APPOINTMENT.

## 2024-05-02 RX ORDER — LANOLIN ALCOHOL/MO/W.PET/CERES
400 CREAM (GRAM) TOPICAL NIGHTLY
Qty: 90 TABLET | Refills: 3 | Status: SHIPPED | OUTPATIENT
Start: 2024-05-02

## 2024-05-02 NOTE — TELEPHONE ENCOUNTER
Last appointment: 3/26/24  Next appointment: Advised to follow-up 6/26/24  Previous refill encounter(s): 9/13/23 #90 with 2 refills    Requested Prescriptions     Pending Prescriptions Disp Refills    magnesium oxide (MAG-OX) 400 (240 Mg) MG tablet [Pharmacy Med Name: MAGNESIUM OXIDE 400 MG TABLET] 90 tablet 3     Sig: TAKE ONE TABLET BY MOUTH AT BEDTIME         For Pharmacy Admin Tracking Only    Program: Medication Refill  CPA in place:    Recommendation Provided To:   Intervention Detail: New Rx: 1, reason: Patient Preference  Intervention Accepted By:   Gap Closed?:    Time Spent (min): 5

## 2024-06-18 ENCOUNTER — APPOINTMENT (OUTPATIENT)
Facility: HOSPITAL | Age: 59
End: 2024-06-18
Payer: MEDICAID

## 2024-06-18 ENCOUNTER — HOSPITAL ENCOUNTER (INPATIENT)
Facility: HOSPITAL | Age: 59
LOS: 2 days | Discharge: HOME | End: 2024-06-20
Attending: EMERGENCY MEDICINE | Admitting: STUDENT IN AN ORGANIZED HEALTH CARE EDUCATION/TRAINING PROGRAM
Payer: MEDICAID

## 2024-06-18 DIAGNOSIS — C61 PROSTATE CA (HCC): ICD-10-CM

## 2024-06-18 DIAGNOSIS — N17.9 AKI (ACUTE KIDNEY INJURY) (HCC): Primary | ICD-10-CM

## 2024-06-18 LAB
ALBUMIN SERPL-MCNC: 3.6 G/DL (ref 3.5–5)
ALBUMIN/GLOB SERPL: 0.8 (ref 1.1–2.2)
ALP SERPL-CCNC: 179 U/L (ref 45–117)
ALT SERPL-CCNC: 24 U/L (ref 12–78)
ANION GAP SERPL CALC-SCNC: 4 MMOL/L (ref 5–15)
ANION GAP SERPL CALC-SCNC: 8 MMOL/L (ref 5–15)
APPEARANCE UR: CLEAR
AST SERPL-CCNC: 28 U/L (ref 15–37)
BACTERIA URNS QL MICRO: NEGATIVE /HPF
BASOPHILS # BLD: 0 K/UL (ref 0–0.1)
BASOPHILS NFR BLD: 0 % (ref 0–1)
BILIRUB SERPL-MCNC: 0.5 MG/DL (ref 0.2–1)
BILIRUB UR QL: NEGATIVE
BUN SERPL-MCNC: 36 MG/DL (ref 6–20)
BUN SERPL-MCNC: 44 MG/DL (ref 6–20)
BUN/CREAT SERPL: 18 (ref 12–20)
BUN/CREAT SERPL: 26 (ref 12–20)
CALCIUM SERPL-MCNC: 7.6 MG/DL (ref 8.5–10.1)
CALCIUM SERPL-MCNC: 9.6 MG/DL (ref 8.5–10.1)
CHLORIDE SERPL-SCNC: 111 MMOL/L (ref 97–108)
CHLORIDE SERPL-SCNC: 98 MMOL/L (ref 97–108)
CO2 SERPL-SCNC: 23 MMOL/L (ref 21–32)
CO2 SERPL-SCNC: 25 MMOL/L (ref 21–32)
COLOR UR: ABNORMAL
CREAT SERPL-MCNC: 1.41 MG/DL (ref 0.7–1.3)
CREAT SERPL-MCNC: 2.4 MG/DL (ref 0.7–1.3)
DIFFERENTIAL METHOD BLD: ABNORMAL
EOSINOPHIL # BLD: 0.1 K/UL (ref 0–0.4)
EOSINOPHIL NFR BLD: 1 % (ref 0–7)
EPITH CASTS URNS QL MICRO: ABNORMAL /LPF
ERYTHROCYTE [DISTWIDTH] IN BLOOD BY AUTOMATED COUNT: 13.4 % (ref 11.5–14.5)
GLOBULIN SER CALC-MCNC: 4.6 G/DL (ref 2–4)
GLUCOSE BLD STRIP.AUTO-MCNC: 263 MG/DL (ref 65–117)
GLUCOSE BLD STRIP.AUTO-MCNC: 284 MG/DL (ref 65–117)
GLUCOSE BLD STRIP.AUTO-MCNC: 296 MG/DL (ref 65–117)
GLUCOSE BLD STRIP.AUTO-MCNC: 361 MG/DL (ref 65–117)
GLUCOSE BLD STRIP.AUTO-MCNC: 380 MG/DL (ref 65–117)
GLUCOSE BLD STRIP.AUTO-MCNC: 389 MG/DL (ref 65–117)
GLUCOSE SERPL-MCNC: 272 MG/DL (ref 65–100)
GLUCOSE SERPL-MCNC: 425 MG/DL (ref 65–100)
GLUCOSE UR STRIP.AUTO-MCNC: >1000 MG/DL
HCT VFR BLD AUTO: 39.7 % (ref 36.6–50.3)
HGB BLD-MCNC: 12.5 G/DL (ref 12.1–17)
HGB UR QL STRIP: NEGATIVE
HYALINE CASTS URNS QL MICRO: ABNORMAL /LPF (ref 0–2)
IMM GRANULOCYTES # BLD AUTO: 0 K/UL (ref 0–0.04)
IMM GRANULOCYTES NFR BLD AUTO: 1 % (ref 0–0.5)
KETONES UR QL STRIP.AUTO: NEGATIVE MG/DL
LEUKOCYTE ESTERASE UR QL STRIP.AUTO: NEGATIVE
LIPASE SERPL-CCNC: 140 U/L (ref 13–75)
LYMPHOCYTES # BLD: 0.9 K/UL (ref 0.8–3.5)
LYMPHOCYTES NFR BLD: 12 % (ref 12–49)
MCH RBC QN AUTO: 27.6 PG (ref 26–34)
MCHC RBC AUTO-ENTMCNC: 31.5 G/DL (ref 30–36.5)
MCV RBC AUTO: 87.6 FL (ref 80–99)
MONOCYTES # BLD: 0.4 K/UL (ref 0–1)
MONOCYTES NFR BLD: 6 % (ref 5–13)
NEUTS SEG # BLD: 6.2 K/UL (ref 1.8–8)
NEUTS SEG NFR BLD: 80 % (ref 32–75)
NITRITE UR QL STRIP.AUTO: NEGATIVE
NRBC # BLD: 0 K/UL (ref 0–0.01)
NRBC BLD-RTO: 0 PER 100 WBC
PH UR STRIP: 5 (ref 5–8)
PLATELET # BLD AUTO: 205 K/UL (ref 150–400)
PMV BLD AUTO: 10.9 FL (ref 8.9–12.9)
POTASSIUM SERPL-SCNC: 3.8 MMOL/L (ref 3.5–5.1)
POTASSIUM SERPL-SCNC: 5.1 MMOL/L (ref 3.5–5.1)
PROT SERPL-MCNC: 8.2 G/DL (ref 6.4–8.2)
PROT UR STRIP-MCNC: NEGATIVE MG/DL
RBC # BLD AUTO: 4.53 M/UL (ref 4.1–5.7)
RBC #/AREA URNS HPF: ABNORMAL /HPF (ref 0–5)
SERVICE CMNT-IMP: ABNORMAL
SODIUM SERPL-SCNC: 131 MMOL/L (ref 136–145)
SODIUM SERPL-SCNC: 138 MMOL/L (ref 136–145)
SP GR UR REFRACTOMETRY: 1.02
URINE CULTURE IF INDICATED: ABNORMAL
UROBILINOGEN UR QL STRIP.AUTO: 0.2 EU/DL (ref 0.2–1)
WBC # BLD AUTO: 7.7 K/UL (ref 4.1–11.1)
WBC URNS QL MICRO: ABNORMAL /HPF (ref 0–4)

## 2024-06-18 PROCEDURE — 6370000000 HC RX 637 (ALT 250 FOR IP): Performed by: STUDENT IN AN ORGANIZED HEALTH CARE EDUCATION/TRAINING PROGRAM

## 2024-06-18 PROCEDURE — 83690 ASSAY OF LIPASE: CPT

## 2024-06-18 PROCEDURE — 99285 EMERGENCY DEPT VISIT HI MDM: CPT

## 2024-06-18 PROCEDURE — 2580000003 HC RX 258: Performed by: STUDENT IN AN ORGANIZED HEALTH CARE EDUCATION/TRAINING PROGRAM

## 2024-06-18 PROCEDURE — 82962 GLUCOSE BLOOD TEST: CPT

## 2024-06-18 PROCEDURE — 85025 COMPLETE CBC W/AUTO DIFF WBC: CPT

## 2024-06-18 PROCEDURE — 6360000002 HC RX W HCPCS: Performed by: STUDENT IN AN ORGANIZED HEALTH CARE EDUCATION/TRAINING PROGRAM

## 2024-06-18 PROCEDURE — 36415 COLL VENOUS BLD VENIPUNCTURE: CPT

## 2024-06-18 PROCEDURE — 96375 TX/PRO/DX INJ NEW DRUG ADDON: CPT

## 2024-06-18 PROCEDURE — 96374 THER/PROPH/DIAG INJ IV PUSH: CPT

## 2024-06-18 PROCEDURE — 81001 URINALYSIS AUTO W/SCOPE: CPT

## 2024-06-18 PROCEDURE — 74176 CT ABD & PELVIS W/O CONTRAST: CPT

## 2024-06-18 PROCEDURE — 6360000002 HC RX W HCPCS: Performed by: EMERGENCY MEDICINE

## 2024-06-18 PROCEDURE — 80053 COMPREHEN METABOLIC PANEL: CPT

## 2024-06-18 PROCEDURE — 1100000003 HC PRIVATE W/ TELEMETRY

## 2024-06-18 PROCEDURE — 2580000003 HC RX 258: Performed by: EMERGENCY MEDICINE

## 2024-06-18 RX ORDER — SODIUM CHLORIDE 0.9 % (FLUSH) 0.9 %
5-40 SYRINGE (ML) INJECTION PRN
Status: DISCONTINUED | OUTPATIENT
Start: 2024-06-18 | End: 2024-06-20 | Stop reason: HOSPADM

## 2024-06-18 RX ORDER — DULAGLUTIDE 3 MG/.5ML
3 INJECTION, SOLUTION SUBCUTANEOUS WEEKLY
COMMUNITY

## 2024-06-18 RX ORDER — 0.9 % SODIUM CHLORIDE 0.9 %
1000 INTRAVENOUS SOLUTION INTRAVENOUS ONCE
Status: COMPLETED | OUTPATIENT
Start: 2024-06-18 | End: 2024-06-18

## 2024-06-18 RX ORDER — SODIUM CHLORIDE 0.9 % (FLUSH) 0.9 %
5-40 SYRINGE (ML) INJECTION EVERY 12 HOURS SCHEDULED
Status: DISCONTINUED | OUTPATIENT
Start: 2024-06-18 | End: 2024-06-20 | Stop reason: HOSPADM

## 2024-06-18 RX ORDER — SODIUM CHLORIDE 9 MG/ML
INJECTION, SOLUTION INTRAVENOUS PRN
Status: DISCONTINUED | OUTPATIENT
Start: 2024-06-18 | End: 2024-06-20 | Stop reason: HOSPADM

## 2024-06-18 RX ORDER — INSULIN GLARGINE 100 [IU]/ML
30 INJECTION, SOLUTION SUBCUTANEOUS EVERY MORNING
Status: DISCONTINUED | OUTPATIENT
Start: 2024-06-18 | End: 2024-06-20 | Stop reason: HOSPADM

## 2024-06-18 RX ORDER — OXYCODONE HYDROCHLORIDE 5 MG/1
5 TABLET ORAL EVERY 4 HOURS PRN
Status: DISCONTINUED | OUTPATIENT
Start: 2024-06-18 | End: 2024-06-20 | Stop reason: HOSPADM

## 2024-06-18 RX ORDER — KETOROLAC TROMETHAMINE 30 MG/ML
30 INJECTION, SOLUTION INTRAMUSCULAR; INTRAVENOUS
Status: COMPLETED | OUTPATIENT
Start: 2024-06-18 | End: 2024-06-18

## 2024-06-18 RX ORDER — GABAPENTIN 100 MG/1
100 CAPSULE ORAL 3 TIMES DAILY
Status: DISCONTINUED | OUTPATIENT
Start: 2024-06-18 | End: 2024-06-20 | Stop reason: HOSPADM

## 2024-06-18 RX ORDER — METHOCARBAMOL 500 MG/1
500 TABLET, FILM COATED ORAL 4 TIMES DAILY
COMMUNITY

## 2024-06-18 RX ORDER — ACETAMINOPHEN 650 MG/1
650 SUPPOSITORY RECTAL EVERY 6 HOURS PRN
Status: DISCONTINUED | OUTPATIENT
Start: 2024-06-18 | End: 2024-06-20 | Stop reason: HOSPADM

## 2024-06-18 RX ORDER — LANOLIN ALCOHOL/MO/W.PET/CERES
3 CREAM (GRAM) TOPICAL NIGHTLY PRN
Status: DISCONTINUED | OUTPATIENT
Start: 2024-06-18 | End: 2024-06-20 | Stop reason: HOSPADM

## 2024-06-18 RX ORDER — INSULIN LISPRO 100 [IU]/ML
0-8 INJECTION, SOLUTION INTRAVENOUS; SUBCUTANEOUS
Status: DISCONTINUED | OUTPATIENT
Start: 2024-06-18 | End: 2024-06-20 | Stop reason: HOSPADM

## 2024-06-18 RX ORDER — POLYETHYLENE GLYCOL 3350 17 G/17G
17 POWDER, FOR SOLUTION ORAL DAILY PRN
Status: DISCONTINUED | OUTPATIENT
Start: 2024-06-18 | End: 2024-06-20 | Stop reason: HOSPADM

## 2024-06-18 RX ORDER — ONDANSETRON 2 MG/ML
4 INJECTION INTRAMUSCULAR; INTRAVENOUS EVERY 6 HOURS PRN
Status: DISCONTINUED | OUTPATIENT
Start: 2024-06-18 | End: 2024-06-20 | Stop reason: HOSPADM

## 2024-06-18 RX ORDER — SODIUM CHLORIDE, SODIUM LACTATE, POTASSIUM CHLORIDE, CALCIUM CHLORIDE 600; 310; 30; 20 MG/100ML; MG/100ML; MG/100ML; MG/100ML
INJECTION, SOLUTION INTRAVENOUS CONTINUOUS
Status: ACTIVE | OUTPATIENT
Start: 2024-06-18 | End: 2024-06-18

## 2024-06-18 RX ORDER — OXYCODONE HYDROCHLORIDE 5 MG/1
5 TABLET ORAL EVERY 4 HOURS PRN
Status: ON HOLD | COMMUNITY
End: 2024-06-20

## 2024-06-18 RX ORDER — ACETAMINOPHEN 325 MG/1
650 TABLET ORAL EVERY 6 HOURS PRN
Status: DISCONTINUED | OUTPATIENT
Start: 2024-06-18 | End: 2024-06-20 | Stop reason: HOSPADM

## 2024-06-18 RX ORDER — PANTOPRAZOLE SODIUM 40 MG/1
40 TABLET, DELAYED RELEASE ORAL
Status: DISCONTINUED | OUTPATIENT
Start: 2024-06-18 | End: 2024-06-20 | Stop reason: HOSPADM

## 2024-06-18 RX ORDER — ENOXAPARIN SODIUM 100 MG/ML
30 INJECTION SUBCUTANEOUS 2 TIMES DAILY
Status: DISCONTINUED | OUTPATIENT
Start: 2024-06-18 | End: 2024-06-20 | Stop reason: HOSPADM

## 2024-06-18 RX ORDER — DEXTROSE MONOHYDRATE 100 MG/ML
INJECTION, SOLUTION INTRAVENOUS CONTINUOUS PRN
Status: DISCONTINUED | OUTPATIENT
Start: 2024-06-18 | End: 2024-06-20 | Stop reason: HOSPADM

## 2024-06-18 RX ORDER — MORPHINE SULFATE 4 MG/ML
4 INJECTION, SOLUTION INTRAMUSCULAR; INTRAVENOUS ONCE
Status: COMPLETED | OUTPATIENT
Start: 2024-06-18 | End: 2024-06-18

## 2024-06-18 RX ORDER — ONDANSETRON 4 MG/1
4 TABLET, ORALLY DISINTEGRATING ORAL EVERY 8 HOURS PRN
Status: DISCONTINUED | OUTPATIENT
Start: 2024-06-18 | End: 2024-06-20 | Stop reason: HOSPADM

## 2024-06-18 RX ORDER — GLUCAGON 1 MG/ML
1 KIT INJECTION PRN
Status: DISCONTINUED | OUTPATIENT
Start: 2024-06-18 | End: 2024-06-20 | Stop reason: HOSPADM

## 2024-06-18 RX ORDER — INSULIN LISPRO 100 [IU]/ML
0-4 INJECTION, SOLUTION INTRAVENOUS; SUBCUTANEOUS NIGHTLY
Status: DISCONTINUED | OUTPATIENT
Start: 2024-06-18 | End: 2024-06-20 | Stop reason: HOSPADM

## 2024-06-18 RX ADMIN — PANTOPRAZOLE SODIUM 40 MG: 40 TABLET, DELAYED RELEASE ORAL at 08:38

## 2024-06-18 RX ADMIN — SODIUM CHLORIDE, POTASSIUM CHLORIDE, SODIUM LACTATE AND CALCIUM CHLORIDE: 600; 310; 30; 20 INJECTION, SOLUTION INTRAVENOUS at 06:29

## 2024-06-18 RX ADMIN — POLYETHYLENE GLYCOL 3350 17 G: 17 POWDER, FOR SOLUTION ORAL at 06:37

## 2024-06-18 RX ADMIN — SODIUM CHLORIDE, PRESERVATIVE FREE 10 ML: 5 INJECTION INTRAVENOUS at 08:38

## 2024-06-18 RX ADMIN — INSULIN GLARGINE 30 UNITS: 100 INJECTION, SOLUTION SUBCUTANEOUS at 06:27

## 2024-06-18 RX ADMIN — KETOROLAC TROMETHAMINE 30 MG: 30 INJECTION, SOLUTION INTRAMUSCULAR at 04:12

## 2024-06-18 RX ADMIN — GABAPENTIN 100 MG: 100 CAPSULE ORAL at 20:12

## 2024-06-18 RX ADMIN — ENOXAPARIN SODIUM 30 MG: 100 INJECTION SUBCUTANEOUS at 20:14

## 2024-06-18 RX ADMIN — SODIUM CHLORIDE 1000 ML: 9 INJECTION, SOLUTION INTRAVENOUS at 04:46

## 2024-06-18 RX ADMIN — MORPHINE SULFATE 4 MG: 4 INJECTION INTRAVENOUS at 04:14

## 2024-06-18 RX ADMIN — PANTOPRAZOLE SODIUM 40 MG: 40 TABLET, DELAYED RELEASE ORAL at 17:28

## 2024-06-18 RX ADMIN — INSULIN LISPRO 8 UNITS: 100 INJECTION, SOLUTION INTRAVENOUS; SUBCUTANEOUS at 08:38

## 2024-06-18 RX ADMIN — INSULIN LISPRO 4 UNITS: 100 INJECTION, SOLUTION INTRAVENOUS; SUBCUTANEOUS at 18:16

## 2024-06-18 RX ADMIN — OXYCODONE HYDROCHLORIDE 5 MG: 5 TABLET ORAL at 06:36

## 2024-06-18 RX ADMIN — SODIUM CHLORIDE, PRESERVATIVE FREE 10 ML: 5 INJECTION INTRAVENOUS at 20:16

## 2024-06-18 RX ADMIN — INSULIN LISPRO 4 UNITS: 100 INJECTION, SOLUTION INTRAVENOUS; SUBCUTANEOUS at 13:14

## 2024-06-18 RX ADMIN — GABAPENTIN 100 MG: 100 CAPSULE ORAL at 08:38

## 2024-06-18 RX ADMIN — OXYCODONE HYDROCHLORIDE 5 MG: 5 TABLET ORAL at 13:15

## 2024-06-18 RX ADMIN — SODIUM CHLORIDE 1000 ML: 9 INJECTION, SOLUTION INTRAVENOUS at 05:20

## 2024-06-18 RX ADMIN — GABAPENTIN 100 MG: 100 CAPSULE ORAL at 13:14

## 2024-06-18 RX ADMIN — ENOXAPARIN SODIUM 30 MG: 100 INJECTION SUBCUTANEOUS at 08:39

## 2024-06-18 ASSESSMENT — LIFESTYLE VARIABLES
HOW MANY STANDARD DRINKS CONTAINING ALCOHOL DO YOU HAVE ON A TYPICAL DAY: 1 OR 2
HOW OFTEN DO YOU HAVE A DRINK CONTAINING ALCOHOL: 2-3 TIMES A WEEK

## 2024-06-18 ASSESSMENT — PAIN SCALES - GENERAL
PAINLEVEL_OUTOF10: 10
PAINLEVEL_OUTOF10: 8
PAINLEVEL_OUTOF10: 0
PAINLEVEL_OUTOF10: 7

## 2024-06-18 ASSESSMENT — PAIN DESCRIPTION - LOCATION
LOCATION: BACK
LOCATION: ABDOMEN;BACK
LOCATION: ABDOMEN

## 2024-06-18 ASSESSMENT — PAIN DESCRIPTION - DESCRIPTORS
DESCRIPTORS: ACHING
DESCRIPTORS: CRAMPING;ACHING

## 2024-06-18 ASSESSMENT — PAIN DESCRIPTION - PAIN TYPE: TYPE: CHRONIC PAIN;ACUTE PAIN

## 2024-06-18 ASSESSMENT — PAIN - FUNCTIONAL ASSESSMENT: PAIN_FUNCTIONAL_ASSESSMENT: 0-10

## 2024-06-18 ASSESSMENT — PAIN DESCRIPTION - ORIENTATION: ORIENTATION: LOWER

## 2024-06-18 NOTE — PROGRESS NOTES
Nephrology Progress Note  [facility] [place] Office  [address]  [place] [address]  Phone - [phone]  Fax - [phone]                 Patient: Sachin Vyas                     YOB: 1965        Date- 6/18/2024                                     Admit Date: 6/18/2024   CC: Follow up for carrillo on ckd          IMPRESSION & PLAN:   Carrillo due to pre renal etiology and NSAIDS use  Back pain  Hyponatremia due to pre renal etiology  Metastatic prostate ca  Dm  Ckd 2- bl cr 1.1  NSAIDS use        PLAN-  Continue ivf  Follow bmp  Avoid nsaids     Subjective:  Interval History:   -  Patient is admitted with back pain.  He is found to have carrillo  His cr 2.4 on admission  He is taking ibuprofen at home  He reports poor po intake    Objective:   Vitals:    06/18/24 0541 06/18/24 0601 06/18/24 0645 06/18/24 0720   BP: (!) 155/98 (!) 152/99 (!) 156/104 139/84   Pulse:   94    Resp:   16    Temp:    97.7 °F (36.5 °C)   TempSrc:    Oral   SpO2: 95% 95% 96%    Weight:       Height:          No intake/output data recorded.  No intake/output data recorded.      Physical exam:    GEN: NAD  NECK- no mass  RESP: No wheezing  NEURO: Normal speech, Non focal  EXT: No Edema   PSYCH: Normal Mood    Chart reviewed.         Pertinent Notes reviewed.     Data Review :  Lab Results   Component Value Date/Time     06/18/2024 01:22 PM    K 3.8 06/18/2024 01:22 PM     06/18/2024 01:22 PM    CO2 23 06/18/2024 01:22 PM    BUN 36 06/18/2024 01:22 PM    CREATININE 1.41 06/18/2024 01:22 PM    GLUCOSE 272 06/18/2024 01:22 PM    GLUCOSE 194 12/26/2023 12:00 AM    CALCIUM 7.6 06/18/2024 01:22 PM       Lab Results   Component Value Date    WBC 7.7 06/18/2024    HGB 12.5 06/18/2024    HCT 39.7 06/18/2024    MCV 87.6 06/18/2024     06/18/2024      Recent Labs     06/18/24  0352 06/18/24  1322   * 138   K 5.1 3.8   CL 98 111*

## 2024-06-18 NOTE — CONSULTS
[facility]         NAME:Sachin Vyas  MRN:[id number]   :1965       Patient seen  Carlos due to pre renal etiology and NSAIDS use  H/o prostate ca  Ckd bp cr 1.1        CARLOS (acute kidney injury) (HCC) [N17.9]    has a past medical history of Asthma, Chronic venous insufficiency, Diabetes (HCC), Lower leg edema, and Prostate cancer (HCC).    has no past surgical history on file.   Seferino Sexton MD  [facility]  Formerly [facility]  [address], [address]  [place] [address]  Phone - [phone]         Fax - [phone] [address], [place] [address]  Phone - [phone]        Fax - [phone]

## 2024-06-18 NOTE — ED TRIAGE NOTES
Pt. Presents with acute on chronic back pain and abdominal pain that started today. Pt. Reports he had bloodwork done this morning at his follow up appt for his prostate CA and started to gradually feel the back pain and abdominal pain as the day continued. Pt. Ambulated with steady gait from EMS cot to ED stretcher; EMS reported high glucose with hx of DMII; pt glucose 382 here, states he had dinner and lemon tea around 11pm last night. Pt. Took his prescribed pain medication without relief shortly after. Pt. Denies injury, denies nausea, states his abdomen hurts in the lower area similar to the pain in his lower back, denies new incontinence, denies numbness and tingling; PMS intact BLE. VSS

## 2024-06-18 NOTE — ED NOTES
Bedside and Verbal shift change report given to Judy RN (oncoming nurse) by Amelia RN (offgoing nurse). Report included the following information Nurse Handoff Report, ED Encounter Summary, ED SBAR, Adult Overview, MAR, Recent Results, Cardiac Rhythm NSR, and Neuro Assessment. All questions and concerns addressed at bedside.

## 2024-06-18 NOTE — H&P
Hospitalist Admission Note    NAME:  Sachin Vyas   :  1965   MRN:  [id number]     Date/Time:  2024 5:32 AM    Patient PCP: Reece Monk MD    ______________________________________________________________________  Given the patient's current clinical presentation, I have a high level of concern for decompensation if discharged from the emergency department.  Complex decision making was performed, which includes reviewing the patient's available past medical records, laboratory results, and x-ray films.       My assessment of this patient's clinical condition and my plan of care is as follows.    Assessment / Plan:    Active Problems:  CARLOS  Metastatic prostate cancer  Cancer related pain  Diabetes mellitus with hyperglycemia  Mild left-sided hydronephrosis    Plan:  Admit to telemetry monitoring  Status post 2 L bolus in ED  LR at 100 cc/h x 10 hours  Trend BMP-repeat at noon  -If creatinine fails to improve will consult nephrology  Discontinue NSAIDs  Start twice daily PPI  Oxycodone and Tylenol as needed pain  Continue PTA gabapentin  Glargine 30 units daily  Corrective coverage insulin  Accu-Cheks  Diabetic diet  Hypoglycemia protocol in place  Will need to follow-up with outpatient urology to ensure resolution of hydronephrosis-does not account for doubling of baseline creatinine      Medical Decision Making:   I personally reviewed labs: Yes, as listed below  I personally reviewed imaging: CT abdomen pelvis  Toxic drug monitoring: None  Discussed case with: ED provider. After discussion I am in agreement that acuity of patient's medical condition necessitates hospital stay.      Code Status: Full  DVT Prophylaxis: Lovenox  GI Prophylaxis: Protonix  Baseline: Independent    Subjective:   CHIEF COMPLAINT: Abdominal pain/back pain    HISTORY OF PRESENT ILLNESS:     Sachin Vyas is a 59 y.o.  male with PMHx as listed below presenting to the emergency department with

## 2024-06-18 NOTE — PROGRESS NOTES
0810 BG level reported to MD, orders to give 8u per SS.     1148 Nephro consult called at this time.

## 2024-06-18 NOTE — PROGRESS NOTES
Nonbillable note  Patient admitted this morning for CARLOS  History of NSAID use  Continue with IV fluid hydration  Nephrology consult  Rest of the plan per morning admit  Vitals:    06/18/24 0720   BP: 139/84   Pulse:    Resp:    Temp: 97.7 °F (36.5 °C)   SpO2:

## 2024-06-18 NOTE — PROGRESS NOTES
Admission Medication History Technician Note:    Hemodialysis patient: None    Patient preferred pharmacy Confirmed:    [facility] - [place], [place] - [address] - P [phone] - F [phone] [address] [address]  Phone: [phone] Fax: [phone]      Information obtained from¹: Patient and Rx Query    Comments/Recommendations: Updated PTA meds/reviewed patient's allergies.    1)  Medication issues identified: Patient was emotional and hard to follow when questioned about medications.  Sometimes he will take medications and sometimes not, feels he is not diabetic/does not check BS.    2)  Medication changes (since last review):  Added  + Lupron  + Trulicity  + Robaxin  + Oxycodone  + Naloxone    Removed  - Prednisone  - Vit D  - Metformin    Adjusted      3)  Pertinent Pharmacy Findings:  Identified High Alert Medication Information  Oral Chemotherapy Lupron 22.5 every 3 months     ¹RxQuery pharmacy benefit data reflects medications filled and processed through the patient's insurance, however this data does NOT capture whether the medication was picked up or is currently being taken by the patient.    Allergies:  Patient has no known allergies.    Chief Complaint for this Admission:    Chief Complaint   Patient presents with    Back Pain     Prior to Admission Medications:   Current Outpatient Medications   Medication Instructions    atorvastatin (LIPITOR) 40 mg, Oral, DAILY    bumetanide (BUMEX) 2 mg, Oral, DAILY    dermacerin (EUCERIN) CREA cream Apply to scaly skin on extremities bid    ferrous sulfate (FE TABS 325) 325 (65 Fe) MG EC tablet GIVE 1 TABLET BY MOUTH ONE TIME A DAY FOR SUPPLEMENTATION RELATED TO ANEMIA.    gabapentin (NEURONTIN) 100 MG capsule TAKE ONE CAPSULE BY MOUTH 3 TIMES A DAY FOR LEG PAIN **NTE:300/DAY**    ibuprofen (ADVIL;MOTRIN) 800 MG tablet TAKE ONE TABLET BY MOUTH 2 TIMES A DAY AS NEEDED FOR PAIN    insulin glargine (LANTUS) 100

## 2024-06-18 NOTE — ED PROVIDER NOTES
MRM 1 MULTI-SPECIALTY TELEMETRY  EMERGENCY DEPARTMENT ENCOUNTER       Pt Name: Sachin Vyas  MRN: [id number]  Birthdate 1965  Date of evaluation: 6/18/2024  Provider: Get Dumont DO   PCP: Reece Monk MD  Note Started: 3:34 AM EDT 6/18/24     CHIEF COMPLAINT       Chief Complaint   Patient presents with    Back Pain        HISTORY OF PRESENT ILLNESS: 1 or more elements      History From: Patient, History limited by: none     Sachin Vyas is a 59 y.o. male past medical history significant for hypertension, obesity, prostate cancer with mets to the spine presenting the emergency department complaining of back pain, abdominal pain.  Back pains he states has been going on for years, progressively worsening, not relieved by oxycodone.  Also complains of abdominal pain, no nausea or vomiting, no fever, no urinary symptoms.  Patient states he was recently at [facility] and admitted to have an MRI which showed some spread of the prostate cancer to the spine       Please See MDM for Additional Details of the HPI/PMH  Nursing Notes were all reviewed and agreed with or any disagreements were addressed in the HPI.     REVIEW OF SYSTEMS        Positives and Pertinent negatives as per HPI.    PAST HISTORY     Past Medical History:  Past Medical History:   Diagnosis Date    Asthma     Chronic venous insufficiency     Diabetes (HCC)     Lower leg edema     Prostate cancer (HCC)     [place] [facility] [place] Surgical History:  History reviewed. No pertinent surgical history.    Family History:  Family History   Problem Relation Age of Onset    No Known Problems Sister     No Known Problems Brother     Hypertension Sister     Hypertension Sister     Asthma Sister     Hypertension Father     Hypertension Sister     Heart Attack Father     Stroke Mother         brain bleed    Stroke Father     Obesity Sister        Social History:  Social History     Tobacco Use    Smoking status: Former

## 2024-06-18 NOTE — CARE COORDINATION
Care Management Initial Assessment       RUR: 11%  Readmission? No  1st IM letter given? No, Medicaid  1st  letter given: No    Chart reviewed. CM met with pt at bedside and sister on phone (Tamar Cisneros [phone]), introduced role, and confirmed demographic information in the chart.  Pt reside in her sister's single story home with 4 JUAN.  Pt performs ADL independently without device - pt is not employed and does not dive, pt uses Medicaid Van to transport to appointments.  Sister gets groceries for home and pt able to prepare his meal.  Medicaid Van will transport home at discharge.  PCP: Dr. Reyes (seen 3/24) and [facility] Heme/Onc: Dr. Doty (seen 6/17/24).  Per pt and sister, pt taken off cancer treatment yesterday.    Pharmacy of choice:   History of HH (unknown agency)/ no history of SNF/History of IPR with [facility] on 5/2023.  Pt thinks HH was after coming home form [facility]. Pt is willing to return to [facility] IPR if recommended.  Pt is overwhelmed from yesterday when he was advised chemo/hormonal was stopped - pt has chronic back pain.    CM will continue to follow for discharge planning. Full assessment below:       06/18/24 7153   Service Assessment   Patient Orientation Alert and Oriented;Person;Place;Situation   Cognition Alert   History Provided By Patient;Child/Family  (sister: Tamar Cisneros [phone])   Primary Caregiver Self   Support Systems Family Members   Patient's Healthcare Decision Maker is: Legal Next of Kin   PCP Verified by CM Yes   Last Visit to PCP Within last 3 months   Prior Functional Level Independent in ADLs/IADLs   Current Functional Level Independent in ADLs/IADLs   Can patient return to prior living arrangement Yes   Ability to make needs known: Good   Family able to assist with home care needs: Yes   Would you like for me to discuss the discharge plan with any other family members/significant others, and if so, who? No  (Medicaid Van at discharge)   Financial

## 2024-06-19 LAB
ANION GAP SERPL CALC-SCNC: 4 MMOL/L (ref 5–15)
BASOPHILS # BLD: 0 K/UL (ref 0–0.1)
BASOPHILS NFR BLD: 0 % (ref 0–1)
BUN SERPL-MCNC: 29 MG/DL (ref 6–20)
BUN/CREAT SERPL: 24 (ref 12–20)
CALCIUM SERPL-MCNC: 8.8 MG/DL (ref 8.5–10.1)
CHLORIDE SERPL-SCNC: 108 MMOL/L (ref 97–108)
CO2 SERPL-SCNC: 23 MMOL/L (ref 21–32)
CREAT SERPL-MCNC: 1.21 MG/DL (ref 0.7–1.3)
DIFFERENTIAL METHOD BLD: ABNORMAL
EOSINOPHIL # BLD: 0.1 K/UL (ref 0–0.4)
EOSINOPHIL NFR BLD: 1 % (ref 0–7)
ERYTHROCYTE [DISTWIDTH] IN BLOOD BY AUTOMATED COUNT: 13.5 % (ref 11.5–14.5)
GLUCOSE BLD STRIP.AUTO-MCNC: 212 MG/DL (ref 65–117)
GLUCOSE BLD STRIP.AUTO-MCNC: 233 MG/DL (ref 65–117)
GLUCOSE BLD STRIP.AUTO-MCNC: 260 MG/DL (ref 65–117)
GLUCOSE BLD STRIP.AUTO-MCNC: 273 MG/DL (ref 65–117)
GLUCOSE SERPL-MCNC: 286 MG/DL (ref 65–100)
HCT VFR BLD AUTO: 36.7 % (ref 36.6–50.3)
HGB BLD-MCNC: 11.3 G/DL (ref 12.1–17)
IMM GRANULOCYTES # BLD AUTO: 0 K/UL (ref 0–0.04)
IMM GRANULOCYTES NFR BLD AUTO: 1 % (ref 0–0.5)
LYMPHOCYTES # BLD: 1 K/UL (ref 0.8–3.5)
LYMPHOCYTES NFR BLD: 20 % (ref 12–49)
MCH RBC QN AUTO: 27.8 PG (ref 26–34)
MCHC RBC AUTO-ENTMCNC: 30.8 G/DL (ref 30–36.5)
MCV RBC AUTO: 90.4 FL (ref 80–99)
MONOCYTES # BLD: 0.3 K/UL (ref 0–1)
MONOCYTES NFR BLD: 6 % (ref 5–13)
NEUTS SEG # BLD: 3.5 K/UL (ref 1.8–8)
NEUTS SEG NFR BLD: 72 % (ref 32–75)
NRBC # BLD: 0 K/UL (ref 0–0.01)
NRBC BLD-RTO: 0 PER 100 WBC
PLATELET # BLD AUTO: 160 K/UL (ref 150–400)
PMV BLD AUTO: 11.1 FL (ref 8.9–12.9)
POTASSIUM SERPL-SCNC: 5.1 MMOL/L (ref 3.5–5.1)
RBC # BLD AUTO: 4.06 M/UL (ref 4.1–5.7)
SERVICE CMNT-IMP: ABNORMAL
SODIUM SERPL-SCNC: 135 MMOL/L (ref 136–145)
WBC # BLD AUTO: 4.8 K/UL (ref 4.1–11.1)

## 2024-06-19 PROCEDURE — 82962 GLUCOSE BLOOD TEST: CPT

## 2024-06-19 PROCEDURE — 6360000002 HC RX W HCPCS: Performed by: STUDENT IN AN ORGANIZED HEALTH CARE EDUCATION/TRAINING PROGRAM

## 2024-06-19 PROCEDURE — 1100000003 HC PRIVATE W/ TELEMETRY

## 2024-06-19 PROCEDURE — 80048 BASIC METABOLIC PNL TOTAL CA: CPT

## 2024-06-19 PROCEDURE — 6370000000 HC RX 637 (ALT 250 FOR IP): Performed by: STUDENT IN AN ORGANIZED HEALTH CARE EDUCATION/TRAINING PROGRAM

## 2024-06-19 PROCEDURE — 36415 COLL VENOUS BLD VENIPUNCTURE: CPT

## 2024-06-19 PROCEDURE — 85025 COMPLETE CBC W/AUTO DIFF WBC: CPT

## 2024-06-19 PROCEDURE — 2580000003 HC RX 258: Performed by: STUDENT IN AN ORGANIZED HEALTH CARE EDUCATION/TRAINING PROGRAM

## 2024-06-19 RX ADMIN — GABAPENTIN 100 MG: 100 CAPSULE ORAL at 20:01

## 2024-06-19 RX ADMIN — PANTOPRAZOLE SODIUM 40 MG: 40 TABLET, DELAYED RELEASE ORAL at 16:47

## 2024-06-19 RX ADMIN — INSULIN LISPRO 4 UNITS: 100 INJECTION, SOLUTION INTRAVENOUS; SUBCUTANEOUS at 11:36

## 2024-06-19 RX ADMIN — SODIUM CHLORIDE, PRESERVATIVE FREE 10 ML: 5 INJECTION INTRAVENOUS at 09:04

## 2024-06-19 RX ADMIN — INSULIN LISPRO 2 UNITS: 100 INJECTION, SOLUTION INTRAVENOUS; SUBCUTANEOUS at 08:58

## 2024-06-19 RX ADMIN — SODIUM CHLORIDE, PRESERVATIVE FREE 5 ML: 5 INJECTION INTRAVENOUS at 20:01

## 2024-06-19 RX ADMIN — OXYCODONE HYDROCHLORIDE 5 MG: 5 TABLET ORAL at 19:53

## 2024-06-19 RX ADMIN — INSULIN GLARGINE 30 UNITS: 100 INJECTION, SOLUTION SUBCUTANEOUS at 08:58

## 2024-06-19 RX ADMIN — POLYETHYLENE GLYCOL 3350 17 G: 17 POWDER, FOR SOLUTION ORAL at 19:53

## 2024-06-19 RX ADMIN — PANTOPRAZOLE SODIUM 40 MG: 40 TABLET, DELAYED RELEASE ORAL at 08:58

## 2024-06-19 RX ADMIN — INSULIN LISPRO 4 UNITS: 100 INJECTION, SOLUTION INTRAVENOUS; SUBCUTANEOUS at 16:47

## 2024-06-19 RX ADMIN — GABAPENTIN 100 MG: 100 CAPSULE ORAL at 08:58

## 2024-06-19 RX ADMIN — OXYCODONE HYDROCHLORIDE 5 MG: 5 TABLET ORAL at 00:34

## 2024-06-19 RX ADMIN — ENOXAPARIN SODIUM 30 MG: 100 INJECTION SUBCUTANEOUS at 20:01

## 2024-06-19 RX ADMIN — GABAPENTIN 100 MG: 100 CAPSULE ORAL at 14:24

## 2024-06-19 RX ADMIN — ENOXAPARIN SODIUM 30 MG: 100 INJECTION SUBCUTANEOUS at 08:58

## 2024-06-19 RX ADMIN — OXYCODONE HYDROCHLORIDE 5 MG: 5 TABLET ORAL at 09:25

## 2024-06-19 ASSESSMENT — PAIN SCALES - GENERAL
PAINLEVEL_OUTOF10: 0
PAINLEVEL_OUTOF10: 8
PAINLEVEL_OUTOF10: 7

## 2024-06-19 ASSESSMENT — PAIN DESCRIPTION - DESCRIPTORS
DESCRIPTORS: ACHING;DISCOMFORT
DESCRIPTORS: ACHING
DESCRIPTORS: ACHING

## 2024-06-19 ASSESSMENT — PAIN DESCRIPTION - LOCATION
LOCATION: BACK

## 2024-06-19 ASSESSMENT — PAIN DESCRIPTION - ORIENTATION
ORIENTATION: POSTERIOR
ORIENTATION: LOWER

## 2024-06-19 NOTE — PROGRESS NOTES
End of Shift Note    Bedside shift change report given to Jacquelyn BARNES (oncoming nurse) by Kaley Chavez RN (offgoing nurse).  Report included the following information SBAR, Kardex, MAR, Recent Results, and Cardiac Rhythm NSR    Shift worked:  5305-5814     Shift summary and any significant changes:     No significant changes     Concerns for physician to address:  See labs     Zone phone for oncoming shift:   0528       Activity:     Number times ambulated in hallways past shift: 0  Number of times OOB to chair past shift: 3    Cardiac:   Cardiac Monitoring: Yes           Access:  Current line(s): PIV     Genitourinary:   Urinary status: voiding    Respiratory:      Chronic home O2 use?: N/A  Incentive spirometer at bedside: N/A       GI:     Current diet:  ADULT DIET; Regular; 4 carb choices (60 gm/meal); Low Fat/Low Chol/High Fiber/LÓPEZ  DIET ONE TIME MESSAGE;  Passing flatus: YES  Tolerating current diet: YES       Pain Management:   Patient states pain is manageable on current regimen: YES    Skin:     Interventions: increase time out of bed and limit briefs    Patient Safety:  Fall Score:    Interventions: gripper socks       Length of Stay:  Expected LOS: 2  Actual LOS: 1      Kaley Chavez RN

## 2024-06-19 NOTE — PROGRESS NOTES
[facility]         NAME:Sachin Vyas  MRN:[id number]   :1965     Cr improved  Cr back to baseline    We will sign off. Please call us if needed.        CARLOS (acute kidney injury) (HCC) [N17.9]    has a past medical history of Asthma, Chronic venous insufficiency, Diabetes (HCC), Lower leg edema, and Prostate cancer (HCC).    has no past surgical history on file.   Seferino Sexton MD  [facility]  [facility] Office  [address] [address]  Phone - [phone]         Fax - [phone] [address], [place] [address]  Phone - [phone]        Fax - [phone]

## 2024-06-19 NOTE — PROGRESS NOTES
Hospitalist Progress Note    NAME:   Sachin Vyas   : 1965   MRN: [id number]     Date/Time: 2024 9:39 AM  Patient PCP: Reece Monk MD    Estimated discharge date:   Barriers: symptom improvement      Assessment / Plan:    CARLOS  Metastatic prostate cancer  Cancer related pain  Mild left-sided hydronephrosis  CT abdomen pelvis :  1. No acute intra-abdominal pathology.  2. Mild left-sided hydronephrosis. No obstructing stones identified. There is a  small nonobstructing left renal stone. Question history of recently passed  stone.  Status post 2 L bolus in ED  S/P IV hydration  Creatinine WNL this AM  Continue twice daily PPI  Oxycodone and Tylenol as needed pain  Continue PTA gabapentin  Will need to follow-up with outpatient urology to ensure resolution of hydronephrosis-does not account for doubling of baseline creatinine    Diabetes mellitus with hyperglycemia  Glargine 30 units daily  Corrective coverage insulin  Accu-Cheks  Diabetic diet  Hypoglycemia protocol in place    Medical Decision Making:   I personally reviewed labs: BMP, CBC  I personally reviewed imaging: CT abdomen pelvis  I personally reviewed EKG: SR  Toxic drug monitoring: crea  Discussed case with: attending, MALIKA        Code Status: Full  DVT Prophylaxis: Lovenox  GI Prophylaxis: Protonix    Subjective:     Chief Complaint / Reason for Physician Visit  \"My abdominal pain is better, but I still have pain in my back \".      Noted improvement of creatinine this AM.    Discussed with RN events overnight.     HPI:  Sachin Vyas is a 59 y.o.  male with PMHx as listed below presenting to the emergency department with complaints of acute on chronic abdominal and lower back pain in setting of known metastatic prostate cancer.  Patient reports he was recently taken off of all chemotherapeutic/hormonal suppressive medications at [facility].  Reports he has had poorly controlled pain and is taking oxycodone and  current radiology test results   YES  Review and summation of old records today    NO  Reviewed patient's current orders and MAR    YES  PMH/SH reviewed - no change compared to H&P    Procedures: see electronic medical records for all procedures/Xrays and details which were not copied into this note but were reviewed prior to creation of Plan.      LABS:  I reviewed today's most current labs and imaging studies.  Pertinent labs include:  Recent Labs     06/18/24  0416 06/19/24  0541   WBC 7.7 4.8   HGB 12.5 11.3*   HCT 39.7 36.7    160     Recent Labs     06/18/24  0352 06/18/24  1322 06/19/24  0541   * 138 135*   K 5.1 3.8 5.1   CL 98 111* 108   CO2 25 23 23   GLUCOSE 425* 272* 286*   BUN 44* 36* 29*   CREATININE 2.40* 1.41* 1.21   CALCIUM 9.6 7.6* 8.8   BILITOT 0.5  --   --    AST 28  --   --    ALT 24  --   --        Signed: ZULEYMA Donohue - NP

## 2024-06-20 ENCOUNTER — TELEPHONE (OUTPATIENT)
Age: 59
End: 2024-06-20

## 2024-06-20 VITALS
WEIGHT: 260.2 LBS | RESPIRATION RATE: 18 BRPM | SYSTOLIC BLOOD PRESSURE: 140 MMHG | HEART RATE: 98 BPM | HEIGHT: 70 IN | OXYGEN SATURATION: 99 % | DIASTOLIC BLOOD PRESSURE: 95 MMHG | BODY MASS INDEX: 37.25 KG/M2 | TEMPERATURE: 98.6 F

## 2024-06-20 LAB
ANION GAP SERPL CALC-SCNC: 3 MMOL/L (ref 5–15)
BASOPHILS # BLD: 0 K/UL (ref 0–0.1)
BASOPHILS NFR BLD: 0 % (ref 0–1)
BUN SERPL-MCNC: 21 MG/DL (ref 6–20)
BUN/CREAT SERPL: 20 (ref 12–20)
CALCIUM SERPL-MCNC: 8.8 MG/DL (ref 8.5–10.1)
CHLORIDE SERPL-SCNC: 106 MMOL/L (ref 97–108)
CO2 SERPL-SCNC: 27 MMOL/L (ref 21–32)
CREAT SERPL-MCNC: 1.03 MG/DL (ref 0.7–1.3)
DIFFERENTIAL METHOD BLD: ABNORMAL
EOSINOPHIL # BLD: 0.1 K/UL (ref 0–0.4)
EOSINOPHIL NFR BLD: 2 % (ref 0–7)
ERYTHROCYTE [DISTWIDTH] IN BLOOD BY AUTOMATED COUNT: 13.2 % (ref 11.5–14.5)
GLUCOSE BLD STRIP.AUTO-MCNC: 196 MG/DL (ref 65–117)
GLUCOSE BLD STRIP.AUTO-MCNC: 249 MG/DL (ref 65–117)
GLUCOSE SERPL-MCNC: 225 MG/DL (ref 65–100)
HCT VFR BLD AUTO: 38.4 % (ref 36.6–50.3)
HGB BLD-MCNC: 11.8 G/DL (ref 12.1–17)
IMM GRANULOCYTES # BLD AUTO: 0 K/UL (ref 0–0.04)
IMM GRANULOCYTES NFR BLD AUTO: 0 % (ref 0–0.5)
LYMPHOCYTES # BLD: 0.8 K/UL (ref 0.8–3.5)
LYMPHOCYTES NFR BLD: 18 % (ref 12–49)
MCH RBC QN AUTO: 27.3 PG (ref 26–34)
MCHC RBC AUTO-ENTMCNC: 30.7 G/DL (ref 30–36.5)
MCV RBC AUTO: 88.9 FL (ref 80–99)
MONOCYTES # BLD: 0.3 K/UL (ref 0–1)
MONOCYTES NFR BLD: 7 % (ref 5–13)
NEUTS SEG # BLD: 3.3 K/UL (ref 1.8–8)
NEUTS SEG NFR BLD: 73 % (ref 32–75)
NRBC # BLD: 0 K/UL (ref 0–0.01)
NRBC BLD-RTO: 0 PER 100 WBC
PLATELET # BLD AUTO: 169 K/UL (ref 150–400)
PMV BLD AUTO: 10.9 FL (ref 8.9–12.9)
POTASSIUM SERPL-SCNC: 5.1 MMOL/L (ref 3.5–5.1)
RBC # BLD AUTO: 4.32 M/UL (ref 4.1–5.7)
SERVICE CMNT-IMP: ABNORMAL
SERVICE CMNT-IMP: ABNORMAL
SODIUM SERPL-SCNC: 136 MMOL/L (ref 136–145)
WBC # BLD AUTO: 4.6 K/UL (ref 4.1–11.1)

## 2024-06-20 PROCEDURE — 2580000003 HC RX 258: Performed by: STUDENT IN AN ORGANIZED HEALTH CARE EDUCATION/TRAINING PROGRAM

## 2024-06-20 PROCEDURE — 6370000000 HC RX 637 (ALT 250 FOR IP): Performed by: STUDENT IN AN ORGANIZED HEALTH CARE EDUCATION/TRAINING PROGRAM

## 2024-06-20 PROCEDURE — 6360000002 HC RX W HCPCS: Performed by: STUDENT IN AN ORGANIZED HEALTH CARE EDUCATION/TRAINING PROGRAM

## 2024-06-20 PROCEDURE — 85025 COMPLETE CBC W/AUTO DIFF WBC: CPT

## 2024-06-20 PROCEDURE — 6370000000 HC RX 637 (ALT 250 FOR IP): Performed by: NURSE PRACTITIONER

## 2024-06-20 PROCEDURE — 82962 GLUCOSE BLOOD TEST: CPT

## 2024-06-20 PROCEDURE — 6360000002 HC RX W HCPCS: Performed by: NURSE PRACTITIONER

## 2024-06-20 PROCEDURE — 80048 BASIC METABOLIC PNL TOTAL CA: CPT

## 2024-06-20 PROCEDURE — 36415 COLL VENOUS BLD VENIPUNCTURE: CPT

## 2024-06-20 RX ORDER — TRAMADOL HYDROCHLORIDE 50 MG/1
50 TABLET ORAL EVERY 6 HOURS PRN
Status: DISCONTINUED | OUTPATIENT
Start: 2024-06-20 | End: 2024-06-20 | Stop reason: HOSPADM

## 2024-06-20 RX ORDER — KETOROLAC TROMETHAMINE 30 MG/ML
30 INJECTION, SOLUTION INTRAMUSCULAR; INTRAVENOUS ONCE
Status: DISCONTINUED | OUTPATIENT
Start: 2024-06-20 | End: 2024-06-20

## 2024-06-20 RX ORDER — OXYCODONE HYDROCHLORIDE 5 MG/1
5 TABLET ORAL EVERY 6 HOURS PRN
Qty: 12 TABLET | Refills: 0 | Status: SHIPPED | OUTPATIENT
Start: 2024-06-20 | End: 2024-06-23

## 2024-06-20 RX ORDER — KETOROLAC TROMETHAMINE 30 MG/ML
15 INJECTION, SOLUTION INTRAMUSCULAR; INTRAVENOUS ONCE
Status: COMPLETED | OUTPATIENT
Start: 2024-06-20 | End: 2024-06-20

## 2024-06-20 RX ADMIN — SODIUM CHLORIDE, PRESERVATIVE FREE 10 ML: 5 INJECTION INTRAVENOUS at 07:59

## 2024-06-20 RX ADMIN — OXYCODONE HYDROCHLORIDE 5 MG: 5 TABLET ORAL at 06:49

## 2024-06-20 RX ADMIN — OXYCODONE HYDROCHLORIDE 5 MG: 5 TABLET ORAL at 01:35

## 2024-06-20 RX ADMIN — GABAPENTIN 100 MG: 100 CAPSULE ORAL at 09:18

## 2024-06-20 RX ADMIN — KETOROLAC TROMETHAMINE 15 MG: 30 INJECTION, SOLUTION INTRAMUSCULAR at 07:59

## 2024-06-20 RX ADMIN — PANTOPRAZOLE SODIUM 40 MG: 40 TABLET, DELAYED RELEASE ORAL at 06:49

## 2024-06-20 RX ADMIN — TRAMADOL HYDROCHLORIDE 50 MG: 50 TABLET ORAL at 07:58

## 2024-06-20 RX ADMIN — INSULIN GLARGINE 30 UNITS: 100 INJECTION, SOLUTION SUBCUTANEOUS at 09:19

## 2024-06-20 RX ADMIN — ENOXAPARIN SODIUM 30 MG: 100 INJECTION SUBCUTANEOUS at 09:18

## 2024-06-20 ASSESSMENT — PAIN SCALES - GENERAL
PAINLEVEL_OUTOF10: 5
PAINLEVEL_OUTOF10: 9

## 2024-06-20 ASSESSMENT — PAIN DESCRIPTION - DESCRIPTORS
DESCRIPTORS: ACHING
DESCRIPTORS: DISCOMFORT

## 2024-06-20 ASSESSMENT — PAIN DESCRIPTION - ORIENTATION: ORIENTATION: LOWER

## 2024-06-20 ASSESSMENT — PAIN DESCRIPTION - LOCATION
LOCATION: BACK
LOCATION: BACK

## 2024-06-20 NOTE — PROGRESS NOTES
1130: I have reviewed discharge instructions with the patient. The patient verbalized understanding. Discharge medications reviewed with patient and appropriate educational materials and side effects teaching were provided. Follow-up appointments reviewed. Opportunity for questions and clarification was provided. Venous access removed without difficulty. Patient is ready for discharge. Transport set up for 12:30 pm.    1245: The patient was taken to discharge area by LOPEZ Carver.

## 2024-06-20 NOTE — PROGRESS NOTES
Hospital follow-up PCP transitional care appointment has been scheduled with Dr. Reece Monk on 6/24/24 1200. This is a previously scheduled appt. [facility] placed [facility] information AVS for patient resource. Pending patient discharge.  Destiny Belle, Care Management Assistant

## 2024-06-20 NOTE — PLAN OF CARE
Problem: Discharge Planning  Goal: Discharge to home or other facility with appropriate resources  Outcome: Progressing  Flowsheets (Taken 6/20/2024 0601)  Discharge to home or other facility with appropriate resources: Identify barriers to discharge with patient and caregiver     Problem: Pain  Goal: Verbalizes/displays adequate comfort level or baseline comfort level  Outcome: Progressing  Flowsheets (Taken 6/20/2024 0601)  Verbalizes/displays adequate comfort level or baseline comfort level:   Encourage patient to monitor pain and request assistance   Assess pain using appropriate pain scale   Implement non-pharmacological measures as appropriate and evaluate response     Problem: Safety - Adult  Goal: Free from fall injury  Outcome: Progressing  Flowsheets (Taken 6/20/2024 0601)  Free From Fall Injury: Instruct family/caregiver on patient safety

## 2024-06-20 NOTE — TELEPHONE ENCOUNTER
Care Transitions Initial Follow Up Call    Outreach made within 2 business days of discharge: Yes    Patient: Sachin Vyas Patient : 1965   MRN: [id number]  Reason for Admission: There are no discharge diagnoses documented for the most recent discharge.  Discharge Date: 24       Spoke with: patient     Discharge department/facility: [facility]        TCM Interactive Patient Contact:  Was patient able to fill all prescriptions: Yes  Was patient instructed to bring all medications to the follow-up visit: Yes  Is patient taking all medications as directed in the discharge summary? Yes  Does patient understand their discharge instructions: Yes:   Does patient have questions or concerns that need addressed prior to 7-14 day follow up office visit: no    Scheduled appointment with PCP within 7-14 days    Follow Up  Future Appointments   Date Time Provider Department Center   2024 12:00 PM [place] [facility] [place] [facility]       Chante Webb MA

## 2024-06-20 NOTE — PLAN OF CARE
Problem: Discharge Planning  Goal: Discharge to home or other facility with appropriate resources  6/20/2024 0954 by Jo, Yeong Ae Jenny, RN  Outcome: Adequate for Discharge  6/20/2024 0601 by Rocio Khan RN  Outcome: Progressing  Flowsheets (Taken 6/20/2024 0601)  Discharge to home or other facility with appropriate resources: Identify barriers to discharge with patient and caregiver     Problem: Pain  Goal: Verbalizes/displays adequate comfort level or baseline comfort level  6/20/2024 0954 by Jo, Yeong Ae Jenny, RN  Outcome: Adequate for Discharge  6/20/2024 0601 by Rocio Khan RN  Outcome: Progressing  Flowsheets (Taken 6/20/2024 0601)  Verbalizes/displays adequate comfort level or baseline comfort level:   Encourage patient to monitor pain and request assistance   Assess pain using appropriate pain scale   Implement non-pharmacological measures as appropriate and evaluate response     Problem: Chronic Conditions and Co-morbidities  Goal: Patient's chronic conditions and co-morbidity symptoms are monitored and maintained or improved  Outcome: Adequate for Discharge     Problem: Safety - Adult  Goal: Free from fall injury  6/20/2024 0954 by Jo, Yeong Ae Jenny, RN  Outcome: Adequate for Discharge  6/20/2024 0601 by Rocio Khan RN  Outcome: Progressing  Flowsheets (Taken 6/20/2024 0601)  Free From Fall Injury: Instruct family/caregiver on patient safety

## 2024-06-20 NOTE — PROGRESS NOTES
End of Shift Note    Bedside shift change report given to Trudi (oncoming nurse) by Rocio Khan RN (offgoing nurse).  Report included the following information SBAR    Shift worked:  7p-7a     Shift summary and any significant changes:     Patient experienced 9/10 pain throughout the night.      Concerns for physician to address:  Pain management addressed with NP     Zone phone for oncoming shift:          Activity:  Level of Assistance: Independent    Cardiac:   Cardiac Monitoring:  yes - Sinus tach    Access:  Current line(s): PIV     Genitourinary:        Respiratory:   O2 Device: None (Room air)    GI:  Current diet: ADULT DIET; Regular; 4 carb choices (60 gm/meal); Low Fat/Low Chol/High Fiber/LÓPEZ  DIET ONE TIME MESSAGE;    Pain Management:   Patient states pain is manageable on current regimen: NO    Skin:  Hesham Scale Score: 22  Interventions: Wound Offloading (Prevention Methods): Bed, pressure redistribution/air, Repositioning, Other (comment) (encourage ambulation)  Pressure injury: yes - Sacrum/ heels    Patient Safety:  Fall Score:    Fall Risk Interventions  Nursing Judgement-Fall Risk High(Add Comments): No  Toilet Every 2 Hours-In Advance of Need: No (Comment) (pt up at [facility])  Hourly Visual Checks: Awake, Quiet  Fall Visual Posted: Socks  Room Door Open: Yes  Alarm On: Other (Comment) (up at [facility])  Patient Moved Closer to Nursing Station: No    Active Consults:  IP CONSULT TO NEPHROLOGY    Length of Stay:  Expected LOS: 2  Actual LOS: 2      Rocio Khan, RN

## 2024-06-20 NOTE — CARE COORDINATION
Pt is clear from CM standpoint for d/c.    Medicaid transportation has been schedule for 1230 pm.    [place] [employer] number for the trip is [phone].    Trip number for the transportation is [id number].    Transition of Care Plan:    RUR: 10%  Prior Level of Functioning: Independent   Disposition: Home with sister   If SNF or IPR: Date FOC offered:   Date FOC received:   Accepting facility:   Date authorization started with reference number:   Date authorization received and expires:   Follow up appointments: PCP  DME needed: No DME needed   Transportation at discharge: Medicaid transportation   IM/IMM Medicare/ letter given: N/A-Medicaid   Is patient a [place] and connected with [facility]? No   If yes, was  transfer form completed and VA notified? No  Caregiver Contact: Pt's mother   Discharge Caregiver contacted prior to discharge? Pt was contacted   Care Conference needed? no  Barriers to discharge: no     06/20/24 0804   Services At/After Discharge   Transition of Care Consult (CM Consult) N/A   Services At/After Discharge None   [place] Resource Information Provided? No   Mode of Transport at Discharge Self   Confirm Follow Up Transport Self   Condition of Participation: Discharge Planning   The Plan for Transition of Care is related to the following treatment goals: Goal is for pt to go home with sister and follow up appointments   The Patient and/or Patient Representative was provided with a Choice of Provider? Patient   The Patient and/Or Patient Representative agree with the Discharge Plan? Yes   Freedom of Choice list was provided with basic dialogue that supports the patient's individualized plan of care/goals, treatment preferences, and shares the quality data associated with the providers?  Yes     Elyse Sher

## 2024-06-20 NOTE — DISCHARGE SUMMARY
Discharge Summary    Name: Sachin Vyas  [id number]  YOB: 1965 (Age: 59 y.o.)   Date of Admission: 6/18/2024  Date of Discharge: 6/20/2024  Attending Physician: Keke Kenney MD    Discharge Diagnosis:     CARLOS (resolved)    Metastatic prostate cancer    Cancer related pain    Mild left-sided hydronephrosis    Diabetes mellitus with hyperglycemia    Consultations:  IP CONSULT TO NEPHROLOGY      Brief Admission History/Reason for Admission Per Wes Doshi, DO:     Sachin Vyas is a 59 y.o.  male with PMHx as listed below presenting to the emergency department with complaints of acute on chronic abdominal and lower back pain in setting of known metastatic prostate cancer.  Patient reports he was recently taken off of all chemotherapeutic/hormonal suppressive medications at [facility].  Reports he has had poorly controlled pain and is taking oxycodone and ibuprofen multiple times per day reporting taking 800 mg of ibuprofen at a time.  Reports epigastric discomfort as well as acute on chronic musculoskeletal pain prompting presentation to emergency department.  Denies other recent medication changes.  ROS otherwise negative.  Denies tobacco, alcohol, illicit drugs.     In the ED, patient afebrile and hemodynamically stable (hypertensive 130s/90s), saturating mid 90s on room air.  CT abdomen pelvis demonstrates no acute pathology with mild left-sided hydronephrosis noted without obstructing stone identified with small nonobstructing left renal stone noted raising question of recently passed stone.  Labs demonstrate: WBC 7.7, hemoglobin 12.5, platelets 2 5, lipase 140, UA not reflexed to culture, sodium 131, potassium 5.1, glucose 425, BUN 44, creatinine 2.40 (previously 1.1 4/2024 at [facility]), LFTs grossly unremarkable.  Patient given 2 L normal saline bolus, Toradol, morphine by ED provider    Brief Hospital Course by Main Problems:     CARLOS

## 2024-06-24 ENCOUNTER — OFFICE VISIT (OUTPATIENT)
Age: 59
End: 2024-06-24
Payer: MEDICAID

## 2024-06-24 VITALS
HEART RATE: 105 BPM | DIASTOLIC BLOOD PRESSURE: 65 MMHG | OXYGEN SATURATION: 95 % | WEIGHT: 260 LBS | TEMPERATURE: 97.6 F | HEIGHT: 70 IN | BODY MASS INDEX: 37.22 KG/M2 | RESPIRATION RATE: 16 BRPM | SYSTOLIC BLOOD PRESSURE: 96 MMHG

## 2024-06-24 DIAGNOSIS — M54.50 CHRONIC BILATERAL LOW BACK PAIN WITHOUT SCIATICA: Primary | ICD-10-CM

## 2024-06-24 DIAGNOSIS — C61 PROSTATE CANCER METASTATIC TO BONE (HCC): ICD-10-CM

## 2024-06-24 DIAGNOSIS — C79.51 PROSTATE CANCER METASTATIC TO BONE (HCC): ICD-10-CM

## 2024-06-24 DIAGNOSIS — G89.29 CHRONIC BILATERAL LOW BACK PAIN WITHOUT SCIATICA: Primary | ICD-10-CM

## 2024-06-24 PROCEDURE — 3074F SYST BP LT 130 MM HG: CPT | Performed by: FAMILY MEDICINE

## 2024-06-24 PROCEDURE — 3078F DIAST BP <80 MM HG: CPT | Performed by: FAMILY MEDICINE

## 2024-06-24 PROCEDURE — 99213 OFFICE O/P EST LOW 20 MIN: CPT | Performed by: FAMILY MEDICINE

## 2024-06-24 RX ORDER — ASPIRIN 81 MG/1
81 TABLET ORAL DAILY
COMMUNITY
Start: 2024-05-31

## 2024-06-24 RX ORDER — LISINOPRIL 2.5 MG/1
2.5 TABLET ORAL DAILY
COMMUNITY
Start: 2024-05-31

## 2024-06-24 RX ORDER — OXYCODONE HYDROCHLORIDE 5 MG/1
5 TABLET ORAL EVERY 8 HOURS PRN
Qty: 90 TABLET | Refills: 0 | Status: SHIPPED | OUTPATIENT
Start: 2024-06-24 | End: 2024-07-24

## 2024-06-24 RX ORDER — ASPIRIN 81 MG/1
81 TABLET ORAL DAILY
Qty: 90 TABLET | Refills: 1 | Status: SHIPPED | OUTPATIENT
Start: 2024-06-24

## 2024-06-24 RX ORDER — GABAPENTIN 100 MG/1
100 CAPSULE ORAL 3 TIMES DAILY
Qty: 270 CAPSULE | Refills: 1 | Status: SHIPPED | OUTPATIENT
Start: 2024-06-24 | End: 2024-12-21

## 2024-06-24 RX ORDER — METFORMIN HYDROCHLORIDE 500 MG/1
500 TABLET, EXTENDED RELEASE ORAL 2 TIMES DAILY
Qty: 180 TABLET | Refills: 1 | Status: SHIPPED | OUTPATIENT
Start: 2024-06-24

## 2024-06-24 NOTE — PROGRESS NOTES
Chief Complaint   Patient presents with    Follow-Up from Hospital         1. \"Have you been to the ER, urgent care clinic since your last visit?  Hospitalized since your last visit?\" 6/18/24-6/20/24    2. \"Have you seen or consulted any other health care providers outside of the [facility] since your last visit?\" HEM/ONC - DR. KWASI JACKSON     3. For patients aged 45-75: Has the patient had a colonoscopy / FIT/ Cologuard? NO      If the patient is female:    4. For patients aged 40-74: Has the patient had a mammogram within the past 2 years? N/A      5. For patients aged 21-65: Has the patient had a pap smear? N/A      Health Maintenance Due   Topic Date Due    Hepatitis B vaccine (1 of 3 - 3-dose series) Never done    Pneumococcal 0-64 years Vaccine (1 of 2 - PCV) Never done    Diabetic foot exam  Never done    HIV screen  Never done    Diabetic Alb to Cr ratio (uACR) test  Never done    Diabetic retinal exam  Never done    DTaP/Tdap/Td vaccine (1 - Tdap) Never done    Shingles vaccine (1 of 2) Never done    Colorectal Cancer Screen  Never done    COVID-19 Vaccine (3 - Pfizer risk series) 04/10/2021    Lipids  08/11/2022    Prostate Specific Antigen (PSA) Screening or Monitoring  08/11/2022      
lumbar spine.   SLR negative bilaterally  Plantar and dorsiflexion of feet is normal able to stand and walk ok.                 An electronic signature was used to authenticate this note.    --Reece Monk MD

## 2024-06-25 ENCOUNTER — TELEPHONE (OUTPATIENT)
Age: 59
End: 2024-06-25

## 2024-06-25 NOTE — TELEPHONE ENCOUNTER
Patient states that he need to speak to a nurse regarding getting his medication increased  oxyCODONE (ROXICODONE) 5 MG immediate release tablet please give him a call back @ [phone]

## 2024-06-26 NOTE — TELEPHONE ENCOUNTER
Called and spoke with patient. He stated that he wants a stronger Oxycodone than 5mg.    Note from [facility] from 6/25/2024 has patient asking for an increase in pain meds as well    Will have to ask  about med increase

## 2024-08-06 RX ORDER — BUMETANIDE 2 MG/1
2 TABLET ORAL DAILY
Qty: 31 TABLET | Refills: 2 | Status: SHIPPED | OUTPATIENT
Start: 2024-08-06

## 2024-08-06 RX ORDER — ATORVASTATIN CALCIUM 40 MG/1
40 TABLET, FILM COATED ORAL DAILY
Qty: 31 TABLET | Refills: 2 | Status: SHIPPED | OUTPATIENT
Start: 2024-08-06

## 2024-10-03 ENCOUNTER — APPOINTMENT (OUTPATIENT)
Facility: HOSPITAL | Age: 59
End: 2024-10-03
Payer: MEDICAID

## 2024-10-03 ENCOUNTER — HOSPITAL ENCOUNTER (OUTPATIENT)
Facility: HOSPITAL | Age: 59
Setting detail: OBSERVATION
Discharge: HOME | End: 2024-10-07
Attending: EMERGENCY MEDICINE | Admitting: INTERNAL MEDICINE
Payer: MEDICAID

## 2024-10-03 DIAGNOSIS — C79.9 MULTIPLE LESIONS OF METASTATIC MALIGNANCY (HCC): ICD-10-CM

## 2024-10-03 DIAGNOSIS — R10.30 LOWER ABDOMINAL PAIN: Primary | ICD-10-CM

## 2024-10-03 DIAGNOSIS — R60.0 LEG EDEMA: ICD-10-CM

## 2024-10-03 DIAGNOSIS — R60.0 BILATERAL LEG EDEMA: ICD-10-CM

## 2024-10-03 DIAGNOSIS — K68.9: ICD-10-CM

## 2024-10-03 LAB
ALBUMIN SERPL-MCNC: 3.1 G/DL (ref 3.5–5)
ALBUMIN/GLOB SERPL: 0.7 (ref 1.1–2.2)
ALP SERPL-CCNC: 229 U/L (ref 45–117)
ALT SERPL-CCNC: 12 U/L (ref 12–78)
ANION GAP SERPL CALC-SCNC: 5 MMOL/L (ref 2–12)
APPEARANCE UR: CLEAR
AST SERPL-CCNC: 15 U/L (ref 15–37)
BACTERIA URNS QL MICRO: NEGATIVE /HPF
BASOPHILS # BLD: 0 K/UL (ref 0–0.1)
BASOPHILS NFR BLD: 0 % (ref 0–1)
BILIRUB SERPL-MCNC: 0.4 MG/DL (ref 0.2–1)
BILIRUB UR QL: NEGATIVE
BUN SERPL-MCNC: 14 MG/DL (ref 6–20)
BUN/CREAT SERPL: 14 (ref 12–20)
CALCIUM SERPL-MCNC: 8.6 MG/DL (ref 8.5–10.1)
CHLORIDE SERPL-SCNC: 104 MMOL/L (ref 97–108)
CO2 SERPL-SCNC: 28 MMOL/L (ref 21–32)
COLOR UR: ABNORMAL
COMMENT:: NORMAL
CREAT SERPL-MCNC: 0.99 MG/DL (ref 0.7–1.3)
DIFFERENTIAL METHOD BLD: ABNORMAL
EOSINOPHIL # BLD: 0.1 K/UL (ref 0–0.4)
EOSINOPHIL NFR BLD: 2 % (ref 0–7)
EPITH CASTS URNS QL MICRO: ABNORMAL /LPF
ERYTHROCYTE [DISTWIDTH] IN BLOOD BY AUTOMATED COUNT: 13.9 % (ref 11.5–14.5)
GLOBULIN SER CALC-MCNC: 4.4 G/DL (ref 2–4)
GLUCOSE BLD STRIP.AUTO-MCNC: 109 MG/DL (ref 65–117)
GLUCOSE BLD STRIP.AUTO-MCNC: 120 MG/DL (ref 65–117)
GLUCOSE SERPL-MCNC: 164 MG/DL (ref 65–100)
GLUCOSE UR STRIP.AUTO-MCNC: 500 MG/DL
HCT VFR BLD AUTO: 31.2 % (ref 36.6–50.3)
HGB BLD-MCNC: 9.6 G/DL (ref 12.1–17)
HGB UR QL STRIP: NEGATIVE
HYALINE CASTS URNS QL MICRO: ABNORMAL /LPF (ref 0–5)
IMM GRANULOCYTES # BLD AUTO: 0 K/UL (ref 0–0.04)
IMM GRANULOCYTES NFR BLD AUTO: 0 % (ref 0–0.5)
KETONES UR QL STRIP.AUTO: NEGATIVE MG/DL
LEUKOCYTE ESTERASE UR QL STRIP.AUTO: NEGATIVE
LIPASE SERPL-CCNC: 62 U/L (ref 13–75)
LYMPHOCYTES # BLD: 0.7 K/UL (ref 0.8–3.5)
LYMPHOCYTES NFR BLD: 14 % (ref 12–49)
MCH RBC QN AUTO: 26.4 PG (ref 26–34)
MCHC RBC AUTO-ENTMCNC: 30.8 G/DL (ref 30–36.5)
MCV RBC AUTO: 86 FL (ref 80–99)
MONOCYTES # BLD: 0.3 K/UL (ref 0–1)
MONOCYTES NFR BLD: 7 % (ref 5–13)
MUCOUS THREADS URNS QL MICRO: ABNORMAL /LPF
NEUTS SEG # BLD: 3.6 K/UL (ref 1.8–8)
NEUTS SEG NFR BLD: 77 % (ref 32–75)
NITRITE UR QL STRIP.AUTO: NEGATIVE
NRBC # BLD: 0.02 K/UL (ref 0–0.01)
NRBC BLD-RTO: 0.4 PER 100 WBC
PH UR STRIP: 5.5 (ref 5–8)
PLATELET # BLD AUTO: 225 K/UL (ref 150–400)
PMV BLD AUTO: 10.3 FL (ref 8.9–12.9)
POTASSIUM SERPL-SCNC: 3.6 MMOL/L (ref 3.5–5.1)
PROT SERPL-MCNC: 7.5 G/DL (ref 6.4–8.2)
PROT UR STRIP-MCNC: 100 MG/DL
RBC # BLD AUTO: 3.63 M/UL (ref 4.1–5.7)
RBC #/AREA URNS HPF: ABNORMAL /HPF (ref 0–5)
RBC MORPH BLD: ABNORMAL
SERVICE CMNT-IMP: ABNORMAL
SERVICE CMNT-IMP: NORMAL
SODIUM SERPL-SCNC: 137 MMOL/L (ref 136–145)
SP GR UR REFRACTOMETRY: 1.02
SPECIMEN HOLD: NORMAL
URINE CULTURE IF INDICATED: ABNORMAL
UROBILINOGEN UR QL STRIP.AUTO: 0.2 EU/DL (ref 0.2–1)
WBC # BLD AUTO: 4.7 K/UL (ref 4.1–11.1)
WBC URNS QL MICRO: ABNORMAL /HPF (ref 0–4)

## 2024-10-03 PROCEDURE — 96374 THER/PROPH/DIAG INJ IV PUSH: CPT

## 2024-10-03 PROCEDURE — 6360000002 HC RX W HCPCS: Performed by: EMERGENCY MEDICINE

## 2024-10-03 PROCEDURE — 74177 CT ABD & PELVIS W/CONTRAST: CPT

## 2024-10-03 PROCEDURE — 80053 COMPREHEN METABOLIC PANEL: CPT

## 2024-10-03 PROCEDURE — 51798 US URINE CAPACITY MEASURE: CPT

## 2024-10-03 PROCEDURE — 6370000000 HC RX 637 (ALT 250 FOR IP): Performed by: INTERNAL MEDICINE

## 2024-10-03 PROCEDURE — 6360000004 HC RX CONTRAST MEDICATION: Performed by: RADIOLOGY

## 2024-10-03 PROCEDURE — 85025 COMPLETE CBC W/AUTO DIFF WBC: CPT

## 2024-10-03 PROCEDURE — G0378 HOSPITAL OBSERVATION PER HR: HCPCS

## 2024-10-03 PROCEDURE — 74176 CT ABD & PELVIS W/O CONTRAST: CPT

## 2024-10-03 PROCEDURE — 83690 ASSAY OF LIPASE: CPT

## 2024-10-03 PROCEDURE — 36415 COLL VENOUS BLD VENIPUNCTURE: CPT

## 2024-10-03 PROCEDURE — 81001 URINALYSIS AUTO W/SCOPE: CPT

## 2024-10-03 PROCEDURE — 6360000002 HC RX W HCPCS: Performed by: INTERNAL MEDICINE

## 2024-10-03 PROCEDURE — 96375 TX/PRO/DX INJ NEW DRUG ADDON: CPT

## 2024-10-03 PROCEDURE — 6370000000 HC RX 637 (ALT 250 FOR IP): Performed by: EMERGENCY MEDICINE

## 2024-10-03 PROCEDURE — 2580000003 HC RX 258: Performed by: INTERNAL MEDICINE

## 2024-10-03 PROCEDURE — 99285 EMERGENCY DEPT VISIT HI MDM: CPT

## 2024-10-03 PROCEDURE — 96372 THER/PROPH/DIAG INJ SC/IM: CPT

## 2024-10-03 PROCEDURE — 82962 GLUCOSE BLOOD TEST: CPT

## 2024-10-03 RX ORDER — ACETAMINOPHEN 650 MG/1
650 SUPPOSITORY RECTAL EVERY 6 HOURS PRN
Status: DISCONTINUED | OUTPATIENT
Start: 2024-10-03 | End: 2024-10-07 | Stop reason: HOSPADM

## 2024-10-03 RX ORDER — INSULIN LISPRO 100 [IU]/ML
0-4 INJECTION, SOLUTION INTRAVENOUS; SUBCUTANEOUS NIGHTLY
Status: DISCONTINUED | OUTPATIENT
Start: 2024-10-03 | End: 2024-10-07 | Stop reason: HOSPADM

## 2024-10-03 RX ORDER — LISINOPRIL 5 MG/1
2.5 TABLET ORAL DAILY
Status: DISCONTINUED | OUTPATIENT
Start: 2024-10-03 | End: 2024-10-07 | Stop reason: HOSPADM

## 2024-10-03 RX ORDER — ASPIRIN 81 MG/1
81 TABLET ORAL DAILY
Status: DISCONTINUED | OUTPATIENT
Start: 2024-10-03 | End: 2024-10-07 | Stop reason: HOSPADM

## 2024-10-03 RX ORDER — FERROUS SULFATE 325(65) MG
325 TABLET ORAL
Status: DISCONTINUED | OUTPATIENT
Start: 2024-10-04 | End: 2024-10-07 | Stop reason: HOSPADM

## 2024-10-03 RX ORDER — MORPHINE SULFATE 2 MG/ML
2 INJECTION, SOLUTION INTRAMUSCULAR; INTRAVENOUS EVERY 4 HOURS PRN
Status: DISCONTINUED | OUTPATIENT
Start: 2024-10-03 | End: 2024-10-07 | Stop reason: HOSPADM

## 2024-10-03 RX ORDER — ACETAMINOPHEN 325 MG/1
650 TABLET ORAL EVERY 6 HOURS PRN
Status: DISCONTINUED | OUTPATIENT
Start: 2024-10-03 | End: 2024-10-07 | Stop reason: HOSPADM

## 2024-10-03 RX ORDER — ENOXAPARIN SODIUM 100 MG/ML
30 INJECTION SUBCUTANEOUS 2 TIMES DAILY
Status: DISCONTINUED | OUTPATIENT
Start: 2024-10-03 | End: 2024-10-07 | Stop reason: HOSPADM

## 2024-10-03 RX ORDER — ONDANSETRON 2 MG/ML
4 INJECTION INTRAMUSCULAR; INTRAVENOUS EVERY 6 HOURS PRN
Status: DISCONTINUED | OUTPATIENT
Start: 2024-10-03 | End: 2024-10-07 | Stop reason: HOSPADM

## 2024-10-03 RX ORDER — SODIUM CHLORIDE 9 MG/ML
INJECTION, SOLUTION INTRAVENOUS PRN
Status: DISCONTINUED | OUTPATIENT
Start: 2024-10-03 | End: 2024-10-07 | Stop reason: HOSPADM

## 2024-10-03 RX ORDER — DICYCLOMINE HCL 20 MG
20 TABLET ORAL
Status: COMPLETED | OUTPATIENT
Start: 2024-10-03 | End: 2024-10-03

## 2024-10-03 RX ORDER — MORPHINE SULFATE 4 MG/ML
4 INJECTION, SOLUTION INTRAMUSCULAR; INTRAVENOUS
Status: COMPLETED | OUTPATIENT
Start: 2024-10-03 | End: 2024-10-03

## 2024-10-03 RX ORDER — OXYCODONE HYDROCHLORIDE 5 MG/1
5 TABLET ORAL EVERY 4 HOURS PRN
Status: DISCONTINUED | OUTPATIENT
Start: 2024-10-03 | End: 2024-10-07 | Stop reason: HOSPADM

## 2024-10-03 RX ORDER — ATORVASTATIN CALCIUM 40 MG/1
40 TABLET, FILM COATED ORAL DAILY
Status: DISCONTINUED | OUTPATIENT
Start: 2024-10-03 | End: 2024-10-07 | Stop reason: HOSPADM

## 2024-10-03 RX ORDER — INSULIN LISPRO 100 [IU]/ML
0-8 INJECTION, SOLUTION INTRAVENOUS; SUBCUTANEOUS
Status: DISCONTINUED | OUTPATIENT
Start: 2024-10-03 | End: 2024-10-07 | Stop reason: HOSPADM

## 2024-10-03 RX ORDER — BUMETANIDE 1 MG/1
2 TABLET ORAL DAILY
Status: DISCONTINUED | OUTPATIENT
Start: 2024-10-03 | End: 2024-10-07 | Stop reason: HOSPADM

## 2024-10-03 RX ORDER — SODIUM CHLORIDE 0.9 % (FLUSH) 0.9 %
5-40 SYRINGE (ML) INJECTION PRN
Status: DISCONTINUED | OUTPATIENT
Start: 2024-10-03 | End: 2024-10-07 | Stop reason: HOSPADM

## 2024-10-03 RX ORDER — IOPAMIDOL 755 MG/ML
100 INJECTION, SOLUTION INTRAVASCULAR
Status: DISCONTINUED | OUTPATIENT
Start: 2024-10-03 | End: 2024-10-07 | Stop reason: HOSPADM

## 2024-10-03 RX ORDER — POLYETHYLENE GLYCOL 3350 17 G/17G
17 POWDER, FOR SOLUTION ORAL DAILY PRN
Status: DISCONTINUED | OUTPATIENT
Start: 2024-10-03 | End: 2024-10-07 | Stop reason: HOSPADM

## 2024-10-03 RX ORDER — GABAPENTIN 100 MG/1
100 CAPSULE ORAL 3 TIMES DAILY
Status: DISCONTINUED | OUTPATIENT
Start: 2024-10-03 | End: 2024-10-07 | Stop reason: HOSPADM

## 2024-10-03 RX ORDER — ONDANSETRON 4 MG/1
4 TABLET, ORALLY DISINTEGRATING ORAL EVERY 8 HOURS PRN
Status: DISCONTINUED | OUTPATIENT
Start: 2024-10-03 | End: 2024-10-07 | Stop reason: HOSPADM

## 2024-10-03 RX ORDER — METHOCARBAMOL 500 MG/1
500 TABLET, FILM COATED ORAL 4 TIMES DAILY
Status: DISCONTINUED | OUTPATIENT
Start: 2024-10-03 | End: 2024-10-07 | Stop reason: HOSPADM

## 2024-10-03 RX ORDER — IOPAMIDOL 755 MG/ML
100 INJECTION, SOLUTION INTRAVASCULAR
Status: COMPLETED | OUTPATIENT
Start: 2024-10-03 | End: 2024-10-03

## 2024-10-03 RX ORDER — INSULIN GLARGINE 100 [IU]/ML
30 INJECTION, SOLUTION SUBCUTANEOUS NIGHTLY
Status: DISCONTINUED | OUTPATIENT
Start: 2024-10-03 | End: 2024-10-07 | Stop reason: HOSPADM

## 2024-10-03 RX ORDER — SODIUM CHLORIDE 0.9 % (FLUSH) 0.9 %
5-40 SYRINGE (ML) INJECTION EVERY 12 HOURS SCHEDULED
Status: DISCONTINUED | OUTPATIENT
Start: 2024-10-03 | End: 2024-10-07 | Stop reason: HOSPADM

## 2024-10-03 RX ORDER — HYDROMORPHONE HYDROCHLORIDE 1 MG/ML
0.5 INJECTION, SOLUTION INTRAMUSCULAR; INTRAVENOUS; SUBCUTANEOUS
Status: COMPLETED | OUTPATIENT
Start: 2024-10-03 | End: 2024-10-03

## 2024-10-03 RX ORDER — LANOLIN ALCOHOL/MO/W.PET/CERES
400 CREAM (GRAM) TOPICAL NIGHTLY
Status: DISCONTINUED | OUTPATIENT
Start: 2024-10-03 | End: 2024-10-07 | Stop reason: HOSPADM

## 2024-10-03 RX ADMIN — ENOXAPARIN SODIUM 30 MG: 100 INJECTION SUBCUTANEOUS at 21:34

## 2024-10-03 RX ADMIN — ATORVASTATIN CALCIUM 40 MG: 40 TABLET, FILM COATED ORAL at 17:40

## 2024-10-03 RX ADMIN — IOPAMIDOL 100 ML: 755 INJECTION, SOLUTION INTRAVENOUS at 18:00

## 2024-10-03 RX ADMIN — SODIUM CHLORIDE, PRESERVATIVE FREE 10 ML: 5 INJECTION INTRAVENOUS at 21:31

## 2024-10-03 RX ADMIN — MORPHINE SULFATE 4 MG: 4 INJECTION, SOLUTION INTRAMUSCULAR; INTRAVENOUS at 12:53

## 2024-10-03 RX ADMIN — LISINOPRIL 2.5 MG: 5 TABLET ORAL at 17:39

## 2024-10-03 RX ADMIN — METHOCARBAMOL TABLETS 500 MG: 500 TABLET, COATED ORAL at 21:34

## 2024-10-03 RX ADMIN — OXYCODONE 5 MG: 5 TABLET ORAL at 21:35

## 2024-10-03 RX ADMIN — GABAPENTIN 100 MG: 100 CAPSULE ORAL at 21:34

## 2024-10-03 RX ADMIN — METHOCARBAMOL TABLETS 500 MG: 500 TABLET, COATED ORAL at 17:39

## 2024-10-03 RX ADMIN — BUMETANIDE 2 MG: 1 TABLET ORAL at 17:40

## 2024-10-03 RX ADMIN — Medication 400 MG: at 21:34

## 2024-10-03 RX ADMIN — GABAPENTIN 100 MG: 100 CAPSULE ORAL at 17:38

## 2024-10-03 RX ADMIN — INSULIN GLARGINE 30 UNITS: 100 INJECTION, SOLUTION SUBCUTANEOUS at 21:33

## 2024-10-03 RX ADMIN — ASPIRIN 81 MG: 81 TABLET, COATED ORAL at 17:39

## 2024-10-03 RX ADMIN — HYDROMORPHONE HYDROCHLORIDE 0.5 MG: 1 INJECTION, SOLUTION INTRAMUSCULAR; INTRAVENOUS; SUBCUTANEOUS at 16:29

## 2024-10-03 RX ADMIN — DICYCLOMINE HYDROCHLORIDE 20 MG: 20 TABLET ORAL at 16:28

## 2024-10-03 ASSESSMENT — LIFESTYLE VARIABLES
HOW MANY STANDARD DRINKS CONTAINING ALCOHOL DO YOU HAVE ON A TYPICAL DAY: PATIENT DOES NOT DRINK
HOW OFTEN DO YOU HAVE A DRINK CONTAINING ALCOHOL: NEVER

## 2024-10-03 ASSESSMENT — PAIN DESCRIPTION - LOCATION
LOCATION: ABDOMEN;BACK
LOCATION: ABDOMEN;BACK
LOCATION: ABDOMEN

## 2024-10-03 ASSESSMENT — PAIN - FUNCTIONAL ASSESSMENT: PAIN_FUNCTIONAL_ASSESSMENT: 0-10

## 2024-10-03 ASSESSMENT — PAIN DESCRIPTION - ONSET: ONSET: ON-GOING

## 2024-10-03 ASSESSMENT — PAIN SCALES - GENERAL
PAINLEVEL_OUTOF10: 5
PAINLEVEL_OUTOF10: 9
PAINLEVEL_OUTOF10: 10
PAINLEVEL_OUTOF10: 9

## 2024-10-03 ASSESSMENT — PAIN DESCRIPTION - DESCRIPTORS
DESCRIPTORS: ACHING;CRAMPING
DESCRIPTORS: ACHING
DESCRIPTORS: ACHING

## 2024-10-03 ASSESSMENT — PAIN DESCRIPTION - FREQUENCY: FREQUENCY: CONTINUOUS

## 2024-10-03 ASSESSMENT — PAIN DESCRIPTION - ORIENTATION: ORIENTATION: DISTAL

## 2024-10-03 ASSESSMENT — PAIN DESCRIPTION - DIRECTION: RADIATING_TOWARDS: BACK

## 2024-10-03 NOTE — ED PROVIDER NOTES
[facility] MEDICAL ONCOLOGY  EMERGENCY DEPARTMENT ENCOUNTER       Pt Name: Sachin Vyas  MRN: [id number]  Birthdate 1965  Date of evaluation: 10/3/2024  Provider: Mario Newell MD   PCP: Reece Monk MD  Note Started: 12:12 PM EDT 10/3/24     CHIEF COMPLAINT       Chief Complaint   Patient presents with    Abdominal Pain     Pt BIBEMS from home c/o abd cramping across LLQ and RLQ that started last night. Last BM this morning pt states was normal. hx DM, prostate cancer        HISTORY OF PRESENT ILLNESS: 1 or more elements      History From: patient, History limited by: none     Sachin Vyas is a 59 y.o. male with a history of hypertension, diabetes, chronic venous insufficiency, asthma and prostate cancer presents emerged part with a chief complaint of abdominal pain.    Patient reports developed left lower quadrant right lower quadrant abdominal pain yesterday has become constant.  Denies any urinary symptoms, hesitancy, dysuria or hematuria.  Denies any nausea, vomiting or diarrhea.  No issues with bowel movements.    Patient is \"requesting something for his stomach pain\" upon my assessment.       Please See MDM for Additional Details of the HPI/PMH  Nursing Notes were all reviewed and agreed with or any disagreements were addressed in the HPI.     REVIEW OF SYSTEMS        Positives and Pertinent negatives as per HPI.    PAST HISTORY     Past Medical History:  Past Medical History:   Diagnosis Date    Asthma     Chronic venous insufficiency     Diabetes (HCC)     Lower leg edema     Prostate cancer (HCC)     gets chemo at [facility]       Past Surgical History:  No past surgical history on file.    Family History:  Family History   Problem Relation Age of Onset    No Known Problems Sister     No Known Problems Brother     Hypertension Sister     Hypertension Sister     Asthma Sister     Hypertension Father     Hypertension Sister     Heart Attack Father     Stroke Mother         brain bleed

## 2024-10-03 NOTE — ED NOTES
Pt refusing IV attempt unless done by ultrasound machine. Ultrasound tech notified. Awaiting USIV placement at this time.

## 2024-10-03 NOTE — H&P
pole renal calculus. No hydronephrosis. STOMACH: Unremarkable. SMALL BOWEL: No dilatation or wall thickening. COLON: No dilatation or wall thickening. Diverticulosis. APPENDIX: Normal. PERITONEUM: No ascites or pneumoperitoneum. RETROPERITONEUM: Stable extensive partially calcified adenopathy measuring up to 19 mm in the left pararenal station. Similar left retroperitoneal fat stranding. Atherosclerosis without aortic aneurysm. REPRODUCTIVE ORGANS: Prostate not enlarged. URINARY BLADDER: No mass or calculus. BONES: Scattered sclerotic osseous metastases again noted, worsened from prior (e.g., new T10 lesion, enlarging T8 lesion, enlarging right sacral ala lesion, new medial left iliac bone lesion). L1 chronic compression fracture and kyphoplasty. Age-indeterminate L1 left transverse process fracture. ABDOMINAL WALL: No mass or hernia. ADDITIONAL COMMENTS: N/A     1.  Grossly unchanged appearance of partially calcified retroperitoneal adenopathy and left retroperitoneal fat stranding. Findings could be on the basis of posttreatment change, healed granulomatous disease, and/or inflammatory process such as pancreatitis or acute diverticulitis. Correlate with patient's symptoms and laboratory parameters. 2.  Nonobstructing left nephrolithiasis. 3.  Worsening osseous metastatic disease. Age-indeterminate L1 transverse process fracture. Chronic L1 compression fracture and kyphoplasty. Electronically signed by PRAVEEN BENAVIDEZ     _______________________________________________________________________    TOTAL TIME:  76 Minutes    Critical Care Provided     Minutes non procedure based    Signed: José Luis Carrasco MD    Procedures: see electronic medical records for all procedures/Xrays and details which were not copied into this note but were reviewed prior to creation of Plan.

## 2024-10-04 ENCOUNTER — APPOINTMENT (OUTPATIENT)
Facility: HOSPITAL | Age: 59
End: 2024-10-04
Payer: MEDICAID

## 2024-10-04 LAB
ANION GAP SERPL CALC-SCNC: 5 MMOL/L (ref 2–12)
BASOPHILS # BLD: 0 K/UL (ref 0–0.1)
BASOPHILS NFR BLD: 0 % (ref 0–1)
BUN SERPL-MCNC: 11 MG/DL (ref 6–20)
BUN/CREAT SERPL: 12 (ref 12–20)
CALCIUM SERPL-MCNC: 8.3 MG/DL (ref 8.5–10.1)
CHLORIDE SERPL-SCNC: 103 MMOL/L (ref 97–108)
CO2 SERPL-SCNC: 29 MMOL/L (ref 21–32)
CREAT SERPL-MCNC: 0.95 MG/DL (ref 0.7–1.3)
DIFFERENTIAL METHOD BLD: ABNORMAL
EOSINOPHIL # BLD: 0.1 K/UL (ref 0–0.4)
EOSINOPHIL NFR BLD: 3 % (ref 0–7)
ERYTHROCYTE [DISTWIDTH] IN BLOOD BY AUTOMATED COUNT: 14 % (ref 11.5–14.5)
FERRITIN SERPL-MCNC: 124 NG/ML (ref 26–388)
FLUAV RNA SPEC QL NAA+PROBE: NOT DETECTED
FLUBV RNA SPEC QL NAA+PROBE: NOT DETECTED
FOLATE SERPL-MCNC: 12.5 NG/ML (ref 5–21)
GLUCOSE BLD STRIP.AUTO-MCNC: 105 MG/DL (ref 65–117)
GLUCOSE BLD STRIP.AUTO-MCNC: 111 MG/DL (ref 65–117)
GLUCOSE BLD STRIP.AUTO-MCNC: 119 MG/DL (ref 65–117)
GLUCOSE BLD STRIP.AUTO-MCNC: 147 MG/DL (ref 65–117)
GLUCOSE SERPL-MCNC: 97 MG/DL (ref 65–100)
HCT VFR BLD AUTO: 32 % (ref 36.6–50.3)
HEMOCCULT STL QL: POSITIVE
HGB BLD-MCNC: 9.7 G/DL (ref 12.1–17)
IMM GRANULOCYTES # BLD AUTO: 0 K/UL (ref 0–0.04)
IMM GRANULOCYTES NFR BLD AUTO: 0 % (ref 0–0.5)
LDH SERPL L TO P-CCNC: 275 U/L (ref 85–241)
LYMPHOCYTES # BLD: 0.9 K/UL (ref 0.8–3.5)
LYMPHOCYTES NFR BLD: 20 % (ref 12–49)
MAGNESIUM SERPL-MCNC: 1.9 MG/DL (ref 1.6–2.4)
MCH RBC QN AUTO: 26.6 PG (ref 26–34)
MCHC RBC AUTO-ENTMCNC: 30.3 G/DL (ref 30–36.5)
MCV RBC AUTO: 87.7 FL (ref 80–99)
MONOCYTES # BLD: 0.4 K/UL (ref 0–1)
MONOCYTES NFR BLD: 9 % (ref 5–13)
NEUTS SEG # BLD: 3 K/UL (ref 1.8–8)
NEUTS SEG NFR BLD: 68 % (ref 32–75)
NRBC # BLD: 0 K/UL (ref 0–0.01)
NRBC BLD-RTO: 0 PER 100 WBC
PLATELET # BLD AUTO: 221 K/UL (ref 150–400)
PMV BLD AUTO: 10.4 FL (ref 8.9–12.9)
POTASSIUM SERPL-SCNC: 3.4 MMOL/L (ref 3.5–5.1)
RBC # BLD AUTO: 3.65 M/UL (ref 4.1–5.7)
RETICS # AUTO: 0.04 M/UL (ref 0.03–0.1)
RETICS/RBC NFR AUTO: 1.2 % (ref 0.7–2.1)
SARS-COV-2 RNA RESP QL NAA+PROBE: NOT DETECTED
SERVICE CMNT-IMP: ABNORMAL
SERVICE CMNT-IMP: ABNORMAL
SERVICE CMNT-IMP: NORMAL
SERVICE CMNT-IMP: NORMAL
SODIUM SERPL-SCNC: 137 MMOL/L (ref 136–145)
SOURCE: NORMAL
T4 FREE SERPL-MCNC: 1.1 NG/DL (ref 0.8–1.5)
TSH SERPL DL<=0.05 MIU/L-ACNC: 0.95 UIU/ML (ref 0.36–3.74)
VIT B12 SERPL-MCNC: 458 PG/ML (ref 193–986)
WBC # BLD AUTO: 4.5 K/UL (ref 4.1–11.1)

## 2024-10-04 PROCEDURE — 96372 THER/PROPH/DIAG INJ SC/IM: CPT

## 2024-10-04 PROCEDURE — 85025 COMPLETE CBC W/AUTO DIFF WBC: CPT

## 2024-10-04 PROCEDURE — 6370000000 HC RX 637 (ALT 250 FOR IP): Performed by: NURSE PRACTITIONER

## 2024-10-04 PROCEDURE — 87040 BLOOD CULTURE FOR BACTERIA: CPT

## 2024-10-04 PROCEDURE — 82746 ASSAY OF FOLIC ACID SERUM: CPT

## 2024-10-04 PROCEDURE — 6360000002 HC RX W HCPCS: Performed by: INTERNAL MEDICINE

## 2024-10-04 PROCEDURE — 84443 ASSAY THYROID STIM HORMONE: CPT

## 2024-10-04 PROCEDURE — 6360000002 HC RX W HCPCS: Performed by: NURSE PRACTITIONER

## 2024-10-04 PROCEDURE — 80048 BASIC METABOLIC PNL TOTAL CA: CPT

## 2024-10-04 PROCEDURE — 83010 ASSAY OF HAPTOGLOBIN QUANT: CPT

## 2024-10-04 PROCEDURE — 82962 GLUCOSE BLOOD TEST: CPT

## 2024-10-04 PROCEDURE — 82607 VITAMIN B-12: CPT

## 2024-10-04 PROCEDURE — 85045 AUTOMATED RETICULOCYTE COUNT: CPT

## 2024-10-04 PROCEDURE — 82272 OCCULT BLD FECES 1-3 TESTS: CPT

## 2024-10-04 PROCEDURE — 93970 EXTREMITY STUDY: CPT

## 2024-10-04 PROCEDURE — G0378 HOSPITAL OBSERVATION PER HR: HCPCS

## 2024-10-04 PROCEDURE — 83615 LACTATE (LD) (LDH) ENZYME: CPT

## 2024-10-04 PROCEDURE — 87070 CULTURE OTHR SPECIMN AEROBIC: CPT

## 2024-10-04 PROCEDURE — 83735 ASSAY OF MAGNESIUM: CPT

## 2024-10-04 PROCEDURE — 71045 X-RAY EXAM CHEST 1 VIEW: CPT

## 2024-10-04 PROCEDURE — 82728 ASSAY OF FERRITIN: CPT

## 2024-10-04 PROCEDURE — 96365 THER/PROPH/DIAG IV INF INIT: CPT

## 2024-10-04 PROCEDURE — 2580000003 HC RX 258: Performed by: INTERNAL MEDICINE

## 2024-10-04 PROCEDURE — 87205 SMEAR GRAM STAIN: CPT

## 2024-10-04 PROCEDURE — 84439 ASSAY OF FREE THYROXINE: CPT

## 2024-10-04 PROCEDURE — 6370000000 HC RX 637 (ALT 250 FOR IP): Performed by: INTERNAL MEDICINE

## 2024-10-04 PROCEDURE — 2580000003 HC RX 258: Performed by: NURSE PRACTITIONER

## 2024-10-04 PROCEDURE — 87636 SARSCOV2 & INF A&B AMP PRB: CPT

## 2024-10-04 PROCEDURE — 36415 COLL VENOUS BLD VENIPUNCTURE: CPT

## 2024-10-04 RX ORDER — ALBUTEROL SULFATE 90 UG/1
2 INHALANT RESPIRATORY (INHALATION) EVERY 4 HOURS PRN
Status: DISCONTINUED | OUTPATIENT
Start: 2024-10-04 | End: 2024-10-04

## 2024-10-04 RX ORDER — IPRATROPIUM BROMIDE AND ALBUTEROL SULFATE 2.5; .5 MG/3ML; MG/3ML
1 SOLUTION RESPIRATORY (INHALATION) EVERY 4 HOURS PRN
Status: DISCONTINUED | OUTPATIENT
Start: 2024-10-04 | End: 2024-10-07 | Stop reason: HOSPADM

## 2024-10-04 RX ORDER — POTASSIUM CHLORIDE 1500 MG/1
40 TABLET, EXTENDED RELEASE ORAL ONCE
Status: COMPLETED | OUTPATIENT
Start: 2024-10-04 | End: 2024-10-04

## 2024-10-04 RX ORDER — BENZONATATE 100 MG/1
100 CAPSULE ORAL 3 TIMES DAILY PRN
Status: DISCONTINUED | OUTPATIENT
Start: 2024-10-04 | End: 2024-10-07 | Stop reason: HOSPADM

## 2024-10-04 RX ADMIN — INSULIN GLARGINE 30 UNITS: 100 INJECTION, SOLUTION SUBCUTANEOUS at 21:47

## 2024-10-04 RX ADMIN — METHOCARBAMOL TABLETS 500 MG: 500 TABLET, COATED ORAL at 16:50

## 2024-10-04 RX ADMIN — OXYCODONE 5 MG: 5 TABLET ORAL at 21:50

## 2024-10-04 RX ADMIN — ENOXAPARIN SODIUM 30 MG: 100 INJECTION SUBCUTANEOUS at 21:46

## 2024-10-04 RX ADMIN — GABAPENTIN 100 MG: 100 CAPSULE ORAL at 08:18

## 2024-10-04 RX ADMIN — GABAPENTIN 100 MG: 100 CAPSULE ORAL at 13:40

## 2024-10-04 RX ADMIN — Medication 400 MG: at 21:48

## 2024-10-04 RX ADMIN — METHOCARBAMOL TABLETS 500 MG: 500 TABLET, COATED ORAL at 13:44

## 2024-10-04 RX ADMIN — ENOXAPARIN SODIUM 30 MG: 100 INJECTION SUBCUTANEOUS at 08:16

## 2024-10-04 RX ADMIN — ATORVASTATIN CALCIUM 40 MG: 40 TABLET, FILM COATED ORAL at 08:19

## 2024-10-04 RX ADMIN — SODIUM CHLORIDE 1000 MG: 900 INJECTION INTRAVENOUS at 16:50

## 2024-10-04 RX ADMIN — GABAPENTIN 100 MG: 100 CAPSULE ORAL at 21:47

## 2024-10-04 RX ADMIN — SODIUM CHLORIDE, PRESERVATIVE FREE 10 ML: 5 INJECTION INTRAVENOUS at 08:16

## 2024-10-04 RX ADMIN — ASPIRIN 81 MG: 81 TABLET, COATED ORAL at 08:19

## 2024-10-04 RX ADMIN — FERROUS SULFATE TAB 325 MG (65 MG ELEMENTAL FE) 325 MG: 325 (65 FE) TAB at 08:19

## 2024-10-04 RX ADMIN — BENZONATATE 100 MG: 100 CAPSULE ORAL at 22:49

## 2024-10-04 RX ADMIN — METHOCARBAMOL TABLETS 500 MG: 500 TABLET, COATED ORAL at 08:19

## 2024-10-04 RX ADMIN — METHOCARBAMOL TABLETS 500 MG: 500 TABLET, COATED ORAL at 21:49

## 2024-10-04 RX ADMIN — LISINOPRIL 2.5 MG: 5 TABLET ORAL at 08:19

## 2024-10-04 RX ADMIN — BUMETANIDE 2 MG: 1 TABLET ORAL at 08:18

## 2024-10-04 RX ADMIN — POLYETHYLENE GLYCOL 3350 17 G: 17 POWDER, FOR SOLUTION ORAL at 16:53

## 2024-10-04 RX ADMIN — POTASSIUM CHLORIDE 40 MEQ: 1500 TABLET, EXTENDED RELEASE ORAL at 08:19

## 2024-10-04 RX ADMIN — SODIUM CHLORIDE, PRESERVATIVE FREE 10 ML: 5 INJECTION INTRAVENOUS at 21:49

## 2024-10-04 ASSESSMENT — PAIN DESCRIPTION - ORIENTATION
ORIENTATION: DISTAL;LOWER
ORIENTATION: DISTAL;LOWER
ORIENTATION: POSTERIOR
ORIENTATION: POSTERIOR
ORIENTATION: RIGHT;LEFT;LOWER
ORIENTATION: POSTERIOR
ORIENTATION: RIGHT;LEFT;LOWER

## 2024-10-04 ASSESSMENT — PAIN - FUNCTIONAL ASSESSMENT
PAIN_FUNCTIONAL_ASSESSMENT: PREVENTS OR INTERFERES SOME ACTIVE ACTIVITIES AND ADLS

## 2024-10-04 ASSESSMENT — PAIN DESCRIPTION - DESCRIPTORS
DESCRIPTORS: ACHING

## 2024-10-04 ASSESSMENT — PAIN DESCRIPTION - LOCATION
LOCATION: ABDOMEN;BACK;LEG
LOCATION: ABDOMEN;BACK;LEG
LOCATION: ABDOMEN;BACK
LOCATION: ABDOMEN;BACK
LOCATION: BACK
LOCATION: ABDOMEN;BACK
LOCATION: BACK

## 2024-10-04 ASSESSMENT — PAIN SCALES - GENERAL
PAINLEVEL_OUTOF10: 8
PAINLEVEL_OUTOF10: 7
PAINLEVEL_OUTOF10: 8

## 2024-10-04 ASSESSMENT — PAIN DESCRIPTION - ONSET: ONSET: ON-GOING

## 2024-10-04 ASSESSMENT — PAIN DESCRIPTION - PAIN TYPE
TYPE: CHRONIC PAIN

## 2024-10-04 ASSESSMENT — PAIN DESCRIPTION - FREQUENCY
FREQUENCY: CONTINUOUS
FREQUENCY: CONTINUOUS

## 2024-10-04 ASSESSMENT — PAIN DESCRIPTION - DIRECTION
RADIATING_TOWARDS: BACK
RADIATING_TOWARDS: BACK

## 2024-10-04 NOTE — CONSULTS
Well nourished. Well developed.   Head Normocephalic; no scars   Eyes Conjunctivae and sclerae are clear and without icterus. Pupils are round   ENMT Sinuses are nontender.  No oral exudates, ulcers, masses, thrush or mucositis. Oropharynx clear.  Tongue normal.   Neck Supple without masses or thyromegaly. No jugular venous distension.   Hematologic/Lymphatic No petechiae or purpura.  No tender or palpable lymph nodes appreciated   Respiratory Lungs are clear to auscultation without rhonchi or wheezing.   Cardiovascular Regular rate and rhythm of heart without murmurs, gallops or rubs.   Chest / Line Site Chest is symmetric with no chest wall deformities.   Abdomen Non-tender, non-distended, no masses, ascites or hepatosplenomegaly. Good bowel sounds.    Musculoskeletal No tenderness or swelling, normal range of motion without obvious weakness.   Extremities No visible deformities, no cyanosis, clubbing but ++++ edema.    Skin No scars, or lesions suggestive of malignancy. No petechiae, purpura, or ecchymoses. No excoriations. Dry desquamation on lower extremities.    Neurologic No sensory or motor deficits noted but not specifically tested.   Psychiatric Alert and oriented. Coherent speech. Verbalizes understanding of our discussions today.       Kaitlin Jimenez MD [facility] [place] office  [address] [place] [address]  Phone [phone]  Fax [phone]

## 2024-10-05 LAB
ALBUMIN SERPL-MCNC: 2.9 G/DL (ref 3.5–5)
ALBUMIN/GLOB SERPL: 0.7 (ref 1.1–2.2)
ALP SERPL-CCNC: 236 U/L (ref 45–117)
ALT SERPL-CCNC: 14 U/L (ref 12–78)
ANION GAP SERPL CALC-SCNC: 4 MMOL/L (ref 2–12)
AST SERPL-CCNC: 16 U/L (ref 15–37)
BASOPHILS # BLD: 0 K/UL (ref 0–0.1)
BASOPHILS NFR BLD: 0 % (ref 0–1)
BILIRUB DIRECT SERPL-MCNC: 0.1 MG/DL (ref 0–0.2)
BILIRUB SERPL-MCNC: 0.4 MG/DL (ref 0.2–1)
BUN SERPL-MCNC: 19 MG/DL (ref 6–20)
BUN/CREAT SERPL: 20 (ref 12–20)
CALCIUM SERPL-MCNC: 8.4 MG/DL (ref 8.5–10.1)
CHLORIDE SERPL-SCNC: 103 MMOL/L (ref 97–108)
CO2 SERPL-SCNC: 29 MMOL/L (ref 21–32)
CREAT SERPL-MCNC: 0.96 MG/DL (ref 0.7–1.3)
DIFFERENTIAL METHOD BLD: ABNORMAL
EOSINOPHIL # BLD: 0.1 K/UL (ref 0–0.4)
EOSINOPHIL NFR BLD: 3 % (ref 0–7)
ERYTHROCYTE [DISTWIDTH] IN BLOOD BY AUTOMATED COUNT: 14 % (ref 11.5–14.5)
GLOBULIN SER CALC-MCNC: 4.4 G/DL (ref 2–4)
GLUCOSE BLD STRIP.AUTO-MCNC: 148 MG/DL (ref 65–117)
GLUCOSE BLD STRIP.AUTO-MCNC: 158 MG/DL (ref 65–117)
GLUCOSE BLD STRIP.AUTO-MCNC: 172 MG/DL (ref 65–117)
GLUCOSE BLD STRIP.AUTO-MCNC: 179 MG/DL (ref 65–117)
GLUCOSE SERPL-MCNC: 127 MG/DL (ref 65–100)
HAPTOGLOB SERPL-MCNC: 332 MG/DL (ref 30–200)
HCT VFR BLD AUTO: 31.5 % (ref 36.6–50.3)
HGB BLD-MCNC: 9.8 G/DL (ref 12.1–17)
IMM GRANULOCYTES # BLD AUTO: 0 K/UL (ref 0–0.04)
IMM GRANULOCYTES NFR BLD AUTO: 1 % (ref 0–0.5)
IRON SATN MFR SERPL: 14 % (ref 20–50)
IRON SERPL-MCNC: 34 UG/DL (ref 35–150)
LYMPHOCYTES # BLD: 0.9 K/UL (ref 0.8–3.5)
LYMPHOCYTES NFR BLD: 19 % (ref 12–49)
MCH RBC QN AUTO: 26.8 PG (ref 26–34)
MCHC RBC AUTO-ENTMCNC: 31.1 G/DL (ref 30–36.5)
MCV RBC AUTO: 86.1 FL (ref 80–99)
MONOCYTES # BLD: 0.4 K/UL (ref 0–1)
MONOCYTES NFR BLD: 8 % (ref 5–13)
NEUTS SEG # BLD: 3 K/UL (ref 1.8–8)
NEUTS SEG NFR BLD: 69 % (ref 32–75)
NRBC # BLD: 0 K/UL (ref 0–0.01)
NRBC BLD-RTO: 0 PER 100 WBC
NT PRO BNP: 65 PG/ML
PLATELET # BLD AUTO: 218 K/UL (ref 150–400)
PMV BLD AUTO: 10.4 FL (ref 8.9–12.9)
POTASSIUM SERPL-SCNC: 3.9 MMOL/L (ref 3.5–5.1)
PROT SERPL-MCNC: 7.3 G/DL (ref 6.4–8.2)
RBC # BLD AUTO: 3.66 M/UL (ref 4.1–5.7)
SERVICE CMNT-IMP: ABNORMAL
SODIUM SERPL-SCNC: 136 MMOL/L (ref 136–145)
TIBC SERPL-MCNC: 250 UG/DL (ref 250–450)
WBC # BLD AUTO: 4.4 K/UL (ref 4.1–11.1)

## 2024-10-05 PROCEDURE — G0378 HOSPITAL OBSERVATION PER HR: HCPCS

## 2024-10-05 PROCEDURE — 96372 THER/PROPH/DIAG INJ SC/IM: CPT

## 2024-10-05 PROCEDURE — 80076 HEPATIC FUNCTION PANEL: CPT

## 2024-10-05 PROCEDURE — 6370000000 HC RX 637 (ALT 250 FOR IP): Performed by: INTERNAL MEDICINE

## 2024-10-05 PROCEDURE — 80048 BASIC METABOLIC PNL TOTAL CA: CPT

## 2024-10-05 PROCEDURE — 85025 COMPLETE CBC W/AUTO DIFF WBC: CPT

## 2024-10-05 PROCEDURE — 82962 GLUCOSE BLOOD TEST: CPT

## 2024-10-05 PROCEDURE — 87040 BLOOD CULTURE FOR BACTERIA: CPT

## 2024-10-05 PROCEDURE — 96366 THER/PROPH/DIAG IV INF ADDON: CPT

## 2024-10-05 PROCEDURE — 6360000002 HC RX W HCPCS: Performed by: INTERNAL MEDICINE

## 2024-10-05 PROCEDURE — 2580000003 HC RX 258: Performed by: NURSE PRACTITIONER

## 2024-10-05 PROCEDURE — 83880 ASSAY OF NATRIURETIC PEPTIDE: CPT

## 2024-10-05 PROCEDURE — 96375 TX/PRO/DX INJ NEW DRUG ADDON: CPT

## 2024-10-05 PROCEDURE — 36415 COLL VENOUS BLD VENIPUNCTURE: CPT

## 2024-10-05 PROCEDURE — 6370000000 HC RX 637 (ALT 250 FOR IP): Performed by: NURSE PRACTITIONER

## 2024-10-05 PROCEDURE — 2580000003 HC RX 258: Performed by: INTERNAL MEDICINE

## 2024-10-05 PROCEDURE — 83550 IRON BINDING TEST: CPT

## 2024-10-05 PROCEDURE — 6360000002 HC RX W HCPCS: Performed by: NURSE PRACTITIONER

## 2024-10-05 PROCEDURE — 83540 ASSAY OF IRON: CPT

## 2024-10-05 RX ADMIN — Medication 400 MG: at 21:26

## 2024-10-05 RX ADMIN — GABAPENTIN 100 MG: 100 CAPSULE ORAL at 15:15

## 2024-10-05 RX ADMIN — SODIUM CHLORIDE, PRESERVATIVE FREE 10 ML: 5 INJECTION INTRAVENOUS at 08:20

## 2024-10-05 RX ADMIN — METHOCARBAMOL TABLETS 500 MG: 500 TABLET, COATED ORAL at 15:15

## 2024-10-05 RX ADMIN — BUMETANIDE 2 MG: 1 TABLET ORAL at 08:19

## 2024-10-05 RX ADMIN — OXYCODONE 5 MG: 5 TABLET ORAL at 21:25

## 2024-10-05 RX ADMIN — ENOXAPARIN SODIUM 30 MG: 100 INJECTION SUBCUTANEOUS at 08:19

## 2024-10-05 RX ADMIN — INSULIN GLARGINE 30 UNITS: 100 INJECTION, SOLUTION SUBCUTANEOUS at 21:27

## 2024-10-05 RX ADMIN — ASPIRIN 81 MG: 81 TABLET, COATED ORAL at 08:18

## 2024-10-05 RX ADMIN — SODIUM CHLORIDE, PRESERVATIVE FREE 10 ML: 5 INJECTION INTRAVENOUS at 21:26

## 2024-10-05 RX ADMIN — GABAPENTIN 100 MG: 100 CAPSULE ORAL at 08:18

## 2024-10-05 RX ADMIN — LISINOPRIL 2.5 MG: 5 TABLET ORAL at 08:18

## 2024-10-05 RX ADMIN — METHOCARBAMOL TABLETS 500 MG: 500 TABLET, COATED ORAL at 21:26

## 2024-10-05 RX ADMIN — ATORVASTATIN CALCIUM 40 MG: 40 TABLET, FILM COATED ORAL at 08:19

## 2024-10-05 RX ADMIN — ENOXAPARIN SODIUM 30 MG: 100 INJECTION SUBCUTANEOUS at 21:26

## 2024-10-05 RX ADMIN — GABAPENTIN 100 MG: 100 CAPSULE ORAL at 21:27

## 2024-10-05 RX ADMIN — IRON SUCROSE 200 MG: 20 INJECTION, SOLUTION INTRAVENOUS at 11:17

## 2024-10-05 RX ADMIN — POLYETHYLENE GLYCOL 3350 17 G: 17 POWDER, FOR SOLUTION ORAL at 16:41

## 2024-10-05 RX ADMIN — BENZONATATE 100 MG: 100 CAPSULE ORAL at 16:40

## 2024-10-05 RX ADMIN — BENZONATATE 100 MG: 100 CAPSULE ORAL at 08:18

## 2024-10-05 RX ADMIN — METHOCARBAMOL TABLETS 500 MG: 500 TABLET, COATED ORAL at 08:18

## 2024-10-05 RX ADMIN — METHOCARBAMOL TABLETS 500 MG: 500 TABLET, COATED ORAL at 16:40

## 2024-10-05 RX ADMIN — SODIUM CHLORIDE 1000 MG: 900 INJECTION INTRAVENOUS at 15:17

## 2024-10-05 RX ADMIN — FERROUS SULFATE TAB 325 MG (65 MG ELEMENTAL FE) 325 MG: 325 (65 FE) TAB at 08:18

## 2024-10-05 ASSESSMENT — PAIN DESCRIPTION - DESCRIPTORS
DESCRIPTORS: SHARP
DESCRIPTORS: ACHING
DESCRIPTORS: ACHING;SHARP

## 2024-10-05 ASSESSMENT — PAIN SCALES - GENERAL
PAINLEVEL_OUTOF10: 8
PAINLEVEL_OUTOF10: 6
PAINLEVEL_OUTOF10: 5
PAINLEVEL_OUTOF10: 7
PAINLEVEL_OUTOF10: 7
PAINLEVEL_OUTOF10: 8

## 2024-10-05 ASSESSMENT — PAIN DESCRIPTION - ONSET: ONSET: ON-GOING

## 2024-10-05 ASSESSMENT — PAIN DESCRIPTION - LOCATION
LOCATION: ABDOMEN;BACK
LOCATION: BACK;ABDOMEN

## 2024-10-05 ASSESSMENT — PAIN DESCRIPTION - PAIN TYPE: TYPE: CHRONIC PAIN

## 2024-10-05 ASSESSMENT — PAIN DESCRIPTION - ORIENTATION
ORIENTATION: RIGHT;LOWER
ORIENTATION: LOWER

## 2024-10-05 ASSESSMENT — PAIN DESCRIPTION - FREQUENCY: FREQUENCY: CONTINUOUS

## 2024-10-05 ASSESSMENT — PAIN - FUNCTIONAL ASSESSMENT: PAIN_FUNCTIONAL_ASSESSMENT: PREVENTS OR INTERFERES SOME ACTIVE ACTIVITIES AND ADLS

## 2024-10-06 LAB
ALBUMIN SERPL-MCNC: 2.8 G/DL (ref 3.5–5)
ALBUMIN/GLOB SERPL: 0.7 (ref 1.1–2.2)
ALP SERPL-CCNC: 246 U/L (ref 45–117)
ALT SERPL-CCNC: 12 U/L (ref 12–78)
ANION GAP SERPL CALC-SCNC: 5 MMOL/L (ref 2–12)
AST SERPL-CCNC: 19 U/L (ref 15–37)
BACTERIA SPEC CULT: NORMAL
BASOPHILS # BLD: 0 K/UL (ref 0–0.1)
BASOPHILS NFR BLD: 1 % (ref 0–1)
BILIRUB DIRECT SERPL-MCNC: <0.1 MG/DL (ref 0–0.2)
BILIRUB SERPL-MCNC: 0.4 MG/DL (ref 0.2–1)
BUN SERPL-MCNC: 17 MG/DL (ref 6–20)
BUN/CREAT SERPL: 17 (ref 12–20)
CALCIUM SERPL-MCNC: 8.4 MG/DL (ref 8.5–10.1)
CHLORIDE SERPL-SCNC: 104 MMOL/L (ref 97–108)
CO2 SERPL-SCNC: 30 MMOL/L (ref 21–32)
CREAT SERPL-MCNC: 0.99 MG/DL (ref 0.7–1.3)
DIFFERENTIAL METHOD BLD: ABNORMAL
EOSINOPHIL # BLD: 0.1 K/UL (ref 0–0.4)
EOSINOPHIL NFR BLD: 3 % (ref 0–7)
ERYTHROCYTE [DISTWIDTH] IN BLOOD BY AUTOMATED COUNT: 14.2 % (ref 11.5–14.5)
GLOBULIN SER CALC-MCNC: 4.2 G/DL (ref 2–4)
GLUCOSE BLD STRIP.AUTO-MCNC: 121 MG/DL (ref 65–117)
GLUCOSE BLD STRIP.AUTO-MCNC: 130 MG/DL (ref 65–117)
GLUCOSE BLD STRIP.AUTO-MCNC: 176 MG/DL (ref 65–117)
GLUCOSE BLD STRIP.AUTO-MCNC: 183 MG/DL (ref 65–117)
GLUCOSE SERPL-MCNC: 141 MG/DL (ref 65–100)
GRAM STN SPEC: NORMAL
GRAM STN SPEC: NORMAL
HCT VFR BLD AUTO: 30.5 % (ref 36.6–50.3)
HGB BLD-MCNC: 9.4 G/DL (ref 12.1–17)
IMM GRANULOCYTES # BLD AUTO: 0 K/UL (ref 0–0.04)
IMM GRANULOCYTES NFR BLD AUTO: 0 % (ref 0–0.5)
LYMPHOCYTES # BLD: 0.7 K/UL (ref 0.8–3.5)
LYMPHOCYTES NFR BLD: 17 % (ref 12–49)
MCH RBC QN AUTO: 26.8 PG (ref 26–34)
MCHC RBC AUTO-ENTMCNC: 30.8 G/DL (ref 30–36.5)
MCV RBC AUTO: 86.9 FL (ref 80–99)
MONOCYTES # BLD: 0.4 K/UL (ref 0–1)
MONOCYTES NFR BLD: 8 % (ref 5–13)
NEUTS SEG # BLD: 3.2 K/UL (ref 1.8–8)
NEUTS SEG NFR BLD: 71 % (ref 32–75)
NRBC # BLD: 0 K/UL (ref 0–0.01)
NRBC BLD-RTO: 0 PER 100 WBC
PLATELET # BLD AUTO: 236 K/UL (ref 150–400)
PMV BLD AUTO: 10.7 FL (ref 8.9–12.9)
POTASSIUM SERPL-SCNC: 4 MMOL/L (ref 3.5–5.1)
PROT SERPL-MCNC: 7 G/DL (ref 6.4–8.2)
RBC # BLD AUTO: 3.51 M/UL (ref 4.1–5.7)
RBC MORPH BLD: ABNORMAL
SERVICE CMNT-IMP: ABNORMAL
SERVICE CMNT-IMP: NORMAL
SODIUM SERPL-SCNC: 139 MMOL/L (ref 136–145)
WBC # BLD AUTO: 4.4 K/UL (ref 4.1–11.1)

## 2024-10-06 PROCEDURE — 6370000000 HC RX 637 (ALT 250 FOR IP): Performed by: INTERNAL MEDICINE

## 2024-10-06 PROCEDURE — 93005 ELECTROCARDIOGRAM TRACING: CPT | Performed by: INTERNAL MEDICINE

## 2024-10-06 PROCEDURE — 6360000002 HC RX W HCPCS: Performed by: INTERNAL MEDICINE

## 2024-10-06 PROCEDURE — 82962 GLUCOSE BLOOD TEST: CPT

## 2024-10-06 PROCEDURE — 80076 HEPATIC FUNCTION PANEL: CPT

## 2024-10-06 PROCEDURE — 96372 THER/PROPH/DIAG INJ SC/IM: CPT

## 2024-10-06 PROCEDURE — 96366 THER/PROPH/DIAG IV INF ADDON: CPT

## 2024-10-06 PROCEDURE — G0378 HOSPITAL OBSERVATION PER HR: HCPCS

## 2024-10-06 PROCEDURE — 6360000002 HC RX W HCPCS: Performed by: NURSE PRACTITIONER

## 2024-10-06 PROCEDURE — 96376 TX/PRO/DX INJ SAME DRUG ADON: CPT

## 2024-10-06 PROCEDURE — 2580000003 HC RX 258: Performed by: INTERNAL MEDICINE

## 2024-10-06 PROCEDURE — 2580000003 HC RX 258: Performed by: NURSE PRACTITIONER

## 2024-10-06 PROCEDURE — 6370000000 HC RX 637 (ALT 250 FOR IP): Performed by: NURSE PRACTITIONER

## 2024-10-06 PROCEDURE — 36415 COLL VENOUS BLD VENIPUNCTURE: CPT

## 2024-10-06 PROCEDURE — 85025 COMPLETE CBC W/AUTO DIFF WBC: CPT

## 2024-10-06 PROCEDURE — 80048 BASIC METABOLIC PNL TOTAL CA: CPT

## 2024-10-06 RX ORDER — METOPROLOL TARTRATE 25 MG/1
12.5 TABLET, FILM COATED ORAL 2 TIMES DAILY
Status: DISCONTINUED | OUTPATIENT
Start: 2024-10-06 | End: 2024-10-07 | Stop reason: HOSPADM

## 2024-10-06 RX ADMIN — FERROUS SULFATE TAB 325 MG (65 MG ELEMENTAL FE) 325 MG: 325 (65 FE) TAB at 08:52

## 2024-10-06 RX ADMIN — OXYCODONE 5 MG: 5 TABLET ORAL at 21:59

## 2024-10-06 RX ADMIN — METHOCARBAMOL TABLETS 500 MG: 500 TABLET, COATED ORAL at 17:34

## 2024-10-06 RX ADMIN — ENOXAPARIN SODIUM 30 MG: 100 INJECTION SUBCUTANEOUS at 21:56

## 2024-10-06 RX ADMIN — METOPROLOL TARTRATE 12.5 MG: 25 TABLET, FILM COATED ORAL at 12:37

## 2024-10-06 RX ADMIN — SODIUM CHLORIDE 1000 MG: 900 INJECTION INTRAVENOUS at 14:38

## 2024-10-06 RX ADMIN — SODIUM CHLORIDE, PRESERVATIVE FREE 10 ML: 5 INJECTION INTRAVENOUS at 22:00

## 2024-10-06 RX ADMIN — ATORVASTATIN CALCIUM 40 MG: 40 TABLET, FILM COATED ORAL at 08:52

## 2024-10-06 RX ADMIN — ASPIRIN 81 MG: 81 TABLET, COATED ORAL at 08:52

## 2024-10-06 RX ADMIN — Medication 400 MG: at 21:59

## 2024-10-06 RX ADMIN — METHOCARBAMOL TABLETS 500 MG: 500 TABLET, COATED ORAL at 08:52

## 2024-10-06 RX ADMIN — GABAPENTIN 100 MG: 100 CAPSULE ORAL at 08:53

## 2024-10-06 RX ADMIN — METHOCARBAMOL TABLETS 500 MG: 500 TABLET, COATED ORAL at 21:57

## 2024-10-06 RX ADMIN — GABAPENTIN 100 MG: 100 CAPSULE ORAL at 12:37

## 2024-10-06 RX ADMIN — METHOCARBAMOL TABLETS 500 MG: 500 TABLET, COATED ORAL at 12:37

## 2024-10-06 RX ADMIN — METOPROLOL TARTRATE 12.5 MG: 25 TABLET, FILM COATED ORAL at 21:57

## 2024-10-06 RX ADMIN — INSULIN GLARGINE 30 UNITS: 100 INJECTION, SOLUTION SUBCUTANEOUS at 21:56

## 2024-10-06 RX ADMIN — GABAPENTIN 100 MG: 100 CAPSULE ORAL at 21:59

## 2024-10-06 RX ADMIN — IRON SUCROSE 200 MG: 20 INJECTION, SOLUTION INTRAVENOUS at 12:38

## 2024-10-06 RX ADMIN — ENOXAPARIN SODIUM 30 MG: 100 INJECTION SUBCUTANEOUS at 08:52

## 2024-10-06 RX ADMIN — BENZONATATE 100 MG: 100 CAPSULE ORAL at 09:00

## 2024-10-06 RX ADMIN — LISINOPRIL 2.5 MG: 5 TABLET ORAL at 08:52

## 2024-10-06 RX ADMIN — SODIUM CHLORIDE, PRESERVATIVE FREE 10 ML: 5 INJECTION INTRAVENOUS at 08:53

## 2024-10-06 RX ADMIN — BUMETANIDE 2 MG: 1 TABLET ORAL at 08:52

## 2024-10-06 ASSESSMENT — PAIN SCALES - GENERAL
PAINLEVEL_OUTOF10: 6
PAINLEVEL_OUTOF10: 7

## 2024-10-06 ASSESSMENT — PAIN - FUNCTIONAL ASSESSMENT
PAIN_FUNCTIONAL_ASSESSMENT: PREVENTS OR INTERFERES SOME ACTIVE ACTIVITIES AND ADLS
PAIN_FUNCTIONAL_ASSESSMENT: ACTIVITIES ARE NOT PREVENTED

## 2024-10-06 ASSESSMENT — PAIN DESCRIPTION - ORIENTATION
ORIENTATION: LOWER

## 2024-10-06 ASSESSMENT — PAIN DESCRIPTION - DESCRIPTORS
DESCRIPTORS: ACHING

## 2024-10-06 ASSESSMENT — PAIN DESCRIPTION - LOCATION
LOCATION: ABDOMEN;BACK

## 2024-10-06 ASSESSMENT — PAIN DESCRIPTION - ONSET: ONSET: ON-GOING

## 2024-10-06 ASSESSMENT — PAIN DESCRIPTION - PAIN TYPE: TYPE: CHRONIC PAIN

## 2024-10-06 ASSESSMENT — PAIN DESCRIPTION - FREQUENCY: FREQUENCY: CONTINUOUS

## 2024-10-07 VITALS
WEIGHT: 253.09 LBS | BODY MASS INDEX: 36.23 KG/M2 | DIASTOLIC BLOOD PRESSURE: 88 MMHG | OXYGEN SATURATION: 95 % | SYSTOLIC BLOOD PRESSURE: 120 MMHG | RESPIRATION RATE: 17 BRPM | TEMPERATURE: 98.6 F | HEIGHT: 70 IN | HEART RATE: 82 BPM

## 2024-10-07 LAB
ALBUMIN SERPL-MCNC: 2.9 G/DL (ref 3.5–5)
ALBUMIN/GLOB SERPL: 0.8 (ref 1.1–2.2)
ALP SERPL-CCNC: 214 U/L (ref 45–117)
ALT SERPL-CCNC: 12 U/L (ref 12–78)
ANION GAP SERPL CALC-SCNC: 5 MMOL/L (ref 2–12)
AST SERPL-CCNC: 18 U/L (ref 15–37)
BASOPHILS # BLD: 0 K/UL (ref 0–0.1)
BASOPHILS NFR BLD: 0 % (ref 0–1)
BILIRUB DIRECT SERPL-MCNC: <0.1 MG/DL (ref 0–0.2)
BILIRUB SERPL-MCNC: 0.3 MG/DL (ref 0.2–1)
BUN SERPL-MCNC: 15 MG/DL (ref 6–20)
BUN/CREAT SERPL: 19 (ref 12–20)
CALCIUM SERPL-MCNC: 8.3 MG/DL (ref 8.5–10.1)
CHLORIDE SERPL-SCNC: 104 MMOL/L (ref 97–108)
CO2 SERPL-SCNC: 29 MMOL/L (ref 21–32)
CREAT SERPL-MCNC: 0.81 MG/DL (ref 0.7–1.3)
DIFFERENTIAL METHOD BLD: ABNORMAL
ECHO BSA: 2.46 M2
EOSINOPHIL # BLD: 0.1 K/UL (ref 0–0.4)
EOSINOPHIL NFR BLD: 4 % (ref 0–7)
ERYTHROCYTE [DISTWIDTH] IN BLOOD BY AUTOMATED COUNT: 14.3 % (ref 11.5–14.5)
GLOBULIN SER CALC-MCNC: 3.5 G/DL (ref 2–4)
GLUCOSE BLD STRIP.AUTO-MCNC: 121 MG/DL (ref 65–117)
GLUCOSE BLD STRIP.AUTO-MCNC: 160 MG/DL (ref 65–117)
GLUCOSE SERPL-MCNC: 139 MG/DL (ref 65–100)
HCT VFR BLD AUTO: 30.1 % (ref 36.6–50.3)
HGB BLD-MCNC: 9.2 G/DL (ref 12.1–17)
IMM GRANULOCYTES # BLD AUTO: 0 K/UL (ref 0–0.04)
IMM GRANULOCYTES NFR BLD AUTO: 0 % (ref 0–0.5)
LYMPHOCYTES # BLD: 0.7 K/UL (ref 0.8–3.5)
LYMPHOCYTES NFR BLD: 19 % (ref 12–49)
MCH RBC QN AUTO: 26.8 PG (ref 26–34)
MCHC RBC AUTO-ENTMCNC: 30.6 G/DL (ref 30–36.5)
MCV RBC AUTO: 87.8 FL (ref 80–99)
MONOCYTES # BLD: 0.3 K/UL (ref 0–1)
MONOCYTES NFR BLD: 9 % (ref 5–13)
NEUTS SEG # BLD: 2.6 K/UL (ref 1.8–8)
NEUTS SEG NFR BLD: 68 % (ref 32–75)
NRBC # BLD: 0 K/UL (ref 0–0.01)
NRBC BLD-RTO: 0 PER 100 WBC
PLATELET # BLD AUTO: 205 K/UL (ref 150–400)
PMV BLD AUTO: 11 FL (ref 8.9–12.9)
POTASSIUM SERPL-SCNC: 4.1 MMOL/L (ref 3.5–5.1)
PROT SERPL-MCNC: 6.4 G/DL (ref 6.4–8.2)
RBC # BLD AUTO: 3.43 M/UL (ref 4.1–5.7)
SERVICE CMNT-IMP: ABNORMAL
SERVICE CMNT-IMP: ABNORMAL
SODIUM SERPL-SCNC: 138 MMOL/L (ref 136–145)
WBC # BLD AUTO: 3.8 K/UL (ref 4.1–11.1)

## 2024-10-07 PROCEDURE — 6370000000 HC RX 637 (ALT 250 FOR IP): Performed by: INTERNAL MEDICINE

## 2024-10-07 PROCEDURE — 96366 THER/PROPH/DIAG IV INF ADDON: CPT

## 2024-10-07 PROCEDURE — 6370000000 HC RX 637 (ALT 250 FOR IP): Performed by: NURSE PRACTITIONER

## 2024-10-07 PROCEDURE — 85025 COMPLETE CBC W/AUTO DIFF WBC: CPT

## 2024-10-07 PROCEDURE — 6360000002 HC RX W HCPCS: Performed by: INTERNAL MEDICINE

## 2024-10-07 PROCEDURE — 80048 BASIC METABOLIC PNL TOTAL CA: CPT

## 2024-10-07 PROCEDURE — 6360000002 HC RX W HCPCS: Performed by: NURSE PRACTITIONER

## 2024-10-07 PROCEDURE — G0378 HOSPITAL OBSERVATION PER HR: HCPCS

## 2024-10-07 PROCEDURE — 2580000003 HC RX 258: Performed by: INTERNAL MEDICINE

## 2024-10-07 PROCEDURE — 82962 GLUCOSE BLOOD TEST: CPT

## 2024-10-07 PROCEDURE — 36415 COLL VENOUS BLD VENIPUNCTURE: CPT

## 2024-10-07 PROCEDURE — 80076 HEPATIC FUNCTION PANEL: CPT

## 2024-10-07 PROCEDURE — 2580000003 HC RX 258: Performed by: NURSE PRACTITIONER

## 2024-10-07 PROCEDURE — 96372 THER/PROPH/DIAG INJ SC/IM: CPT

## 2024-10-07 RX ORDER — FERROUS SULFATE 325(65) MG
TABLET, DELAYED RELEASE (ENTERIC COATED) ORAL
Qty: 90 TABLET | Refills: 3 | Status: SHIPPED | OUTPATIENT
Start: 2024-10-07

## 2024-10-07 RX ORDER — CEPHALEXIN 500 MG/1
500 CAPSULE ORAL 2 TIMES DAILY
Qty: 10 CAPSULE | Refills: 0 | Status: SHIPPED | OUTPATIENT
Start: 2024-10-07 | End: 2024-10-07

## 2024-10-07 RX ORDER — CEPHALEXIN 500 MG/1
500 CAPSULE ORAL 2 TIMES DAILY
Qty: 10 CAPSULE | Refills: 0 | Status: SHIPPED | OUTPATIENT
Start: 2024-10-07 | End: 2024-10-12

## 2024-10-07 RX ORDER — METOPROLOL TARTRATE 25 MG/1
12.5 TABLET, FILM COATED ORAL 2 TIMES DAILY
Qty: 60 TABLET | Refills: 0 | Status: SHIPPED | OUTPATIENT
Start: 2024-10-07

## 2024-10-07 RX ADMIN — ATORVASTATIN CALCIUM 40 MG: 40 TABLET, FILM COATED ORAL at 08:28

## 2024-10-07 RX ADMIN — SODIUM CHLORIDE, PRESERVATIVE FREE 10 ML: 5 INJECTION INTRAVENOUS at 08:27

## 2024-10-07 RX ADMIN — BUMETANIDE 2 MG: 1 TABLET ORAL at 08:28

## 2024-10-07 RX ADMIN — METHOCARBAMOL TABLETS 500 MG: 500 TABLET, COATED ORAL at 08:28

## 2024-10-07 RX ADMIN — SODIUM CHLORIDE 1000 MG: 900 INJECTION INTRAVENOUS at 14:05

## 2024-10-07 RX ADMIN — METHOCARBAMOL TABLETS 500 MG: 500 TABLET, COATED ORAL at 13:34

## 2024-10-07 RX ADMIN — GABAPENTIN 100 MG: 100 CAPSULE ORAL at 13:34

## 2024-10-07 RX ADMIN — BENZONATATE 100 MG: 100 CAPSULE ORAL at 13:36

## 2024-10-07 RX ADMIN — ASPIRIN 81 MG: 81 TABLET, COATED ORAL at 08:27

## 2024-10-07 RX ADMIN — METOPROLOL TARTRATE 12.5 MG: 25 TABLET, FILM COATED ORAL at 08:27

## 2024-10-07 RX ADMIN — FERROUS SULFATE TAB 325 MG (65 MG ELEMENTAL FE) 325 MG: 325 (65 FE) TAB at 08:28

## 2024-10-07 RX ADMIN — GABAPENTIN 100 MG: 100 CAPSULE ORAL at 08:28

## 2024-10-07 RX ADMIN — ENOXAPARIN SODIUM 30 MG: 100 INJECTION SUBCUTANEOUS at 08:28

## 2024-10-07 ASSESSMENT — PAIN DESCRIPTION - PAIN TYPE: TYPE: CHRONIC PAIN

## 2024-10-07 ASSESSMENT — PAIN DESCRIPTION - ORIENTATION
ORIENTATION: LOWER
ORIENTATION: POSTERIOR

## 2024-10-07 ASSESSMENT — PAIN DESCRIPTION - LOCATION
LOCATION: BACK;ABDOMEN
LOCATION: BACK
LOCATION: BACK;ABDOMEN
LOCATION: ABDOMEN;BACK

## 2024-10-07 ASSESSMENT — PAIN DESCRIPTION - DESCRIPTORS
DESCRIPTORS: ACHING

## 2024-10-07 ASSESSMENT — PAIN DESCRIPTION - ONSET: ONSET: ON-GOING

## 2024-10-07 ASSESSMENT — PAIN DESCRIPTION - FREQUENCY: FREQUENCY: CONTINUOUS

## 2024-10-07 ASSESSMENT — PAIN SCALES - GENERAL
PAINLEVEL_OUTOF10: 8
PAINLEVEL_OUTOF10: 7
PAINLEVEL_OUTOF10: 6
PAINLEVEL_OUTOF10: 8

## 2024-10-07 ASSESSMENT — PAIN - FUNCTIONAL ASSESSMENT: PAIN_FUNCTIONAL_ASSESSMENT: ACTIVITIES ARE NOT PREVENTED

## 2024-10-07 NOTE — PLAN OF CARE
Problem: Chronic Conditions and Co-morbidities  Goal: Patient's chronic conditions and co-morbidity symptoms are monitored and maintained or improved  Outcome: Completed     Problem: Safety - Adult  Goal: Free from fall injury  10/7/2024 1243 by Ying Riojas RN  Outcome: Completed  Flowsheets (Taken 10/7/2024 0338 by Nydia Marcial RN)  Free From Fall Injury: Instruct family/caregiver on patient safety  10/7/2024 0101 by Nydia Marcial RN  Outcome: Progressing  Flowsheets (Taken 10/6/2024 1934)  Free From Fall Injury: Instruct family/caregiver on patient safety     Problem: Pain  Goal: Verbalizes/displays adequate comfort level or baseline comfort level  10/7/2024 1243 by Ying Riojas RN  Outcome: Completed  Flowsheets (Taken 10/7/2024 0338 by Nydia Marcial RN)  Verbalizes/displays adequate comfort level or baseline comfort level: Encourage patient to monitor pain and request assistance  10/7/2024 0101 by Nydia Marcial RN  Outcome: Progressing  Flowsheets (Taken 10/6/2024 1934)  Verbalizes/displays adequate comfort level or baseline comfort level: Assess pain using appropriate pain scale

## 2024-10-07 NOTE — PROGRESS NOTES
Hospitalist Progress Note    NAME:   Sachin Vyas   : 1965   MRN: [id number]     Date/Time: 10/6/2024 12:44 PM  Patient PCP: Reece Monk MD    Estimated discharge date: 10/7       Assessment / Plan:  Lower abdominal pain likely related to metastatic prostate cancer  - LFTs normal  - lipase within normal limits  - CT of abdomen/pelvis on 10/3/24 shows:  Grossly unchanged appearance of partially calcified retroperitoneal adenopathy and left retroperitoneal fat stranding.  Findings could be on the basis of posttreatment change, healed granulomatous disease, and/or inflammatory process such as pancreatitis or acute diverticulitis  Nonobstructing left nephrolithiasis  Worsening osseous metastatic disease.  Age indeterminate L1 transverse process fracture  Chronic L1 compression fracture and kyphoplasty  Right inguinal lymphadenopathy, nonspecific  - continue current pain medications  - heme/oncology following    2. Type II DM with neuropathy       History of chronic pain       Age indeterminate L1 transverse process fracture       Chronic L1 compression fracture and kyphoplasty  - hemoglobin A1c 8.8 3/26/24  - hold home metformin  - continue sliding scale  - continue lantus, robaxin, gabapentin  - accuchecks  - diabetic diet  - hypoglycemia protocol  - continue current pain medications    3. Anemia of chronic disease  - Hgb stable~9.4  - no active bleeding noted or reported  FOBT positive but no melena or hematochezia  Anemia likely from WILLIAMS and anemia of chronic disease  Will need outpatient colonoscopy  Monitor hemoglobin    4. History of asthma  - chest xray pending  - COVID and flu tests negative  - nebulizer prn  - turn, cough, deep breathe  - incentive spirometry    5. Dyslipidemia      HTN    - continue aspirin, statin, bumex, lisinopril    6. Hypokalemia  - replaced    7. Lymphedema      Left lower extremity cellulitis      Superficial right lower extremity wounds  - patient states 
      Hospitalist Progress Note    NAME:   Sachin Vyas   : 1965   MRN: [id number]     Date/Time: 10/5/2024 1:10 PM  Patient PCP: Reece Monk MD    Estimated discharge date: 10/6-  Barriers: Clinical stability. Improvement in cellulitis. Hematology/Oncology clearance      Assessment / Plan:  Lower abdominal pain likely related to metastatic prostate cancer  - LFTs normal  - lipase within normal limits  - CT of abdomen/pelvis on 10/3/24 shows:  Grossly unchanged appearance of partially calcified retroperitoneal adenopathy and left retroperitoneal fat stranding.  Findings could be on the basis of posttreatment change, healed granulomatous disease, and/or inflammatory process such as pancreatitis or acute diverticulitis  Nonobstructing left nephrolithiasis  Worsening osseous metastatic disease.  Age indeterminate L1 transverse process fracture  Chronic L1 compression fracture and kyphoplasty  Right inguinal lymphadenopathy, nonspecific  - continue current pain medications  - monitor labwork  - heme/oncology following    2. Type II DM with neuropathy       History of chronic pain       Age indeterminate L1 transverse process fracture       Chronic L1 compression fracture and kyphoplasty  - hemoglobin A1c 8.8 3/26/24  - hold home metformin  - continue sliding scale  - continue lantus, robaxin, gabapentin  - accuchecks  - diabetic diet  - hypoglycemia protocol  - continue current pain medications    3. Anemia of chronic disease  - Hgb 9.7  - no active bleeding noted or reported  FOBT positive but no melena or hematochezia  Anemia likely from WILLIAMS and anemia of chronic disease  Will need outpatient colonoscopy  Monitor hemoglobin    4. History of asthma  - chest xray pending  - COVID and flu tests negative  - nebulizer prn  - turn, cough, deep breathe  - incentive spirometry      5. Dyslipidemia      HTN  - follow pro BNP  - echo ordered  - daily weights  - strict I&O  - continue aspirin, statin, 
 Hospital follow-up PCP transitional care appointment has been scheduled with Dr. Reece Monk on 10/22/24 at 1140. This is the first available appt due to limited provider availability. PCP office does not offer alternate provider option for hospital follow up. [facility] placed [facility] information AVS for patient resource. Pending patient discharge.  Lizbeth Ku , Care Management Assistant     
-Hematology / Oncology ([facility]) -  -Primary Oncologist-   -CC-    -S-  Planning to leave soon, uber coming. No complaints, states he knows he has an appt with his cancer doc at [facility] soon.    -O-    Patient Vitals for the past 24 hrs:   Temp Pulse Resp BP SpO2   10/07/24 0730 98.6 °F (37 °C) 82 17 120/88 95 %   10/07/24 0338 97.7 °F (36.5 °C) 88 14 131/78 97 %   10/06/24 2229 -- -- 20 -- --   10/06/24 2157 -- -- 20 -- --   10/06/24 2154 -- 89 -- 115/71 --   10/06/24 1934 98.2 °F (36.8 °C) 87 20 (!) 144/82 96 %   10/06/24 1545 97.7 °F (36.5 °C) 86 16 131/84 100 %   10/06/24 1229 99 °F (37.2 °C) 92 16 129/78 98 %     No intake/output data recorded.    ROS: 12 point obtained and negative other than stated in HPI    PE:  Gen: nad  Chest: bilateral breath sounds present  Cardiac: rrr  Abd: s/nt  Ext: bandage on RLE, LLE with 2+ edema    -Labs-    Recent Labs     10/05/24  0600 10/06/24  0249 10/07/24  0428   WBC 4.4 4.4 3.8*   HGB 9.8* 9.4* 9.2*    236 205    139 138   K 3.9 4.0 4.1   BUN 19 17 15   ALT 14 12 12       -Imaging-   CT Result (most recent):  CT ABDOMEN PELVIS W IV CONTRAST 10/03/2024    Narrative  EXAM: CT ABDOMEN PELVIS W IV CONTRAST    INDICATION: attn: L retroperitoneal inflammation noted on non-con    COMPARISON: CT abdomen pelvis 10/3/2024    CONTRAST: 100 mL of Isovue-370.    ORAL CONTRAST: None    TECHNIQUE:  Following intravenous administration of contrast, thin axial images were  obtained through the abdomen and pelvis. Coronal and sagittal reconstructions  were generated. CT dose reduction was achieved through use of a standardized  protocol tailored for this examination and automatic exposure control for dose  modulation.    FINDINGS:  LOWER THORAX: No significant abnormality in the incidentally imaged lower chest.  LIVER: No mass.  BILIARY TREE: Gallbladder is within normal limits. CBD is not dilated.  SPLEEN: within normal limits.  PANCREAS: No mass or ductal dilatation.  ADRENALS: 
..End of Shift Note    Bedside shift change report given to CAMERON Stafford (oncoming nurse) by Pop Staples RN (offgoing nurse).  Report included the following information SBAR, Kardex, Intake/Output, MAR, and Recent Results    Shift worked:  3758-2271     Shift summary and any significant changes:     Pt given prescribed meds per MAR. PRN Tessalon given. Pt ambulating in room and offered pt the opportunity to walk in valente today. Wound dressing clean, dry and intact. Caring rounds completed.            Pop Staples, RN                            
.End of Shift Note    Bedside shift change report given to CAMERON Carson (oncoming nurse) by Nydia Marcial RN (offgoing nurse).  Report included the following information SBAR, Kardex, and MAR    Shift worked:  7p - 7a     Shift summary and any significant changes:    Medications administered per MAR, education provided.  PRN oxycodone X1 for lower abdominal and low back pain.  Patient up in room ad severino, patients heart rate will elevate 100 - 130's with activity.  Patient upset nursing staff checks on him especially when HR increases.  Patient stated \"I'm not a child, I don't need to be checked on all the time\".  Informed patient nursing must check on patients when their telemetry alerts.  Patient finally calmed down.  AM labs obtained per order, tolerated well.  Patient rested fair this shift.  Caring rounds completed.     Concerns for physician to address:       Zone phone for oncoming shift:          Activity:  Level of Assistance: Independent    Cardiac:   Cardiac Monitoring:  yes - sinus rhythm to sinus tach    Access:  Current line(s): PIV     Genitourinary:   Urinary Status: Voiding, Bathroom privileges    Respiratory:   O2 Device: None (Room air)    GI:  Current diet: ADULT DIET; Regular; 4 carb choices (60 gm/meal)    Pain Management:   Patient states pain is manageable on current regimen: YES    Skin:  Hesham Scale Score: 21  Interventions: Wound Offloading (Prevention Methods): Pillows, Elevate heels  Pressure injury: no    Patient Safety:  Fall Score: Mandujano Total Score: 25  Fall Risk Interventions  Nursing Judgement-Fall Risk High(Add Comments): Yes  Toilet Every 2 Hours-In Advance of Need: Yes  Hourly Visual Checks: Awake, In bed, In chair  Fall Visual Posted: Socks  Room Door Open: Deferred to promote rest  Alarm On: Bed, Chair  Patient Moved Closer to Nursing Station: No    Active Consults:  IP CONSULT TO ONCOLOGY  IP WOUND CARE NURSE CONSULT TO EVAL    Length of Stay:  Expected LOS: 2  Actual LOS: 
.End of Shift Note    Bedside shift change report given to CAMERON Hi (oncoming nurse) by Nydia Marcial RN (offgoing nurse).  Report included the following information SBAR, Kardex, and MAR    Shift worked:  7p - 7a     Shift summary and any significant changes:    Medications administered per MAR, education provided. Patient tolerating metoprolol well, no signs of adverse reaction.  PRN oxycodone administered X1 with mild effect.  Patient up part of the night, up ad severino in his room.  AM labs obtained per order, patient tolerated well.    Caring rounds completed.     Concerns for physician to address:       Zone phone for oncoming shift:          Activity:  Level of Assistance: Independent    Cardiac:   Cardiac Monitoring:  yes - Sinus rhythm    Access:  Current line(s): PIV     Genitourinary:   Urinary Status: Voiding, Bathroom privileges    Respiratory:   O2 Device: None (Room air)    GI:  Current diet: ADULT DIET; Regular; 4 carb choices (60 gm/meal)    Pain Management:   Patient states pain is manageable on current regimen: YES    Skin:  Hesham Scale Score: 21  Interventions: Wound Offloading (Prevention Methods): Bed, pressure reduction mattress, Elevate heels, Pillows, Repositioning, Turning  Pressure injury: no    Patient Safety:  Fall Score: Mandujano Total Score: 25  Fall Risk Interventions  Nursing Judgement-Fall Risk High(Add Comments): Yes  Toilet Every 2 Hours-In Advance of Need: Yes  Hourly Visual Checks: Awake, In chair  Fall Visual Posted: Socks, Fall sign posted  Room Door Open: Deferred to promote rest  Alarm On: Bed  Patient Moved Closer to Nursing Station: Yes    Active Consults:  IP CONSULT TO ONCOLOGY  IP WOUND CARE NURSE CONSULT TO EVAL    Length of Stay:  Expected LOS: 2  Actual LOS: 0      Nydia Marcial RN   
.End of Shift Note    Bedside shift change report given to CAMERON Lord (oncoming nurse) by Nydia Marcial RN (offgoing nurse).  Report included the following information SBAR, Kardex, and MAR    Shift worked:  7p - 7a     Shift summary and any significant changes:    Medications administered per order, education provided.  PRN oxycodone X1 for complaints of pain with mild effect.  Tessalon yecenia X1 for complaints of cough.  Patient up ad severino in room, HR elevates when up to bathroom.  BM X1, fecal occult specimen obtained.  AM labs including second set of blood cultures obtained, patient tolerated well.  Patient did not rest well during the night, Caring rounds completed.     Concerns for physician to address:       Zone phone for oncoming shift:          Activity:  Level of Assistance: Independent    Cardiac:   Cardiac Monitoring:  yes - Sinus rhythm to sinus tach with PVC's    Access:  Current line(s): PIV     Genitourinary:   Urinary Status: Voiding, Bathroom privileges    Respiratory:   O2 Device: None (Room air)    GI:  Current diet: ADULT DIET; Regular; 4 carb choices (60 gm/meal)    Pain Management:   Patient states pain is manageable on current regimen: YES    Skin:  Hesham Scale Score: 22  Interventions: Wound Offloading (Prevention Methods): Pillows, Repositioning (moved to chair; feet elevated with pillows (student nurse))  Pressure injury: no    Patient Safety:  Fall Score: Mandujano Total Score: 25  Fall Risk Interventions  Nursing Judgement-Fall Risk High(Add Comments): Yes  Toilet Every 2 Hours-In Advance of Need: Yes  Hourly Visual Checks: Awake, In chair  Fall Visual Posted: Socks, Fall sign posted  Room Door Open: Deferred to promote rest  Alarm On: Bed  Patient Moved Closer to Nursing Station: No    Active Consults:  IP CONSULT TO ONCOLOGY  IP WOUND CARE NURSE CONSULT TO EVAL    Length of Stay:  Expected LOS: 2  Actual LOS: 0      Nydia Marcial RN    
.I have reviewed discharge instructions with the patient. The patient verbalized understanding. Discharge medications reviewed with patient and appropriate educational materials and side effects teaching were provided. Follow-up appointments reviewed. Opportunity for questions and clarification was provided.  Venous access removed without difficulty.  Patient's belongings gathered and sent with patient. Patient is ready for discharge.     Ying Riojas RN   
Attempted to deliver and verbally explain the MOON/VOON with patient. Patient was with clinical staff. Lizbeth uK, Care Management Assistant   
End of Shift Note    Bedside shift change report given to CAMERON He (oncoming nurse) by Yocasta Snell LPN (offgoing nurse).  Report included the following information SBAR, Kardex, and MAR    Shift worked:  7p-7:30     Shift summary and any significant changes:     Pt tolerated care fairly well. All pt medications administered per MAR. Pt complained of pain in lower abdomen that radiates to the back, PRN oxy administered. Pt is ad severino, ambulates to bathroom . Routine safety rounding completed, call bell within reach    Am labs collected phlebotomist      Concerns for physician to address:       Zone phone for oncoming shift:          Activity:     Number times ambulated in hallways past shift: 0  Number of times OOB to chair past shift: 0    Cardiac:   Cardiac Monitoring: Yes           Access:  Current line(s): PIV     Genitourinary:   Urinary status: voiding    Respiratory:      Chronic home O2 use?: NO  Incentive spirometer at bedside: NO       GI:     Current diet:  ADULT DIET; Regular; 4 carb choices (60 gm/meal)  Passing flatus: YES  Tolerating current diet: YES       Pain Management:   Patient states pain is manageable on current regimen: YES    Skin:     Interventions: float heels    Patient Safety:  Fall Score:    Interventions: bed/chair alarm and gripper socks       Length of Stay:  Expected LOS: 2  Actual LOS: 0      Yocasta Snell LPN                           
End of Shift Note    Bedside shift change report given to Nydia RN  (oncoming nurse) by Ying Riojas RN (offgoing nurse).  Report included the following information SBAR, Kardex, Intake/Output, MAR, Recent Results, and Med Rec Status    Shift worked:  5172-3373     Shift summary and any significant changes:     Pt given prescribed med's per MAR. I could only drawl one set of blood cultures the other set will need to be done by PICC team with ultrasound. Still awaiting for stool sample for occult blood. Incentive Spirometer given and educated on the process and reasoning. Wound care nurse put orders for pt to have M-W-FR wound care dressing changed. Caring rounds completed.     Concerns for physician to address:  2nd set of blood cultures need to be pulled with ultrasound or picc team tomorrow     Zone phone for oncoming shift:   [phone]             Ying Riojas, RN                            
Occupational Therapy    Orders acknowledged, chart reviewed. Observed pt up ad severino in room. Spoke with pt who reports he feels he currently has no skilled acute OT needs at this time, RN agrees. PT to educate pt on body mechanics/pain management with mobility and movement due to \"age-indeterminate L1 transverse process fracture and chronic L1 compression fracture and kyphoplasty\". Will sign-off; please re-consult should pt have ADL deficits. Thanks.    Priya Leblanc MS, OTR/L  
Physical Therapy    Orders received, chart reviewed. Pt seen up ad severino in room without issue. Pt focused on wound care and going home. Attempted to educate pt on back protection with BLT precautions and log roll due to \"age-indeterminate L1 transverse process fracture and chronic L1 compression fracture and kyphoplasty\", however pt limitedly receptive to education. Offered HHPT or OPPT to follow up but pt denies need for further PT services. Will sign off at this time.    Amber Siddiqui, PT  
Spiritual Health History and Assessment/Progress Note  [facility]    Initial Encounter,  , Life Adjustments, Adjustment to illness, New Diagnosis,      Name: Sachin Vyas MRN: [id number]    Age: 59 y.o.     Sex: male   Language: English   Caodaism: Other   Lower abdominal pain     Date: 10/4/2024            Total Time Calculated: 60 min              Spiritual Assessment began in [facility] MEDICAL ONCOLOGY        Referral/Consult From: Nurse   Encounter Overview/Reason: Initial Encounter  Service Provided For: Patient    Cintia, Belief, Meaning:   Patient identifies as spiritual, is connected with a cintia tradition or spiritual practice, and has beliefs or practices that help with coping during difficult times  Family/Friends No family/friends present      Importance and Influence:  Patient has no beliefs influential to healthcare decision-making identified during this visit  Family/Friends No family/friends present    Community:  Patient expresses feelings of isolation: feeling there is no one to turn to for help and Other: spends a lot of time alone  Family/Friends No family/friends present    Assessment and Plan of Care:     Patient Interventions include: Facilitated expression of thoughts and feelings, Explored spiritual coping/struggle/distress, and Affirmed coping skills/support systems  Family/Friends Interventions include: No family/friends present    Patient Plan of Care: Spiritual Care available upon further referral  Family/Friends Plan of Care: No family/friends present    Responded to page to Oncology unit to offer support to Mr Vyas.  He learned during this admission that it appears his cancer has spread.  He shared that he doesn't know what it will mean for him.  He has four sisters and his mother is still living.  He lives with one of his sisters, but she works so he spends a lot of time alone.  He shared that he gets lonely quite frequently which is depressing for 
lisinopril    6. Hypokalemia  - will replace and monitor lab work    7. Lymphedema      Left lower extremity cellulitis      Superficial right lower extremity wounds  - patient states that he was taking antibiotics for skin infection at home prior to admission, unable to recall how many days he has taken  - wound culture and blood culture ordered  - venous duplex ordered  - patient follows wound care as outpatient  - conditional wound care ordered  - will start IV rocephin  - wound care consulted      Medical Decision Making:   I personally reviewed labs: yes  I personally reviewed imaging: yes  I personally reviewed EKG: yes  Toxic drug monitoring: Lovenox, H/H  Discussed case with: patient, nursing, Dr. MANSI Sexton        Code Status: Full Code  DVT Prophylaxis: Lovenox      Subjective:     Chief Complaint / Reason for Physician Visit \" I'm a little tired\"    Sachin Vyas is a 59 y.o.  male with PMHx significant for metastatic prostate cancer, diabetes mellitus, dyslipidemia, diabetic neuropathy presented to the emergency room on 10/3/24 with complaints of lower abdominal pain.  Reports does have chronic pain secondary to metastatic prostate cancer but the last 2 days started having some lower abdominal pain involving both left and right lower quadrants.  Reports nausea but no vomiting.  No diarrhea or constipation.  Does not report any urinary symptoms.  Does not report any chest pain or shortness of breath.  Reports some generalized weakness as well.     On arrival to ED, noted to have blood pressure of 161/100.  On labs BMP is within normal limits.  LFTs are normal.  CBC shows a hemoglobin of 9.6.  CT abdomen shows grossly unchanged appearance of partially calcified retroperitoneal adenopathy and retrograde left likely posttreatment change likely on the basis of posttreatment change along with nonobstructing left nephrolithiasis and worsening osseous metastatic disease and age-indeterminate L1

## 2024-10-07 NOTE — CARE COORDINATION
CM aware that pt will d/c on today and will transition home with family support.  Pt received observation letter.  CM arranged transport for pt on today with [facility] at 3:30P.    MALIKA informed pts nurse of the following.    MALIKA completed the needs of the pt at this time.    JOLEEN Sunshine CM  [phone]

## 2024-10-07 NOTE — DISCHARGE SUMMARY
97.7 °F (36.5 °C) Oral 88 14 97 % --   10/06/24 2229 -- -- -- -- 20 -- --   10/06/24 2157 -- -- -- -- 20 -- --   10/06/24 2154 115/71 -- -- 89 -- -- --   10/06/24 1934 (!) 144/82 98.2 °F (36.8 °C) Oral 87 20 96 % --   10/06/24 1545 131/84 97.7 °F (36.5 °C) -- 86 16 100 % --       Gen:    Not in distress  Chest: Clear lungs  CVS:   Regular rhythm.  No edema  Abd:  Soft, not distended, not tender  Neuro:  awake, moving all exts    Discharge/Recent Laboratory Results:  Recent Labs     10/07/24  0428      K 4.1      CO2 29   BUN 15   CREATININE 0.81   GLUCOSE 139*   CALCIUM 8.3*     Recent Labs     10/07/24  0428   HGB 9.2*   HCT 30.1*   WBC 3.8*          Discharge Medications:     Medication List        START taking these medications      cephALEXin 500 MG capsule  Commonly known as: KEFLEX  Take 1 capsule by mouth 2 times daily for 5 days     metoprolol tartrate 25 MG tablet  Commonly known as: LOPRESSOR  Take 0.5 tablets by mouth 2 times daily            CHANGE how you take these medications      gabapentin 100 MG capsule  Commonly known as: NEURONTIN  Take 1 capsule by mouth 3 times daily for 180 days. Intended supply: 90 days Max Daily Amount: 300 mg  What changed: Another medication with the same name was removed. Continue taking this medication, and follow the directions you see here.     GNP Aspirin 81 MG EC tablet  Generic drug: aspirin  TAKE ONE TABLET BY MOUTH EVERY DAY  What changed: Another medication with the same name was removed. Continue taking this medication, and follow the directions you see here.     metFORMIN 500 MG extended release tablet  Commonly known as: GLUCOPHAGE-XR  TAKE 1 TABLET BY MOUTH TWICE A DAY  What changed: Another medication with the same name was removed. Continue taking this medication, and follow the directions you see here.            CONTINUE taking these medications      atorvastatin 40 MG tablet  Commonly known as: LIPITOR  TAKE ONE TABLET BY MOUTH ONCE

## 2024-10-08 ENCOUNTER — TELEPHONE (OUTPATIENT)
Age: 59
End: 2024-10-08

## 2024-10-08 NOTE — TELEPHONE ENCOUNTER
Care Transitions Initial Follow Up Call    Outreach made within 2 business days of discharge: Yes    Patient: Sachin Vyas Patient : 1965   MRN: [id number]  Reason for Admission: lower abdominal pain  Discharge Date: 10/7/24       Spoke with: patient    Discharge department/facility: [facility] Patient Contact:  Was patient able to fill all prescriptions: Yes  Was patient instructed to bring all medications to the follow-up visit: Yes  Is patient taking all medications as directed in the discharge summary? Yes  Does patient understand their discharge instructions: Yes  Does patient have questions or concerns that need addressed prior to 7-14 day follow up office visit: no    Additional needs identified to be addressed with provider  No needs identified             Scheduled appointment with PCP within 7-14 days    Follow Up  Future Appointments   Date Time Provider Department Center   10/22/2024 11:40 AM Reece Monk MD [facility] DEP   2024 11:20 AM Reece Monk MD [facility] DEP       Rebecca Herrera

## 2024-10-09 LAB
BACTERIA SPEC CULT: NORMAL
EKG ATRIAL RATE: 95 BPM
EKG DIAGNOSIS: NORMAL
EKG P AXIS: 59 DEGREES
EKG P-R INTERVAL: 210 MS
EKG Q-T INTERVAL: 354 MS
EKG QRS DURATION: 84 MS
EKG QTC CALCULATION (BAZETT): 444 MS
EKG R AXIS: -26 DEGREES
EKG T AXIS: 49 DEGREES
EKG VENTRICULAR RATE: 95 BPM
SERVICE CMNT-IMP: NORMAL

## 2024-10-10 LAB
BACTERIA SPEC CULT: NORMAL
SERVICE CMNT-IMP: NORMAL

## 2024-10-21 RX ORDER — SILVER SULFADIAZINE 10 MG/G
CREAM TOPICAL DAILY PRN
Qty: 25 G | Refills: 2 | Status: SHIPPED | OUTPATIENT
Start: 2024-10-21

## 2024-10-21 NOTE — TELEPHONE ENCOUNTER
Last appointment: 6/24/24  Next appointment: 10/22/24, 12/30/24  Previous refill encounter(s): 12/26/23    Requested Prescriptions     Pending Prescriptions Disp Refills    silver sulfADIAZINE (SILVADENE) 1 % cream 25 g 2     Sig: Apply topically daily as needed (prn)         For Pharmacy Admin Tracking Only    Program: Medication Refill  CPA in place:    Recommendation Provided To:   Intervention Detail: New Rx: 1, reason: Patient Preference  Intervention Accepted By:   Gap Closed?:    Time Spent (min): 5

## 2024-10-22 ENCOUNTER — OFFICE VISIT (OUTPATIENT)
Age: 59
End: 2024-10-22
Payer: MEDICAID

## 2024-10-22 VITALS
SYSTOLIC BLOOD PRESSURE: 114 MMHG | WEIGHT: 252.6 LBS | HEART RATE: 84 BPM | BODY MASS INDEX: 35.36 KG/M2 | RESPIRATION RATE: 16 BRPM | DIASTOLIC BLOOD PRESSURE: 62 MMHG | HEIGHT: 71 IN | TEMPERATURE: 97 F | OXYGEN SATURATION: 98 %

## 2024-10-22 DIAGNOSIS — I87.2 CHRONIC VENOUS INSUFFICIENCY: ICD-10-CM

## 2024-10-22 DIAGNOSIS — Z79.4 TYPE 2 DIABETES MELLITUS WITHOUT COMPLICATION, WITH LONG-TERM CURRENT USE OF INSULIN (HCC): Primary | ICD-10-CM

## 2024-10-22 DIAGNOSIS — G89.29 CHRONIC BILATERAL LOW BACK PAIN WITHOUT SCIATICA: ICD-10-CM

## 2024-10-22 DIAGNOSIS — E11.9 TYPE 2 DIABETES MELLITUS WITHOUT COMPLICATION, WITH LONG-TERM CURRENT USE OF INSULIN (HCC): Primary | ICD-10-CM

## 2024-10-22 DIAGNOSIS — M54.50 CHRONIC BILATERAL LOW BACK PAIN WITHOUT SCIATICA: ICD-10-CM

## 2024-10-22 PROCEDURE — 3078F DIAST BP <80 MM HG: CPT | Performed by: FAMILY MEDICINE

## 2024-10-22 PROCEDURE — 99213 OFFICE O/P EST LOW 20 MIN: CPT | Performed by: FAMILY MEDICINE

## 2024-10-22 PROCEDURE — 3074F SYST BP LT 130 MM HG: CPT | Performed by: FAMILY MEDICINE

## 2024-10-22 RX ORDER — OXYCODONE HYDROCHLORIDE 5 MG/1
5 TABLET ORAL EVERY 12 HOURS PRN
Qty: 40 TABLET | Refills: 0 | Status: SHIPPED | OUTPATIENT
Start: 2024-10-22 | End: 2024-11-11

## 2024-10-22 RX ORDER — GABAPENTIN 100 MG/1
100 CAPSULE ORAL 3 TIMES DAILY
Qty: 270 CAPSULE | Refills: 1 | Status: SHIPPED | OUTPATIENT
Start: 2024-10-22 | End: 2025-04-20

## 2024-10-22 NOTE — PROGRESS NOTES
Sachin Vyas (:  1965) is a 59 y.o. male,Established patient, here for evaluation of the following chief complaint(s):  Follow-Up from Hospital         Assessment & Plan  Chronic bilateral low back pain without sciatica       Orders:    gabapentin (NEURONTIN) 100 MG capsule; Take 1 capsule by mouth 3 times daily for 180 days. Intended supply: 90 days Max Daily Amount: 300 mg    oxyCODONE (ROXICODONE) 5 MG immediate release tablet; Take 1 tablet by mouth every 12 hours as needed for Pain for up to 20 days. Intended supply: 3 days. Take lowest dose possible to manage pain Max Daily Amount: 10 mg    Type 2 diabetes mellitus without complication, with long-term current use of insulin ([facility], located within [facility])       Orders:    CBC; Future    Comprehensive Metabolic Panel; Future    Hemoglobin A1C; Future    Chronic venous insufficiency              No follow-ups on file.     Recheck in 3 months  Subjective   HPI In for hospital followup. Was in [facility] and discharged 10-7. Was in for metastatic prostae cancer and cellulitis of his L lower leg. Followed at [facility] for prostate cancer. Thinks that is about to start a new treatment for prostate cancer. Gets a lupron shot every 3 months. Has been going to wound care at [place] for legs. Needs refill of pain meds for metastatic prostate cancer.     Review of Systems       Objective   Physical Exam  Cardiovascular:      Rate and Rhythm: Normal rate and regular rhythm.      Heart sounds: Normal heart sounds. No murmur heard.  Pulmonary:      Effort: Pulmonary effort is normal.      Breath sounds: Normal breath sounds.   Abdominal:      General: Abdomen is flat. Bowel sounds are normal.      Palpations: Abdomen is soft.   Musculoskeletal:      Left lower leg: No edema.      Comments: 3+ edema bilaterally                  An electronic signature was used to authenticate this note.    --Reece Monk MD

## 2024-10-22 NOTE — PROGRESS NOTES
Chief Complaint   Patient presents with    Follow-Up from Hospital       \"Have you been to the ER, urgent care clinic since your last visit?  Hospitalized since your last visit?\"      10/3/24 - 10/7/24 - [facility]    “Have you seen or consulted any other health care providers outside of [facility] since your last visit?”    NO            Click Here for Release of Records Request       Vitals:    10/22/24 1206   BP: 114/62   Pulse: 84   Resp: 16   Temp: 97 °F (36.1 °C)   SpO2: 98%      Health Maintenance Due   Topic Date Due    Pneumococcal 0-64 years Vaccine (1 of 2 - PCV) Never done    Diabetic foot exam  Never done    HIV screen  Never done    Diabetic Alb to Cr ratio (uACR) test  Never done    Diabetic retinal exam  Never done    Hepatitis B vaccine (1 of 3 - 19+ 3-dose series) Never done    DTaP/Tdap/Td vaccine (1 - Tdap) Never done    Shingles vaccine (1 of 2) Never done    COVID-19 Vaccine (3 - Pfizer risk series) 04/10/2021    Lipids  2022    Prostate Specific Antigen (PSA) Screening or Monitoring  2022    Flu vaccine (1) 2024        The patient, Sachin Vyas, identity was verified by name and .

## 2024-10-23 LAB
ALBUMIN SERPL-MCNC: 3.7 G/DL (ref 3.8–4.9)
ALP SERPL-CCNC: 286 IU/L (ref 44–121)
ALT SERPL-CCNC: 10 IU/L (ref 0–44)
AST SERPL-CCNC: 13 IU/L (ref 0–40)
BILIRUB SERPL-MCNC: <0.2 MG/DL (ref 0–1.2)
BUN SERPL-MCNC: 24 MG/DL (ref 6–24)
BUN/CREAT SERPL: 22 (ref 9–20)
CALCIUM SERPL-MCNC: 9 MG/DL (ref 8.7–10.2)
CHLORIDE SERPL-SCNC: 102 MMOL/L (ref 96–106)
CO2 SERPL-SCNC: 24 MMOL/L (ref 20–29)
CREAT SERPL-MCNC: 1.08 MG/DL (ref 0.76–1.27)
EGFRCR SERPLBLD CKD-EPI 2021: 79 ML/MIN/1.73
ERYTHROCYTE [DISTWIDTH] IN BLOOD BY AUTOMATED COUNT: 13.9 % (ref 11.6–15.4)
GLOBULIN SER CALC-MCNC: 3.8 G/DL (ref 1.5–4.5)
GLUCOSE SERPL-MCNC: 120 MG/DL (ref 70–99)
HBA1C MFR BLD: 9 % (ref 4.8–5.6)
HCT VFR BLD AUTO: 30.6 % (ref 37.5–51)
HGB BLD-MCNC: 9.3 G/DL (ref 13–17.7)
MCH RBC QN AUTO: 26.6 PG (ref 26.6–33)
MCHC RBC AUTO-ENTMCNC: 30.4 G/DL (ref 31.5–35.7)
MCV RBC AUTO: 87 FL (ref 79–97)
PLATELET # BLD AUTO: 215 X10E3/UL (ref 150–450)
POTASSIUM SERPL-SCNC: 5.2 MMOL/L (ref 3.5–5.2)
PROT SERPL-MCNC: 7.5 G/DL (ref 6–8.5)
RBC # BLD AUTO: 3.5 X10E6/UL (ref 4.14–5.8)
SODIUM SERPL-SCNC: 139 MMOL/L (ref 134–144)
WBC # BLD AUTO: 3.8 X10E3/UL (ref 3.4–10.8)

## 2024-10-29 NOTE — PROGRESS NOTES
Pc with pt. Not always taking trulicity. Compliance stressed. Hasn't goten oxycodone filled yet. In looking at Pet Scan result, looks like would have good reason to be in severe pain

## 2024-12-09 RX ORDER — BUMETANIDE 2 MG/1
2 TABLET ORAL DAILY
Qty: 90 TABLET | Refills: 1 | Status: SHIPPED | OUTPATIENT
Start: 2024-12-09

## 2024-12-09 RX ORDER — ATORVASTATIN CALCIUM 40 MG/1
40 TABLET, FILM COATED ORAL DAILY
Qty: 90 TABLET | Refills: 1 | Status: SHIPPED | OUTPATIENT
Start: 2024-12-09

## 2024-12-30 RX ORDER — DOCUSATE SODIUM 100 MG/1
100 CAPSULE, LIQUID FILLED ORAL 2 TIMES DAILY PRN
COMMUNITY
Start: 2024-11-21

## 2024-12-30 RX ORDER — ACETAMINOPHEN 500 MG
1000 TABLET ORAL EVERY 8 HOURS
COMMUNITY
Start: 2024-12-22 | End: 2025-01-21

## 2024-12-30 RX ORDER — POLYETHYLENE GLYCOL 3350 17 G/17G
17 POWDER, FOR SOLUTION ORAL DAILY
COMMUNITY
Start: 2024-12-22 | End: 2025-01-21

## 2024-12-30 RX ORDER — ACETAMINOPHEN 325 MG/1
325 TABLET ORAL EVERY 6 HOURS PRN
COMMUNITY
Start: 2024-11-21